# Patient Record
Sex: FEMALE | Race: WHITE | Employment: OTHER | URBAN - METROPOLITAN AREA
[De-identification: names, ages, dates, MRNs, and addresses within clinical notes are randomized per-mention and may not be internally consistent; named-entity substitution may affect disease eponyms.]

---

## 2019-03-25 ENCOUNTER — OFFICE VISIT (OUTPATIENT)
Dept: FAMILY MEDICINE CLINIC | Age: 64
End: 2019-03-25
Payer: COMMERCIAL

## 2019-03-25 VITALS
TEMPERATURE: 98.5 F | WEIGHT: 139 LBS | BODY MASS INDEX: 26.24 KG/M2 | HEART RATE: 88 BPM | DIASTOLIC BLOOD PRESSURE: 84 MMHG | SYSTOLIC BLOOD PRESSURE: 130 MMHG | HEIGHT: 61 IN | OXYGEN SATURATION: 98 %

## 2019-03-25 DIAGNOSIS — S05.01XA ABRASION OF RIGHT CORNEA, INITIAL ENCOUNTER: Primary | ICD-10-CM

## 2019-03-25 PROCEDURE — 99213 OFFICE O/P EST LOW 20 MIN: CPT | Performed by: PHYSICIAN ASSISTANT

## 2019-03-25 RX ORDER — MULTIVIT-MIN/IRON/FOLIC ACID/K 18-600-40
CAPSULE ORAL
Status: ON HOLD | COMMUNITY
End: 2022-02-11 | Stop reason: HOSPADM

## 2019-03-25 RX ORDER — ASCORBIC ACID 500 MG
500 TABLET ORAL DAILY
Status: ON HOLD | COMMUNITY
End: 2022-02-11 | Stop reason: HOSPADM

## 2019-03-25 RX ORDER — LANOLIN ALCOHOL/MO/W.PET/CERES
500 CREAM (GRAM) TOPICAL DAILY
Status: ON HOLD | COMMUNITY
End: 2022-02-11 | Stop reason: HOSPADM

## 2019-03-25 RX ORDER — TOBRAMYCIN 3 MG/ML
SOLUTION/ DROPS OPHTHALMIC
Qty: 5 ML | Refills: 0 | Status: SHIPPED
Start: 2019-03-25 | End: 2022-01-14

## 2019-03-25 SDOH — HEALTH STABILITY: MENTAL HEALTH: HOW MANY STANDARD DRINKS CONTAINING ALCOHOL DO YOU HAVE ON A TYPICAL DAY?: 1 OR 2

## 2019-03-25 SDOH — HEALTH STABILITY: MENTAL HEALTH: HOW OFTEN DO YOU HAVE A DRINK CONTAINING ALCOHOL?: MONTHLY OR LESS

## 2020-06-29 ENCOUNTER — HOSPITAL ENCOUNTER (OUTPATIENT)
Age: 65
Discharge: HOME | End: 2020-07-01
Payer: COMMERCIAL

## 2020-06-29 LAB
ALBUMIN SERPL-MCNC: 4.6 G/DL (ref 3.5–5.2)
ALP BLD-CCNC: 78 U/L (ref 35–104)
ALT SERPL-CCNC: 13 U/L (ref 0–32)
ANION GAP SERPL CALCULATED.3IONS-SCNC: 14 MMOL/L (ref 7–16)
AST SERPL-CCNC: 21 U/L (ref 0–31)
BILIRUB SERPL-MCNC: 0.4 MG/DL (ref 0–1.2)
BUN BLDV-MCNC: 15 MG/DL (ref 8–23)
CALCIUM SERPL-MCNC: 9.6 MG/DL (ref 8.6–10.2)
CHLORIDE BLD-SCNC: 103 MMOL/L (ref 98–107)
CHOLESTEROL, TOTAL: 145 MG/DL (ref 0–199)
CO2: 25 MMOL/L (ref 22–29)
CREAT SERPL-MCNC: 0.8 MG/DL (ref 0.5–1)
GFR AFRICAN AMERICAN: >60
GFR NON-AFRICAN AMERICAN: >60 ML/MIN/1.73
GLUCOSE BLD-MCNC: 94 MG/DL (ref 74–99)
HDLC SERPL-MCNC: 73 MG/DL
LDL CHOLESTEROL CALCULATED: 57 MG/DL (ref 0–99)
POTASSIUM SERPL-SCNC: 4.7 MMOL/L (ref 3.5–5)
SODIUM BLD-SCNC: 142 MMOL/L (ref 132–146)
TOTAL PROTEIN: 7.5 G/DL (ref 6.4–8.3)
TRIGL SERPL-MCNC: 75 MG/DL (ref 0–149)
TSH SERPL DL<=0.05 MIU/L-ACNC: 3.6 UIU/ML (ref 0.27–4.2)
VITAMIN D 25-HYDROXY: 67 NG/ML (ref 30–100)
VLDLC SERPL CALC-MCNC: 15 MG/DL

## 2020-06-29 PROCEDURE — 80061 LIPID PANEL: CPT

## 2020-06-29 PROCEDURE — 82306 VITAMIN D 25 HYDROXY: CPT

## 2020-06-29 PROCEDURE — 80053 COMPREHEN METABOLIC PANEL: CPT

## 2020-06-29 PROCEDURE — 84443 ASSAY THYROID STIM HORMONE: CPT

## 2020-10-28 ENCOUNTER — OFFICE VISIT (OUTPATIENT)
Dept: FAMILY MEDICINE CLINIC | Age: 65
End: 2020-10-28
Payer: COMMERCIAL

## 2020-10-28 ENCOUNTER — HOSPITAL ENCOUNTER (OUTPATIENT)
Age: 65
Discharge: HOME | End: 2020-10-30
Payer: COMMERCIAL

## 2020-10-28 VITALS
BODY MASS INDEX: 25.72 KG/M2 | HEIGHT: 60 IN | WEIGHT: 131 LBS | HEART RATE: 95 BPM | DIASTOLIC BLOOD PRESSURE: 70 MMHG | RESPIRATION RATE: 14 BRPM | OXYGEN SATURATION: 96 % | TEMPERATURE: 96 F | SYSTOLIC BLOOD PRESSURE: 130 MMHG

## 2020-10-28 LAB
APPEARANCE FLUID: ABNORMAL
BILIRUBIN, POC: ABNORMAL
BLOOD URINE, POC: ABNORMAL
CLARITY, POC: ABNORMAL
COLOR, POC: YELLOW
GLUCOSE URINE, POC: ABNORMAL
KETONES, POC: ABNORMAL
LEUKOCYTE EST, POC: ABNORMAL
NITRITE, POC: ABNORMAL
PH, POC: 6
PROTEIN, POC: ABNORMAL
SPECIFIC GRAVITY, POC: 1.01
UROBILINOGEN, POC: 0.2

## 2020-10-28 PROCEDURE — 87088 URINE BACTERIA CULTURE: CPT

## 2020-10-28 PROCEDURE — 87186 SC STD MICRODIL/AGAR DIL: CPT

## 2020-10-28 PROCEDURE — 81002 URINALYSIS NONAUTO W/O SCOPE: CPT | Performed by: PHYSICIAN ASSISTANT

## 2020-10-28 PROCEDURE — 99213 OFFICE O/P EST LOW 20 MIN: CPT | Performed by: PHYSICIAN ASSISTANT

## 2020-10-28 RX ORDER — SULFAMETHOXAZOLE AND TRIMETHOPRIM 800; 160 MG/1; MG/1
1 TABLET ORAL 2 TIMES DAILY
Qty: 14 TABLET | Refills: 0 | Status: SHIPPED | OUTPATIENT
Start: 2020-10-28 | End: 2020-11-04

## 2020-10-28 NOTE — PROGRESS NOTES
Chief Complaint:   Urinary Tract Infection (x 4-5 days, frequency, urgency, cloudy urine, foul odor)      History of Present Illness   Source of history provided by:  patient. Arcadio Forrest is a 76 y.o. old female who has a past medical history of: History reviewed. No pertinent past medical history. Presents to the walk in clinic for evaluation of a suspected UTI X 4-5 days. Reports associated frequency, urgency, foul smelling urine, cloudy urine, and suprapubic pressure. Denies gross hematuria. Denies associated flank pain. Denies any fever, chills, vaginal discharge, vaginal bleeding, possibility of pregnancy, vomiting, diarrhea, or lethargy. No LMP recorded. Patient is postmenopausal.    ROS    Unless otherwise stated in this report or unable to obtain because of the patient's clinical or mental status as evidenced by the medical record, this patients's positive and negative responses for Review of Systems, constitutional, psych, eyes, ENT, cardiovascular, respiratory, gastrointestinal, neurological, genitourinary, musculoskeletal, integument systems and systems related to the presenting problem are either stated in the preceding or were not pertinent or were negative for the symptoms and/or complaints related to the medical problem. Past Surgical history: History reviewed. No pertinent surgical history. Social History:  reports that she has never smoked. She has never used smokeless tobacco. She reports current alcohol use. She reports that she does not use drugs. Family History: family history is not on file. Allergies: Patient has no known allergies. Physical Exam         VS:  /70   Pulse 95   Temp 96 °F (35.6 °C)   Resp 14   Ht 5' (1.524 m)   Wt 131 lb (59.4 kg)   SpO2 96%   BMI 25.58 kg/m²    Oxygen Saturation Interpretation: Normal.    Constitutional:  A&Ox3, development consistent with age, NAD. Lungs:  CTAB without wheezing, rales, or rhonchi.   Heart:  RRR without

## 2020-10-30 LAB
ORGANISM: ABNORMAL
URINE CULTURE, ROUTINE: ABNORMAL

## 2022-01-14 ENCOUNTER — HOSPITAL ENCOUNTER (INPATIENT)
Age: 67
LOS: 28 days | Discharge: ANOTHER HOSPITAL | DRG: 207 | End: 2022-02-11
Attending: EMERGENCY MEDICINE | Admitting: FAMILY MEDICINE
Payer: MEDICARE

## 2022-01-14 ENCOUNTER — APPOINTMENT (OUTPATIENT)
Dept: GENERAL RADIOLOGY | Age: 67
DRG: 207 | End: 2022-01-14
Payer: MEDICARE

## 2022-01-14 DIAGNOSIS — R09.02 HYPOXIA: Primary | ICD-10-CM

## 2022-01-14 DIAGNOSIS — U07.1 COVID-19: ICD-10-CM

## 2022-01-14 PROBLEM — J12.82 PNEUMONIA DUE TO COVID-19 VIRUS: Status: ACTIVE | Noted: 2022-01-14

## 2022-01-14 LAB
ALBUMIN SERPL-MCNC: 3.9 G/DL (ref 3.5–5.2)
ALP BLD-CCNC: 90 U/L (ref 35–104)
ALT SERPL-CCNC: 81 U/L (ref 0–32)
ANION GAP SERPL CALCULATED.3IONS-SCNC: 19 MMOL/L (ref 7–16)
ANISOCYTOSIS: ABNORMAL
APTT: 32.5 SEC (ref 24.5–35.1)
AST SERPL-CCNC: 160 U/L (ref 0–31)
BASOPHILS ABSOLUTE: 0 E9/L (ref 0–0.2)
BASOPHILS RELATIVE PERCENT: 0.1 % (ref 0–2)
BILIRUB SERPL-MCNC: <0.2 MG/DL (ref 0–1.2)
BUN BLDV-MCNC: 38 MG/DL (ref 6–23)
BURR CELLS: ABNORMAL
C-REACTIVE PROTEIN: 12.8 MG/DL (ref 0–0.4)
CALCIUM SERPL-MCNC: 9.2 MG/DL (ref 8.6–10.2)
CHLORIDE BLD-SCNC: 101 MMOL/L (ref 98–107)
CO2: 19 MMOL/L (ref 22–29)
CREAT SERPL-MCNC: 1.9 MG/DL (ref 0.5–1)
D DIMER: 404 NG/ML DDU
EOSINOPHILS ABSOLUTE: 0 E9/L (ref 0.05–0.5)
EOSINOPHILS RELATIVE PERCENT: 0 % (ref 0–6)
FERRITIN: 5098 NG/ML
GFR AFRICAN AMERICAN: 32
GFR NON-AFRICAN AMERICAN: 26 ML/MIN/1.73
GLUCOSE BLD-MCNC: 122 MG/DL (ref 74–99)
HCT VFR BLD CALC: 38.2 % (ref 34–48)
HEMOGLOBIN: 12.5 G/DL (ref 11.5–15.5)
INR BLD: 1
LACTATE DEHYDROGENASE: 640 U/L (ref 135–214)
LACTIC ACID: 1.8 MMOL/L (ref 0.5–2.2)
LYMPHOCYTES ABSOLUTE: 0.29 E9/L (ref 1.5–4)
LYMPHOCYTES RELATIVE PERCENT: 2.6 % (ref 20–42)
MCH RBC QN AUTO: 30.3 PG (ref 26–35)
MCHC RBC AUTO-ENTMCNC: 32.7 % (ref 32–34.5)
MCV RBC AUTO: 92.7 FL (ref 80–99.9)
MONOCYTES ABSOLUTE: 0.19 E9/L (ref 0.1–0.95)
MONOCYTES RELATIVE PERCENT: 1.7 % (ref 2–12)
MYELOCYTE PERCENT: 0.9 % (ref 0–0)
NEUTROPHILS ABSOLUTE: 9.12 E9/L (ref 1.8–7.3)
NEUTROPHILS RELATIVE PERCENT: 94.8 % (ref 43–80)
PDW BLD-RTO: 14.3 FL (ref 11.5–15)
PLATELET # BLD: 432 E9/L (ref 130–450)
PMV BLD AUTO: 11 FL (ref 7–12)
POIKILOCYTES: ABNORMAL
POLYCHROMASIA: ABNORMAL
POTASSIUM REFLEX MAGNESIUM: 4.3 MMOL/L (ref 3.5–5)
PROCALCITONIN: 1.57 NG/ML (ref 0–0.08)
PROTHROMBIN TIME: 11.3 SEC (ref 9.3–12.4)
RBC # BLD: 4.12 E12/L (ref 3.5–5.5)
SARS-COV-2, NAAT: DETECTED
SODIUM BLD-SCNC: 139 MMOL/L (ref 132–146)
TOTAL PROTEIN: 7.5 G/DL (ref 6.4–8.3)
WBC # BLD: 9.5 E9/L (ref 4.5–11.5)

## 2022-01-14 PROCEDURE — 6370000000 HC RX 637 (ALT 250 FOR IP): Performed by: EMERGENCY MEDICINE

## 2022-01-14 PROCEDURE — 85730 THROMBOPLASTIN TIME PARTIAL: CPT

## 2022-01-14 PROCEDURE — 6370000000 HC RX 637 (ALT 250 FOR IP): Performed by: FAMILY MEDICINE

## 2022-01-14 PROCEDURE — 6360000002 HC RX W HCPCS: Performed by: FAMILY MEDICINE

## 2022-01-14 PROCEDURE — 86140 C-REACTIVE PROTEIN: CPT

## 2022-01-14 PROCEDURE — 99283 EMERGENCY DEPT VISIT LOW MDM: CPT

## 2022-01-14 PROCEDURE — XW0DXM6 INTRODUCTION OF BARICITINIB INTO MOUTH AND PHARYNX, EXTERNAL APPROACH, NEW TECHNOLOGY GROUP 6: ICD-10-PCS | Performed by: FAMILY MEDICINE

## 2022-01-14 PROCEDURE — 36415 COLL VENOUS BLD VENIPUNCTURE: CPT

## 2022-01-14 PROCEDURE — 2580000003 HC RX 258: Performed by: EMERGENCY MEDICINE

## 2022-01-14 PROCEDURE — 83615 LACTATE (LD) (LDH) ENZYME: CPT

## 2022-01-14 PROCEDURE — 84145 PROCALCITONIN (PCT): CPT

## 2022-01-14 PROCEDURE — 71045 X-RAY EXAM CHEST 1 VIEW: CPT

## 2022-01-14 PROCEDURE — 1200000000 HC SEMI PRIVATE

## 2022-01-14 PROCEDURE — 83605 ASSAY OF LACTIC ACID: CPT

## 2022-01-14 PROCEDURE — 87635 SARS-COV-2 COVID-19 AMP PRB: CPT

## 2022-01-14 PROCEDURE — 82728 ASSAY OF FERRITIN: CPT

## 2022-01-14 PROCEDURE — 93005 ELECTROCARDIOGRAM TRACING: CPT | Performed by: EMERGENCY MEDICINE

## 2022-01-14 PROCEDURE — 2700000000 HC OXYGEN THERAPY PER DAY

## 2022-01-14 PROCEDURE — 85610 PROTHROMBIN TIME: CPT

## 2022-01-14 PROCEDURE — 2580000003 HC RX 258: Performed by: FAMILY MEDICINE

## 2022-01-14 PROCEDURE — 80053 COMPREHEN METABOLIC PANEL: CPT

## 2022-01-14 PROCEDURE — 85378 FIBRIN DEGRADE SEMIQUANT: CPT

## 2022-01-14 PROCEDURE — 85025 COMPLETE CBC W/AUTO DIFF WBC: CPT

## 2022-01-14 RX ORDER — SODIUM CHLORIDE 9 MG/ML
1000 INJECTION, SOLUTION INTRAVENOUS CONTINUOUS
Status: DISCONTINUED | OUTPATIENT
Start: 2022-01-14 | End: 2022-01-15

## 2022-01-14 RX ORDER — SODIUM CHLORIDE 9 MG/ML
INJECTION, SOLUTION INTRAVENOUS CONTINUOUS
Status: DISCONTINUED | OUTPATIENT
Start: 2022-01-14 | End: 2022-01-15

## 2022-01-14 RX ORDER — ASCORBIC ACID 500 MG
500 TABLET ORAL DAILY
Status: DISCONTINUED | OUTPATIENT
Start: 2022-01-14 | End: 2022-02-09

## 2022-01-14 RX ORDER — ZINC SULFATE 50(220)MG
50 CAPSULE ORAL DAILY
Status: DISCONTINUED | OUTPATIENT
Start: 2022-01-14 | End: 2022-02-09

## 2022-01-14 RX ORDER — SODIUM CHLORIDE 0.9 % (FLUSH) 0.9 %
5-40 SYRINGE (ML) INJECTION EVERY 12 HOURS SCHEDULED
Status: DISCONTINUED | OUTPATIENT
Start: 2022-01-14 | End: 2022-02-11 | Stop reason: HOSPADM

## 2022-01-14 RX ORDER — CEFDINIR 300 MG/1
300 CAPSULE ORAL 2 TIMES DAILY
Status: ON HOLD | COMMUNITY
End: 2022-02-11 | Stop reason: HOSPADM

## 2022-01-14 RX ORDER — SODIUM CHLORIDE 0.9 % (FLUSH) 0.9 %
5-40 SYRINGE (ML) INJECTION PRN
Status: DISCONTINUED | OUTPATIENT
Start: 2022-01-14 | End: 2022-01-19

## 2022-01-14 RX ORDER — CHOLECALCIFEROL (VITAMIN D3) 50 MCG
2000 TABLET ORAL DAILY
Status: DISCONTINUED | OUTPATIENT
Start: 2022-01-14 | End: 2022-02-11 | Stop reason: HOSPADM

## 2022-01-14 RX ORDER — ONDANSETRON 4 MG/1
4 TABLET, ORALLY DISINTEGRATING ORAL EVERY 8 HOURS PRN
Status: DISCONTINUED | OUTPATIENT
Start: 2022-01-14 | End: 2022-01-19

## 2022-01-14 RX ORDER — ONDANSETRON 2 MG/ML
4 INJECTION INTRAMUSCULAR; INTRAVENOUS EVERY 6 HOURS PRN
Status: DISCONTINUED | OUTPATIENT
Start: 2022-01-14 | End: 2022-01-19

## 2022-01-14 RX ORDER — POLYETHYLENE GLYCOL 3350 17 G/17G
17 POWDER, FOR SOLUTION ORAL DAILY PRN
Status: DISCONTINUED | OUTPATIENT
Start: 2022-01-14 | End: 2022-01-19

## 2022-01-14 RX ORDER — DEXAMETHASONE SODIUM PHOSPHATE 10 MG/ML
10 INJECTION INTRAMUSCULAR; INTRAVENOUS ONCE
Status: COMPLETED | OUTPATIENT
Start: 2022-01-14 | End: 2022-01-14

## 2022-01-14 RX ORDER — SODIUM CHLORIDE 9 MG/ML
25 INJECTION, SOLUTION INTRAVENOUS PRN
Status: DISCONTINUED | OUTPATIENT
Start: 2022-01-14 | End: 2022-01-19

## 2022-01-14 RX ORDER — 0.9 % SODIUM CHLORIDE 0.9 %
1000 INTRAVENOUS SOLUTION INTRAVENOUS ONCE
Status: COMPLETED | OUTPATIENT
Start: 2022-01-14 | End: 2022-01-14

## 2022-01-14 RX ORDER — DEXAMETHASONE SODIUM PHOSPHATE 10 MG/ML
6 INJECTION INTRAMUSCULAR; INTRAVENOUS EVERY 24 HOURS
Status: DISCONTINUED | OUTPATIENT
Start: 2022-01-15 | End: 2022-01-15

## 2022-01-14 RX ORDER — ALENDRONATE SODIUM 70 MG/1
70 TABLET ORAL
Status: ON HOLD | COMMUNITY
End: 2022-02-11 | Stop reason: HOSPADM

## 2022-01-14 RX ORDER — ACETAMINOPHEN 650 MG/1
650 SUPPOSITORY RECTAL EVERY 6 HOURS PRN
Status: DISCONTINUED | OUTPATIENT
Start: 2022-01-14 | End: 2022-01-19 | Stop reason: SDUPTHER

## 2022-01-14 RX ORDER — GUAIFENESIN/DEXTROMETHORPHAN 100-10MG/5
5 SYRUP ORAL EVERY 4 HOURS PRN
Status: DISCONTINUED | OUTPATIENT
Start: 2022-01-14 | End: 2022-01-19

## 2022-01-14 RX ORDER — ACETAMINOPHEN 325 MG/1
650 TABLET ORAL ONCE
Status: COMPLETED | OUTPATIENT
Start: 2022-01-14 | End: 2022-01-14

## 2022-01-14 RX ORDER — ALBUTEROL SULFATE 90 UG/1
2 AEROSOL, METERED RESPIRATORY (INHALATION) EVERY 6 HOURS PRN
Status: ON HOLD | COMMUNITY
End: 2022-02-11 | Stop reason: HOSPADM

## 2022-01-14 RX ORDER — ACETAMINOPHEN 325 MG/1
650 TABLET ORAL EVERY 6 HOURS PRN
Status: DISCONTINUED | OUTPATIENT
Start: 2022-01-14 | End: 2022-01-19 | Stop reason: SDUPTHER

## 2022-01-14 RX ADMIN — DEXAMETHASONE SODIUM PHOSPHATE 10 MG: 10 INJECTION INTRAMUSCULAR; INTRAVENOUS at 21:02

## 2022-01-14 RX ADMIN — SODIUM CHLORIDE 1000 ML: 9 INJECTION, SOLUTION INTRAVENOUS at 20:58

## 2022-01-14 RX ADMIN — ENOXAPARIN SODIUM 50 MG: 100 INJECTION SUBCUTANEOUS at 21:00

## 2022-01-14 RX ADMIN — ZINC SULFATE 220 MG (50 MG) CAPSULE 50 MG: CAPSULE at 21:16

## 2022-01-14 RX ADMIN — ACETAMINOPHEN 650 MG: 325 TABLET ORAL at 20:59

## 2022-01-14 RX ADMIN — Medication 2000 UNITS: at 21:16

## 2022-01-14 RX ADMIN — BARICITINIB 1 MG: 2 TABLET, FILM COATED ORAL at 21:15

## 2022-01-14 RX ADMIN — OXYCODONE HYDROCHLORIDE AND ACETAMINOPHEN 500 MG: 500 TABLET ORAL at 21:16

## 2022-01-14 RX ADMIN — SODIUM CHLORIDE: 9 INJECTION, SOLUTION INTRAVENOUS at 22:00

## 2022-01-14 ASSESSMENT — PAIN SCALES - GENERAL: PAINLEVEL_OUTOF10: 0

## 2022-01-14 NOTE — ED PROVIDER NOTES
HPI:  1/14/22,   Time: 5:20 PM SHERI Carlson is a 78 y.o. female presenting to the ED for sob/diarrhea, beginning 1 week ago. The complaint has been persistent, severe in severity, and worsened by nothing. Bib private vehicle, no sick contacts. Pos n/v. No covid vaccine. No cough/congestion/ha. Pos fevers    Review of Systems:   Pertinent positives and negatives are stated within HPI, all other systems reviewed and are negative.          --------------------------------------------- PAST HISTORY ---------------------------------------------  Past Medical History:  has no past medical history on file. Past Surgical History:  has no past surgical history on file. Social History:  reports that she has never smoked. She has never used smokeless tobacco. She reports current alcohol use. She reports that she does not use drugs. Family History: family history is not on file. The patients home medications have been reviewed. Allergies: Patient has no known allergies. ---------------------------------------------------PHYSICAL EXAM--------------------------------------    Constitutional/General: Alert and oriented x3, well appearing, non toxic in NAD  Head: Normocephalic and atraumatic  Eyes: PERRL, EOMI, conjunctive normal, sclera non icteric  Mouth: Oropharynx clear, handling secretions,   Neck: Supple, full ROM,   Respiratory: Lungs clear to auscultation bilaterally, no wheezes, rales, or rhonchi. Jaya Stalker, distressed  Cardiovascular:  Regular rate. Regular rhythm. No murmurs, gallops, or rubs. 2+ distal pulses  Chest: No chest wall tenderness  GI:  Abdomen Soft, Non tender, Non distended. Musculoskeletal: Moves all extremities x 4. Warm and well perfused, no clubbing, cyanosis, or edema. Capillary refill <3 seconds  Integument: skin warm and dry. No rashes.    Lymphatic: no lymphadenopathy noted  Neurologic: GCS 15, no focal deficits,   Psychiatric: Normal Affect    -------------------------------------------------- RESULTS -------------------------------------------------  I have personally reviewed all laboratory and imaging results for this patient. Results are listed below.      LABS:  Results for orders placed or performed during the hospital encounter of 01/14/22   COVID-19, Rapid    Specimen: Nasopharyngeal Swab   Result Value Ref Range    SARS-CoV-2, NAAT DETECTED (A) Not Detected   CBC Auto Differential   Result Value Ref Range    WBC 9.5 4.5 - 11.5 E9/L    RBC 4.12 3.50 - 5.50 E12/L    Hemoglobin 12.5 11.5 - 15.5 g/dL    Hematocrit 38.2 34.0 - 48.0 %    MCV 92.7 80.0 - 99.9 fL    MCH 30.3 26.0 - 35.0 pg    MCHC 32.7 32.0 - 34.5 %    RDW 14.3 11.5 - 15.0 fL    Platelets 304 086 - 278 E9/L    MPV 11.0 7.0 - 12.0 fL    Neutrophils % 94.8 (H) 43.0 - 80.0 %    Lymphocytes % 2.6 (L) 20.0 - 42.0 %    Monocytes % 1.7 (L) 2.0 - 12.0 %    Eosinophils % 0.0 0.0 - 6.0 %    Basophils % 0.1 0.0 - 2.0 %    Neutrophils Absolute 9.12 (H) 1.80 - 7.30 E9/L    Lymphocytes Absolute 0.29 (L) 1.50 - 4.00 E9/L    Monocytes Absolute 0.19 0.10 - 0.95 E9/L    Eosinophils Absolute 0.00 (L) 0.05 - 0.50 E9/L    Basophils Absolute 0.00 0.00 - 0.20 E9/L    Myelocyte Percent 0.9 0 - 0 %    Anisocytosis 2+     Polychromasia 1+     Poikilocytes 1+     Purnima Cells 1+    Comprehensive Metabolic Panel w/ Reflex to MG   Result Value Ref Range    Sodium 139 132 - 146 mmol/L    Potassium reflex Magnesium 4.3 3.5 - 5.0 mmol/L    Chloride 101 98 - 107 mmol/L    CO2 19 (L) 22 - 29 mmol/L    Anion Gap 19 (H) 7 - 16 mmol/L    Glucose 122 (H) 74 - 99 mg/dL    BUN 38 (H) 6 - 23 mg/dL    CREATININE 1.9 (H) 0.5 - 1.0 mg/dL    GFR Non-African American 26 >=60 mL/min/1.73    GFR African American 32     Calcium 9.2 8.6 - 10.2 mg/dL    Total Protein 7.5 6.4 - 8.3 g/dL    Albumin 3.9 3.5 - 5.2 g/dL    Total Bilirubin <0.2 0.0 - 1.2 mg/dL    Alkaline Phosphatase 90 35 - 104 U/L    ALT 81 (H) 0 - 32 U/L     (H) 0 - 31 U/L   Protime-INR   Result Value Ref Range    Protime 11.3 9.3 - 12.4 sec    INR 1.0    APTT   Result Value Ref Range    aPTT 32.5 24.5 - 35.1 sec   D-Dimer, Quantitative   Result Value Ref Range    D-Dimer, Quant 404 ng/mL DDU   Lactic Acid, Plasma   Result Value Ref Range    Lactic Acid 1.8 0.5 - 2.2 mmol/L       RADIOLOGY:  Interpreted by Radiologist.  XR CHEST PORTABLE   Final Result   Bilateral airspace disease likely related to pneumonia. EKG:  This EKG is signed and interpreted by the EP. Time: 1801  Rate: 110  Rhythm: Sinus  Interpretation: non-specific EKG  Comparison: None      ------------------------- NURSING NOTES AND VITALS REVIEWED ---------------------------   The nursing notes within the ED encounter and vital signs as below have been reviewed by myself. /70   Pulse 110   Temp 103.2 °F (39.6 °C) (Oral)   Resp 20   Ht 5' (1.524 m)   Wt 109 lb (49.4 kg)   SpO2 95%   BMI 21.29 kg/m²   Oxygen Saturation Interpretation: Abnormal and Improved after treatment    The patients available past medical records and past encounters were reviewed.         ------------------------------ ED COURSE/MEDICAL DECISION MAKING----------------------  Medications   enoxaparin (LOVENOX) injection 50 mg (has no administration in time range)   dexamethasone (DECADRON) injection 10 mg (has no administration in time range)   sodium chloride flush 0.9 % injection 5-40 mL (has no administration in time range)   sodium chloride flush 0.9 % injection 5-40 mL (has no administration in time range)   0.9 % sodium chloride infusion (has no administration in time range)   enoxaparin (LOVENOX) injection 30 mg (has no administration in time range)   ondansetron (ZOFRAN-ODT) disintegrating tablet 4 mg (has no administration in time range)     Or   ondansetron (ZOFRAN) injection 4 mg (has no administration in time range)   polyethylene glycol (GLYCOLAX) packet 17 g (has no administration in time range) acetaminophen (TYLENOL) tablet 650 mg (has no administration in time range)     Or   acetaminophen (TYLENOL) suppository 650 mg (has no administration in time range)   ascorbic acid (VITAMIN C) tablet 500 mg (has no administration in time range)   zinc sulfate (ZINCATE) capsule 50 mg (has no administration in time range)   0.9 % sodium chloride infusion (has no administration in time range)   guaiFENesin-dextromethorphan (ROBITUSSIN DM) 100-10 MG/5ML syrup 5 mL (has no administration in time range)   dexamethasone (DECADRON) injection 6 mg (has no administration in time range)   Vitamin D (CHOLECALCIFEROL) tablet 2,000 Units (has no administration in time range)   acetaminophen (TYLENOL) tablet 650 mg (has no administration in time range)   0.9 % sodium chloride bolus (has no administration in time range)   0.9 % sodium chloride infusion (has no administration in time range)   baricitinib (OLUMIANT) tablet 1 mg (has no administration in time range)         ED COURSE:       Medical Decision Making:    Covid/hypoxia, o2 to keep sats over 92. Dex given, dimer noted, felisha, can't cta, dose with lovenox and admit for further care. Pt updated      This patient's ED course included: a personal history and physicial examination    This patient has remained hemodynamically stable during their ED course. Re-Evaluations:             Re-evaluation. Patients symptoms show no change    Re-examination  1/14/22   7:20 PM EST          Vital Signs:   Vitals:    01/14/22 1708 01/14/22 1718   BP:  122/70   Pulse: 119 110   Resp: 22 20   Temp: 100.8 °F (38.2 °C) 103.2 °F (39.6 °C)   TempSrc: Oral Oral   SpO2: (!) 72% 95%   Weight:  109 lb (49.4 kg)   Height:  5' (1.524 m)         Consultations:             sound    Critical Care: 35 min        Counseling:    The emergency provider has spoken with the patient and discussed todays results, in addition to providing specific details for the plan of care and counseling regarding the diagnosis and prognosis. Questions are answered at this time and they are agreeable with the plan.       --------------------------------- IMPRESSION AND DISPOSITION ---------------------------------    IMPRESSION  1. Hypoxia    2. COVID-19        DISPOSITION  Disposition: Admit to telemetry  Patient condition is stable    NOTE: This report was transcribed using voice recognition software.  Every effort was made to ensure accuracy; however, inadvertent computerized transcription errors may be present        Brandi Peck MD  01/14/22 1333

## 2022-01-15 LAB
ALBUMIN SERPL-MCNC: 3.1 G/DL (ref 3.5–5.2)
ALP BLD-CCNC: 89 U/L (ref 35–104)
ALT SERPL-CCNC: 147 U/L (ref 0–32)
ANION GAP SERPL CALCULATED.3IONS-SCNC: 18 MMOL/L (ref 7–16)
ANISOCYTOSIS: ABNORMAL
AST SERPL-CCNC: 362 U/L (ref 0–31)
ATYPICAL LYMPHOCYTE RELATIVE PERCENT: 1.8 % (ref 0–4)
BASOPHILS ABSOLUTE: 0 E9/L (ref 0–0.2)
BASOPHILS RELATIVE PERCENT: 0.1 % (ref 0–2)
BILIRUB SERPL-MCNC: <0.2 MG/DL (ref 0–1.2)
BUN BLDV-MCNC: 38 MG/DL (ref 6–23)
BURR CELLS: ABNORMAL
C-REACTIVE PROTEIN: 17.1 MG/DL (ref 0–0.4)
CALCIUM SERPL-MCNC: 8.4 MG/DL (ref 8.6–10.2)
CHLORIDE BLD-SCNC: 106 MMOL/L (ref 98–107)
CO2: 17 MMOL/L (ref 22–29)
CREAT SERPL-MCNC: 1.4 MG/DL (ref 0.5–1)
EOSINOPHILS ABSOLUTE: 0 E9/L (ref 0.05–0.5)
EOSINOPHILS RELATIVE PERCENT: 0 % (ref 0–6)
FIBRINOGEN: >700 MG/DL (ref 225–540)
GFR AFRICAN AMERICAN: 45
GFR NON-AFRICAN AMERICAN: 37 ML/MIN/1.73
GLUCOSE BLD-MCNC: 172 MG/DL (ref 74–99)
HCT VFR BLD CALC: 33.8 % (ref 34–48)
HEMOGLOBIN: 11.1 G/DL (ref 11.5–15.5)
HYPOCHROMIA: ABNORMAL
LYMPHOCYTES ABSOLUTE: 0.58 E9/L (ref 1.5–4)
LYMPHOCYTES RELATIVE PERCENT: 5.2 % (ref 20–42)
MCH RBC QN AUTO: 30.7 PG (ref 26–35)
MCHC RBC AUTO-ENTMCNC: 32.8 % (ref 32–34.5)
MCV RBC AUTO: 93.6 FL (ref 80–99.9)
METER GLUCOSE: 124 MG/DL (ref 74–99)
MONOCYTES ABSOLUTE: 0.42 E9/L (ref 0.1–0.95)
MONOCYTES RELATIVE PERCENT: 5.2 % (ref 2–12)
NEUTROPHILS ABSOLUTE: 7.3 E9/L (ref 1.8–7.3)
NEUTROPHILS RELATIVE PERCENT: 87.8 % (ref 43–80)
OVALOCYTES: ABNORMAL
PDW BLD-RTO: 14.5 FL (ref 11.5–15)
PLATELET # BLD: 384 E9/L (ref 130–450)
PMV BLD AUTO: 11.5 FL (ref 7–12)
POIKILOCYTES: ABNORMAL
POLYCHROMASIA: ABNORMAL
POTASSIUM REFLEX MAGNESIUM: 4.3 MMOL/L (ref 3.5–5)
RBC # BLD: 3.61 E12/L (ref 3.5–5.5)
SCHISTOCYTES: ABNORMAL
SODIUM BLD-SCNC: 141 MMOL/L (ref 132–146)
TOTAL PROTEIN: 6.6 G/DL (ref 6.4–8.3)
WBC # BLD: 8.3 E9/L (ref 4.5–11.5)

## 2022-01-15 PROCEDURE — 6360000002 HC RX W HCPCS: Performed by: NURSE PRACTITIONER

## 2022-01-15 PROCEDURE — 86140 C-REACTIVE PROTEIN: CPT

## 2022-01-15 PROCEDURE — 6370000000 HC RX 637 (ALT 250 FOR IP): Performed by: NURSE PRACTITIONER

## 2022-01-15 PROCEDURE — 87040 BLOOD CULTURE FOR BACTERIA: CPT

## 2022-01-15 PROCEDURE — 6370000000 HC RX 637 (ALT 250 FOR IP): Performed by: FAMILY MEDICINE

## 2022-01-15 PROCEDURE — 85384 FIBRINOGEN ACTIVITY: CPT

## 2022-01-15 PROCEDURE — 82962 GLUCOSE BLOOD TEST: CPT

## 2022-01-15 PROCEDURE — 94664 DEMO&/EVAL PT USE INHALER: CPT

## 2022-01-15 PROCEDURE — 1200000000 HC SEMI PRIVATE

## 2022-01-15 PROCEDURE — 85025 COMPLETE CBC W/AUTO DIFF WBC: CPT

## 2022-01-15 PROCEDURE — 80053 COMPREHEN METABOLIC PANEL: CPT

## 2022-01-15 PROCEDURE — 6360000002 HC RX W HCPCS: Performed by: FAMILY MEDICINE

## 2022-01-15 PROCEDURE — 2580000003 HC RX 258: Performed by: NURSE PRACTITIONER

## 2022-01-15 PROCEDURE — 94660 CPAP INITIATION&MGMT: CPT

## 2022-01-15 PROCEDURE — 94640 AIRWAY INHALATION TREATMENT: CPT

## 2022-01-15 PROCEDURE — 82570 ASSAY OF URINE CREATININE: CPT

## 2022-01-15 PROCEDURE — 2580000003 HC RX 258: Performed by: FAMILY MEDICINE

## 2022-01-15 PROCEDURE — 36415 COLL VENOUS BLD VENIPUNCTURE: CPT

## 2022-01-15 RX ORDER — IPRATROPIUM BROMIDE AND ALBUTEROL SULFATE 2.5; .5 MG/3ML; MG/3ML
1 SOLUTION RESPIRATORY (INHALATION)
Status: DISCONTINUED | OUTPATIENT
Start: 2022-01-15 | End: 2022-02-11 | Stop reason: HOSPADM

## 2022-01-15 RX ORDER — DEXAMETHASONE SODIUM PHOSPHATE 4 MG/ML
6 INJECTION, SOLUTION INTRA-ARTICULAR; INTRALESIONAL; INTRAMUSCULAR; INTRAVENOUS; SOFT TISSUE EVERY 24 HOURS
Status: DISCONTINUED | OUTPATIENT
Start: 2022-01-15 | End: 2022-01-16

## 2022-01-15 RX ADMIN — BARICITINIB 1 MG: 2 TABLET, FILM COATED ORAL at 09:07

## 2022-01-15 RX ADMIN — IPRATROPIUM BROMIDE AND ALBUTEROL SULFATE 1 AMPULE: .5; 2.5 SOLUTION RESPIRATORY (INHALATION) at 15:41

## 2022-01-15 RX ADMIN — IPRATROPIUM BROMIDE AND ALBUTEROL SULFATE 1 AMPULE: .5; 2.5 SOLUTION RESPIRATORY (INHALATION) at 21:04

## 2022-01-15 RX ADMIN — OXYCODONE HYDROCHLORIDE AND ACETAMINOPHEN 500 MG: 500 TABLET ORAL at 09:07

## 2022-01-15 RX ADMIN — Medication 2000 UNITS: at 09:07

## 2022-01-15 RX ADMIN — ZINC SULFATE 220 MG (50 MG) CAPSULE 50 MG: CAPSULE at 09:07

## 2022-01-15 RX ADMIN — WATER 1000 MG: 1 INJECTION INTRAMUSCULAR; INTRAVENOUS; SUBCUTANEOUS at 13:34

## 2022-01-15 RX ADMIN — Medication 10 ML: at 09:00

## 2022-01-15 RX ADMIN — AZITHROMYCIN MONOHYDRATE 500 MG: 500 INJECTION, POWDER, LYOPHILIZED, FOR SOLUTION INTRAVENOUS at 13:35

## 2022-01-15 RX ADMIN — ENOXAPARIN SODIUM 30 MG: 100 INJECTION SUBCUTANEOUS at 09:07

## 2022-01-15 ASSESSMENT — PAIN SCALES - GENERAL
PAINLEVEL_OUTOF10: 0
PAINLEVEL_OUTOF10: 0

## 2022-01-15 NOTE — PROGRESS NOTES
Hospitalist Progress Note      SYNOPSIS:     Ms. Kaleigh Benedict is a 78y.o. year old female who has a PMH of asthma.     She presented to the ER on 22 with complaints of SOB and diarrhea x 1 week. She was not vaccinated against COVID-19. Reportedly her daughter found her curled into the fetal position in respiratory distress at home and called 911. Vitals on arrival were significant for temperature 103.2, heart rate 110, blood pressure 122/70 and oxygen saturation 72% on room air. She was placed on 15 L via nonrebreather mask and her oxygen saturation improved to 95%. Labs were significant for bicarbonate 19, BUN 38, creatinine 1.9, anion gap 19, procalcitonin 1.57, CRP 12.8, , mildly elevated LFTs, D-dimer 404, ferritin 5098 and a positive COVID PCR. Chest x-ray showed bilateral airspace disease. She was given 1 L normal saline bolus as well Lovenox and Decadron prior to being admitted. SUBJECTIVE:    Patient seen and examined. Requiring 15L via nonrebreather, oxygen saturation 90%. Nursing reports desaturations into the 60s when nonrebreather removed for sips of water. Records reviewed. Temp (24hrs), Av.5 °F (38.1 °C), Min:98.3 °F (36.8 °C), Max:103.2 °F (39.6 °C)    DIET: ADULT DIET; Regular  CODE: Full Code    Intake/Output Summary (Last 24 hours) at 1/15/2022 0924  Last data filed at 2022 2133  Gross per 24 hour   Intake 1000 ml   Output --   Net 1000 ml       Review of Systems  All bolded are positive; please see HPI  General:  Fever, chills, diaphoresis, fatigue, malaise, night sweats, weight loss  Psychological:  Anxiety, disorientation, hallucinations. ENT:  Epistaxis, headaches, vertigo, visual changes. Cardiovascular:  Chest pain, irregular heartbeats, palpitations, paroxysmal nocturnal dyspnea. Respiratory:  Shortness of breath, coughing, sputum production, hemoptysis, wheezing, orthopnea.   Gastrointestinal:  Nausea, vomiting, diarrhea, heartburn, constipation, abdominal pain, hematemesis, hematochezia, melena, acholic stools  Genito-Urinary:  Dysuria, urgency, frequency, hematuria  Musculoskeletal:  Joint pain, joint stiffness, joint swelling, muscle pain  Neurology:  Headache, focal neurological deficits, weakness, numbness, paresthesia  Derm:  Rashes, ulcers, excoriations, bruising  Extremities:  Decreased ROM, peripheral edema, mottling      OBJECTIVE:    BP (!) 147/76   Pulse 87   Temp 98.3 °F (36.8 °C) (Oral)   Resp 27   Ht 5' (1.524 m)   Wt 109 lb (49.4 kg)   SpO2 96%   BMI 21.29 kg/m²     General appearance: Alert, in no distress  HEENT: Normal cephalic, atraumatic without obvious deformity. Pupils equal, round, and reactive to light. Neck: Supple, with full range of motion. No jugular venous distention. Trachea midline. Respiratory: Diminished bilaterally on 15L via nonrebreather mask  Cardiovascular: RRR, no murmur noted  Abdomen: Soft, nontender, nondistended, flat  Musculoskeletal: No clubbing, cyanosis, edema of bilateral lower extremities. Brisk capillary refill. Skin: Normal skin color. No rashes or lesions. Neurologic:  Neurovascularly intact without any focal sensory/motor deficits.    Psychiatric: Calm and cooperative    Medications:  REVIEWED DAILY    Infusion Medications    sodium chloride      sodium chloride 75 mL/hr at 01/14/22 2200    sodium chloride       Scheduled Medications    sodium chloride flush  5-40 mL IntraVENous 2 times per day    ascorbic acid  500 mg Oral Daily    zinc sulfate  50 mg Oral Daily    dexamethasone  6 mg IntraVENous Q24H    vitamin D  2,000 Units Oral Daily    baricitinib  1 mg Oral Daily    enoxaparin  30 mg SubCUTAneous Daily     PRN Meds: sodium chloride flush, sodium chloride, ondansetron **OR** ondansetron, polyethylene glycol, acetaminophen **OR** acetaminophen, guaiFENesin-dextromethorphan    Labs:     Recent Labs     01/14/22  1750 01/15/22  0432   WBC 9.5 8.3   HGB 12.5 11.1* HCT 38.2 33.8*    384       Recent Labs     01/14/22  1750 01/15/22  0432    141   K 4.3 4.3    106   CO2 19* 17*   BUN 38* 38*   CREATININE 1.9* 1.4*   CALCIUM 9.2 8.4*       Recent Labs     01/14/22  1750 01/15/22  0432   PROT 7.5 6.6   ALKPHOS 90 89   ALT 81* 147*   * 362*   BILITOT <0.2 <0.2       Recent Labs     01/14/22  1750   INR 1.0       No results for input(s): Alfonzo Weems in the last 72 hours. Chronic labs:    Lab Results   Component Value Date    CHOL 145 06/29/2020    TRIG 75 06/29/2020    HDL 73 06/29/2020    LDLCALC 57 06/29/2020    TSH 3.600 06/29/2020    INR 1.0 01/14/2022       Radiology: REVIEWED DAILY      ASSESSMENT and PLAN:    COVID-19 pneumonia-unvaccinated, symptom onset approximately 1 week prior to presentation. To continue with Decadron for, vitamin C, vitamin D, zinc and baricitinib. Lovenox prophylaxis. Trend inflammatory markers. She is requiring 15L presently and quite tachypneic. CRP trending up. Will consult pulmonary for possible toci. Possible superimposed bacterial pneumonia-procalcitonin 1.57. To obtain blood and sputum cultures, urine antigens. Start empiric azithromycin and ceftriaxone pending cultures. Acute hypoxic respiratory failure-2/2 above, presently requiring 15L via nonrebreather for saturation 90%. Wean O2 as tolerated. SAMUEL stage I-volume responsive prerenal SAMUEL in the setting of COVID-19 and diarrhea. Nephrology has been consulted. Baseline creatinine appears to be 0.7 mg/dL. Presently on normal saline at 75 mL/hr. Low bicarbonate level with elevated anion gap-likely HAGMA 2/2 uremia; continue to monitor bicarbonate levels    Mildly elevated LFTs-in setting of above. Continue to monitor CMP. Normocytic anemia-continue to monitor H&H    Hx asthma- states that she has never seen a pulmonologist and that it is controlled with PRN albuterol.       DISPOSITION: Continued inpatient care    +++++++++++++++++++++++++++++++++++++++++++++++++  MAUREEN Barone CNP Physician - Hospitalist  [address], [place]  +++++++++++++++++++++++++++++++++++++++++++++++++  NOTE: This report was transcribed using voice recognition software. Every effort was made to ensure accuracy; however, inadvertent computerized transcription errors may be present.

## 2022-01-15 NOTE — ED NOTES
PT was incontinent of bladder, this nurse did a bed change. Pt was OOB without non rebreather on O2 72%, PT appeard to be confused.  This nurse re education Pt on the use of O2 and placed Pt back in bed, all needs met at his time     Isaac [facility]  01/14/22 1440

## 2022-01-15 NOTE — CONSULTS
Department of Internal Medicine  Nephrology Nurse Practitioner Consult Note      Reason for Consult: SAMUEL  Requesting Physician:  Arnold Antonio DO    CHIEF COMPLAINT:  SOB and diahhrea    History Obtained From:  patient, electronic medical record    HISTORY OF PRESENT ILLNESS:  Briefly, Ms. Alta Paul is a 78year old female with a past medical history of Asthma who presented to the ER on January 14 th, 2022 with complaints of SOB and diarrhea for one week. She is not vaccinated against COVID. Reportedly her daughter found her curled into a fetal position in respiratory distress at home and EMS was summoned. She was found to be COVID 19 positive. Labs were significant for bicarbonate 19, BUN 38, creatinine 1.9, anion gap 19, procalcitonin 1.57, CRP 12.8, , mildly elevated LFTs, D-dimer 404, ferritin 5098. Chest x-ray showed bilateral airspace disease. Renal is consulted for SAMUEL. Past Medical History:    History reviewed. No pertinent past medical history. Past Surgical History:    History reviewed. No pertinent surgical history.   Current Medications:    Current Facility-Administered Medications: azithromycin (ZITHROMAX) 500 mg in D5W 250ml Vial Mate, 500 mg, IntraVENous, Q24H  cefTRIAXone (ROCEPHIN) 1,000 mg in sterile water 10 mL IV syringe, 1,000 mg, IntraVENous, Q24H  ipratropium-albuterol (DUONEB) nebulizer solution 1 ampule, 1 ampule, Inhalation, Q4H WA  enoxaparin (LOVENOX) injection 30 mg, 30 mg, SubCUTAneous, BID  sodium chloride flush 0.9 % injection 5-40 mL, 5-40 mL, IntraVENous, 2 times per day  sodium chloride flush 0.9 % injection 5-40 mL, 5-40 mL, IntraVENous, PRN  0.9 % sodium chloride infusion, 25 mL, IntraVENous, PRN  ondansetron (ZOFRAN-ODT) disintegrating tablet 4 mg, 4 mg, Oral, Q8H PRN **OR** ondansetron (ZOFRAN) injection 4 mg, 4 mg, IntraVENous, Q6H PRN  polyethylene glycol (GLYCOLAX) packet 17 g, 17 g, Oral, Daily PRN  acetaminophen (TYLENOL) tablet 650 mg, 650 mg, Oral, Q6H PRN **OR** acetaminophen (TYLENOL) suppository 650 mg, 650 mg, Rectal, Q6H PRN  ascorbic acid (VITAMIN C) tablet 500 mg, 500 mg, Oral, Daily  zinc sulfate (ZINCATE) capsule 50 mg, 50 mg, Oral, Daily  0.9 % sodium chloride infusion, , IntraVENous, Continuous  guaiFENesin-dextromethorphan (ROBITUSSIN DM) 100-10 MG/5ML syrup 5 mL, 5 mL, Oral, Q4H PRN  dexamethasone (DECADRON) injection 6 mg, 6 mg, IntraVENous, Q24H  Vitamin D (CHOLECALCIFEROL) tablet 2,000 Units, 2,000 Units, Oral, Daily  baricitinib (OLUMIANT) tablet 1 mg, 1 mg, Oral, Daily  Allergies:  Patient has no known allergies. Social History:    TOBACCO:   reports that she has never smoked. She has never used smokeless tobacco.  ETOH:   reports current alcohol use. DRUGS:   reports no history of drug use. Family History:   History reviewed. No pertinent family history. REVIEW OF SYSTEMS:    General:  Fever, chills, diaphoresis, fatigue, malaise, night sweats, weight loss  Psychological:  Anxiety, disorientation, hallucinations. ENT:  Epistaxis, headaches, vertigo, visual changes. Cardiovascular:  Chest pain, irregular heartbeats, palpitations, paroxysmal nocturnal dyspnea. Respiratory:  Shortness of breath, coughing, sputum production, hemoptysis, wheezing, orthopnea.   Gastrointestinal:  Nausea, vomiting, diarrhea, heartburn, constipation, abdominal pain, hematemesis, hematochezia, melena, acholic stools  Genito-Urinary:  Dysuria, urgency, frequency, hematuria  Musculoskeletal:  Joint pain, joint stiffness, joint swelling, muscle pain  Neurology:  Headache, focal neurological deficits, weakness, numbness, paresthesia  Derm:  Rashes, ulcers, excoriations, bruising  Extremities:  Decreased ROM, peripheral edema, mottling    PHYSICAL EXAM:      Vitals:    VITALS:  /81   Pulse 92   Temp 98.3 °F (36.8 °C) (Oral)   Resp 27   Ht 5' (1.524 m)   Wt 109 lb (49.4 kg)   SpO2 91%   BMI 21.29 kg/m²   24HR INTAKE/OUTPUT:    Intake/Output Summary (Last 24 hours) at 1/15/2022 1618  Last data filed at 1/14/2022 2133  Gross per 24 hour   Intake 1000 ml   Output --   Net 1000 ml     General appearance: Alert, in no distress  HEENT: Normal cephalic, atraumatic without obvious deformity. Pupils equal, round, and reactive to light. Neck: Supple, with full range of motion. No jugular venous distention. Trachea midline. Respiratory: Diminished bilaterally on 15L via nonrebreather mask  Cardiovascular: RRR, no murmur noted  Abdomen: Soft, nontender, nondistended, flat  Musculoskeletal: No clubbing, cyanosis, edema of bilateral lower extremities. Brisk capillary refill. Skin: Normal skin color.  No rashes or lesions. Neurologic:  Neurovascularly intact without any focal sensory/motor deficits. Psychiatric: Calm and cooperative    DATA:    CBC with Differential:    Lab Results   Component Value Date    WBC 8.3 01/15/2022    RBC 3.61 01/15/2022    HGB 11.1 01/15/2022    HCT 33.8 01/15/2022     01/15/2022    MCV 93.6 01/15/2022    MCH 30.7 01/15/2022    MCHC 32.8 01/15/2022    RDW 14.5 01/15/2022    LYMPHOPCT 5.2 01/15/2022    MONOPCT 5.2 01/15/2022    MYELOPCT 0.9 01/14/2022    BASOPCT 0.1 01/15/2022    MONOSABS 0.42 01/15/2022    LYMPHSABS 0.58 01/15/2022    EOSABS 0.00 01/15/2022    BASOSABS 0.00 01/15/2022     CMP:    Lab Results   Component Value Date     01/15/2022    K 4.3 01/15/2022     01/15/2022    CO2 17 01/15/2022    BUN 38 01/15/2022    CREATININE 1.4 01/15/2022    GFRAA 45 01/15/2022    LABGLOM 37 01/15/2022    GLUCOSE 172 01/15/2022    PROT 6.6 01/15/2022    LABALBU 3.1 01/15/2022    CALCIUM 8.4 01/15/2022    BILITOT <0.2 01/15/2022    ALKPHOS 89 01/15/2022     01/15/2022     01/15/2022     Magnesium:  No results found for: MG  Phosphorus:  No results found for: PHOS       Radiology Review:        Chest XR 1/14/22   Bilateral airspace disease likely related to pneumonia.          IMPRESSION/RECOMMENDATIONS:  Briefly, Ms. Margie Valle is a 78year old female with a past medical history of Asthma who presented to the ER on January 14 th, 2022 with complaints of SOB and diarrhea for one week. She is not vaccinated against COVID. Reportedly her daughter found her curled into a fetal position in respiratory distress at home and EMS was summoned. She was found to be COVID 19 positive. Labs were significant for bicarbonate 19, BUN 38, creatinine 1.9, anion gap 19, procalcitonin 1.57, CRP 12.8, , mildly elevated LFTs, D-dimer 404, ferritin 5098. Chest x-ray showed bilateral airspace disease. Renal is consulted for SAMUEL. 1. SAMUEL, likely pre-renal volume responsive SAMUEL secondary to poor oral intake and GI losses (diarrhea). Creatinine 1.9mg/dL on admission. Renal function has improved to 1.4mg/dL with IVF administration. Will continue IVF. 2. HAGMA with low bicarbonate levels, secondary to uremia. To obtain ABG and start bicarbonate drip  3. COVID +, on Decadron, Baricitinib  4. Acute hypoxic respiratory failure, secondary to COVID pneumonia. 5. Normocytic anemia    Plan:    · Change IVF to 0.45%NS + 75 mEq sodium bicarbonate at 150 ml/hr. · Obtain ABG  · Continue to monitor renal function           Thank you very much Dr. Gray Reina DO for allowing us to participate in the care of Ms. Gloria Rodriguez.

## 2022-01-15 NOTE — H&P
Hospitalist History & Physical      PCP: No primary care provider on file. Date of Service: Pt seen/examined on 1/14/2022     Chief Complaint:  had concerns including Shortness of Breath and Concern For COVID-19. History Of Present Illness:    Ms. Law Felder, a 78y.o. year old female  who  has no past medical history on file. Patient presents emergency department with complaints of shortness of breath and diarrhea. This began about a week ago. Patient is not vaccinated against COVID-19. Vital signs reveal the patient to be febrile with a temperature of 103.2 and tachycardic with a rate of 110. He is also hypoxic with SPO2 of 72% on room air. Patient was placed on nonrebreather at 15 L and currently is 95%. Laboratory studies reveal BUN 38, creatinine 1.9, glucose 122, ALT 81, , D-dimer 404. COVID-19 positive. Chest x-ray shows bilateral pulmonary infiltrates. Patient was given Lovenox and dexamethasone and medicine was consulted for admission. History reviewed. No pertinent past medical history. History reviewed. No pertinent surgical history. Prior to Admission medications    Medication Sig Start Date End Date Taking? Authorizing Provider   Lansoprazole (PREVACID PO) Take by mouth    Historical Provider, MD   Alpha-D-Galactosidase (BEANO PO) Take by mouth    Historical Provider, MD   vitamin B-12 (CYANOCOBALAMIN) 1000 MCG tablet Take 500 mcg by mouth daily    Historical Provider, MD   vitamin C (ASCORBIC ACID) 500 MG tablet Take 500 mg by mouth daily    Historical Provider, MD   Cholecalciferol (VITAMIN D) 2000 units CAPS capsule Take by mouth    Historical Provider, MD   RaNITidine HCl (ZANTAC 150 MAXIMUM STRENGTH PO) Take by mouth    Historical Provider, MD   tobramycin (TOBREX) 0.3 % ophthalmic solution 2 drops L eye q4 hrs WA x 5 days 3/25/19   Shanique Erickson PA-C         Allergies:  Patient has no known allergies.     Social History:    TOBACCO:   reports that she has never smoked. She has never used smokeless tobacco.  ETOH:   reports current alcohol use. Family History:    Reviewed in detail and negative for DM, CAD, Cancer, CVA. Positive as follows\"  History reviewed. No pertinent family history. REVIEW OF SYSTEMS:   Pertinent positives as noted in the HPI. All other systems reviewed and negative. PHYSICAL EXAM:  /70   Pulse 110   Temp 103.2 °F (39.6 °C) (Oral)   Resp 20   Ht 5' (1.524 m)   Wt 109 lb (49.4 kg)   SpO2 95%   BMI 21.29 kg/m²   General appearance: No apparent distress, appears stated age and cooperative. HEENT: Normal cephalic, atraumatic without obvious deformity. Pupils equal, round, and reactive to light. Extra ocular muscles intact. Conjunctivae/corneas clear. Neck: Supple, with full range of motion. No jugular venous distention. Trachea midline. Respiratory: Clear to auscultation bilaterally  Cardiovascular: Tachycardic  Abdomen: Soft, nontender, nondistended  Musculoskeletal: No clubbing, cyanosis, edema of bilateral lower extremities. Brisk capillary refill. Skin: Normal skin color. No rashes or lesions. Neurologic:  Neurovascularly intact without any focal sensory/motor deficits. Cranial nerves: II-XII intact, grossly non-focal.  Psychiatric: Alert and oriented, thought content appropriate, normal insight    Reviewed EKG and CXR personally      CBC:   Recent Labs     01/14/22  1750   WBC 9.5   RBC 4.12   HGB 12.5   HCT 38.2   MCV 92.7   RDW 14.3        BMP:   Recent Labs     01/14/22  1750      K 4.3      CO2 19*   BUN 38*   CREATININE 1.9*     LFT:  Recent Labs     01/14/22  1750   PROT 7.5   ALKPHOS 90   ALT 81*   *   BILITOT <0.2     CE:  No results for input(s): Valerie Breeze in the last 72 hours. PT/INR:   Recent Labs     01/14/22  1750   INR 1.0   APTT 32.5     BNP: No results for input(s): BNP in the last 72 hours.   ESR: No results found for: SEDRATE  CRP: No results found for: CRP  D Dimer:   Lab Results   Component Value Date    DDIMER 404 01/14/2022      Folate and B12: No results found for: HYPAICUR28, No results found for: FOLATE  Lactic Acid:   Lab Results   Component Value Date    LACTA 1.8 01/14/2022     Thyroid Studies:   Lab Results   Component Value Date    TSH 3.600 06/29/2020       Oupatient labs:  Lab Results   Component Value Date    CHOL 145 06/29/2020    TRIG 75 06/29/2020    HDL 73 06/29/2020    LDLCALC 57 06/29/2020    TSH 3.600 06/29/2020    INR 1.0 01/14/2022       Urinalysis:    Lab Results   Component Value Date    BLOODU Trace-Intact 10/28/2020    SPECGRAV 1.015 10/28/2020    GLUCOSEU neg 10/28/2020       Imaging:  XR CHEST PORTABLE    Result Date: 1/14/2022  EXAMINATION: ONE XRAY VIEW OF THE CHEST 1/14/2022 4:37 pm COMPARISON: None. HISTORY: ORDERING SYSTEM PROVIDED HISTORY: hypoxia TECHNOLOGIST PROVIDED HISTORY: Reason for exam:->hypoxia What reading provider will be dictating this exam?->CRC FINDINGS: There is extensive bilateral airspace disease bilaterally. The findings are most compatible with pneumonia. Heart size is borderline. Pulmonary vascularity is not clearly congested. Bilateral airspace disease likely related to pneumonia. ASSESSMENT:  -COVID-19 pneumonia  -Acute hypoxic respiratory failure  -Acute renal failure      PLAN:  -Admit to medicine  -Consult nephrology  -Pharmacy to dose baricitinib  -Dexamethasone 6 mg IV daily  -Vitamin C, vitamin D, zinc  -Lovenox 30 mg twice daily  -Monitor serial inflammatory markers  -Normal saline at 75 mils per hour  -Monitor renal function avoid nephrotoxic medications        Diet: No diet orders on file  Code Status: No Order  Surrogate decision maker confirmed with patient:   Extended Emergency Contact Information  Primary Emergency Contact: Jenny Mendosa  Mobile Phone: [phone]  Relation: Domestic Partner   needed?  No    DVT Prophylaxis: []Lovenox []Heparin []PCD [] [address]  []Encouraged ambulation  Disposition: []Med/Surg [] Intermediate [] ICU/CCU  Admit status: [] Observation [] Inpatient     +++++++++++++++++++++++++++++++++++++++++++++++++  DO KIESHA Bailey/ [place] [address]  +++++++++++++++++++++++++++++++++++++++++++++++++  NOTE: This report was transcribed using voice recognition software. Every effort was made to ensure accuracy; however, inadvertent computerized transcription errors may be present.

## 2022-01-16 ENCOUNTER — APPOINTMENT (OUTPATIENT)
Dept: GENERAL RADIOLOGY | Age: 67
DRG: 207 | End: 2022-01-16
Payer: MEDICARE

## 2022-01-16 LAB
ALBUMIN SERPL-MCNC: 3.3 G/DL (ref 3.5–5.2)
ALP BLD-CCNC: 87 U/L (ref 35–104)
ALT SERPL-CCNC: 111 U/L (ref 0–32)
ANION GAP SERPL CALCULATED.3IONS-SCNC: 15 MMOL/L (ref 7–16)
ANION GAP SERPL CALCULATED.3IONS-SCNC: 19 MMOL/L (ref 7–16)
AST SERPL-CCNC: 210 U/L (ref 0–31)
BASOPHILS ABSOLUTE: 0.01 E9/L (ref 0–0.2)
BASOPHILS RELATIVE PERCENT: 0.1 % (ref 0–2)
BILIRUB SERPL-MCNC: 0.2 MG/DL (ref 0–1.2)
BUN BLDV-MCNC: 31 MG/DL (ref 6–23)
BUN BLDV-MCNC: 34 MG/DL (ref 6–23)
C-REACTIVE PROTEIN: 9 MG/DL (ref 0–0.4)
CALCIUM SERPL-MCNC: 8.4 MG/DL (ref 8.6–10.2)
CALCIUM SERPL-MCNC: 8.5 MG/DL (ref 8.6–10.2)
CHLORIDE BLD-SCNC: 110 MMOL/L (ref 98–107)
CHLORIDE BLD-SCNC: 110 MMOL/L (ref 98–107)
CHLORIDE URINE RANDOM: 31 MMOL/L
CO2: 19 MMOL/L (ref 22–29)
CO2: 24 MMOL/L (ref 22–29)
CREAT SERPL-MCNC: 0.9 MG/DL (ref 0.5–1)
CREAT SERPL-MCNC: 1 MG/DL (ref 0.5–1)
CREATININE URINE: 73 MG/DL (ref 29–226)
EKG ATRIAL RATE: 113 BPM
EKG P AXIS: 52 DEGREES
EKG P-R INTERVAL: 124 MS
EKG Q-T INTERVAL: 316 MS
EKG QRS DURATION: 76 MS
EKG QTC CALCULATION (BAZETT): 433 MS
EKG R AXIS: 50 DEGREES
EKG T AXIS: 32 DEGREES
EKG VENTRICULAR RATE: 113 BPM
EOSINOPHILS ABSOLUTE: 0 E9/L (ref 0.05–0.5)
EOSINOPHILS RELATIVE PERCENT: 0 % (ref 0–6)
GFR AFRICAN AMERICAN: >60
GFR AFRICAN AMERICAN: >60
GFR NON-AFRICAN AMERICAN: 55 ML/MIN/1.73
GFR NON-AFRICAN AMERICAN: >60 ML/MIN/1.73
GLUCOSE BLD-MCNC: 148 MG/DL (ref 74–99)
GLUCOSE BLD-MCNC: 148 MG/DL (ref 74–99)
HCT VFR BLD CALC: 33.1 % (ref 34–48)
HEMOGLOBIN: 10.8 G/DL (ref 11.5–15.5)
IMMATURE GRANULOCYTES #: 0.1 E9/L
IMMATURE GRANULOCYTES %: 0.9 % (ref 0–5)
L. PNEUMOPHILA SEROGP 1 UR AG: NORMAL
LYMPHOCYTES ABSOLUTE: 0.89 E9/L (ref 1.5–4)
LYMPHOCYTES RELATIVE PERCENT: 8.3 % (ref 20–42)
MCH RBC QN AUTO: 30.8 PG (ref 26–35)
MCHC RBC AUTO-ENTMCNC: 32.6 % (ref 32–34.5)
MCV RBC AUTO: 94.3 FL (ref 80–99.9)
MONOCYTES ABSOLUTE: 0.72 E9/L (ref 0.1–0.95)
MONOCYTES RELATIVE PERCENT: 6.8 % (ref 2–12)
NEUTROPHILS ABSOLUTE: 8.94 E9/L (ref 1.8–7.3)
NEUTROPHILS RELATIVE PERCENT: 83.9 % (ref 43–80)
PDW BLD-RTO: 14.6 FL (ref 11.5–15)
PLATELET # BLD: 512 E9/L (ref 130–450)
PMV BLD AUTO: 11.2 FL (ref 7–12)
POTASSIUM REFLEX MAGNESIUM: 3.6 MMOL/L (ref 3.5–5)
POTASSIUM SERPL-SCNC: 3.4 MMOL/L (ref 3.5–5)
POTASSIUM, UR: 36.6 MMOL/L
RBC # BLD: 3.51 E12/L (ref 3.5–5.5)
SODIUM BLD-SCNC: 148 MMOL/L (ref 132–146)
SODIUM BLD-SCNC: 149 MMOL/L (ref 132–146)
SODIUM URINE: 36 MMOL/L
STREP PNEUMONIAE ANTIGEN, URINE: NORMAL
TOTAL PROTEIN: 6.6 G/DL (ref 6.4–8.3)
WBC # BLD: 10.7 E9/L (ref 4.5–11.5)

## 2022-01-16 PROCEDURE — 6360000002 HC RX W HCPCS: Performed by: NURSE PRACTITIONER

## 2022-01-16 PROCEDURE — 86140 C-REACTIVE PROTEIN: CPT

## 2022-01-16 PROCEDURE — 85025 COMPLETE CBC W/AUTO DIFF WBC: CPT

## 2022-01-16 PROCEDURE — 36415 COLL VENOUS BLD VENIPUNCTURE: CPT

## 2022-01-16 PROCEDURE — 94660 CPAP INITIATION&MGMT: CPT

## 2022-01-16 PROCEDURE — 2580000003 HC RX 258: Performed by: FAMILY MEDICINE

## 2022-01-16 PROCEDURE — 6370000000 HC RX 637 (ALT 250 FOR IP): Performed by: NURSE PRACTITIONER

## 2022-01-16 PROCEDURE — 6360000002 HC RX W HCPCS: Performed by: FAMILY MEDICINE

## 2022-01-16 PROCEDURE — 84300 ASSAY OF URINE SODIUM: CPT

## 2022-01-16 PROCEDURE — 6360000002 HC RX W HCPCS

## 2022-01-16 PROCEDURE — 2500000003 HC RX 250 WO HCPCS: Performed by: NURSE PRACTITIONER

## 2022-01-16 PROCEDURE — 94640 AIRWAY INHALATION TREATMENT: CPT

## 2022-01-16 PROCEDURE — 84133 ASSAY OF URINE POTASSIUM: CPT

## 2022-01-16 PROCEDURE — 80048 BASIC METABOLIC PNL TOTAL CA: CPT

## 2022-01-16 PROCEDURE — 71045 X-RAY EXAM CHEST 1 VIEW: CPT

## 2022-01-16 PROCEDURE — 1200000000 HC SEMI PRIVATE

## 2022-01-16 PROCEDURE — 6370000000 HC RX 637 (ALT 250 FOR IP)

## 2022-01-16 PROCEDURE — 2580000003 HC RX 258: Performed by: NURSE PRACTITIONER

## 2022-01-16 PROCEDURE — 6370000000 HC RX 637 (ALT 250 FOR IP): Performed by: FAMILY MEDICINE

## 2022-01-16 PROCEDURE — 87449 NOS EACH ORGANISM AG IA: CPT

## 2022-01-16 PROCEDURE — 82436 ASSAY OF URINE CHLORIDE: CPT

## 2022-01-16 PROCEDURE — 2700000000 HC OXYGEN THERAPY PER DAY

## 2022-01-16 PROCEDURE — 80053 COMPREHEN METABOLIC PANEL: CPT

## 2022-01-16 RX ORDER — PANTOPRAZOLE SODIUM 40 MG/1
40 TABLET, DELAYED RELEASE ORAL
Status: DISCONTINUED | OUTPATIENT
Start: 2022-01-17 | End: 2022-01-19

## 2022-01-16 RX ORDER — DEXAMETHASONE SODIUM PHOSPHATE 10 MG/ML
10 INJECTION, SOLUTION INTRAMUSCULAR; INTRAVENOUS EVERY 12 HOURS
Status: DISCONTINUED | OUTPATIENT
Start: 2022-01-16 | End: 2022-01-19

## 2022-01-16 RX ORDER — BUDESONIDE AND FORMOTEROL FUMARATE DIHYDRATE 160; 4.5 UG/1; UG/1
2 AEROSOL RESPIRATORY (INHALATION) 2 TIMES DAILY
Status: DISCONTINUED | OUTPATIENT
Start: 2022-01-16 | End: 2022-01-20

## 2022-01-16 RX ORDER — DEXAMETHASONE SODIUM PHOSPHATE 10 MG/ML
10 INJECTION, SOLUTION INTRAMUSCULAR; INTRAVENOUS EVERY 24 HOURS
Status: DISCONTINUED | OUTPATIENT
Start: 2022-01-21 | End: 2022-01-19

## 2022-01-16 RX ADMIN — IPRATROPIUM BROMIDE AND ALBUTEROL SULFATE 1 AMPULE: .5; 2.5 SOLUTION RESPIRATORY (INHALATION) at 13:23

## 2022-01-16 RX ADMIN — ONDANSETRON 4 MG: 2 INJECTION INTRAMUSCULAR; INTRAVENOUS at 03:44

## 2022-01-16 RX ADMIN — SODIUM BICARBONATE: 84 INJECTION, SOLUTION INTRAVENOUS at 00:15

## 2022-01-16 RX ADMIN — DEXAMETHASONE SODIUM PHOSPHATE 6 MG: 4 INJECTION, SOLUTION INTRAMUSCULAR; INTRAVENOUS at 00:18

## 2022-01-16 RX ADMIN — IPRATROPIUM BROMIDE AND ALBUTEROL SULFATE 1 AMPULE: .5; 2.5 SOLUTION RESPIRATORY (INHALATION) at 15:37

## 2022-01-16 RX ADMIN — BUDESONIDE AND FORMOTEROL FUMARATE DIHYDRATE 2 PUFF: 160; 4.5 AEROSOL RESPIRATORY (INHALATION) at 20:22

## 2022-01-16 RX ADMIN — IPRATROPIUM BROMIDE AND ALBUTEROL SULFATE 1 AMPULE: .5; 2.5 SOLUTION RESPIRATORY (INHALATION) at 08:03

## 2022-01-16 RX ADMIN — ENOXAPARIN SODIUM 30 MG: 100 INJECTION SUBCUTANEOUS at 20:21

## 2022-01-16 RX ADMIN — Medication 10 ML: at 20:18

## 2022-01-16 RX ADMIN — AZITHROMYCIN MONOHYDRATE 500 MG: 500 INJECTION, POWDER, LYOPHILIZED, FOR SOLUTION INTRAVENOUS at 11:45

## 2022-01-16 RX ADMIN — OXYCODONE HYDROCHLORIDE AND ACETAMINOPHEN 500 MG: 500 TABLET ORAL at 08:30

## 2022-01-16 RX ADMIN — IPRATROPIUM BROMIDE AND ALBUTEROL SULFATE 1 AMPULE: .5; 2.5 SOLUTION RESPIRATORY (INHALATION) at 19:53

## 2022-01-16 RX ADMIN — SODIUM BICARBONATE: 84 INJECTION, SOLUTION INTRAVENOUS at 14:24

## 2022-01-16 RX ADMIN — ENOXAPARIN SODIUM 30 MG: 100 INJECTION SUBCUTANEOUS at 08:29

## 2022-01-16 RX ADMIN — ZINC SULFATE 220 MG (50 MG) CAPSULE 50 MG: CAPSULE at 08:29

## 2022-01-16 RX ADMIN — Medication 10 ML: at 00:15

## 2022-01-16 RX ADMIN — BARICITINIB 1 MG: 2 TABLET, FILM COATED ORAL at 08:30

## 2022-01-16 RX ADMIN — BUDESONIDE AND FORMOTEROL FUMARATE DIHYDRATE 2 PUFF: 160; 4.5 AEROSOL RESPIRATORY (INHALATION) at 12:05

## 2022-01-16 RX ADMIN — Medication 10 ML: at 08:30

## 2022-01-16 RX ADMIN — DEXAMETHASONE SODIUM PHOSPHATE 10 MG: 10 INJECTION INTRAMUSCULAR; INTRAVENOUS at 11:48

## 2022-01-16 RX ADMIN — Medication 2000 UNITS: at 08:29

## 2022-01-16 RX ADMIN — WATER 1000 MG: 1 INJECTION INTRAMUSCULAR; INTRAVENOUS; SUBCUTANEOUS at 10:30

## 2022-01-16 RX ADMIN — SODIUM BICARBONATE: 84 INJECTION, SOLUTION INTRAVENOUS at 11:45

## 2022-01-16 RX ADMIN — ENOXAPARIN SODIUM 30 MG: 100 INJECTION SUBCUTANEOUS at 00:21

## 2022-01-16 RX ADMIN — ACETAMINOPHEN 650 MG: 325 TABLET ORAL at 05:02

## 2022-01-16 RX ADMIN — DEXAMETHASONE SODIUM PHOSPHATE 10 MG: 10 INJECTION INTRAMUSCULAR; INTRAVENOUS at 20:19

## 2022-01-16 ASSESSMENT — PAIN SCALES - GENERAL
PAINLEVEL_OUTOF10: 5
PAINLEVEL_OUTOF10: 0

## 2022-01-16 ASSESSMENT — PAIN DESCRIPTION - PAIN TYPE: TYPE: ACUTE PAIN

## 2022-01-16 ASSESSMENT — PAIN DESCRIPTION - LOCATION: LOCATION: HEAD

## 2022-01-16 ASSESSMENT — PAIN DESCRIPTION - DESCRIPTORS: DESCRIPTORS: HEADACHE

## 2022-01-16 NOTE — PROGRESS NOTES
Nutrition Assessment     Type and Reason for Visit: Initial,Positive Nutrition Screen    Nutrition Recommendations/Plan: Continue current diet, Start Ensure BID     Nutrition Assessment:  Pt adm w/ hypoxia 2/2 +COVID19. Noted SAMUEL. Subjective wt loss/poor PO intake PTA. Will add ONS and monitor    Malnutrition Assessment:  Malnutrition Status: Insufficient data (d/t covid iso)    Nutrition Related Findings: pt alert, abd WDL, no noted edema, +I/Os      Current Nutrition Therapies:    ADULT DIET; Regular    Anthropometric Measures:  · Height: 5' (152.4 cm)  · Current Body Wt: 109 lb (49.4 kg) (bed scale 1/14)   · BMI: 21.3    Nutrition Diagnosis:   No nutrition diagnosis at this time     Nutrition Interventions:   Food and/or Nutrient Delivery:  Continue Current Diet,Start Oral Nutrition Supplement (Ensure BID)  Nutrition Education/Counseling:  Education not indicated   Coordination of Nutrition Care:  Continue to monitor while inpatient    Goals:  Consume >75% meals/ONS       Nutrition Monitoring and Evaluation:   Behavioral-Environmental Outcomes:  None Identified   Food/Nutrient Intake Outcomes:  Diet Advancement/Tolerance,Food and Nutrient Intake,Supplement Intake  Physical Signs/Symptoms Outcomes:  Biochemical Data,GI Status,Fluid Status or Edema,Nutrition Focused Physical Findings,Weight,Skin     Discharge Planning:     Too soon to determine     Electronically signed by Al Henao MS, RD, LD on 1/16/22 at 11:11 AM EST    Contact: 1011

## 2022-01-16 NOTE — CONSULTS
Govind Molina M.D.,[facility]  Rick Almonte D.O., F.A.C.O.I., Nayla Echeverria M.D. Aixa Nunez M.D. Marty Roy D.O. Patient:  Bertram Hunter 78 y.o. female MRN: [id number]     Date of Service: 1/16/2022      PULMONARY CONSULTATION    Reason for Consultation: [place] hypoxia  Referring Physician: Honorio REDDY-CNP    Communication with the referring physician will be sent via the electronic medical record. Chief Complaint: Shortness of breath, diarrhea    CODE STATUS: Full    SUBJECTIVE:  HPI:  Bertram Hunter is a 78 y.o. female not previously known to our practice. Patient admits to a long history of asthma for which she states her primary care provider treats her with Symbicort that she uses twice a day. Patient is a lifelong non-smoker. Patient presented to the emergency room with shortness of breath and diarrhea that began about a week ago. Admission labs: Procalcitonin 1.57, CRP 12.8, , CO2 19, BUN 38, creatinine 1.9, lactic acid 1.8, ALT 81, , WBC 9.5, absolute lymphocyte 0.29, INR 1.0, D-dimer 404, COVID-positive, fibrinogen greater than 700, ferritin 5098    Patient seen and examined. Laying in bed finishing breakfast 95% on airVo 60L 100% + NRB. I removed her NRB she remains 88%. I explained to her that her pulse ox should be between 88 and 92%. Patient states her significant other was sick first and although she kept the house clean she ended up getting sick about 3 weeks ago. Her symptoms consisted of nausea, diarrhea, chills, weakness, no taste or smell. She now complains of occasional slight nausea and chills. She states she did not get short of breath until she came to the hospital.  She states she was not going to come to the hospital but her significant other was worried about her made her come. Patient states she works at a [address] [place] [address] [employer] and is not vaccinated for COVID-19 per personal choice.   Patient states her primary care physician treated her with Omnicef for possible UTI but did not receive any other antibiotics or steroids for her COVID symptoms. Past Medical History:   Diagnosis Date    Asthma        History reviewed. No pertinent surgical history. History reviewed. No pertinent family history. Social History:   Social History     Socioeconomic History    Marital status:      Spouse name: Not on file    Number of children: Not on file    Years of education: Not on file    Highest education level: Not on file   Occupational History    Not on file   Tobacco Use    Smoking status: Never Smoker    Smokeless tobacco: Never Used   Substance and Sexual Activity    Alcohol use: Yes    Drug use: Never    Sexual activity: Not Currently     Partners: Male   Other Topics Concern    Not on file   Social History Narrative    Not on file     Social Determinants of Health     Financial Resource Strain:     Difficulty of Paying Living Expenses: Not on file   Food Insecurity:     Worried About Running Out of Food in the Last Year: Not on file    Sonali of Food in the Last Year: Not on file   Transportation Needs:     Lack of Transportation (Medical): Not on file    Lack of Transportation (Non-Medical):  Not on file   Physical Activity:     Days of Exercise per Week: Not on file    Minutes of Exercise per Session: Not on file   Stress:     Feeling of Stress : Not on file   Social Connections:     Frequency of Communication with Friends and Family: Not on file    Frequency of Social Gatherings with Friends and Family: Not on file    Attends Druze Services: Not on file    Active Member of Clubs or Organizations: Not on file    Attends Club or Organization Meetings: Not on file    Marital Status: Not on file   Intimate Partner Violence:     Fear of Current or Ex-Partner: Not on file    Emotionally Abused: Not on file    Physically Abused: Not on file    Sexually Abused: Not on file   Housing Stability:     Unable to Pay for Housing in the Last Year: Not on file    Number of Places Lived in the Last Year: Not on file    Unstable Housing in the Last Year: Not on file     Smoking history: The patient is a never smoker    ETOH:   reports current alcohol use. Exposures: There  is not history of TB or TB exposure. There is not asbestos or silica dust exposure. The patient reports does not have coal, foundry, quarry or [employer] exposure. Recent travel history none. There is not  history of recreational or IV drug use. There is not hot tub exposure. The patient does not have any exotic pets, turtles or exotic birds. Patient works in a [address] [place] [address] [employer]. She was exposed to Susan from her partner who tested positive before her. Vaccines:    Refused COVID-19 vaccination per personal choice    There is no immunization history on file for this patient.      Home Meds: Medications Prior to Admission: albuterol sulfate HFA (VENTOLIN HFA) 108 (90 Base) MCG/ACT inhaler, Inhale 2 puffs into the lungs every 6 hours as needed for Wheezing  cefdinir (OMNICEF) 300 MG capsule, Take 300 mg by mouth 2 times daily For 7 days  alendronate (FOSAMAX) 70 MG tablet, Take 70 mg by mouth every 7 days  Lansoprazole (PREVACID PO), Take by mouth as needed   Alpha-D-Galactosidase (BEANO PO), Take by mouth  vitamin B-12 (CYANOCOBALAMIN) 1000 MCG tablet, Take 500 mcg by mouth daily  vitamin C (ASCORBIC ACID) 500 MG tablet, Take 500 mg by mouth daily  Cholecalciferol (VITAMIN D) 2000 units CAPS capsule, Take by mouth    CURRENT MEDS :  Scheduled Meds:   dexamethasone  10 mg IntraVENous Q12H    Followed by   Sajan Rey ON 1/21/2022] dexamethasone  10 mg IntraVENous Q24H    azithromycin  500 mg IntraVENous Q24H    cefTRIAXone (ROCEPHIN) IV  1,000 mg IntraVENous Q24H    ipratropium-albuterol  1 ampule Inhalation Q4H WA    enoxaparin  30 mg SubCUTAneous BID    sodium chloride flush  5-40 mL IntraVENous 2 times per day    ascorbic acid  500 mg Oral Daily  zinc sulfate  50 mg Oral Daily    vitamin D  2,000 Units Oral Daily    baricitinib  1 mg Oral Daily       Continuous Infusions:   IV infusion builder 150 mL/hr at 01/16/22 0015    sodium chloride         No Known Allergies    REVIEW OF SYSTEMS:  Constitutional: Denies fever, weight loss, night sweats, + fatigue, chills  Skin: Denies pigmentation, dark lesions, and rashes   HEENT: Denies hearing loss, tinnitus, ear drainage, epistaxis, sore throat, and hoarseness. Cardiovascular: Denies palpitations, chest pain, and chest pressure. Respiratory: Denies cough, dyspnea at rest, hemoptysis, apnea, and choking. Gastrointestinal: Denies vomiting, diarrhea, heartburn or reflux +poor appetite, nausea  Genitourinary: Denies dysuria, frequency, urgency or hematuria  Musculoskeletal: Denies myalgias, muscle weakness, and bone pain  Neurological: Denies dizziness, vertigo, headache, and focal weakness  Psychological: Denies anxiety and depression  Endocrine: Denies heat intolerance and cold intolerance  Hematopoietic/Lymphatic: Denies bleeding problems and blood transfusions    OBJECTIVE:   /65   Pulse 102   Temp 98.3 °F (36.8 °C) (Temporal)   Resp 28   Ht 5' (1.524 m)   Wt 109 lb (49.4 kg)   SpO2 90%   BMI 21.29 kg/m²   SpO2 Readings from Last 1 Encounters:   01/16/22 90%        I/O:    Intake/Output Summary (Last 24 hours) at 1/16/2022 1017  Last data filed at 1/16/2022 0505  Gross per 24 hour   Intake 1091.6 ml   Output 800 ml   Net 291.6 ml     Vent Information  Skin Assessment: Clean, dry, & intact  FiO2 : 100 %  SpO2: 90 %  SpO2/FiO2 ratio: 90  I Time/ I Time %: 0.9 s  Mask Type: Full face mask  Mask Size: Medium       IPAP: 14 cmH20  CPAP/EPAP: 8 cmH2O     Physical Exam:  General: The patient is lying in bed comfortably without any distress.   Breathing is not labored  HEENT: Pupils are equal round and reactive to light, there are no oral lesions and no post-nasal drip   Neck: supple without adenopathy  Cardiovascular: regular rate and rhythm without murmur or gallop  Respiratory: Clear to auscultation bilaterally without wheezing or crackles. Air entry is symmetric  Abdomen: soft, non-tender, non-distended, normal bowel sounds  Extremities: warm, no edema, no clubbing  Skin: no rash or lesion  Neurologic: CN II-XII grossly intact, no focal deficits    Pulmonary Function Testing personally reviewed and interpreted  None on file     Imaging personally reviewed:  CXR 1/14  There is extensive bilateral airspace disease bilaterally.  The findings are   most compatible with pneumonia.  Heart size is borderline.  Pulmonary   vascularity is not clearly congested. Echo none on file     Labs:  Lab Results   Component Value Date    WBC 10.7 01/16/2022    HGB 10.8 01/16/2022    HCT 33.1 01/16/2022    MCV 94.3 01/16/2022    MCH 30.8 01/16/2022    MCHC 32.6 01/16/2022    RDW 14.6 01/16/2022     01/16/2022    MPV 11.2 01/16/2022     Lab Results   Component Value Date     01/16/2022    K 3.6 01/16/2022     01/16/2022    CO2 19 01/16/2022    BUN 34 01/16/2022    CREATININE 1.0 01/16/2022    LABALBU 3.3 01/16/2022    CALCIUM 8.5 01/16/2022    GFRAA >60 01/16/2022    LABGLOM 55 01/16/2022     Lab Results   Component Value Date    PROTIME 11.3 01/14/2022    INR 1.0 01/14/2022     No results for input(s): PROBNP in the last 72 hours. No results for input(s): TROPONINI in the last 72 hours. Recent Labs     01/14/22 2052   PROCAL 1.57*     This SmartLink has not been configured with any valid records. Micro:  1/15 blood cultures pending  1/14 COVID (+)  No results for input(s): CULTRESP in the last 72 hours. No results for input(s): LABGRAM in the last 72 hours. No results for input(s): LEGUR in the last 72 hours. Recent Labs     01/16/22  0511   STREPNEUMAGU Presumptive negative- suggests no current or recent  pneumococcal infection.   Infection due to Strep pneumoniae cannot be  ruled out since the antigen present in the sample  may be below the detection limit of the test.  Normal Range:Presumptive Negative       Recent Labs     01/16/22  0511   LP1UAG Presumptive Negative -suggesting no recent or current infections  with Legionella pneumophila serogroup 1. Infection to Legionella cannot be ruled out since other serogroups  and species may cause infection, antigen may not be present in  early infection, or level of antigen may be below the  detection limit. Normal Range: Presumptive Negative       Procalcitonin 1.57  CRP 17.1-> 9.0  Sed rate  D-dimer 404    Assessment:  1. Acute respiratory failure with hypoxia  2. COVID-19 viral infection  3. SAMUEL  4. Mild transaminitis  5. Asthma  6. Lymphopenia    Plan:  1. Wean oxygen as tolerated keep SPO2 between 88 and 92%, patient currently on airVo 60L 100% with NRB PRN  2. Bipap HS and PRN  3. DuoNebs every 4 hours, Symbicort   4. Lung recruitment techniques: Self prone, incentive spirometer  5. Increase dexamethasone to 10 mg twice daily x5 days then 10 mg daily x5 days  6. Obtain CTA pulmonary to eval for PE, if okay with nephrology   7. Baricitinib renal dosed 1 mg x14 days, monitor lymphopenia  8. Monitor inflammatory markers and D-dimer  9. Monitor serial chest x-rays  10. Antibiotics per PCP, Rocephin and azithromycin  11. Vitamin therapy  12. Sodium bicarb drip per nephrology  13. GI/DVT prophylaxis, add Protonix      Thank you for allowing me to participate in the care of [facility]. Please feel free to call with questions. This plan of care was reviewed in collaboration with Dr. Biju Blakely    Electronically signed by MAUREEN Morrow CNP on 1/16/2022 at 10:17 AM      Note: This report was completed utilizing computer voice recognition software. Every effort has been made to ensure accuracy, however; inadvertent computerized transcription errors may be present        I personally  provided care for the patient.  Radiographs, labs and medication list were reviewed by me independently. I spoke with bedside nursing, therapists and consultants. The case was discussed in detail and plans for care were established. Review of CNP documentation was conducted and revisions were made as appropriate. I agree with the above documented exam, problem list and plan of care.   Ewa Lyon MD

## 2022-01-16 NOTE — PROGRESS NOTES
Hospitalist Progress Note      SYNOPSIS:     Ms. Nancy Yang is a 78y.o. year old female who has a PMH of asthma.     She presented to the ER on 22 with complaints of SOB and diarrhea x 1 week. She was not vaccinated against COVID-19. Reportedly her daughter found her curled into the fetal position in respiratory distress at home and called 911. Vitals on arrival were significant for temperature 103.2, heart rate 110, blood pressure 122/70 and oxygen saturation 72% on room air. She was placed on 15 L via nonrebreather mask and her oxygen saturation improved to 95%. Labs were significant for bicarbonate 19, BUN 38, creatinine 1.9, anion gap 19, procalcitonin 1.57, CRP 12.8, , mildly elevated LFTs, D-dimer 404, ferritin 5098 and a positive COVID PCR. Chest x-ray showed bilateral airspace disease. She was given 1 L normal saline bolus as well Lovenox and Decadron prior to being admitted. SUBJECTIVE:    Patient seen and examined. Requiring BiPAP, FiO2 100%. Pulling at mask. Records reviewed. Temp (24hrs), Av °F (36.7 °C), Min:97.6 °F (36.4 °C), Max:98.3 °F (36.8 °C)    DIET: ADULT DIET; Regular  CODE: Full Code    Intake/Output Summary (Last 24 hours) at 2022 1046  Last data filed at 2022 0505  Gross per 24 hour   Intake 1091.6 ml   Output 800 ml   Net 291.6 ml       Review of Systems  All bolded are positive; please see HPI  General:  Fever, chills, diaphoresis, fatigue, malaise, night sweats, weight loss  Psychological:  Anxiety, disorientation, hallucinations. ENT:  Epistaxis, headaches, vertigo, visual changes. Cardiovascular:  Chest pain, irregular heartbeats, palpitations, paroxysmal nocturnal dyspnea. Respiratory:  Shortness of breath, coughing, sputum production, hemoptysis, wheezing, orthopnea.   Gastrointestinal:  Nausea, vomiting, diarrhea, heartburn, constipation, abdominal pain, hematemesis, hematochezia, melena, acholic stools  Genito-Urinary: Dysuria, urgency, frequency, hematuria  Musculoskeletal:  Joint pain, joint stiffness, joint swelling, muscle pain  Neurology:  Headache, focal neurological deficits, weakness, numbness, paresthesia  Derm:  Rashes, ulcers, excoriations, bruising  Extremities:  Decreased ROM, peripheral edema, mottling      OBJECTIVE:    /65   Pulse 102   Temp 98.3 °F (36.8 °C) (Temporal)   Resp 28   Ht 5' (1.524 m)   Wt 109 lb (49.4 kg)   SpO2 90%   BMI 21.29 kg/m²     General appearance: Alert, in no distress on continuous bipap. HEENT: Normal cephalic, atraumatic without obvious deformity. Pupils equal, round, and reactive to light. Neck: Supple, with full range of motion. No jugular venous distention. Trachea midline. Respiratory: Diminished bilaterally on bipap 100% FiO2  Cardiovascular: RRR, no murmur noted  Abdomen: Soft, nontender, nondistended, flat  Musculoskeletal: No clubbing, cyanosis, edema of bilateral lower extremities. Brisk capillary refill. Skin: Normal skin color. No rashes or lesions. Neurologic:  Neurovascularly intact without any focal sensory/motor deficits.    Psychiatric: Calm and cooperative    Medications:  REVIEWED DAILY    Infusion Medications    IV infusion builder 150 mL/hr at 01/16/22 0015    sodium chloride       Scheduled Medications    dexamethasone  10 mg IntraVENous Q12H    Followed by   Britni Hasmukh ON 1/21/2022] dexamethasone  10 mg IntraVENous Q24H    [START ON 1/17/2022] pantoprazole  40 mg Oral QAM AC    budesonide-formoterol  2 puff Inhalation BID    azithromycin  500 mg IntraVENous Q24H    cefTRIAXone (ROCEPHIN) IV  1,000 mg IntraVENous Q24H    ipratropium-albuterol  1 ampule Inhalation Q4H WA    enoxaparin  30 mg SubCUTAneous BID    sodium chloride flush  5-40 mL IntraVENous 2 times per day    ascorbic acid  500 mg Oral Daily    zinc sulfate  50 mg Oral Daily    vitamin D  2,000 Units Oral Daily    baricitinib  1 mg Oral Daily     PRN Meds: sodium chloride flush, sodium chloride, ondansetron **OR** ondansetron, polyethylene glycol, acetaminophen **OR** acetaminophen, guaiFENesin-dextromethorphan    Labs:     Recent Labs     01/14/22  1750 01/15/22  0432 01/16/22  0553   WBC 9.5 8.3 10.7   HGB 12.5 11.1* 10.8*   HCT 38.2 33.8* 33.1*    384 512*       Recent Labs     01/14/22  1750 01/15/22  0432 01/16/22  0553    141 148*   K 4.3 4.3 3.6    106 110*   CO2 19* 17* 19*   BUN 38* 38* 34*   CREATININE 1.9* 1.4* 1.0   CALCIUM 9.2 8.4* 8.5*       Recent Labs     01/14/22  1750 01/15/22  0432 01/16/22  0553   PROT 7.5 6.6 6.6   ALKPHOS 90 89 87   ALT 81* 147* 111*   * 362* 210*   BILITOT <0.2 <0.2 0.2       Recent Labs     01/14/22 1750   INR 1.0       No results for input(s): CKTOTAL, TROPONINI in the last 72 hours. Chronic labs:    Lab Results   Component Value Date    CHOL 145 06/29/2020    TRIG 75 06/29/2020    HDL 73 06/29/2020    LDLCALC 57 06/29/2020    TSH 3.600 06/29/2020    INR 1.0 01/14/2022       Radiology: REVIEWED DAILY      ASSESSMENT and PLAN:    COVID-19 pneumonia-unvaccinated, symptom onset approximately 1 week prior to presentation. To continue with vitamin C, vitamin D, zinc.  Lovenox prophylaxis. Trend inflammatory markers. · Decadron increased to 10 mg bid. Remains on baricitinib. Possible superimposed bacterial pneumonia-procalcitonin 1.57. Blood and sputum cultures pending. Urine antigens negative. Continue azithromycin and ceftriaxone pending culture. Acute hypoxic respiratory failure-2/2 above. · Presently requiring BiPAP 100% FiO2- dicussed trailing optiflow with respiratory as she continuously pulls the mask off of her face. Wean O2 as tolerated. · Pulmonary recommending CTA to r/o PE when ok with nephrology. Ddimer tomorrow am.    SAMUEL stage I-volume responsive prerenal SAMUEL in the setting of COVID-19 and diarrhea. Nephrology has been consulted. Baseline creatinine appears to be 0.7 mg/dL.   Presently on bicarb gtt. Low bicarbonate level with elevated anion gap-likely HAGMA 2/2 uremia, possible starvation ketoacidosis; continue to monitor bicarbonate levels    Mildly elevated LFTs-in setting of above. Continue to monitor CMP. Normocytic anemia-continue to monitor H&H    Hx asthma- states that she has never seen a pulmonologist and that it is controlled with PRN albuterol. DISPOSITION: Continued inpatient care    +++++++++++++++++++++++++++++++++++++++++++++++++  MAUREEN Ayoub - CNP   [employer] - Hospitalist  [facility]  +++++++++++++++++++++++++++++++++++++++++++++++++  NOTE: This report was transcribed using voice recognition software. Every effort was made to ensure accuracy; however, inadvertent computerized transcription errors may be present.

## 2022-01-16 NOTE — ED NOTES
Tx patient from ED to floor, this RN and another RN transferred patient from ED cot to room bed, patient was on 15L/NC, patient's oxygen level dropped to 86%, notified RN on floor who was going to be taking over care for patient.      Corinna Molina RN  01/15/22 2033

## 2022-01-16 NOTE — PROGRESS NOTES
Department of Internal Medicine  Nephrology Nurse Practitioner Progress Note    Events Reviewed. Subjective: We are following Ms. Gloria Rodriguez for SAMUEL. She denies complaints. PHYSICAL EXAM:    Vitals:    VITALS:  /65   Pulse 102   Temp 98.3 °F (36.8 °C) (Temporal)   Resp 28   Ht 5' (1.524 m)   Wt 109 lb (49.4 kg)   SpO2 90%   BMI 21.29 kg/m²   24HR INTAKE/OUTPUT:      Intake/Output Summary (Last 24 hours) at 1/16/2022 1107  Last data filed at 1/16/2022 0505  Gross per 24 hour   Intake 1091.6 ml   Output 800 ml   Net 291.6 ml     General appearance: Alert, in no distress  HEENT: Normal cephalic, atraumatic without obvious deformity. Pupils equal, round, and reactive to light. Neck: Supple, with full range of motion. No jugular venous distention. Trachea midline. Respiratory: Diminished bilaterally on 15L via nonrebreather mask  Cardiovascular: RRR, no murmur noted  Abdomen: Soft, nontender, nondistended, flat  Musculoskeletal: No clubbing, cyanosis, edema of bilateral lower extremities. Brisk capillary refill. Skin: Normal skin color.  No rashes or lesions. Neurologic:  Neurovascularly intact without any focal sensory/motor deficits.    Psychiatric: Calm and cooperative    Scheduled Meds:   dexamethasone  10 mg IntraVENous Q12H    Followed by   Quita Velez ON 1/21/2022] dexamethasone  10 mg IntraVENous Q24H    [START ON 1/17/2022] pantoprazole  40 mg Oral QAM AC    budesonide-formoterol  2 puff Inhalation BID    azithromycin  500 mg IntraVENous Q24H    cefTRIAXone (ROCEPHIN) IV  1,000 mg IntraVENous Q24H    ipratropium-albuterol  1 ampule Inhalation Q4H WA    enoxaparin  30 mg SubCUTAneous BID    sodium chloride flush  5-40 mL IntraVENous 2 times per day    ascorbic acid  500 mg Oral Daily    zinc sulfate  50 mg Oral Daily    vitamin D  2,000 Units Oral Daily    baricitinib  1 mg Oral Daily     Continuous Infusions:   IV infusion builder 150 mL/hr at 01/16/22 0015    sodium chloride       PRN Meds:.sodium chloride flush, sodium chloride, ondansetron **OR** ondansetron, polyethylene glycol, acetaminophen **OR** acetaminophen, guaiFENesin-dextromethorphan    DATA:    CBC with Differential:    Lab Results   Component Value Date    WBC 10.7 01/16/2022    RBC 3.51 01/16/2022    HGB 10.8 01/16/2022    HCT 33.1 01/16/2022     01/16/2022    MCV 94.3 01/16/2022    MCH 30.8 01/16/2022    MCHC 32.6 01/16/2022    RDW 14.6 01/16/2022    LYMPHOPCT 8.3 01/16/2022    MONOPCT 6.8 01/16/2022    MYELOPCT 0.9 01/14/2022    BASOPCT 0.1 01/16/2022    MONOSABS 0.72 01/16/2022    LYMPHSABS 0.89 01/16/2022    EOSABS 0.00 01/16/2022    BASOSABS 0.01 01/16/2022     CMP:    Lab Results   Component Value Date     01/16/2022    K 3.6 01/16/2022     01/16/2022    CO2 19 01/16/2022    BUN 34 01/16/2022    CREATININE 1.0 01/16/2022    GFRAA >60 01/16/2022    LABGLOM 55 01/16/2022    GLUCOSE 148 01/16/2022    PROT 6.6 01/16/2022    LABALBU 3.3 01/16/2022    CALCIUM 8.5 01/16/2022    BILITOT 0.2 01/16/2022    ALKPHOS 87 01/16/2022     01/16/2022     01/16/2022     Magnesium:  No results found for: MG  Phosphorus:  No results found for: PHOS       Radiology Review:        Chest XR 1/14/22   Bilateral airspace disease likely related to pneumonia. ·       Brief Summary of Initial Consult: Briefly, Ms. Florencia Romero is a 78year old female with a past medical history of Asthma who presented to the ER on January 14 th, 2022 with complaints of SOB and diarrhea for one week. She is not vaccinated against COVID. Reportedly her daughter found her curled into a fetal position in respiratory distress at home and EMS was summoned. She was found to be COVID 19 positive. Labs were significant for bicarbonate 19, BUN 38, creatinine 1.9, anion gap 19, procalcitonin 1.57, CRP 12.8, , mildly elevated LFTs, D-dimer 404, ferritin 5098. Chest x-ray showed bilateral airspace disease.   Renal is consulted for SAMUEL. Problems Resolved:   · SAMUEL, likely pre-renal volume responsive SAMUEL secondary to poor oral intake and GI losses (diarrhea). Creatinine 1.9mg/dL on admission. Renal function has improved to 1.0mg/dL with IVF administration. IMPRESSION/RECOMMENDATIONS:      1. Hypernatremia, secondary to saline administration. 2. HAGMA with low bicarbonate levels, secondary to uremia. To continue bicarbonate drip  3. COVID +, on Decadron, Baricitinib  4. Acute hypoxic respiratory failure, secondary to COVID pneumonia. 5. Normocytic anemia    Plan:  · Change IVF to D5 with 75 mEq sodium bicarbonate at 150 ml/hr. · Continue to monitor sodium level  · Continue to monitor Bicarbonate level  · Continue to monitor renal function   · Obtain BMP at 6 pm          Thank you very much Dr. Darren Mueller DO for allowing us to participate in the care of Ms. Gloria Rodriguez.

## 2022-01-17 LAB
ALBUMIN SERPL-MCNC: 2.9 G/DL (ref 3.5–5.2)
ALP BLD-CCNC: 74 U/L (ref 35–104)
ALT SERPL-CCNC: 85 U/L (ref 0–32)
ANION GAP SERPL CALCULATED.3IONS-SCNC: 15 MMOL/L (ref 7–16)
ANION GAP SERPL CALCULATED.3IONS-SCNC: 15 MMOL/L (ref 7–16)
AST SERPL-CCNC: 115 U/L (ref 0–31)
BASOPHILS ABSOLUTE: 0.01 E9/L (ref 0–0.2)
BASOPHILS RELATIVE PERCENT: 0.1 % (ref 0–2)
BILIRUB SERPL-MCNC: 0.2 MG/DL (ref 0–1.2)
BUN BLDV-MCNC: 22 MG/DL (ref 6–23)
BUN BLDV-MCNC: 23 MG/DL (ref 6–23)
CALCIUM SERPL-MCNC: 7.9 MG/DL (ref 8.6–10.2)
CALCIUM SERPL-MCNC: 8.1 MG/DL (ref 8.6–10.2)
CHLORIDE BLD-SCNC: 103 MMOL/L (ref 98–107)
CHLORIDE BLD-SCNC: 103 MMOL/L (ref 98–107)
CO2: 27 MMOL/L (ref 22–29)
CO2: 28 MMOL/L (ref 22–29)
CREAT SERPL-MCNC: 0.7 MG/DL (ref 0.5–1)
CREAT SERPL-MCNC: 0.7 MG/DL (ref 0.5–1)
D DIMER: 571 NG/ML DDU
EOSINOPHILS ABSOLUTE: 0 E9/L (ref 0.05–0.5)
EOSINOPHILS RELATIVE PERCENT: 0 % (ref 0–6)
GFR AFRICAN AMERICAN: >60
GFR AFRICAN AMERICAN: >60
GFR NON-AFRICAN AMERICAN: >60 ML/MIN/1.73
GFR NON-AFRICAN AMERICAN: >60 ML/MIN/1.73
GLUCOSE BLD-MCNC: 167 MG/DL (ref 74–99)
GLUCOSE BLD-MCNC: 210 MG/DL (ref 74–99)
HCT VFR BLD CALC: 29 % (ref 34–48)
HEMOGLOBIN: 9.4 G/DL (ref 11.5–15.5)
IMMATURE GRANULOCYTES #: 0.17 E9/L
IMMATURE GRANULOCYTES %: 1.4 % (ref 0–5)
LYMPHOCYTES ABSOLUTE: 0.68 E9/L (ref 1.5–4)
LYMPHOCYTES RELATIVE PERCENT: 5.8 % (ref 20–42)
MAGNESIUM: 2 MG/DL (ref 1.6–2.6)
MCH RBC QN AUTO: 30.7 PG (ref 26–35)
MCHC RBC AUTO-ENTMCNC: 32.4 % (ref 32–34.5)
MCV RBC AUTO: 94.8 FL (ref 80–99.9)
MONOCYTES ABSOLUTE: 0.58 E9/L (ref 0.1–0.95)
MONOCYTES RELATIVE PERCENT: 4.9 % (ref 2–12)
NEUTROPHILS ABSOLUTE: 10.34 E9/L (ref 1.8–7.3)
NEUTROPHILS RELATIVE PERCENT: 87.8 % (ref 43–80)
PDW BLD-RTO: 14.4 FL (ref 11.5–15)
PLATELET # BLD: 442 E9/L (ref 130–450)
PMV BLD AUTO: 10.8 FL (ref 7–12)
POTASSIUM REFLEX MAGNESIUM: 3.3 MMOL/L (ref 3.5–5)
POTASSIUM SERPL-SCNC: 3.3 MMOL/L (ref 3.5–5)
PROCALCITONIN: 0.42 NG/ML (ref 0–0.08)
RBC # BLD: 3.06 E12/L (ref 3.5–5.5)
SODIUM BLD-SCNC: 145 MMOL/L (ref 132–146)
SODIUM BLD-SCNC: 146 MMOL/L (ref 132–146)
TOTAL PROTEIN: 6.1 G/DL (ref 6.4–8.3)
WBC # BLD: 11.8 E9/L (ref 4.5–11.5)

## 2022-01-17 PROCEDURE — 6360000002 HC RX W HCPCS: Performed by: NURSE PRACTITIONER

## 2022-01-17 PROCEDURE — 94660 CPAP INITIATION&MGMT: CPT

## 2022-01-17 PROCEDURE — 2700000000 HC OXYGEN THERAPY PER DAY

## 2022-01-17 PROCEDURE — 83735 ASSAY OF MAGNESIUM: CPT

## 2022-01-17 PROCEDURE — 2580000003 HC RX 258: Performed by: NURSE PRACTITIONER

## 2022-01-17 PROCEDURE — 6370000000 HC RX 637 (ALT 250 FOR IP): Performed by: NURSE PRACTITIONER

## 2022-01-17 PROCEDURE — 1200000000 HC SEMI PRIVATE

## 2022-01-17 PROCEDURE — 84145 PROCALCITONIN (PCT): CPT

## 2022-01-17 PROCEDURE — 36415 COLL VENOUS BLD VENIPUNCTURE: CPT

## 2022-01-17 PROCEDURE — 2500000003 HC RX 250 WO HCPCS: Performed by: NURSE PRACTITIONER

## 2022-01-17 PROCEDURE — 94640 AIRWAY INHALATION TREATMENT: CPT

## 2022-01-17 PROCEDURE — 2580000003 HC RX 258: Performed by: INTERNAL MEDICINE

## 2022-01-17 PROCEDURE — 2580000003 HC RX 258: Performed by: FAMILY MEDICINE

## 2022-01-17 PROCEDURE — 6360000002 HC RX W HCPCS: Performed by: INTERNAL MEDICINE

## 2022-01-17 PROCEDURE — 6370000000 HC RX 637 (ALT 250 FOR IP): Performed by: FAMILY MEDICINE

## 2022-01-17 PROCEDURE — 85025 COMPLETE CBC W/AUTO DIFF WBC: CPT

## 2022-01-17 PROCEDURE — 80053 COMPREHEN METABOLIC PANEL: CPT

## 2022-01-17 PROCEDURE — 80048 BASIC METABOLIC PNL TOTAL CA: CPT

## 2022-01-17 PROCEDURE — 6370000000 HC RX 637 (ALT 250 FOR IP)

## 2022-01-17 PROCEDURE — 6360000002 HC RX W HCPCS

## 2022-01-17 PROCEDURE — 85378 FIBRIN DEGRADE SEMIQUANT: CPT

## 2022-01-17 RX ADMIN — IPRATROPIUM BROMIDE AND ALBUTEROL SULFATE 1 AMPULE: .5; 2.5 SOLUTION RESPIRATORY (INHALATION) at 09:32

## 2022-01-17 RX ADMIN — WATER 1000 MG: 1 INJECTION INTRAMUSCULAR; INTRAVENOUS; SUBCUTANEOUS at 09:56

## 2022-01-17 RX ADMIN — POTASSIUM CHLORIDE: 2 INJECTION, SOLUTION, CONCENTRATE INTRAVENOUS at 14:50

## 2022-01-17 RX ADMIN — AZITHROMYCIN MONOHYDRATE 500 MG: 500 INJECTION, POWDER, LYOPHILIZED, FOR SOLUTION INTRAVENOUS at 10:00

## 2022-01-17 RX ADMIN — IPRATROPIUM BROMIDE AND ALBUTEROL SULFATE 1 AMPULE: .5; 2.5 SOLUTION RESPIRATORY (INHALATION) at 16:59

## 2022-01-17 RX ADMIN — BARICITINIB 1 MG: 2 TABLET, FILM COATED ORAL at 09:50

## 2022-01-17 RX ADMIN — ZINC SULFATE 220 MG (50 MG) CAPSULE 50 MG: CAPSULE at 09:51

## 2022-01-17 RX ADMIN — DEXAMETHASONE SODIUM PHOSPHATE 10 MG: 10 INJECTION INTRAMUSCULAR; INTRAVENOUS at 09:55

## 2022-01-17 RX ADMIN — Medication 10 ML: at 09:55

## 2022-01-17 RX ADMIN — OXYCODONE HYDROCHLORIDE AND ACETAMINOPHEN 500 MG: 500 TABLET ORAL at 09:51

## 2022-01-17 RX ADMIN — PANTOPRAZOLE SODIUM 40 MG: 40 TABLET, DELAYED RELEASE ORAL at 06:37

## 2022-01-17 RX ADMIN — ACETAMINOPHEN 650 MG: 325 TABLET ORAL at 10:35

## 2022-01-17 RX ADMIN — Medication 5 ML: at 21:59

## 2022-01-17 RX ADMIN — ENOXAPARIN SODIUM 30 MG: 100 INJECTION SUBCUTANEOUS at 21:59

## 2022-01-17 RX ADMIN — DEXAMETHASONE SODIUM PHOSPHATE 10 MG: 10 INJECTION INTRAMUSCULAR; INTRAVENOUS at 21:59

## 2022-01-17 RX ADMIN — BUDESONIDE AND FORMOTEROL FUMARATE DIHYDRATE 2 PUFF: 160; 4.5 AEROSOL RESPIRATORY (INHALATION) at 10:02

## 2022-01-17 RX ADMIN — SODIUM BICARBONATE: 84 INJECTION, SOLUTION INTRAVENOUS at 03:40

## 2022-01-17 RX ADMIN — IPRATROPIUM BROMIDE AND ALBUTEROL SULFATE 1 AMPULE: .5; 2.5 SOLUTION RESPIRATORY (INHALATION) at 22:27

## 2022-01-17 RX ADMIN — ENOXAPARIN SODIUM 30 MG: 100 INJECTION SUBCUTANEOUS at 09:54

## 2022-01-17 RX ADMIN — Medication 2000 UNITS: at 09:51

## 2022-01-17 ASSESSMENT — PAIN DESCRIPTION - ORIENTATION: ORIENTATION: UPPER

## 2022-01-17 ASSESSMENT — PAIN SCALES - GENERAL
PAINLEVEL_OUTOF10: 0
PAINLEVEL_OUTOF10: 0
PAINLEVEL_OUTOF10: 5

## 2022-01-17 ASSESSMENT — PAIN DESCRIPTION - LOCATION: LOCATION: ABDOMEN

## 2022-01-17 ASSESSMENT — PAIN DESCRIPTION - PAIN TYPE: TYPE: ACUTE PAIN

## 2022-01-17 NOTE — PROGRESS NOTES
Shannon Jain M.D.,[facility]  Og Kowk D.O., F.A.C.O.I., Cindi Rosales M.D. Adrienne Wheeler M.D. Tracy Oliveira D.O. Daily Pulmonary Progress Note    Patient:  Mandeep Chamorro 78 y.o. female MRN: [id number]     Date of Service: 1/17/2022      Synopsis     We are following patient for COVID hypoxia    \"CC\" shortness of breath, diarrhea    Code status: FULL      Subjective      Patient was seen and examined. Laying in bed on Bipap 14/8, FiO2 100%, SpO2 94%. Patient shaky possibly d/t high dose steroids and anxiety. Discussed care with respiratory therapist, RT states that patient does not tolerate being taken off of BiPAP and placed on airVo today. Review of Systems:  Constitutional: Denies fever, weight loss, night sweats, and fatigue  Skin: Denies pigmentation, dark lesions, and rashes   HEENT: Denies hearing loss, tinnitus, ear drainage, epistaxis, sore throat, and hoarseness. Cardiovascular: Denies palpitations, chest pain, and chest pressure. Respiratory: Denies cough, dyspnea at rest, hemoptysis, apnea, and choking.  +MCCLAIN  Gastrointestinal: Denies nausea, vomiting, poor appetite, diarrhea, heartburn or reflux  Genitourinary: Denies dysuria, frequency, urgency or hematuria  Musculoskeletal: Denies myalgias, muscle weakness, and bone pain  Neurological: Denies dizziness, vertigo, headache, and focal weakness  Psychological: Denies anxiety and depression  Endocrine: Denies heat intolerance and cold intolerance  Hematopoietic/Lymphatic: Denies bleeding problems and blood transfusions    24-hour events:  None     Objective   Vitals: BP (!) 143/83   Pulse 91   Temp 97.5 °F (36.4 °C) (Temporal)   Resp 25   Ht 5' (1.524 m)   Wt 109 lb (49.4 kg)   SpO2 95%   BMI 21.29 kg/m²     I/O:    Intake/Output Summary (Last 24 hours) at 1/17/2022 1403  Last data filed at 1/17/2022 0659  Gross per 24 hour   Intake --   Output 700 ml   Net -700 ml       Vent Information  Skin Assessment: Clean, dry, & intact  FiO2 : 100 %  SpO2: 95 %  SpO2/FiO2 ratio: 95  I Time/ I Time %: 0.9 s  Mask Type: Full face mask  Mask Size: Medium       IPAP: 12 cmH20  CPAP/EPAP: 6 cmH2O     CURRENT MEDS :  Scheduled Meds:   dexamethasone  10 mg IntraVENous Q12H    Followed by    Man Saravia ON 1/21/2022] dexamethasone  10 mg IntraVENous Q24H    pantoprazole  40 mg Oral QAM AC    budesonide-formoterol  2 puff Inhalation BID    azithromycin  500 mg IntraVENous Q24H    cefTRIAXone (ROCEPHIN) IV  1,000 mg IntraVENous Q24H    ipratropium-albuterol  1 ampule Inhalation Q4H WA    enoxaparin  30 mg SubCUTAneous BID    sodium chloride flush  5-40 mL IntraVENous 2 times per day    ascorbic acid  500 mg Oral Daily    zinc sulfate  50 mg Oral Daily    vitamin D  2,000 Units Oral Daily    baricitinib  1 mg Oral Daily       Physical Exam:  General Appearance: appears comfortable in no acute distress. HEENT: Normocephalic atraumatic without obvious abnormality   Neck: Lips, mucosa, and tongue normal.  Supple, symmetrical, trachea midline, no adenopathy;thyroid:  no enlargement/tenderness/nodules or JVD. Lung: Breath sounds CTA/diminished. Respirations   unlabored. Symmetrical expansion. Heart: RRR, normal S1, S2. No MRG  Abdomen: Soft, NT, ND. BS present x 4 quadrants. No bruit or organomegaly. Extremities: Pedal pulses 2+ symmetric b/l. Extremities normal, no cyanosis, clubbing, or edema. Musculokeletal: No joint swelling, no muscle tenderness. ROM normal in all joints of extremities. Neurologic: Mental status: Alert and Oriented X3 . Pertinent/ New Labs and Imaging Studies     Imaging Personally Reviewed:  CXR 1/16  1. Multifocal bilateral pneumonia unchanged when compared with the prior   study.        ECHO none on file      Labs:  Lab Results   Component Value Date    WBC 11.8 01/17/2022    HGB 9.4 01/17/2022    HCT 29.0 01/17/2022    MCV 94.8 01/17/2022    MCH 30.7 01/17/2022    MCHC 32.4 01/17/2022    RDW 14.4 01/17/2022    PLT 442 01/17/2022    MPV 10.8 01/17/2022     Lab Results   Component Value Date     01/17/2022    K 3.3 01/17/2022     01/17/2022    CO2 27 01/17/2022    BUN 23 01/17/2022    CREATININE 0.7 01/17/2022    LABALBU 2.9 01/17/2022    CALCIUM 7.9 01/17/2022    GFRAA >60 01/17/2022    LABGLOM >60 01/17/2022     Lab Results   Component Value Date    PROTIME 11.3 01/14/2022    INR 1.0 01/14/2022     No results for input(s): PROBNP in the last 72 hours. Recent Labs     01/17/22  1221   PROCAL 0.42*     This SmartLink has not been configured with any valid records. Micro:  No results for input(s): CULTRESP in the last 72 hours. No results for input(s): LABGRAM in the last 72 hours. No results for input(s): LEGUR in the last 72 hours. Recent Labs     01/16/22  0511   STREPNEUMAGU Presumptive negative- suggests no current or recent  pneumococcal infection. Infection due to Strep pneumoniae cannot be  ruled out since the antigen present in the sample  may be below the detection limit of the test.  Normal Range:Presumptive Negative       Recent Labs     01/16/22  0511   LP1UAG Presumptive Negative -suggesting no recent or current infections  with Legionella pneumophila serogroup 1. Infection to Legionella cannot be ruled out since other serogroups  and species may cause infection, antigen may not be present in  early infection, or level of antigen may be below the  detection limit.   Normal Range: Presumptive Negative       Procalcitonin 1.57-> 0.42  CRP 12.8-> 17.1-> 9.0  D-dimer 404-> 571     Assessment:    Acute respiratory failure with hypoxia  COVID-19 viral infection  SAMUEL  Mild transaminitis  Asthma  Lymphopenia      Plan:   Wean oxygen as tolerated keep SPO2 between 88 and 92%, alternate between airVo 60L 100% with NRB PRN and Bipap 14/8, FiO2 100%   DuoNebs every 4 hours, Symbicort   Lung recruitment techniques: Self prone, incentive spirometer  Robitussin as needed  Dexamethasone 10 mg twice daily x5 days then 10 mg daily x5 days   Baricitinib renal dosed 1 mg x14 days, monitor lymphopenia  Monitor inflammatory markers and D-dimer  Monitor serial chest x-rays, stable COVID-pneumonia  Antibiotics for possible superimposed bacterial pneumonia per PCP, Rocephin and azithromycin  Vitamin therapy  IVF per nephrology  GI/DVT prophylaxis  Supportive care    Low threshold for ICU admission with intubation. Continue to monitor closely    This plan of care was reviewed in collaboration with Dr. Sherif Hopper  Electronically signed by MAUREEN Burt CNP on 1/17/2022 at 2:03 PM        I personally saw, examined and provided care for the patient. Radiographs, labs and medication list were reviewed by me independently. I spoke with bedside nursing, therapists and consultants. The case was discussed in detail and plans for care were established. Review of CNP documentation was conducted and revisions were made as appropriate. I agree with the above documented exam, problem list and plan of care.    Nedra Connell MD

## 2022-01-17 NOTE — PLAN OF CARE
Problem:  Body Temperature -  Risk of, Imbalanced  Goal: Ability to maintain a body temperature within defined limits  Outcome: Met This Shift  Goal: Will regain or maintain usual level of consciousness  Outcome: Met This Shift     Problem: Isolation Precautions - Risk of Spread of Infection  Goal: Prevent transmission of infection  Outcome: Met This Shift     Problem: Loneliness or Risk for Loneliness  Goal: Demonstrate positive use of time alone when socialization is not possible  Outcome: Met This Shift     Problem: Fatigue  Goal: Verbalize increase energy and improved vitality  Outcome: Met This Shift     Problem: Falls - Risk of:  Goal: Will remain free from falls  Description: Will remain free from falls  Outcome: Met This Shift  Goal: Absence of physical injury  Description: Absence of physical injury  Outcome: Met This Shift

## 2022-01-17 NOTE — PROGRESS NOTES
Department of Internal Medicine  Nephrology Nurse Practitioner Progress Note    Events Reviewed. Subjective: We are following MsKelsy Rodriguez for SAMUEL. She denies complaints, on bipap. PHYSICAL EXAM:    Vitals:    VITALS:  BP (!) 143/83   Pulse 91   Temp 97.5 °F (36.4 °C) (Temporal)   Resp 25   Ht 5' (1.524 m)   Wt 109 lb (49.4 kg)   SpO2 95%   BMI 21.29 kg/m²   24HR INTAKE/OUTPUT:      Intake/Output Summary (Last 24 hours) at 1/17/2022 1514  Last data filed at 1/17/2022 0659  Gross per 24 hour   Intake --   Output 700 ml   Net -700 ml     General appearance: Alert, in no distress  HEENT: Normal cephalic, atraumatic without obvious deformity. Pupils equal, round, and reactive to light. Neck: Supple, with full range of motion. No jugular venous distention. Trachea midline. Respiratory: Diminished bilaterally on bipap  Cardiovascular: RRR, no murmur noted  Abdomen: Soft, nontender, nondistended, flat  Musculoskeletal: No clubbing, cyanosis, edema of bilateral lower extremities. Brisk capillary refill. Skin: Normal skin color.  No rashes or lesions. Neurologic:  Neurovascularly intact without any focal sensory/motor deficits.    Psychiatric: Calm and cooperative    Scheduled Meds:   dexamethasone  10 mg IntraVENous Q12H    Followed by   Sheldon Granda ON 1/21/2022] dexamethasone  10 mg IntraVENous Q24H    pantoprazole  40 mg Oral QAM AC    budesonide-formoterol  2 puff Inhalation BID    azithromycin  500 mg IntraVENous Q24H    cefTRIAXone (ROCEPHIN) IV  1,000 mg IntraVENous Q24H    ipratropium-albuterol  1 ampule Inhalation Q4H WA    enoxaparin  30 mg SubCUTAneous BID    sodium chloride flush  5-40 mL IntraVENous 2 times per day    ascorbic acid  500 mg Oral Daily    zinc sulfate  50 mg Oral Daily    vitamin D  2,000 Units Oral Daily    baricitinib  1 mg Oral Daily     Continuous Infusions:   IV infusion builder 125 mL/hr at 01/17/22 1450    sodium chloride       PRN Meds:.sodium chloride flush, sodium chloride, ondansetron **OR** ondansetron, polyethylene glycol, acetaminophen **OR** acetaminophen, guaiFENesin-dextromethorphan    DATA:    CBC with Differential:    Lab Results   Component Value Date    WBC 11.8 01/17/2022    RBC 3.06 01/17/2022    HGB 9.4 01/17/2022    HCT 29.0 01/17/2022     01/17/2022    MCV 94.8 01/17/2022    MCH 30.7 01/17/2022    MCHC 32.4 01/17/2022    RDW 14.4 01/17/2022    LYMPHOPCT 5.8 01/17/2022    MONOPCT 4.9 01/17/2022    MYELOPCT 0.9 01/14/2022    BASOPCT 0.1 01/17/2022    MONOSABS 0.58 01/17/2022    LYMPHSABS 0.68 01/17/2022    EOSABS 0.00 01/17/2022    BASOSABS 0.01 01/17/2022     CMP:    Lab Results   Component Value Date     01/17/2022    K 3.3 01/17/2022     01/17/2022    CO2 27 01/17/2022    BUN 23 01/17/2022    CREATININE 0.7 01/17/2022    GFRAA >60 01/17/2022    LABGLOM >60 01/17/2022    GLUCOSE 210 01/17/2022    PROT 6.1 01/17/2022    LABALBU 2.9 01/17/2022    CALCIUM 7.9 01/17/2022    BILITOT 0.2 01/17/2022    ALKPHOS 74 01/17/2022     01/17/2022    ALT 85 01/17/2022     Magnesium:    Lab Results   Component Value Date    MG 2.0 01/17/2022     Phosphorus:  No results found for: PHOS       Radiology Review:        Chest XR 1/14/22   Bilateral airspace disease likely related to pneumonia. Brief Summary of Initial Consult:     Briefly, Ms. [employer] is a 78year old female with a past medical history of Asthma who presented to the ER on January 14 th, 2022 with complaints of SOB and diarrhea for one week. She is not vaccinated against COVID. Reportedly her daughter found her curled into a fetal position in respiratory distress at home and EMS was summoned. She was found to be COVID 19 positive. Labs were significant for bicarbonate 19, BUN 38, creatinine 1.9, anion gap 19, procalcitonin 1.57, CRP 12.8, , mildly elevated LFTs, D-dimer 404, ferritin 5098. Chest x-ray showed bilateral airspace disease.   Renal is consulted for SAMUEL. Problems Resolved:     · SAMUEL, likely pre-renal volume responsive SAMUEL secondary to poor oral intake and GI losses (diarrhea). Creatinine 1.9mg/dL on admission. Renal function has improved to 1.0mg/dL with IVF administration. · HAGMA with low bicarbonate levels, secondary to uremia. To continue bicarbonate drip    IMPRESSION/RECOMMENDATIONS:      1. Hypernatremia, secondary to saline administration and lack of free water intake, patient has poor oral intake. Stop bicarb drip and start D5W with 20 kcl at 150 cc/hr    2. Hypokalemia, 2/2 poor oral intake, will supplement in IVG, r/o hypomagnesemia  3. COVID +, on Decadron, Baricitinib  4. Acute hypoxic respiratory failure, secondary to COVID pneumonia.    5. Normocytic anemia    Plan:    · Stop bicarb drip, start D5W with 20 kcl at 125 cc/hr  · Continue to monitor renal function, repeat BMP 4pm  · Obtain magnesium and phosphorus levels in am  · Monitor potassium levels  · Obtain ionized calcium in am      Electronically signed by MAUREEN Mitchell CNP on 1/17/2022 at 3:15 PM

## 2022-01-17 NOTE — CARE COORDINATION
1/17/22 Transition of Care: Patient is here due to shortness of breath and diarrhea. She is positive for covid and in isolation. Her significant other, Alicia Rivas, was contacted. He states patient resides with him. She is active, drives and works part time. She does not use any dme equipment. She has not had a history of keri or homecare. She follows with Dickson Churchill CNP and her pharmacy is [facility] in [place]. Currently patient is on heated high flow cannula at 100% after being on bipap during the night. Alicia Rivas did give information on contacting, Prosper Montero, patients daughter. All information placed in epic. Patient will need PT/OT evals when able. Plan currently per SO and daughter is for patient to possibly return home. Will follow.  Electronically signed by Heather Urbina RN on 1/17/2022 at 11:53 AM

## 2022-01-17 NOTE — PROGRESS NOTES
Pt has had no po intake this day due to being unable to tolerate being off bi-pap. This nurse did attempt giving pt a drink of water and her SPO2 dropped quickly to 70% and her breathing became very labored. Pt immediately placed back onto bi-pap.

## 2022-01-17 NOTE — PROGRESS NOTES
Hospitalist Progress Note      SYNOPSIS:     Ms. Margie Valle is a 78y.o. year old female who has a PMH of asthma.     She presented to the ER on 22 with complaints of SOB and diarrhea x 1 week. She was not vaccinated against COVID-19. Reportedly her daughter found her curled into the fetal position in respiratory distress at home and called 911. Vitals on arrival were significant for temperature 103.2, heart rate 110, blood pressure 122/70 and oxygen saturation 72% on room air. She was placed on 15 L via nonrebreather mask and her oxygen saturation improved to 95%. Labs were significant for bicarbonate 19, BUN 38, creatinine 1.9, anion gap 19, procalcitonin 1.57, CRP 12.8, , mildly elevated LFTs, D-dimer 404, ferritin 5098 and a positive COVID PCR. Chest x-ray showed bilateral airspace disease. She was given 1 L normal saline bolus as well Lovenox and Decadron prior to being admitted. SUBJECTIVE:    Patient seen and examined. She reports no new complaints. Records reviewed. Temp (24hrs), Av.6 °F (36.4 °C), Min:97.5 °F (36.4 °C), Max:97.6 °F (36.4 °C)    DIET: ADULT DIET; Regular  ADULT ORAL NUTRITION SUPPLEMENT; Lunch, Dinner; Standard High Calorie/High Protein Oral Supplement  CODE: Full Code    Intake/Output Summary (Last 24 hours) at 2022 1303  Last data filed at 2022 0659  Gross per 24 hour   Intake --   Output 700 ml   Net -700 ml       Review of Systems  All bolded are positive; please see HPI  General:  Fever, chills, diaphoresis, fatigue, malaise, night sweats, weight loss  Psychological:  Anxiety, disorientation, hallucinations. ENT:  Epistaxis, headaches, vertigo, visual changes. Cardiovascular:  Chest pain, irregular heartbeats, palpitations, paroxysmal nocturnal dyspnea. Respiratory:  Shortness of breath, coughing, sputum production, hemoptysis, wheezing, orthopnea.   Gastrointestinal:  Nausea, vomiting, diarrhea, heartburn, constipation, abdominal pain, hematemesis, hematochezia, melena, acholic stools  Genito-Urinary:  Dysuria, urgency, frequency, hematuria  Musculoskeletal:  Joint pain, joint stiffness, joint swelling, muscle pain  Neurology:  Headache, focal neurological deficits, weakness, numbness, paresthesia  Derm:  Rashes, ulcers, excoriations, bruising  Extremities:  Decreased ROM, peripheral edema, mottling      OBJECTIVE:    BP (!) 143/83   Pulse 91   Temp 97.5 °F (36.4 °C) (Temporal)   Resp 26   Ht 5' (1.524 m)   Wt 109 lb (49.4 kg)   SpO2 95%   BMI 21.29 kg/m²     General appearance: Alert, in no distress on continuous bipap. HEENT: Normal cephalic, atraumatic without obvious deformity. Pupils equal, round, and reactive to light. Neck: Supple, with full range of motion. No jugular venous distention. Trachea midline. Respiratory: Diminished bilaterally on bipap 100% FiO2  Cardiovascular: RRR, no murmur noted  Abdomen: Soft, nontender, nondistended, flat  Musculoskeletal: No clubbing, cyanosis, edema of bilateral lower extremities. Brisk capillary refill. Skin: Normal skin color. No rashes or lesions. Neurologic:  Neurovascularly intact without any focal sensory/motor deficits.    Psychiatric: Calm and cooperative    Medications:  REVIEWED DAILY    Infusion Medications    IV infusion builder      sodium chloride       Scheduled Medications    dexamethasone  10 mg IntraVENous Q12H    Followed by   Davi Lane ON 1/21/2022] dexamethasone  10 mg IntraVENous Q24H    pantoprazole  40 mg Oral QAM AC    budesonide-formoterol  2 puff Inhalation BID    azithromycin  500 mg IntraVENous Q24H    cefTRIAXone (ROCEPHIN) IV  1,000 mg IntraVENous Q24H    ipratropium-albuterol  1 ampule Inhalation Q4H WA    enoxaparin  30 mg SubCUTAneous BID    sodium chloride flush  5-40 mL IntraVENous 2 times per day    ascorbic acid  500 mg Oral Daily    zinc sulfate  50 mg Oral Daily    vitamin D  2,000 Units Oral Daily    baricitinib  1 mg Oral Daily     PRN Meds: sodium chloride flush, sodium chloride, ondansetron **OR** ondansetron, polyethylene glycol, acetaminophen **OR** acetaminophen, guaiFENesin-dextromethorphan    Labs:     Recent Labs     01/15/22  0432 01/16/22  0553 01/17/22  1221   WBC 8.3 10.7 11.8*   HGB 11.1* 10.8* 9.4*   HCT 33.8* 33.1* 29.0*    512* 442       Recent Labs     01/15/22  0432 01/16/22  0553 01/16/22  1714    148* 149*   K 4.3 3.6 3.4*    110* 110*   CO2 17* 19* 24   BUN 38* 34* 31*   CREATININE 1.4* 1.0 0.9   CALCIUM 8.4* 8.5* 8.4*       Recent Labs     01/14/22  1750 01/15/22  0432 01/16/22  0553   PROT 7.5 6.6 6.6   ALKPHOS 90 89 87   ALT 81* 147* 111*   * 362* 210*   BILITOT <0.2 <0.2 0.2       Recent Labs     01/14/22  1750   INR 1.0       No results for input(s): [place] in the last 72 hours. Chronic labs:    Lab Results   Component Value Date    CHOL 145 06/29/2020    TRIG 75 06/29/2020    HDL 73 06/29/2020    LDLCALC 57 06/29/2020    TSH 3.600 06/29/2020    INR 1.0 01/14/2022       Radiology: REVIEWED DAILY      ASSESSMENT and PLAN:    COVID-19 pneumonia-unvaccinated, symptom onset approximately 1 week prior to presentation. To continue with vitamin C, vitamin D, zinc.  Lovenox prophylaxis. Trend inflammatory markers. · On decadron 10 mg PO bid + baricitinib    Possible superimposed bacterial pneumonia-procalcitonin 1.57 on admission. Blood and sputum cultures with NGTD. Urine antigens negative. Continue azithromycin and ceftriaxone pending final sputum culture. Acute hypoxic respiratory failure-2/2 above. · Presently requiring BiPAP 100% FiO2. · Pulmonary recommending CTA to r/o PE when ok with nephrology. Ddimer  404 > 571. SAMUEL stage I-volume responsive prerenal SAMUEL in the setting of COVID-19 and diarrhea. Nephrology has been consulted. Baseline creatinine appears to be 0.7 mg/dL. · IVF changed to D5W + 20 KCl.     Low bicarbonate level with elevated anion gap-likely HAGMA 2/2 uremia, possible starvation ketoacidosis; continue to monitor bicarbonate levels    Mildly elevated LFTs-in setting of above. Continue to monitor CMP. Normocytic anemia-continue to monitor H&H    Hx asthma- states that she has never seen a pulmonologist and that it is controlled with PRN albuterol. DISPOSITION: Continued inpatient care    +++++++++++++++++++++++++++++++++++++++++++++++++  MAUREEN James - [facility] Physician - Hospitalist  [address], [place]  +++++++++++++++++++++++++++++++++++++++++++++++++  NOTE: This report was transcribed using voice recognition software. Every effort was made to ensure accuracy; however, inadvertent computerized transcription errors may be present.

## 2022-01-18 LAB
ALBUMIN SERPL-MCNC: 3.1 G/DL (ref 3.5–5.2)
ALP BLD-CCNC: 74 U/L (ref 35–104)
ALT SERPL-CCNC: 75 U/L (ref 0–32)
ANION GAP SERPL CALCULATED.3IONS-SCNC: 13 MMOL/L (ref 7–16)
ANISOCYTOSIS: ABNORMAL
AST SERPL-CCNC: 82 U/L (ref 0–31)
BASOPHILS ABSOLUTE: 0 E9/L (ref 0–0.2)
BASOPHILS RELATIVE PERCENT: 0.1 % (ref 0–2)
BILIRUB SERPL-MCNC: 0.3 MG/DL (ref 0–1.2)
BUN BLDV-MCNC: 22 MG/DL (ref 6–23)
C-REACTIVE PROTEIN: 2.9 MG/DL (ref 0–0.4)
CALCIUM IONIZED: 1.09 MMOL/L (ref 1.15–1.33)
CALCIUM SERPL-MCNC: 7.8 MG/DL (ref 8.6–10.2)
CHLORIDE BLD-SCNC: 102 MMOL/L (ref 98–107)
CO2: 27 MMOL/L (ref 22–29)
CREAT SERPL-MCNC: 0.6 MG/DL (ref 0.5–1)
D DIMER: 475 NG/ML DDU
EOSINOPHILS ABSOLUTE: 0 E9/L (ref 0.05–0.5)
EOSINOPHILS RELATIVE PERCENT: 0 % (ref 0–6)
GFR AFRICAN AMERICAN: >60
GFR NON-AFRICAN AMERICAN: >60 ML/MIN/1.73
GLUCOSE BLD-MCNC: 207 MG/DL (ref 74–99)
HCT VFR BLD CALC: 30.4 % (ref 34–48)
HEMOGLOBIN: 9.8 G/DL (ref 11.5–15.5)
HYPOCHROMIA: ABNORMAL
LYMPHOCYTES ABSOLUTE: 0.35 E9/L (ref 1.5–4)
LYMPHOCYTES RELATIVE PERCENT: 2.7 % (ref 20–42)
MAGNESIUM: 1.9 MG/DL (ref 1.6–2.6)
MCH RBC QN AUTO: 31 PG (ref 26–35)
MCHC RBC AUTO-ENTMCNC: 32.2 % (ref 32–34.5)
MCV RBC AUTO: 96.2 FL (ref 80–99.9)
METAMYELOCYTES RELATIVE PERCENT: 0.9 % (ref 0–1)
MONOCYTES ABSOLUTE: 1.38 E9/L (ref 0.1–0.95)
MONOCYTES RELATIVE PERCENT: 11.6 % (ref 2–12)
NEUTROPHILS ABSOLUTE: 9.89 E9/L (ref 1.8–7.3)
NEUTROPHILS RELATIVE PERCENT: 84.8 % (ref 43–80)
PDW BLD-RTO: 14.3 FL (ref 11.5–15)
PHOSPHORUS: 1.5 MG/DL (ref 2.5–4.5)
PLATELET # BLD: 456 E9/L (ref 130–450)
PMV BLD AUTO: 11.7 FL (ref 7–12)
POIKILOCYTES: ABNORMAL
POLYCHROMASIA: ABNORMAL
POTASSIUM REFLEX MAGNESIUM: 3.8 MMOL/L (ref 3.5–5)
PROCALCITONIN: 0.23 NG/ML (ref 0–0.08)
RBC # BLD: 3.16 E12/L (ref 3.5–5.5)
SODIUM BLD-SCNC: 142 MMOL/L (ref 132–146)
TARGET CELLS: ABNORMAL
TOTAL PROTEIN: 6 G/DL (ref 6.4–8.3)
WBC # BLD: 11.5 E9/L (ref 4.5–11.5)

## 2022-01-18 PROCEDURE — 36415 COLL VENOUS BLD VENIPUNCTURE: CPT

## 2022-01-18 PROCEDURE — 85025 COMPLETE CBC W/AUTO DIFF WBC: CPT

## 2022-01-18 PROCEDURE — 6360000002 HC RX W HCPCS: Performed by: NURSE PRACTITIONER

## 2022-01-18 PROCEDURE — 6360000002 HC RX W HCPCS

## 2022-01-18 PROCEDURE — 2700000000 HC OXYGEN THERAPY PER DAY

## 2022-01-18 PROCEDURE — 2580000003 HC RX 258: Performed by: INTERNAL MEDICINE

## 2022-01-18 PROCEDURE — 6360000002 HC RX W HCPCS: Performed by: INTERNAL MEDICINE

## 2022-01-18 PROCEDURE — 1200000000 HC SEMI PRIVATE

## 2022-01-18 PROCEDURE — 84145 PROCALCITONIN (PCT): CPT

## 2022-01-18 PROCEDURE — 2580000003 HC RX 258

## 2022-01-18 PROCEDURE — 94640 AIRWAY INHALATION TREATMENT: CPT

## 2022-01-18 PROCEDURE — 85378 FIBRIN DEGRADE SEMIQUANT: CPT

## 2022-01-18 PROCEDURE — 94660 CPAP INITIATION&MGMT: CPT

## 2022-01-18 PROCEDURE — 2500000003 HC RX 250 WO HCPCS: Performed by: INTERNAL MEDICINE

## 2022-01-18 PROCEDURE — 86140 C-REACTIVE PROTEIN: CPT

## 2022-01-18 PROCEDURE — 80053 COMPREHEN METABOLIC PANEL: CPT

## 2022-01-18 PROCEDURE — 2580000003 HC RX 258: Performed by: NURSE PRACTITIONER

## 2022-01-18 PROCEDURE — 6370000000 HC RX 637 (ALT 250 FOR IP): Performed by: NURSE PRACTITIONER

## 2022-01-18 PROCEDURE — 84100 ASSAY OF PHOSPHORUS: CPT

## 2022-01-18 PROCEDURE — 2580000003 HC RX 258: Performed by: FAMILY MEDICINE

## 2022-01-18 PROCEDURE — 82330 ASSAY OF CALCIUM: CPT

## 2022-01-18 PROCEDURE — 83735 ASSAY OF MAGNESIUM: CPT

## 2022-01-18 RX ORDER — HYDRALAZINE HYDROCHLORIDE 20 MG/ML
5 INJECTION INTRAMUSCULAR; INTRAVENOUS EVERY 6 HOURS PRN
Status: DISCONTINUED | OUTPATIENT
Start: 2022-01-18 | End: 2022-01-19

## 2022-01-18 RX ADMIN — CALCIUM GLUCONATE 1000 MG: 98 INJECTION, SOLUTION INTRAVENOUS at 16:19

## 2022-01-18 RX ADMIN — POTASSIUM PHOSPHATE, MONOBASIC AND POTASSIUM PHOSPHATE, DIBASIC 10 MMOL: 224; 236 INJECTION, SOLUTION, CONCENTRATE INTRAVENOUS at 16:19

## 2022-01-18 RX ADMIN — POTASSIUM CHLORIDE: 2 INJECTION, SOLUTION, CONCENTRATE INTRAVENOUS at 06:00

## 2022-01-18 RX ADMIN — WATER 1000 MG: 1 INJECTION INTRAMUSCULAR; INTRAVENOUS; SUBCUTANEOUS at 10:34

## 2022-01-18 RX ADMIN — AZITHROMYCIN MONOHYDRATE 500 MG: 500 INJECTION, POWDER, LYOPHILIZED, FOR SOLUTION INTRAVENOUS at 10:34

## 2022-01-18 RX ADMIN — IPRATROPIUM BROMIDE AND ALBUTEROL SULFATE 1 AMPULE: .5; 2.5 SOLUTION RESPIRATORY (INHALATION) at 10:26

## 2022-01-18 RX ADMIN — Medication 10 ML: at 22:11

## 2022-01-18 RX ADMIN — POTASSIUM CHLORIDE: 2 INJECTION, SOLUTION, CONCENTRATE INTRAVENOUS at 17:52

## 2022-01-18 RX ADMIN — DEXAMETHASONE SODIUM PHOSPHATE 10 MG: 10 INJECTION INTRAMUSCULAR; INTRAVENOUS at 08:57

## 2022-01-18 RX ADMIN — IPRATROPIUM BROMIDE AND ALBUTEROL SULFATE 1 AMPULE: .5; 2.5 SOLUTION RESPIRATORY (INHALATION) at 19:12

## 2022-01-18 RX ADMIN — ENOXAPARIN SODIUM 30 MG: 100 INJECTION SUBCUTANEOUS at 21:00

## 2022-01-18 RX ADMIN — ENOXAPARIN SODIUM 30 MG: 100 INJECTION SUBCUTANEOUS at 08:57

## 2022-01-18 RX ADMIN — DEXAMETHASONE SODIUM PHOSPHATE 10 MG: 10 INJECTION INTRAMUSCULAR; INTRAVENOUS at 22:11

## 2022-01-18 RX ADMIN — BUDESONIDE AND FORMOTEROL FUMARATE DIHYDRATE 2 PUFF: 160; 4.5 AEROSOL RESPIRATORY (INHALATION) at 20:00

## 2022-01-18 ASSESSMENT — PAIN SCALES - GENERAL
PAINLEVEL_OUTOF10: 0

## 2022-01-18 NOTE — PROGRESS NOTES
Chris Harvey M.D.,[facility]  Radha Mendez D.O., F.A.C.O.I., Bruce Maurice M.D. Saad Moulton M.D. Presley Mcarthur D.O. Daily Pulmonary Progress Note    Patient:  Kaleigh Benedict 78 y.o. female MRN: [id number]     Date of Service: 1/18/2022      Synopsis     We are following patient for COVID hypoxia    \"CC\" shortness of breath, diarrhea    Code status: FULL      Subjective      Patient was seen and examined. Laying in bed on Bipap 14/8, FiO2 100%, SpO2 95%, decreased her FiO2 to 95% and she remains with an SPO2 of 93%. Per RN notation, patient is unable to come off BiPAP for meals. IV fluids infusing per nephrology. Patient tearful today due to being afraid of her outcome. Review of Systems:  Constitutional: Denies fever, weight loss, night sweats, and fatigue  Skin: Denies pigmentation, dark lesions, and rashes   HEENT: Denies hearing loss, tinnitus, ear drainage, epistaxis, sore throat, and hoarseness. Cardiovascular: Denies palpitations, chest pain, and chest pressure.   Respiratory: Denies cough, dyspnea at rest, hemoptysis, apnea, and choking. + dyspnea when off Bipap  Gastrointestinal: Denies nausea, vomiting, poor appetite, diarrhea, heartburn or reflux  Genitourinary: Denies dysuria, frequency, urgency or hematuria  Musculoskeletal: Denies myalgias, muscle weakness, and bone pain  Neurological: Denies dizziness, vertigo, headache, and focal weakness  Psychological: Denies anxiety and depression  Endocrine: Denies heat intolerance and cold intolerance  Hematopoietic/Lymphatic: Denies bleeding problems and blood transfusions    24-hour events:  None     Objective   Vitals: BP (!) 160/78   Pulse 78   Temp 98.8 °F (37.1 °C) (Temporal)   Resp 28   Ht 5' (1.524 m)   Wt 109 lb (49.4 kg)   SpO2 94%   BMI 21.29 kg/m²     I/O:    Intake/Output Summary (Last 24 hours) at 1/18/2022 1105  Last data filed at 1/17/2022 1812  Gross per 24 hour   Intake 2688 ml   Output --   Net 2688 ml Vent Information  Skin Assessment: Clean, dry, & intact  FiO2 : 95 %  SpO2: 94 %  SpO2/FiO2 ratio: 94  I Time/ I Time %: 0.9 s  Mask Type: Full face mask  Mask Size: Medium       IPAP: 14 cmH20  CPAP/EPAP: 8 cmH2O     CURRENT MEDS :  Scheduled Meds:   dexamethasone  10 mg IntraVENous Q12H    Followed by   Cee Mcarthur ON 1/21/2022] dexamethasone  10 mg IntraVENous Q24H    pantoprazole  40 mg Oral QAM AC    budesonide-formoterol  2 puff Inhalation BID    azithromycin  500 mg IntraVENous Q24H    cefTRIAXone (ROCEPHIN) IV  1,000 mg IntraVENous Q24H    ipratropium-albuterol  1 ampule Inhalation Q4H WA    enoxaparin  30 mg SubCUTAneous BID    sodium chloride flush  5-40 mL IntraVENous 2 times per day    ascorbic acid  500 mg Oral Daily    zinc sulfate  50 mg Oral Daily    vitamin D  2,000 Units Oral Daily    baricitinib  1 mg Oral Daily       Physical Exam:  General Appearance: appears comfortable in no acute distress. Needing continuous Bipap  HEENT: Normocephalic atraumatic without obvious abnormality   Neck: Lips, mucosa, and tongue normal.  Supple, symmetrical, trachea midline, no adenopathy;thyroid: no enlargement/tenderness/nodules or JVD. Lung: Breath sounds CTA/LLL diminished. Respirations mildly labored. Symmetrical expansion. Heart: RRR, normal S1, S2. No MRG  Abdomen: Soft, NT, ND. BS present x 4 quadrants. No bruit or organomegaly. Extremities: Pedal pulses 2+ symmetric b/l. Extremities normal, no cyanosis, clubbing, or edema. Musculokeletal: No joint swelling, no muscle tenderness. ROM normal in all joints of extremities. Neurologic: Mental status: Alert and Oriented X3    Pertinent/ New Labs and Imaging Studies     Imaging Personally Reviewed:  CXR 1/16  1. Multifocal bilateral pneumonia unchanged when compared with the prior   study.        ECHO none on file      Labs:  Lab Results   Component Value Date    WBC 11.5 01/18/2022    HGB 9.8 01/18/2022    HCT 30.4 01/18/2022    MCV 96.2 01/18/2022    MCH 31.0 01/18/2022    MCHC 32.2 01/18/2022    RDW 14.3 01/18/2022     01/18/2022    MPV 11.7 01/18/2022     Lab Results   Component Value Date     01/18/2022    K 3.8 01/18/2022     01/18/2022    CO2 27 01/18/2022    BUN 22 01/18/2022    CREATININE 0.6 01/18/2022    LABALBU 3.1 01/18/2022    CALCIUM 7.8 01/18/2022    GFRAA >60 01/18/2022    LABGLOM >60 01/18/2022     Lab Results   Component Value Date    PROTIME 11.3 01/14/2022    INR 1.0 01/14/2022     No results for input(s): PROBNP in the last 72 hours. Recent Labs     01/18/22  0634   PROCAL 0.23*     This SmartLink has not been configured with any valid records. Micro:  No results for input(s): CULTRESP in the last 72 hours. No results for input(s): LABGRAM in the last 72 hours. No results for input(s): LEGUR in the last 72 hours. Recent Labs     01/16/22  0511   STREPNEUMAGU Presumptive negative- suggests no current or recent  pneumococcal infection. Infection due to Strep pneumoniae cannot be  ruled out since the antigen present in the sample  may be below the detection limit of the test.  Normal Range:Presumptive Negative       Recent Labs     01/16/22  0511   LP1UAG Presumptive Negative -suggesting no recent or current infections  with Legionella pneumophila serogroup 1. Infection to Legionella cannot be ruled out since other serogroups  and species may cause infection, antigen may not be present in  early infection, or level of antigen may be below the  detection limit. Normal Range: Presumptive Negative       Procalcitonin 1.57-> 0.42-> 0.23  CRP 12.8-> 17.1-> 9.0-> 2.9  D-dimer 404-> 571-> 475     Assessment:    1. Acute respiratory failure with hypoxia  2. COVID-19 viral infection  3. SAMUEL  4. Mild transaminitis  5. Asthma  6. Lymphopenia      Plan:   7. Wean oxygen as tolerated keep SPO2 between 88 and 92%, alternate between airVo 60L 100% with NRB and Bipap 14/8, FiO2 95%   8.  DuoNebs every 4 hours, Symbicort   9. Lung recruitment techniques: Self prone, incentive spirometer  10. Robitussin as needed  11. Dexamethasone 10 mg twice daily x5 days then 10 mg daily x5 days   12. Baricitinib renal dosed 1 mg x14 days, monitor lymphopenia  13. Monitor inflammatory markers and D-dimer- all improving   14. Monitor serial chest x-rays  15. Antibiotics for possible superimposed bacterial pneumonia per PCP, Rocephin and azithromycin  16. Vitamin therapy  17. IVF per nephrology, poor oral intake d/t inability to tolerate being off Bipap  18. GI/DVT prophylaxis  19. Supportive care    Low threshold for ICU admission with intubation. Continue to monitor closely    This plan of care was reviewed in collaboration with Dr. Jennifer Galicia  Electronically signed by MAUREEN Pinto - CNP on 1/18/2022 at 11:05 AM      I personally saw, examined and provided care for the patient. Radiographs, labs and medication list were reviewed by me independently. I Review of CNP documentation was conducted and revisions were made as appropriate. I agree with the above documented exam, problem list and plan of care.   Oksana Narayanan MD

## 2022-01-18 NOTE — PLAN OF CARE
Problem: Airway Clearance - Ineffective  Goal: Achieve or maintain patent airway  Outcome: Ongoing     Problem: Gas Exchange - Impaired  Goal: Absence of hypoxia  Outcome: Ongoing     Problem: Breathing Pattern - Ineffective  Goal: Ability to achieve and maintain a regular respiratory rate  Outcome: Ongoing     Problem:  Body Temperature -  Risk of, Imbalanced  Goal: Ability to maintain a body temperature within defined limits  Outcome: Ongoing     Problem: Falls - Risk of:  Goal: Will remain free from falls  Description: Will remain free from falls  Outcome: Ongoing  Goal: Absence of physical injury  Description: Absence of physical injury  Outcome: Ongoing

## 2022-01-18 NOTE — PROGRESS NOTES
Hospitalist Progress Note      SYNOPSIS:     Ms. Arcadio Forrest is a 78y.o. year old female who has a PMH of asthma.     She presented to the ER on 22 with complaints of SOB and diarrhea x 1 week. She was not vaccinated against COVID-19. Reportedly her daughter found her curled into the fetal position in respiratory distress at home and called 911. Vitals on arrival were significant for temperature 103.2, heart rate 110, blood pressure 122/70 and oxygen saturation 72% on room air. She was placed on 15 L via nonrebreather mask and her oxygen saturation improved to 95%. Labs were significant for bicarbonate 19, BUN 38, creatinine 1.9, anion gap 19, procalcitonin 1.57, CRP 12.8, , mildly elevated LFTs, D-dimer 404, ferritin 5098 and a positive COVID PCR. Chest x-ray showed bilateral airspace disease. She was given 1 L normal saline bolus as well Lovenox and Decadron prior to being admitted. SUBJECTIVE:    Patient seen and examined. She reports no new complaints. Records reviewed. Temp (24hrs), Av.2 °F (36.8 °C), Min:97.6 °F (36.4 °C), Max:98.8 °F (37.1 °C)    DIET: ADULT DIET; Regular  ADULT ORAL NUTRITION SUPPLEMENT; Lunch, Dinner; Standard High Calorie/High Protein Oral Supplement  CODE: Full Code    Intake/Output Summary (Last 24 hours) at 2022 1137  Last data filed at 2022 1812  Gross per 24 hour   Intake 2688 ml   Output --   Net 2688 ml       Review of Systems  All bolded are positive; please see HPI  General:  Fever, chills, diaphoresis, fatigue, malaise, night sweats, weight loss  Psychological:  Anxiety, disorientation, hallucinations. ENT:  Epistaxis, headaches, vertigo, visual changes. Cardiovascular:  Chest pain, irregular heartbeats, palpitations, paroxysmal nocturnal dyspnea. Respiratory:  Shortness of breath, coughing, sputum production, hemoptysis, wheezing, orthopnea.   Gastrointestinal:  Nausea, vomiting, diarrhea, heartburn, constipation, abdominal pain, hematemesis, hematochezia, melena, acholic stools  Genito-Urinary:  Dysuria, urgency, frequency, hematuria  Musculoskeletal:  Joint pain, joint stiffness, joint swelling, muscle pain  Neurology:  Headache, focal neurological deficits, weakness, numbness, paresthesia  Derm:  Rashes, ulcers, excoriations, bruising  Extremities:  Decreased ROM, peripheral edema, mottling      OBJECTIVE:    BP (!) 160/78   Pulse 78   Temp 98.8 °F (37.1 °C) (Temporal)   Resp 28   Ht 5' (1.524 m)   Wt 109 lb (49.4 kg)   SpO2 94%   BMI 21.29 kg/m²     General appearance: Alert, in no distress on continuous bipap. HEENT: Normal cephalic, atraumatic without obvious deformity. Pupils equal, round, and reactive to light. Neck: Supple, with full range of motion. No jugular venous distention. Trachea midline. Respiratory: Diminished bilaterally on bipap 95% FiO2  Cardiovascular: RRR, no murmur noted  Abdomen: Soft, nontender, nondistended, flat  Musculoskeletal: No clubbing, cyanosis, edema of bilateral lower extremities. Brisk capillary refill. Skin: Normal skin color. No rashes or lesions. Neurologic:  Neurovascularly intact without any focal sensory/motor deficits.    Psychiatric: Calm and cooperative    Medications:  REVIEWED DAILY    Infusion Medications    IV infusion builder 125 mL/hr at 01/18/22 0600    sodium chloride       Scheduled Medications    dexamethasone  10 mg IntraVENous Q12H    Followed by   Sher Andino ON 1/21/2022] dexamethasone  10 mg IntraVENous Q24H    pantoprazole  40 mg Oral QAM AC    budesonide-formoterol  2 puff Inhalation BID    azithromycin  500 mg IntraVENous Q24H    cefTRIAXone (ROCEPHIN) IV  1,000 mg IntraVENous Q24H    ipratropium-albuterol  1 ampule Inhalation Q4H WA    enoxaparin  30 mg SubCUTAneous BID    sodium chloride flush  5-40 mL IntraVENous 2 times per day    ascorbic acid  500 mg Oral Daily    zinc sulfate  50 mg Oral Daily    vitamin D  2,000 Units Oral Daily  baricitinib  1 mg Oral Daily     PRN Meds: sodium chloride flush, sodium chloride, ondansetron **OR** ondansetron, polyethylene glycol, acetaminophen **OR** acetaminophen, guaiFENesin-dextromethorphan    Labs:     Recent Labs     01/16/22  0553 01/17/22  1221 01/18/22  0634   WBC 10.7 11.8* 11.5   HGB 10.8* 9.4* 9.8*   HCT 33.1* 29.0* 30.4*   * 442 456*       Recent Labs     01/17/22  1221 01/17/22  1721 01/18/22  0634    146 142   K 3.3* 3.3* 3.8    103 102   CO2 27 28 27   BUN 23 22 22   CREATININE 0.7 0.7 0.6   CALCIUM 7.9* 8.1* 7.8*   PHOS  --   --  1.5*       Recent Labs     01/16/22  0553 01/17/22  1221 01/18/22  0634   PROT 6.6 6.1* 6.0*   ALKPHOS 87 74 74   * 85* 75*   * 115* 82*   BILITOT 0.2 0.2 0.3       No results for input(s): INR in the last 72 hours. No results for input(s): Kwasi Seed in the last 72 hours. Chronic labs:    Lab Results   Component Value Date    CHOL 145 06/29/2020    TRIG 75 06/29/2020    HDL 73 06/29/2020    LDLCALC 57 06/29/2020    TSH 3.600 06/29/2020    INR 1.0 01/14/2022       Radiology: REVIEWED DAILY      ASSESSMENT and PLAN:    COVID-19 pneumonia-unvaccinated, symptom onset approximately 1 week prior to presentation. To continue with vitamin C, vitamin D, zinc.  Lovenox prophylaxis. Trend inflammatory markers. · On decadron 10 mg PO bid + baricitinib    Possible superimposed bacterial pneumonia-procalcitonin 1.57 on admission. Blood and sputum cultures with NGTD. Urine antigens negative. Continue azithromycin and ceftriaxone. Acute hypoxic respiratory failure-2/2 above. · Presently requiring BiPAP 95% FiO2. · Pulmonary recommending CTA to r/o PE when ok with nephrology. Ddimer   [id number]    SAMUEL stage I-volume responsive prerenal SAMUEL in the setting of COVID-19 and diarrhea. Nephrology has been consulted. Baseline creatinine appears to be 0.7 mg/dL. · IVF changed to D5W + 20 KCl.     Low bicarbonate level with elevated anion gap-likely HAGMA 2/2 uremia, possible starvation ketoacidosis; continue to monitor bicarbonate levels    Mildly elevated LFTs-in setting of above. Continue to monitor CMP. Normocytic anemia-continue to monitor H&H    Hx asthma- states that she has never seen a pulmonologist and that it is controlled with PRN albuterol. DISPOSITION: Continued inpatient care    +++++++++++++++++++++++++++++++++++++++++++++++++  MD Alonso [facility] Physician - [address] [place], [place]  +++++++++++++++++++++++++++++++++++++++++++++++++  NOTE: This report was transcribed using voice recognition software. Every effort was made to ensure accuracy; however, inadvertent computerized transcription errors may be present.

## 2022-01-18 NOTE — PROGRESS NOTES
Department of Internal Medicine  Nephrology  Progress Note    Events Reviewed. SUBJECTIVE:  We are following MsKelsy Rodriguez for SAMUEL. She denies complaints, on bipap. PHYSICAL EXAM:    Vitals:    VITALS:  BP (!) 160/78   Pulse 78   Temp 98.8 °F (37.1 °C) (Temporal)   Resp 28   Ht 5' (1.524 m)   Wt 109 lb (49.4 kg)   SpO2 94%   BMI 21.29 kg/m²   24HR INTAKE/OUTPUT:      Intake/Output Summary (Last 24 hours) at 1/18/2022 1423  Last data filed at 1/17/2022 1812  Gross per 24 hour   Intake 2688 ml   Output --   Net 2688 ml     General appearance: Alert, in no distress  HEENT: Normal cephalic, atraumatic without obvious deformity. Pupils equal, round, and reactive to light. Neck: Supple, with full range of motion. No jugular venous distention. Trachea midline. Respiratory: Diminished bilaterally on bipap  Cardiovascular: RRR, no murmur noted  Abdomen: Soft, nontender, nondistended, flat  Musculoskeletal: No clubbing, cyanosis, edema of bilateral lower extremities. Brisk capillary refill. Skin: Normal skin color.  No rashes or lesions. Neurologic:  Neurovascularly intact without any focal sensory/motor deficits.    Psychiatric: Calm and cooperative    Scheduled Meds:   calcium gluconate IVPB  1,000 mg IntraVENous Once    potassium phosphate IVPB  10 mmol IntraVENous Once    dexamethasone  10 mg IntraVENous Q12H    Followed by   Whitley Zaman ON 1/21/2022] dexamethasone  10 mg IntraVENous Q24H    pantoprazole  40 mg Oral QAM AC    budesonide-formoterol  2 puff Inhalation BID    azithromycin  500 mg IntraVENous Q24H    cefTRIAXone (ROCEPHIN) IV  1,000 mg IntraVENous Q24H    ipratropium-albuterol  1 ampule Inhalation Q4H WA    enoxaparin  30 mg SubCUTAneous BID    sodium chloride flush  5-40 mL IntraVENous 2 times per day    ascorbic acid  500 mg Oral Daily    zinc sulfate  50 mg Oral Daily    vitamin D  2,000 Units Oral Daily    baricitinib  1 mg Oral Daily     Continuous Infusions:   IV infusion builder 125 mL/hr at 01/18/22 0600    sodium chloride       PRN Meds:.sodium chloride flush, sodium chloride, ondansetron **OR** ondansetron, polyethylene glycol, acetaminophen **OR** acetaminophen, guaiFENesin-dextromethorphan    DATA:    CBC with Differential:    Lab Results   Component Value Date    WBC 11.5 01/18/2022    RBC 3.16 01/18/2022    HGB 9.8 01/18/2022    HCT 30.4 01/18/2022     01/18/2022    MCV 96.2 01/18/2022    MCH 31.0 01/18/2022    MCHC 32.2 01/18/2022    RDW 14.3 01/18/2022    METASPCT 0.9 01/18/2022    LYMPHOPCT 2.7 01/18/2022    MONOPCT 11.6 01/18/2022    MYELOPCT 0.9 01/14/2022    BASOPCT 0.1 01/18/2022    MONOSABS 1.38 01/18/2022    LYMPHSABS 0.35 01/18/2022    EOSABS 0.00 01/18/2022    BASOSABS 0.00 01/18/2022     CMP:    Lab Results   Component Value Date     01/18/2022    K 3.8 01/18/2022     01/18/2022    CO2 27 01/18/2022    BUN 22 01/18/2022    CREATININE 0.6 01/18/2022    GFRAA >60 01/18/2022    LABGLOM >60 01/18/2022    GLUCOSE 207 01/18/2022    PROT 6.0 01/18/2022    LABALBU 3.1 01/18/2022    CALCIUM 7.8 01/18/2022    BILITOT 0.3 01/18/2022    ALKPHOS 74 01/18/2022    AST 82 01/18/2022    ALT 75 01/18/2022     Magnesium:    Lab Results   Component Value Date    MG 1.9 01/18/2022     Phosphorus:    Lab Results   Component Value Date    PHOS 1.5 01/18/2022          Radiology Review:        Chest XR 1/14/22   Bilateral airspace disease likely related to pneumonia. BRIEF SUMMARY OF INITIAL CONSULT:     Briefly, Ms. Prashant Mcleod is a 78year old female with a past medical history of Asthma who presented to the ER on January 14 th, 2022 with complaints of SOB and diarrhea for one week. She is not vaccinated against COVID. Reportedly her daughter found her curled into a fetal position in respiratory distress at home and EMS was summoned. She was found to be COVID 19 positive.  Labs were significant for bicarbonate 19, BUN 38, creatinine 1.9, anion gap 19, procalcitonin 1.57, CRP 12.8, , mildly elevated LFTs, D-dimer 404, ferritin 5098. Chest x-ray showed bilateral airspace disease. Renal is consulted for SAMUEL. Problems Resolved:     · SAMUEL, likely pre-renal volume responsive SAMUEL secondary to poor oral intake and GI losses (diarrhea). Creatinine 1.9mg/dL on admission. Renal function has improved to 1.0mg/dL with IVF administration. · HAGMA with low bicarbonate levels, secondary to uremia. To continue bicarbonate drip    IMPRESSION/RECOMMENDATIONS:      1. Hypernatremia, with water deficit, dehydration due to poor intake. Sodium levels quite improved. 2. Hypokalemia, 2/2 poor oral intake, potassium levels improved. 3. Hypophosphatemia, 2/2 poor intake, rule out vitamin D deficiency  4. Hypocalcemia, ionized calcium 1.09, rule out vitamin D deficiency  ----------------------------------------  5. COVID +, on Decadron, Baricitinib  6. Acute hypoxic respiratory failure, secondary to COVID pneumonia.    7. Normocytic anemia, hemoglobin stable    Plan:    · Continue IV fluids, D5W with 20 kcl, decrease to 75 cc/hour  · Replace phosphorus  · Replace calcium  · Obtain vitamin D 25 level  · Continue to monitor renal function, repeat BMP 4pm  · Continue to monitor potassium levels  · Continue to monitor ionized calcium      Electronically signed by Alfonzo Ulrich MD on 1/18/2022 at 2:23 PM

## 2022-01-18 NOTE — CARE COORDINATION
1/18/22 Update CM note: Patient is in isolation and on continuous bipap/FiO2 95% with inability to wean due to instant drop in oxygen sat. She is on iv decadron, iv rocephin, aerosols, iv zithromax. PT/OT pending. Renal is following due to kidney function. Lab values sent to renal via [facility]. Discharge plan unclear at this point.  Electronically signed by Asuncion Harkins RN CM on 1/18/2022 at 1:49 PM

## 2022-01-18 NOTE — PROGRESS NOTES
Pt unable to tolerate being off of bipap. Once removed from bipap, patient quickly desats. Unable to give AM PO Meds. Pt was placed back on to bipap.

## 2022-01-19 ENCOUNTER — APPOINTMENT (OUTPATIENT)
Dept: GENERAL RADIOLOGY | Age: 67
DRG: 207 | End: 2022-01-19
Payer: MEDICARE

## 2022-01-19 ENCOUNTER — ANESTHESIA (OUTPATIENT)
Dept: ICU | Age: 67
DRG: 207 | End: 2022-01-19
Payer: MEDICARE

## 2022-01-19 ENCOUNTER — ANESTHESIA EVENT (OUTPATIENT)
Dept: ICU | Age: 67
DRG: 207 | End: 2022-01-19
Payer: MEDICARE

## 2022-01-19 LAB
AADO2: 419.5 MMHG
AADO2: 441.7 MMHG
AADO2: 541.9 MMHG
AADO2: 550.1 MMHG
AADO2: 595.6 MMHG
ANION GAP SERPL CALCULATED.3IONS-SCNC: 14 MMOL/L (ref 7–16)
B.E.: 1.3 MMOL/L (ref -3–3)
B.E.: 1.5 MMOL/L (ref -3–3)
B.E.: 1.9 MMOL/L (ref -3–3)
B.E.: 2.5 MMOL/L (ref -3–3)
B.E.: 2.9 MMOL/L (ref -3–3)
B.E.: 4 MMOL/L (ref -3–3)
BUN BLDV-MCNC: 21 MG/DL (ref 6–23)
CALCIUM IONIZED: 1.11 MMOL/L (ref 1.15–1.33)
CALCIUM SERPL-MCNC: 7.8 MG/DL (ref 8.6–10.2)
CHLORIDE BLD-SCNC: 97 MMOL/L (ref 98–107)
CO2: 26 MMOL/L (ref 22–29)
COHB: 0.1 % (ref 0–1.5)
COHB: 0.2 % (ref 0–1.5)
COHB: 0.2 % (ref 0–1.5)
COHB: 0.4 % (ref 0–1.5)
COHB: 0.6 % (ref 0–1.5)
COMMENT: ABNORMAL
CREAT SERPL-MCNC: 0.5 MG/DL (ref 0.5–1)
CRITICAL: ABNORMAL
D DIMER: 508 NG/ML DDU
DATE ANALYZED: ABNORMAL
DATE OF COLLECTION: ABNORMAL
DELIVERY SYSTEMS: ABNORMAL
DEVICE: ABNORMAL
FERRITIN: 2910 NG/ML
FIO2 ARTERIAL: 100
FIO2: 100 %
FIO2: 85 %
FIO2: 85 %
GFR AFRICAN AMERICAN: >60
GFR NON-AFRICAN AMERICAN: >60 ML/MIN/1.73
GLUCOSE BLD-MCNC: 241 MG/DL (ref 74–99)
HBA1C MFR BLD: 6 % (ref 4–5.6)
HCO3 ARTERIAL: 31 MMOL/L (ref 22–26)
HCO3: 25.8 MMOL/L (ref 22–26)
HCO3: 25.8 MMOL/L (ref 22–26)
HCO3: 27.6 MMOL/L (ref 22–26)
HCO3: 28.3 MMOL/L (ref 22–26)
HCO3: 30.3 MMOL/L (ref 22–26)
HCT VFR BLD CALC: 29.1 % (ref 34–48)
HCT VFR BLD CALC: 29.5 % (ref 34–48)
HEMOGLOBIN: 9.3 G/DL (ref 11.5–15.5)
HHB: 13.1 % (ref 0–5)
HHB: 2.4 % (ref 0–5)
HHB: 2.9 % (ref 0–5)
HHB: 3.3 % (ref 0–5)
HHB: 4.9 % (ref 0–5)
IMMATURE RETIC FRACT: 18.7 % (ref 3–15.9)
IRON SATURATION: 35 % (ref 15–50)
IRON: 74 MCG/DL (ref 37–145)
LAB: ABNORMAL
LACTIC ACID: 4 MMOL/L (ref 0.5–2.2)
Lab: ABNORMAL
MAGNESIUM: 1.8 MG/DL (ref 1.6–2.6)
MCH RBC QN AUTO: 30.6 PG (ref 26–35)
MCHC RBC AUTO-ENTMCNC: 32 % (ref 32–34.5)
MCV RBC AUTO: 95.7 FL (ref 80–99.9)
METER GLUCOSE: 121 MG/DL (ref 74–99)
METER GLUCOSE: 123 MG/DL (ref 74–99)
METER GLUCOSE: 125 MG/DL (ref 74–99)
METER GLUCOSE: 199 MG/DL (ref 74–99)
METER GLUCOSE: 209 MG/DL (ref 74–99)
METER GLUCOSE: 216 MG/DL (ref 74–99)
METHB: 0.1 % (ref 0–1.5)
METHB: 0.3 % (ref 0–1.5)
METHB: 0.3 % (ref 0–1.5)
MODE: ABNORMAL
MODE: AC
O2 CONTENT: 13.3 ML/DL
O2 CONTENT: 13.4 ML/DL
O2 CONTENT: 13.5 ML/DL
O2 CONTENT: 14.1 ML/DL
O2 CONTENT: 14.2 ML/DL
O2 SATURATION: 86.8 % (ref 92–98.5)
O2 SATURATION: 95.1 % (ref 92–98.5)
O2 SATURATION: 96.7 % (ref 92–98.5)
O2 SATURATION: 97.1 % (ref 92–98.5)
O2 SATURATION: 97.3 % (ref 92–98.5)
O2 SATURATION: 97.6 % (ref 92–98.5)
O2HB: 86 % (ref 94–97)
O2HB: 94.8 % (ref 94–97)
O2HB: 96.2 % (ref 94–97)
O2HB: 96.9 % (ref 94–97)
O2HB: 97.1 % (ref 94–97)
OPERATOR ID: 1721
OPERATOR ID: 1868
OPERATOR ID: 359
OPERATOR ID: ABNORMAL
PATIENT TEMP: 37 C
PCO2 ARTERIAL: 65 MMHG (ref 35–45)
PCO2: 39.3 MMHG (ref 35–45)
PCO2: 40.1 MMHG (ref 35–45)
PCO2: 47.9 MMHG (ref 35–45)
PCO2: 48.8 MMHG (ref 35–45)
PCO2: 54.7 MMHG (ref 35–45)
PDW BLD-RTO: 13.9 FL (ref 11.5–15)
PEEP/CPAP: 8 CMH2O
PFO2: 0.53 MMHG/%
PFO2: 0.89 MMHG/%
PFO2: 1.06 MMHG/%
PFO2: 1.1 MMHG/%
PFO2: 1.28 MMHG/%
PH BLOOD GAS: 7.29 (ref 7.35–7.45)
PH BLOOD GAS: 7.36 (ref 7.35–7.45)
PH BLOOD GAS: 7.37 (ref 7.35–7.45)
PH BLOOD GAS: 7.39 (ref 7.35–7.45)
PH BLOOD GAS: 7.43 (ref 7.35–7.45)
PH BLOOD GAS: 7.43 (ref 7.35–7.45)
PHOSPHORUS: 3.3 MG/DL (ref 2.5–4.5)
PLATELET # BLD: 475 E9/L (ref 130–450)
PMV BLD AUTO: 12 FL (ref 7–12)
PO2 ARTERIAL: 108.4 MMHG (ref 60–80)
PO2: 106 MMHG (ref 75–100)
PO2: 108.6 MMHG (ref 75–100)
PO2: 53.1 MMHG (ref 75–100)
PO2: 89.1 MMHG (ref 75–100)
PO2: 93.4 MMHG (ref 75–100)
POSITIVE END EXP PRESS: 8 CMH2O
POTASSIUM SERPL-SCNC: 3.53 MMOL/L (ref 3.5–5)
POTASSIUM SERPL-SCNC: 4.1 MMOL/L (ref 3.5–5)
RBC # BLD: 3.04 E12/L (ref 3.5–5.5)
RESPIRATORY RATE: 18 B/MIN
RETIC HGB EQUIVALENT: 31.1 PG (ref 28.2–36.6)
RETICULOCYTE ABSOLUTE COUNT: 0.02 E12/L
RETICULOCYTE COUNT PCT: 0.8 % (ref 0.4–1.9)
RI(T): 11.22
RI(T): 3.86
RI(T): 4.73
RI(T): 5.11
RI(T): 6.17
RR MECHANICAL: 18 B/MIN
RR MECHANICAL: 18 B/MIN
RR MECHANICAL: 26 B/MIN
RR MECHANICAL: 26 B/MIN
SODIUM BLD-SCNC: 137 MMOL/L (ref 132–146)
SOURCE, BLOOD GAS: ABNORMAL
THB: 10 G/DL (ref 11.5–16.5)
THB: 10.2 G/DL (ref 11.5–16.5)
THB: 10.3 G/DL (ref 11.5–16.5)
THB: 11.1 G/DL (ref 11.5–16.5)
THB: 9.7 G/DL (ref 11.5–16.5)
TIDAL VOLUME: 300 ML
TIME ANALYZED: 1333
TIME ANALYZED: 154
TIME ANALYZED: 2036
TIME ANALYZED: 348
TIME ANALYZED: 651
TOTAL IRON BINDING CAPACITY: 211 MCG/DL (ref 250–450)
VITAMIN D 25-HYDROXY: 39 NG/ML (ref 30–100)
VT MECHANICAL: 300 ML
WBC # BLD: 20.2 E9/L (ref 4.5–11.5)

## 2022-01-19 PROCEDURE — 6360000002 HC RX W HCPCS: Performed by: INTERNAL MEDICINE

## 2022-01-19 PROCEDURE — 2500000003 HC RX 250 WO HCPCS

## 2022-01-19 PROCEDURE — 6360000002 HC RX W HCPCS

## 2022-01-19 PROCEDURE — 87070 CULTURE OTHR SPECIMN AEROBIC: CPT

## 2022-01-19 PROCEDURE — 36556 INSERT NON-TUNNEL CV CATH: CPT | Performed by: INTERNAL MEDICINE

## 2022-01-19 PROCEDURE — 85045 AUTOMATED RETICULOCYTE COUNT: CPT

## 2022-01-19 PROCEDURE — 82330 ASSAY OF CALCIUM: CPT

## 2022-01-19 PROCEDURE — 99223 1ST HOSP IP/OBS HIGH 75: CPT | Performed by: INTERNAL MEDICINE

## 2022-01-19 PROCEDURE — 87449 NOS EACH ORGANISM AG IA: CPT

## 2022-01-19 PROCEDURE — 2000000000 HC ICU R&B

## 2022-01-19 PROCEDURE — 82306 VITAMIN D 25 HYDROXY: CPT

## 2022-01-19 PROCEDURE — 87206 SMEAR FLUORESCENT/ACID STAI: CPT

## 2022-01-19 PROCEDURE — 82962 GLUCOSE BLOOD TEST: CPT

## 2022-01-19 PROCEDURE — 82805 BLOOD GASES W/O2 SATURATION: CPT

## 2022-01-19 PROCEDURE — 87106 FUNGI IDENTIFICATION YEAST: CPT

## 2022-01-19 PROCEDURE — 31500 INSERT EMERGENCY AIRWAY: CPT

## 2022-01-19 PROCEDURE — 2580000003 HC RX 258

## 2022-01-19 PROCEDURE — 84100 ASSAY OF PHOSPHORUS: CPT

## 2022-01-19 PROCEDURE — 83550 IRON BINDING TEST: CPT

## 2022-01-19 PROCEDURE — 6360000002 HC RX W HCPCS: Performed by: FAMILY MEDICINE

## 2022-01-19 PROCEDURE — 85027 COMPLETE CBC AUTOMATED: CPT

## 2022-01-19 PROCEDURE — 87088 URINE BACTERIA CULTURE: CPT

## 2022-01-19 PROCEDURE — 2500000003 HC RX 250 WO HCPCS: Performed by: NURSE ANESTHETIST, CERTIFIED REGISTERED

## 2022-01-19 PROCEDURE — 2580000003 HC RX 258: Performed by: INTERNAL MEDICINE

## 2022-01-19 PROCEDURE — 36556 INSERT NON-TUNNEL CV CATH: CPT

## 2022-01-19 PROCEDURE — 94640 AIRWAY INHALATION TREATMENT: CPT

## 2022-01-19 PROCEDURE — 6370000000 HC RX 637 (ALT 250 FOR IP): Performed by: FAMILY MEDICINE

## 2022-01-19 PROCEDURE — 74018 RADEX ABDOMEN 1 VIEW: CPT

## 2022-01-19 PROCEDURE — 82803 BLOOD GASES ANY COMBINATION: CPT

## 2022-01-19 PROCEDURE — 83540 ASSAY OF IRON: CPT

## 2022-01-19 PROCEDURE — 80048 BASIC METABOLIC PNL TOTAL CA: CPT

## 2022-01-19 PROCEDURE — 6370000000 HC RX 637 (ALT 250 FOR IP)

## 2022-01-19 PROCEDURE — 71045 X-RAY EXAM CHEST 1 VIEW: CPT

## 2022-01-19 PROCEDURE — C9113 INJ PANTOPRAZOLE SODIUM, VIA: HCPCS

## 2022-01-19 PROCEDURE — 36600 WITHDRAWAL OF ARTERIAL BLOOD: CPT

## 2022-01-19 PROCEDURE — 83735 ASSAY OF MAGNESIUM: CPT

## 2022-01-19 PROCEDURE — 02HV33Z INSERTION OF INFUSION DEVICE INTO SUPERIOR VENA CAVA, PERCUTANEOUS APPROACH: ICD-10-PCS | Performed by: INTERNAL MEDICINE

## 2022-01-19 PROCEDURE — 83036 HEMOGLOBIN GLYCOSYLATED A1C: CPT

## 2022-01-19 PROCEDURE — 82728 ASSAY OF FERRITIN: CPT

## 2022-01-19 PROCEDURE — 6370000000 HC RX 637 (ALT 250 FOR IP): Performed by: NURSE PRACTITIONER

## 2022-01-19 PROCEDURE — 6370000000 HC RX 637 (ALT 250 FOR IP): Performed by: INTERNAL MEDICINE

## 2022-01-19 PROCEDURE — 2500000003 HC RX 250 WO HCPCS: Performed by: INTERNAL MEDICINE

## 2022-01-19 PROCEDURE — 85378 FIBRIN DEGRADE SEMIQUANT: CPT

## 2022-01-19 PROCEDURE — 94002 VENT MGMT INPAT INIT DAY: CPT

## 2022-01-19 PROCEDURE — 36620 INSERTION CATHETER ARTERY: CPT

## 2022-01-19 PROCEDURE — 31500 INSERT EMERGENCY AIRWAY: CPT | Performed by: NURSE ANESTHETIST, CERTIFIED REGISTERED

## 2022-01-19 PROCEDURE — 87081 CULTURE SCREEN ONLY: CPT

## 2022-01-19 PROCEDURE — 6360000002 HC RX W HCPCS: Performed by: NURSE ANESTHETIST, CERTIFIED REGISTERED

## 2022-01-19 PROCEDURE — 83605 ASSAY OF LACTIC ACID: CPT

## 2022-01-19 PROCEDURE — 84132 ASSAY OF SERUM POTASSIUM: CPT

## 2022-01-19 PROCEDURE — 76937 US GUIDE VASCULAR ACCESS: CPT | Performed by: INTERNAL MEDICINE

## 2022-01-19 RX ORDER — SUCCINYLCHOLINE CHLORIDE 20 MG/ML
INJECTION INTRAMUSCULAR; INTRAVENOUS PRN
Status: DISCONTINUED | OUTPATIENT
Start: 2022-01-19 | End: 2022-01-20

## 2022-01-19 RX ORDER — DEXAMETHASONE SODIUM PHOSPHATE 10 MG/ML
10 INJECTION INTRAMUSCULAR; INTRAVENOUS EVERY 24 HOURS
Status: DISCONTINUED | OUTPATIENT
Start: 2022-01-19 | End: 2022-01-23

## 2022-01-19 RX ORDER — SODIUM CHLORIDE 0.9 % (FLUSH) 0.9 %
5-40 SYRINGE (ML) INJECTION EVERY 12 HOURS SCHEDULED
Status: DISCONTINUED | OUTPATIENT
Start: 2022-01-19 | End: 2022-01-19

## 2022-01-19 RX ORDER — ACETAMINOPHEN 325 MG/1
650 TABLET ORAL EVERY 6 HOURS PRN
Status: DISCONTINUED | OUTPATIENT
Start: 2022-01-19 | End: 2022-02-11 | Stop reason: HOSPADM

## 2022-01-19 RX ORDER — MIDAZOLAM HYDROCHLORIDE 1 MG/ML
INJECTION INTRAMUSCULAR; INTRAVENOUS
Status: COMPLETED
Start: 2022-01-19 | End: 2022-01-19

## 2022-01-19 RX ORDER — SODIUM CHLORIDE 9 MG/ML
25 INJECTION, SOLUTION INTRAVENOUS PRN
Status: DISCONTINUED | OUTPATIENT
Start: 2022-01-19 | End: 2022-02-11 | Stop reason: HOSPADM

## 2022-01-19 RX ORDER — ONDANSETRON 2 MG/ML
4 INJECTION INTRAMUSCULAR; INTRAVENOUS EVERY 6 HOURS PRN
Status: DISCONTINUED | OUTPATIENT
Start: 2022-01-19 | End: 2022-01-19

## 2022-01-19 RX ORDER — ACETAMINOPHEN 650 MG/1
650 SUPPOSITORY RECTAL EVERY 6 HOURS PRN
Status: DISCONTINUED | OUTPATIENT
Start: 2022-01-19 | End: 2022-02-11 | Stop reason: HOSPADM

## 2022-01-19 RX ORDER — MIDAZOLAM HYDROCHLORIDE 2 MG/2ML
4 INJECTION, SOLUTION INTRAMUSCULAR; INTRAVENOUS ONCE
Status: COMPLETED | OUTPATIENT
Start: 2022-01-19 | End: 2022-01-19

## 2022-01-19 RX ORDER — SODIUM CHLORIDE 9 MG/ML
10 INJECTION INTRAVENOUS DAILY
Status: DISCONTINUED | OUTPATIENT
Start: 2022-01-19 | End: 2022-01-26

## 2022-01-19 RX ORDER — NICOTINE POLACRILEX 4 MG
15 LOZENGE BUCCAL PRN
Status: DISCONTINUED | OUTPATIENT
Start: 2022-01-19 | End: 2022-02-11 | Stop reason: HOSPADM

## 2022-01-19 RX ORDER — SUCCINYLCHOLINE CHLORIDE 20 MG/ML
INJECTION INTRAMUSCULAR; INTRAVENOUS
Status: COMPLETED
Start: 2022-01-19 | End: 2022-01-19

## 2022-01-19 RX ORDER — POLYETHYLENE GLYCOL 3350 17 G/17G
17 POWDER, FOR SOLUTION ORAL DAILY
Status: DISCONTINUED | OUTPATIENT
Start: 2022-01-19 | End: 2022-01-31

## 2022-01-19 RX ORDER — DEXTROSE MONOHYDRATE 50 MG/ML
100 INJECTION, SOLUTION INTRAVENOUS PRN
Status: DISCONTINUED | OUTPATIENT
Start: 2022-01-19 | End: 2022-02-11 | Stop reason: HOSPADM

## 2022-01-19 RX ORDER — MINERAL OIL AND WHITE PETROLATUM 150; 830 MG/G; MG/G
OINTMENT OPHTHALMIC EVERY 4 HOURS
Status: DISCONTINUED | OUTPATIENT
Start: 2022-01-19 | End: 2022-02-10

## 2022-01-19 RX ORDER — SODIUM CHLORIDE 0.9 % (FLUSH) 0.9 %
5-40 SYRINGE (ML) INJECTION PRN
Status: DISCONTINUED | OUTPATIENT
Start: 2022-01-19 | End: 2022-02-11 | Stop reason: HOSPADM

## 2022-01-19 RX ORDER — ETOMIDATE 2 MG/ML
INJECTION INTRAVENOUS PRN
Status: DISCONTINUED | OUTPATIENT
Start: 2022-01-19 | End: 2022-01-20

## 2022-01-19 RX ORDER — LORAZEPAM 2 MG/ML
1 INJECTION INTRAMUSCULAR ONCE
Status: COMPLETED | OUTPATIENT
Start: 2022-01-19 | End: 2022-01-19

## 2022-01-19 RX ORDER — ONDANSETRON 4 MG/1
4 TABLET, ORALLY DISINTEGRATING ORAL EVERY 8 HOURS PRN
Status: DISCONTINUED | OUTPATIENT
Start: 2022-01-19 | End: 2022-01-19

## 2022-01-19 RX ORDER — CHLORHEXIDINE GLUCONATE 0.12 MG/ML
15 RINSE ORAL 2 TIMES DAILY
Status: DISCONTINUED | OUTPATIENT
Start: 2022-01-19 | End: 2022-02-11 | Stop reason: HOSPADM

## 2022-01-19 RX ORDER — PANTOPRAZOLE SODIUM 40 MG/10ML
40 INJECTION, POWDER, LYOPHILIZED, FOR SOLUTION INTRAVENOUS DAILY
Status: DISCONTINUED | OUTPATIENT
Start: 2022-01-19 | End: 2022-01-26

## 2022-01-19 RX ORDER — POLYETHYLENE GLYCOL 3350 17 G/17G
17 POWDER, FOR SOLUTION ORAL DAILY PRN
Status: DISCONTINUED | OUTPATIENT
Start: 2022-01-19 | End: 2022-01-19

## 2022-01-19 RX ORDER — DEXTROSE MONOHYDRATE 25 G/50ML
12.5 INJECTION, SOLUTION INTRAVENOUS PRN
Status: DISCONTINUED | OUTPATIENT
Start: 2022-01-19 | End: 2022-02-11 | Stop reason: HOSPADM

## 2022-01-19 RX ORDER — SODIUM CHLORIDE, SODIUM LACTATE, POTASSIUM CHLORIDE, CALCIUM CHLORIDE 600; 310; 30; 20 MG/100ML; MG/100ML; MG/100ML; MG/100ML
INJECTION, SOLUTION INTRAVENOUS CONTINUOUS
Status: DISCONTINUED | OUTPATIENT
Start: 2022-01-19 | End: 2022-01-20

## 2022-01-19 RX ORDER — ETOMIDATE 2 MG/ML
INJECTION INTRAVENOUS
Status: COMPLETED
Start: 2022-01-19 | End: 2022-01-19

## 2022-01-19 RX ADMIN — ENOXAPARIN SODIUM 40 MG: 100 INJECTION SUBCUTANEOUS at 08:33

## 2022-01-19 RX ADMIN — CISATRACURIUM BESYLATE 2 MCG/KG/MIN: 200 INJECTION INTRAVENOUS at 20:42

## 2022-01-19 RX ADMIN — Medication 25 MCG/HR: at 02:13

## 2022-01-19 RX ADMIN — MIDAZOLAM HYDROCHLORIDE 4 MG: 2 INJECTION, SOLUTION INTRAMUSCULAR; INTRAVENOUS at 02:25

## 2022-01-19 RX ADMIN — MIDAZOLAM 4 MG: 1 INJECTION INTRAMUSCULAR; INTRAVENOUS at 02:25

## 2022-01-19 RX ADMIN — CHLORHEXIDINE GLUCONATE 0.12% ORAL RINSE 15 ML: 1.2 LIQUID ORAL at 20:17

## 2022-01-19 RX ADMIN — Medication 2000 UNITS: at 08:41

## 2022-01-19 RX ADMIN — ETOMIDATE 10 MG: 2 INJECTION, SOLUTION INTRAVENOUS at 02:07

## 2022-01-19 RX ADMIN — POTASSIUM CHLORIDE: 2 INJECTION, SOLUTION, CONCENTRATE INTRAVENOUS at 02:51

## 2022-01-19 RX ADMIN — INSULIN LISPRO 6 UNITS: 100 INJECTION, SOLUTION INTRAVENOUS; SUBCUTANEOUS at 11:12

## 2022-01-19 RX ADMIN — DEXAMETHASONE SODIUM PHOSPHATE 10 MG: 10 INJECTION INTRAMUSCULAR; INTRAVENOUS at 11:12

## 2022-01-19 RX ADMIN — PANTOPRAZOLE SODIUM 40 MG: 40 INJECTION, POWDER, FOR SOLUTION INTRAVENOUS at 11:13

## 2022-01-19 RX ADMIN — ENOXAPARIN SODIUM 70 MG: 100 INJECTION SUBCUTANEOUS at 20:53

## 2022-01-19 RX ADMIN — LORAZEPAM 1 MG: 2 INJECTION INTRAMUSCULAR; INTRAVENOUS at 00:26

## 2022-01-19 RX ADMIN — MINERAL OIL AND WHITE PETROLATUM: 150; 830 OINTMENT OPHTHALMIC at 12:00

## 2022-01-19 RX ADMIN — VANCOMYCIN HYDROCHLORIDE 1500 MG: 500 INJECTION, POWDER, LYOPHILIZED, FOR SOLUTION INTRAVENOUS at 11:51

## 2022-01-19 RX ADMIN — MIDAZOLAM 1 MG/HR: 5 INJECTION INTRAMUSCULAR; INTRAVENOUS at 02:49

## 2022-01-19 RX ADMIN — IPRATROPIUM BROMIDE AND ALBUTEROL SULFATE 1 AMPULE: .5; 2.5 SOLUTION RESPIRATORY (INHALATION) at 10:55

## 2022-01-19 RX ADMIN — OXYCODONE HYDROCHLORIDE AND ACETAMINOPHEN 500 MG: 500 TABLET ORAL at 08:33

## 2022-01-19 RX ADMIN — POLYETHYLENE GLYCOL 3350 17 G: 17 POWDER, FOR SOLUTION ORAL at 12:27

## 2022-01-19 RX ADMIN — Medication 200 MCG/HR: at 16:34

## 2022-01-19 RX ADMIN — ZINC SULFATE 220 MG (50 MG) CAPSULE 50 MG: CAPSULE at 08:40

## 2022-01-19 RX ADMIN — SUCCINYLCHOLINE CHLORIDE 100 MG: 20 INJECTION, SOLUTION INTRAMUSCULAR; INTRAVENOUS at 02:07

## 2022-01-19 RX ADMIN — CALCIUM GLUCONATE 1000 MG: 98 INJECTION, SOLUTION INTRAVENOUS at 10:19

## 2022-01-19 RX ADMIN — SODIUM CHLORIDE, POTASSIUM CHLORIDE, SODIUM LACTATE AND CALCIUM CHLORIDE 100 ML: 600; 310; 30; 20 INJECTION, SOLUTION INTRAVENOUS at 11:50

## 2022-01-19 RX ADMIN — CISATRACURIUM BESYLATE 2 MCG/KG/MIN: 200 INJECTION INTRAVENOUS at 09:37

## 2022-01-19 RX ADMIN — CHLORHEXIDINE GLUCONATE 0.12% ORAL RINSE 15 ML: 1.2 LIQUID ORAL at 08:33

## 2022-01-19 RX ADMIN — POTASSIUM PHOSPHATE, MONOBASIC AND POTASSIUM PHOSPHATE, DIBASIC 20 MMOL: 224; 236 INJECTION, SOLUTION, CONCENTRATE INTRAVENOUS at 03:45

## 2022-01-19 RX ADMIN — MINERAL OIL AND WHITE PETROLATUM: 150; 830 OINTMENT OPHTHALMIC at 16:37

## 2022-01-19 RX ADMIN — PIPERACILLIN AND TAZOBACTAM 3375 MG: 3; .375 INJECTION, POWDER, LYOPHILIZED, FOR SOLUTION INTRAVENOUS at 11:52

## 2022-01-19 RX ADMIN — IPRATROPIUM BROMIDE AND ALBUTEROL SULFATE 1 AMPULE: .5; 2.5 SOLUTION RESPIRATORY (INHALATION) at 21:43

## 2022-01-19 RX ADMIN — PIPERACILLIN AND TAZOBACTAM 3375 MG: 3; .375 INJECTION, POWDER, LYOPHILIZED, FOR SOLUTION INTRAVENOUS at 20:19

## 2022-01-19 RX ADMIN — BARICITINIB 1 MG: 2 TABLET, FILM COATED ORAL at 08:32

## 2022-01-19 RX ADMIN — MINERAL OIL AND WHITE PETROLATUM: 150; 830 OINTMENT OPHTHALMIC at 20:18

## 2022-01-19 RX ADMIN — IPRATROPIUM BROMIDE AND ALBUTEROL SULFATE 1 AMPULE: .5; 2.5 SOLUTION RESPIRATORY (INHALATION) at 16:48

## 2022-01-19 RX ADMIN — MIDAZOLAM 5 MG/HR: 5 INJECTION INTRAMUSCULAR; INTRAVENOUS at 20:15

## 2022-01-19 ASSESSMENT — PULMONARY FUNCTION TESTS
PIF_VALUE: 32
PIF_VALUE: 32
PIF_VALUE: 27
PIF_VALUE: 33
PIF_VALUE: 20
PIF_VALUE: 32
PIF_VALUE: 32
PIF_VALUE: 29
PIF_VALUE: 32
PIF_VALUE: 27
PIF_VALUE: 16
PIF_VALUE: 33
PIF_VALUE: 32
PIF_VALUE: 25
PIF_VALUE: 28
PIF_VALUE: 32
PIF_VALUE: 33
PIF_VALUE: 31
PIF_VALUE: 24
PIF_VALUE: 28
PIF_VALUE: 26
PIF_VALUE: 29
PIF_VALUE: 14
PIF_VALUE: 28

## 2022-01-19 ASSESSMENT — PAIN SCALES - GENERAL
PAINLEVEL_OUTOF10: 0

## 2022-01-19 NOTE — PROGRESS NOTES
Mechanical Vent Consult    Pt now intubated s/p RRT transferred to MICU. Nutrition assessment completed 1/16, will follow up per policy  Please consult if nutrition support initiated for appropriate recs.     Electronically signed by Galo Musa RD, LD on 1/19/2022 at 11:04 AM

## 2022-01-19 NOTE — PROGRESS NOTES
Pt was placed on bipap, pt has a sev anxiety, breathing 55/min.  I had to take her off and place her back on Airvo 100%    Nurse was notified about her anxiety and  call was placed to a Dr. Per nurse     I will be back to check back with pt and try to place her back on

## 2022-01-19 NOTE — PLAN OF CARE
Problem: Airway Clearance - Ineffective  Goal: Achieve or maintain patent airway  Outcome: Met This Shift     Problem: Gas Exchange - Impaired  Goal: Absence of hypoxia  Outcome: Met This Shift     Problem: Risk for Fluid Volume Deficit  Goal: Maintain normal heart rhythm  Outcome: Met This Shift     Problem: Risk for Fluid Volume Deficit  Goal: Maintain normal serum potassium, sodium, calcium, phosphorus, and pH  Outcome: Ongoing     Problem: Falls - Risk of:  Goal: Will remain free from falls  Outcome: Met This Shift     Problem: Falls - Risk of:  Goal: Absence of physical injury  Outcome: Met This Shift     Problem: Skin Integrity:  Goal: Will show no infection signs and symptoms  Outcome: Met This Shift     Problem: Skin Integrity:  Goal: Absence of new skin breakdown  Outcome: Met This Shift     Problem: Non-Violent Restraints  Goal: Removal from restraints as soon as assessed to be safe  Outcome: Not Met This Shift     Problem: Non-Violent Restraints  Goal: No harm/injury to patient while restraints in use  Outcome: Met This Shift     Problem: Non-Violent Restraints  Goal: Patient's dignity will be maintained  Outcome: Met This Shift

## 2022-01-19 NOTE — PLAN OF CARE
Problem: Airway Clearance - Ineffective  Goal: Achieve or maintain patent airway  1/19/2022 0450 by Marie Mart RN  Outcome: Met This Shift     Problem: Gas Exchange - Impaired  Goal: Absence of hypoxia  1/19/2022 0450 by Marie Mart RN  Outcome: Met This Shift     Problem: Risk for Fluid Volume Deficit  Goal: Maintain normal heart rhythm  1/19/2022 0450 by Marie Mart RN  Outcome: Met This Shift     Problem: Falls - Risk of:  Goal: Will remain free from falls  Description: Will remain free from falls  1/19/2022 0450 by Marie Mart RN  Outcome: Met This Shift  Goal: Absence of physical injury  Description: Absence of physical injury  1/19/2022 0450 by Marie Mart RN  Outcome: Met This Shift     Problem: Skin Integrity:  Goal: Will show no infection signs and symptoms  Description: Will show no infection signs and symptoms  1/19/2022 0450 by Marie Mart RN  Outcome: Met This Shift  Goal: Absence of new skin breakdown  Description: Absence of new skin breakdown  1/19/2022 0450 by Marie Mart RN  Outcome: Met This Shift

## 2022-01-19 NOTE — CONSULTS
Medical Intensive Care Unit     Naye [address]  Resident Consult Note    Date and time: 1/19/2022 2:46 AM  Patient's name:  Ronnell Iraheta Record Number: [id number]  Patient's account/billing number: [de-identified]  Patient's YOB: 1955  Age: 78 y.o. Date of Admission: 1/14/2022  5:11 PM  Length of stay during current admission: 5    Primary Care Physician: No primary care provider on file. ICU Attending Physician: Dr. Oral Hernandez Status: Full Code    Reason for ICU admission: Acute Respiratory Decompensation     History of Present Illness:   Sally Bradshaw is a 70-year-old female with a past medical history of asthma. Patient presented to the hospital with worsening shortness of breath, on January 14, tested positive for COVID at that time. Patient was admitted to the hospital for evaluation due to concerns of respiratory status. Patient initially started on baricitinib per primary. Patient was also found to have an SAMUEL at this time and was treated by nephrology and has since resolved. On the floors patient was initially on nasal cannula and was satting well however she began to further decompensate requiring the need for BiPAP, BiPAP and stabilizer with her satting in the 90s. Pulmonology was also consulted and she was started on high-dose steroids Decadron 10 mg twice daily for 5 days followed by 10 mg daily. However on 1/19/2022 patient began to have further decompensation on BiPAP and could not maintain oxygen saturation above 84%. At this point it was determined that patient would be a candidate for intubation and was transferred to the unit where she was intubated upon arrival. Work up was sent for bacterial component of respiratory status.       CURRENT VENTILATION STATUS:   [x] Ventilator  [] BIPAP  [] Nasal Cannula [] Room Air      SECRETIONS   Amount:  [] Small [] Moderate  [] Large [x] None    Color:     [] White [] Colored  [] Bloody    SEDATION:  DALJIT Score:  [] Propofol gtt  [x] Versed gtt  [x] Fentanyl gtt  [] No Sedation    PARALYZED:  [] No    [x] Yes    VASOPRESSORS:  [x] No    [] Yes    If yes -   [] Levophed       [] Dopamine     [] Vasopressin       [] Dobutamine  [] Phenylephrine         [] Epinephrine    CENTRAL LINES:     [x] No   [] Yes   (Date of Insertion:   )           If yes -     [] Right IJ     [] Left IJ [] Right Femoral [] Left Femoral                   [] Right Subclavian [] Left Subclavian     TRIANA'S CATHETER:   [] No   [x] Yes  (Date of Insertion:   )     URINE OUTPUT:            [] Good   [] Low              [] Anuric    Review of Systems   Reason unable to perform ROS: Unable to obtain due to intubation status of patient. OBJECTIVE:     VITAL SIGNS:  BP (!) 162/144   Pulse 115   Temp 96.6 °F (35.9 °C) (Temporal)   Resp (!) 42   Ht 5' (1.524 m)   Wt 109 lb (49.4 kg)   SpO2 96%   BMI 21.29 kg/m²   Tmax over 24 hours:  Temp (24hrs), Av.7 °F (36.5 °C), Min:96.6 °F (35.9 °C), Max:98.8 °F (37.1 °C)      Patient Vitals for the past 6 hrs:   BP Temp Temp src Pulse Resp SpO2   22 0215 (!) 162/144 -- -- 115 -- 96 %   22 0207 135/72 -- -- 98 (!) 42 (!) 81 %   22 0102 -- -- -- -- (!) 41 --   22 0011 -- -- -- -- (!) 31 --   22 2200 (!) 147/72 96.6 °F (35.9 °C) Temporal 79 22 94 %       No intake or output data in the 24 hours ending 22 0246  Wt Readings from Last 2 Encounters:   22 109 lb (49.4 kg)   10/28/20 131 lb (59.4 kg)     Body mass index is 21.29 kg/m². Physical Exam  Constitutional:       Appearance: Normal appearance. Interventions: She is sedated and intubated. HENT:      Head: Normocephalic and atraumatic. Eyes:      Pupils: Pupils are equal, round, and reactive to light. Cardiovascular:      Rate and Rhythm: Normal rate and regular rhythm. Pulses: Normal pulses. Heart sounds: Normal heart sounds. Pulmonary:      Effort: She is intubated.       Breath sounds: Rhonchi present. Abdominal:      General: Abdomen is flat. Bowel sounds are normal. There is no distension. Palpations: Abdomen is soft. Musculoskeletal:         General: Normal range of motion. Cervical back: Normal range of motion and neck supple. Skin:     General: Skin is warm and dry. Capillary Refill: Capillary refill takes less than 2 seconds. Neurological:      General: No focal deficit present. Mental Status: She is alert and oriented to person, place, and time.           Any additional physical findings:    MEDICATIONS:  Scheduled Meds:   chlorhexidine  15 mL Mouth/Throat BID    sodium chloride flush  5-40 mL IntraVENous 2 times per day    enoxaparin  40 mg SubCUTAneous Daily    potassium phosphate IVPB  20 mmol IntraVENous Once    dexamethasone  10 mg IntraVENous Q12H    Followed by   Tinnie Free ON 1/21/2022] dexamethasone  10 mg IntraVENous Q24H    pantoprazole  40 mg Oral QAM AC    budesonide-formoterol  2 puff Inhalation BID    azithromycin  500 mg IntraVENous Q24H    cefTRIAXone (ROCEPHIN) IV  1,000 mg IntraVENous Q24H    ipratropium-albuterol  1 ampule Inhalation Q4H WA    sodium chloride flush  5-40 mL IntraVENous 2 times per day    ascorbic acid  500 mg Oral Daily    zinc sulfate  50 mg Oral Daily    vitamin D  2,000 Units Oral Daily    baricitinib  1 mg Oral Daily     Continuous Infusions:   fentaNYL 5 mcg/ml in 0.9%  ml infusion 25 mcg/hr (01/19/22 0213)    midazolam      cisatracurium (NIMBEX) infusion      sodium chloride      IV infusion builder 75 mL/hr at 01/18/22 1752    sodium chloride       PRN Meds:   sodium chloride flush, 5-40 mL, PRN  sodium chloride, 25 mL, PRN  ondansetron, 4 mg, Q8H PRN   Or  ondansetron, 4 mg, Q6H PRN  polyethylene glycol, 17 g, Daily PRN  acetaminophen, 650 mg, Q6H PRN   Or  acetaminophen, 650 mg, Q6H PRN  sodium chloride flush, 5-40 mL, PRN  sodium chloride, 25 mL, PRN  acetaminophen, 650 mg, Q6H PRN Or  acetaminophen, 650 mg, Q6H PRN        VENT SETTINGS (Comprehensive) (if applicable):  Vent Information  $Ventilation: $Initial Day  Skin Assessment: Clean, dry, & intact  Vent Type: 980  Vent Mode: AC/VC  Vt Ordered: 300 mL  Rate Set: 18 bmp  Peak Flow: 55 L/min  Pressure Support: 0 cmH20  FiO2 : 100 %  SpO2: 96 %  SpO2/FiO2 ratio: 81  Sensitivity: 3  PEEP/CPAP: 8  I Time/ I Time %: 0 s  Humidification Source: Heated wire  Humidification Temp: 37  Mask Type: Full face mask  Mask Size: Medium  Additional Respiratory  Assessments  Pulse: 115  Resp: (!) 42  SpO2: 96 %  Humidification Source: Heated wire  Humidification Temp: 37  Subglottic Suction Done?: Yes  Airway Size: 7.5    ABGs:   Recent Labs     01/19/22 0154   PH 7.435   PCO2 39.3   PO2 53.1*   HCO3 25.8   BE 1.5   O2SAT 86.8*       Laboratory findings:  Complete Blood Count:   Recent Labs     01/16/22  0553 01/17/22  1221 01/18/22  0634   WBC 10.7 11.8* 11.5   HGB 10.8* 9.4* 9.8*   HCT 33.1* 29.0* 30.4*   * 442 456*        Last 3 Blood Glucose:   Recent Labs     01/17/22  1221 01/17/22  1721 01/18/22  0634   GLUCOSE 210* 167* 207*        PT/INR:    Lab Results   Component Value Date    PROTIME 11.3 01/14/2022    INR 1.0 01/14/2022     PTT:    Lab Results   Component Value Date    APTT 32.5 01/14/2022       Comprehensive Metabolic Profile:   Recent Labs     01/16/22  1714 01/17/22  1221 01/17/22  1221 01/17/22  1721 01/18/22  0634 01/19/22  0154   NA  --  145  --  146 142  --    K   < > 3.3*  --  3.3* 3.8 3.53   CL  --  103  --  103 102  --    CO2  --  27  --  28 27  --    BUN  --  23 --  22 22  --    CREATININE  --  0.7  --  0.7 0.6  --    GLUCOSE  --  210*  --  167* 207*  --    CALCIUM  --  7.9*  --  8.1* 7.8*  --    PROT  --  6.1*   < >  --  6.0*  --    LABALBU  --  2.9*   < >  --  3.1*  --    BILITOT  --  0.2   < >  --  0.3  --    ALKPHOS  --  74   < >  --  74  --    AST  --  115*   < >  --  82*  --    ALT  --  85*   < >  --  75*  --     < > = values in this interval not displayed. Magnesium:   Lab Results   Component Value Date    MG 1.9 01/18/2022     Phosphorus:   Lab Results   Component Value Date    PHOS 1.5 01/18/2022     Ionized Calcium:   Lab Results   Component Value Date    CAION 1.09 01/18/2022      Troponin: No results for input(s): TROPONINI in the last 72 hours. Radiology/Imaging:   XR CHEST PORTABLE   Final Result   1. Multifocal bilateral pneumonia unchanged when compared with the prior   study. XR CHEST PORTABLE   Final Result   Bilateral airspace disease likely related to pneumonia.          XR CHEST PORTABLE    (Results Pending)   XR CHEST PORTABLE    (Results Pending)   XR ABDOMEN FOR NG/OG/NE TUBE PLACEMENT    (Results Pending)   XR CHEST PORTABLE    (Results Pending)         ASSESSMENT  And PLAN:        Pulmonary  Acute Hypoxic Respiratory 2/2 COVID 19 PNA   -Patient was diagnosed with COVID on 1/14/22  -Admitted for Observation  -Was on BiPAP with further decompensation  -RRT was called for worsening hypoxia  -Transferred to ICU for intubation   -Pulmonology following  -Did not receive remdesivir   -CRP of 2.9  -Pro-maría elena of 0.23  -Will trend Ferratin, CRP, D-Dimer  -Lactic Acid sent   -Follow ABG, prone and adjust vent based on ABG  -Patient started on Baricitinib on 1/14/22 per primary, will stop  -Continue on Decadron 10 mg BID for 5 followed by 10 mg daily for 5 days that was started on the 16th  -Some concern for superimposed bacterial infection, cultures have been sent and patient was started on Rocephin and azithromycin, will send fungal work up as well  -Follow cultures   -Stop Rocephin and azithromycin, start on Vanc and Zosyn  -In light of decompensation will obtain CTA of the chest to rule out PE or pneumo, also send troponin's and ekg to rule out cardiac causes   -Continue breathing treatments with symbicort and douneb     Hx Asthma  -Continue breathing treatments  -On mechanical ventilation Infectious Disease  COVID 19 Infection   -See above  -Trend inflammatory markers   -Continue Vit Bundle, C, D and zinc     Endocrine  Hyperglycemia 2/2 Steroids?  -Sugars are now elevated in the 200s  -Recently started on high dose steroids  -A1C sent to evaluate for underlying diabetes  -Will start on low dose sliding scale  -Continue to monitor Glucose     Genitourinary/Renal  Stage 1 SAMUEL (resolved) was likely 2/2 poor oral intake   -Cr on admission was 1.9  -Baseline around 0.8  -Nephrology was consulted   -Was started on IV fluids and bicarb  -Cr now improved to 0.6, appears to be back on baseline   -Will follow nephrology recommendations     Hematology/Oncology  Normocytic Anemia most likely 2/2 Blood loss due to lab draws  -Hgb has progressively declined over course of admission  -Hgb of 9.8 and is slightly up  -Also may have component of dilutional effect with the fluid resuscitation that she has received  -Iron studies sent   -Will continue to monitor at this time        WEAN PER PROTOCOL:  [x] No   [] Yes  [] N/A    ICU PROPHYLAXIS:  Stress ulcer:  [x] PPI Agent  [] [id number] [] Sucralfate  [] Other:  VTE:   [x] Enoxaparin  [] Unfract. Heparin Subcut  [] EPC Cuffs    NUTRITION:  [x] NPO [] Tube Feeding (Specify: ) [] TPN  [] PO (Diet: Diet NPO)    INSULIN DRIP:   [x] No   [] Yes    CONSULTATION NEEDED:   [x] No   [] Yes    FAMILY UPDATED:    [x] No   [] Yes    TRANSFER OUT OF ICU:   [x] No   [] Yes    Anu De Los Santos MD, PGY-1  Attending Physician: Dr. Beto Dunn   1/19/2022, 2:46 AM    [facility]  Department of Pulmonary, Critical Care and Sleep Medicine  [address] [facility]  Department of Internal Medicine      During multidisciplinary team rounds Sally Bradshaw is a 78 y.o. female was seen, examined and discussed.  This is confirmation that I have personally seen and examined the patient and that the key elements of the encounter were performed by me (> 85 % time). The medications & laboratory data was discussed and adjusted where necessary. The radiographic images were reviewed or with radiologist or consultant if felt dis-concordant with the exam or history. The above findings were corroborated, plans confirmed and changes made if needed. Family is updated at the bedside as available. Key issues of the case were discussed among consultants. Critical Care time is documented if appropriate.       Angel Torrez DO, FACP, FCCP, [facility],

## 2022-01-19 NOTE — FLOWSHEET NOTE
MD Juana Peraza made aware that patient unstable to transfer to CT for CTA. MD stated it's okay to postpone at this time. MD also made aware that patient has been hypotensive. RN repositioned cuff, NIBP MAP <65. RN decreased sedation per BIS orders. BIS currently <40.

## 2022-01-19 NOTE — PROCEDURES
Central Line Insertion     Procedure: right internal jugular vein triple lumen catheter placement. Indications: vascular access    Consent: The family members were counseled regarding the procedure, its indications, risks, potential complications and alternatives, and any questions were answered. Consent was obtained to proceed. Number of sticks: 1    Number of Kits used: 1    Procedure: Time Out: Immediately prior to the procedure a \"timeout\" was called to verify the correct patient and procedure. The patient was place in the trendelenburg position and the skin over the right internal jugular vein was prepped with betadine and draped in a sterile fashion. Local anesthesia was not used due to sedation of patient . With ultrasound guidance a large bore needle was used to identify the vein, dark non pulsatile blood returned. The guide wire was then inserted through the needle with minimal resistance. 2 mm nick was made in the skin beside the guidewire. Then a dilator was inserted and removed. A triple lumen catheter was then inserted into the vessel over the guide wire using the Seldinger technique to the 15 cm thomas. All ports showed good, free flowing blood return and were flushed with saline solution. The catheter was then securely fastened to the skin with sutures and with an adhesive dressing and covered with a bio patch and sterile dressing. A post procedure X-ray was ordered and is still pending at this time. Complications: None   The patient tolerated the procedure well. Estimated blood loss: 5 ml. Brittny Carrington MD PGY-1  1/19/2022  12:24 PM    [place]  Department of Pulmonary, Critical Care and Sleep Medicine  [address] [facility]  Department of Internal Medicine      During multidisciplinary team rounds Sade Matt is a 78 y.o. female was seen, examined and discussed.  This is confirmation that I have personally seen and examined the

## 2022-01-19 NOTE — PROGRESS NOTES
RRT called at [address]. Unable to tolerate bipap. Oxygen 84 % on optiflow. Transported to MICU bed 18 for intubation.

## 2022-01-19 NOTE — PROGRESS NOTES
jugular venous distention. Trachea midline. Respiratory: Diminished bilaterally on bipap 95% FiO2  Cardiovascular: RRR, no murmur noted  Abdomen: Soft,, nondistended, flat  Musculoskeletal: No clubbing, cyanosis, edema of bilateral lower extremities. Brisk capillary refill. Skin: Normal skin color. No rashes or lesions.   Neurologic:  intubated, sedated, paralyzed    Medications:  REVIEWED DAILY    Infusion Medications    fentaNYL 5 mcg/ml in 0.9%  ml infusion 200 mcg/hr (01/19/22 1634)    midazolam 5 mg/hr (01/19/22 0940)    cisatracurium (NIMBEX) infusion 2 mcg/kg/min (01/19/22 4512)    sodium chloride      dextrose      lactated ringers 100 mL (01/19/22 1150)    IV infusion builder 75 mL/hr at 01/19/22 0251     Scheduled Medications    chlorhexidine  15 mL Mouth/Throat BID    pantoprazole  40 mg IntraVENous Daily    And    sodium chloride (PF)  10 mL IntraVENous Daily    insulin lispro  0-12 Units SubCUTAneous Q4H    artificial tears   Both Eyes Q4H    polyethylene glycol  17 g Oral Daily    dexamethasone  10 mg IntraVENous Q24H    piperacillin-tazobactam  3,375 mg IntraVENous Q8H    enoxaparin  1 mg/kg SubCUTAneous BID    budesonide-formoterol  2 puff Inhalation BID    ipratropium-albuterol  1 ampule Inhalation Q4H WA    sodium chloride flush  5-40 mL IntraVENous 2 times per day    ascorbic acid  500 mg Oral Daily    zinc sulfate  50 mg Oral Daily    vitamin D  2,000 Units Oral Daily     PRN Meds: sodium chloride flush, sodium chloride, acetaminophen **OR** acetaminophen, glucose, dextrose, glucagon (rDNA), dextrose    Labs:     Recent Labs     01/17/22  1221 01/18/22  0634 01/19/22  0436   WBC 11.8* 11.5 20.2*   HGB 9.4* 9.8* 9.3*   HCT 29.0* 30.4* 29.5*  29.1*    456* 475*       Recent Labs     01/17/22  1221 01/17/22  1721 01/18/22  0634 01/19/22  0154 01/19/22  0436   NA  --  146 142  --  137   K   < > 3.3* 3.8 3.53 4.1   CL  --  103 102  --  97*   CO2  --  28 27  -- 26   BUN  --  22 22  --  21   CREATININE  --  0.7 0.6  --  0.5   CALCIUM  --  8.1* 7.8*  --  7.8*   PHOS  --   --  1.5*  --  3.3    < > = values in this interval not displayed. Recent Labs     01/17/22  1221 01/18/22  0634   PROT 6.1* 6.0*   ALKPHOS 74 74   ALT 85* 75*   * 82*   BILITOT 0.2 0.3       No results for input(s): INR in the last 72 hours. No results for input(s): Homero Collier in the last 72 hours. Chronic labs:    Lab Results   Component Value Date    CHOL 145 06/29/2020    TRIG 75 06/29/2020    HDL 73 06/29/2020    LDLCALC 57 06/29/2020    TSH 3.600 06/29/2020    INR 1.0 01/14/2022    LABA1C 6.0 (H) 01/19/2022       Radiology: REVIEWED DAILY      ASSESSMENT and PLAN:    COVID-19 pneumonia-unvaccinated, symptom onset approximately 1 week prior to presentation. To continue with vitamin C, vitamin D, zinc.  Lovenox prophylaxis. Trend inflammatory markers. · On decadron 10 mg PO bid + baricitinib    Possible superimposed bacterial pneumonia-procalcitonin 1.57-->0. 23. Blood and sputum cultures with NGTD. Urine antigens negative. Continue azithromycin and ceftriaxone. Acute hypoxic respiratory failure-2/2 above. · Presently requiring BiPAP 95% FiO2. · Pulmonary recommending CTA to r/o PE when ok with nephrology. Ddimer   330>442>142    SAMUEL stage I-volume responsive prerenal SAMUEL in the setting of COVID-19 and diarrhea. Nephrology has been consulted. Baseline creatinine appears to be 0.7 mg/dL. · IVF [email or web address]    Low bicarbonate level with elevated anion gap-likely HAGMA 2/2 uremia, possible starvation ketoacidosis; continue to monitor bicarbonate levels    Mildly elevated LFTs-in setting of above. Continue to monitor CMP. Normocytic anemia-continue to monitor H&H    Hx asthma- states that she has never seen a pulmonologist and that it is controlled with PRN albuterol.       DISPOSITION: Continued inpatient care    +++++++++++++++++++++++++++++++++++++++++++++++++  Rosa Maria Vargas MD   [address]  +++++++++++++++++++++++++++++++++++++++++++++++++  NOTE: This report was transcribed using voice recognition software. Every effort was made to ensure accuracy; however, inadvertent computerized transcription errors may be present.

## 2022-01-19 NOTE — PROGRESS NOTES
Long conversation with daughter, Aristeo Casillas earlier. Updated on condition,treatment paln and insertion of lines and rational as to need. Direct phone number to unit provided.

## 2022-01-19 NOTE — SIGNIFICANT EVENT
Rapid Response Team Note  Date of event: 1/19/2022   Time of event: 1.45  Gloria Rodriguez 78y.o. year old female   YOB: 1955   Admit date:  1/14/2022   Location: [address] [place] [address]-A   Witnessed? : [x]Yes  [] No  Monitored? : [x]Yes  [] No  Code status: [x] Full  [] DNR-CCA  []DNR-CC  ______________________________________________________________________  Reason for RRT:    [] RR < 8     [x] RR > 28   [x] SpO2 <90%   [] HR < 40 bpm   [] HR > 130 bpm  [] SBP < 90 mmHg    [] SpO2 <90%   [] LOC   [] Seizures    [] Significant Bleeding Event    [] Other: accessory  Muscle use    Subjective:   CTSP regarding the above event, RRT was called for resp distress, she was breathing in 30-40, saturating only 84%, she was on Bipap, not tolerating it well, very anxious, she got ativan, ABG was drawn before RRT wasc alled, showed hypoxemia, with p/f ratio of 0.55, pt was transferred to MICU for intubation and ventilation, .     Objective:   Vital signs: Temp: /BP:  149/82 /RR: 33 /HR: 97  Initial Condition:  Conscious   [x] Yes  [] No     Breathing [x] Yes  [] No     Pulse  [x] Yes  [] No    Airway:   [x] Open/ Clear     Intervention: [] None  [] Pooled secretions     [] Suctioned  [] Stridor      [] Intubation    Lungs:   [] Symmetrical chest rise/ CTABL Intervention: [] None  [x] Use of accessory muscles    [x] NIV (CPAP/BiPAP)  [] Cyanosis      [] Nasal Oxygen/Mask  [] Wheezing       [] ABG             [] CXR  [] Other:     Circulation:   Rhythm:  [x] Sinus [] Other:  Intervention: [] None            [] IV Access  [] Peripheral              [] Central            [] EKG            [] Cardioversion            [] Defibrillation     Capillary Refill:  [] > 2 seconds [] < 2 seconds    Neurologic:   [] NIHSS      [] Pupillary Response:    Response to pain:   [] Yes  [] No  Follow commands:  [x] Yes  [] No  Facial asymmetry:  [] Yes  [x] No  Motor strength:  [] Equal  [] Focal deficit:   Diagnostic Test:  Blood Sugar:  216 mg/dL  EKG:      ABG:      Lab Results   Component Value Date    PH 7.435 01/19/2022    PCO2 39.3 01/19/2022    PO2 53.1 01/19/2022    HCO3 25.8 01/19/2022    O2SAT 86.8 01/19/2022     Medication(s) Given:  []  Narcan (Naloxone)   []  Romazicon (Flumazenil)    []  Breathing Treatment    []  Steroids (Prednisone, Solu-Medrol)  []  Adenosine  [] Cardiac Medicines:    Infusion(s):   [] Normal Saline    Amount:  cc/hr      [] Lactate Ringers      [] Other:     Imaging:   [] CXR:   [] Normal         [] Pneumothorax         [] Pulmonary Edema  [] Infiltrate          [] CT Head  [] Normal          [] ICB          [] SAH          [] Other:     Laboratory Tests:         Teams Assessment and Plan:  Acute resp failure with hypoxia, 2/2 COVID PNA  COVID PNA  Asthma  Lymphopenia  S/p barcitinib, abx, dexamethasone    ?   Disposition:  [] No transfer   [] Transfer to monitor floor  [x] Transfer to: [x] MICU [] NICU [] CCU [] SICU    Patients family updated: [] Yes  [x] No   Discussed with:  [x] Critical Care Intensivist: Dr. Lory Macias      [] Primary Care Provider: Dr. Gerardo Al      [] Other: ?    Marcos Frank MD PGY-3  1/19/2022 1:59 AM  Attending Physician: Dr Gerardo Al

## 2022-01-19 NOTE — ANESTHESIA PROCEDURE NOTES
Airway  Date/Time: 1/19/2022 2:07 AM  Urgency: emergent    Airway not difficult    General Information and Staff    Patient location during procedure: ICU  Resident/CRNA: MAUREEN Gu CRNA  Performed: resident/CRNA     Consent for Airway (if performed for an anesthetic, see related documentation for consents)  Patient identity confirmed: per hospital policy  Consent: The procedure was performed in an emergent situation. Verbal consent not obtained. Written consent not obtained. Risks and benefits: risks, benefits and alternatives were not discussed      Code status verified:yes  Indications and Patient Condition  Indications for airway management: respiratory failure (COVID)  Spontaneous ventilation: present  Sedation level: deep  Preoxygenated: yes  Patient position: sniffing  MILS not maintained throughout  Mask difficulty assessment: not attempted    Final Airway Details  Final airway type: endotracheal airway      Successful airway: ETT  Cuffed: yes   Successful intubation technique: video laryngoscopy  Facilitating devices/methods: intubating stylet  Endotracheal tube insertion site: oral  Blade type: CMAC D Blade.   Blade size: #4  ETT size (mm): 8.0  Cormack-Lehane Classification: grade I - full view of glottis  Placement verified by: chest auscultation and capnometry   Measured from: lips  ETT to teeth (cm): 21  Number of attempts at approach: 1  Number of other approaches attempted: 0    no

## 2022-01-19 NOTE — PROGRESS NOTES
Physical Therapy    PT consult to evaluate/treat received and appreciated. Pt chart reviewed and evaluation attempted. Pt is currently on paralytic. Not able to participate in PT, will sign off for now. Please re-consult as needed. Thank you.         Ade Quinonez, PT, DPT   [id number]

## 2022-01-19 NOTE — PROGRESS NOTES
Department of Internal Medicine  Nephrology  Progress Note    Events Reviewed. Patient RRT yesterday was intubated and transferred to MICU    SUBJECTIVE:  We are following Ms. Gloria Rodriguez for SAMUEL. Patient is intubated. PHYSICAL EXAM:    Vitals:    VITALS:  BP (!) 86/49   Pulse 63   Temp 95.6 °F (35.3 °C)   Resp 26   Ht 5' (1.524 m)   Wt 146 lb 13.2 oz (66.6 kg)   SpO2 97%   BMI 28.68 kg/m²   24HR INTAKE/OUTPUT:      Intake/Output Summary (Last 24 hours) at 1/19/2022 1218  Last data filed at 1/19/2022 0600  Gross per 24 hour   Intake 600 ml   Output 1030 ml   Net -430 ml     General appearance: Patient is intubated, sedated  HEENT: Normal cephalic, atraumatic without obvious deformity. Pupils equal, round, and reactive to light. Endotracheal tube in place  Neck: Supple, with full range of motion. No jugular venous distention. Trachea midline. Respiratory: Diminished bilaterally on bipap  Cardiovascular: RRR, no murmur noted  Abdomen: Soft, nontender, nondistended, flat  Musculoskeletal: No clubbing, cyanosis, edema of bilateral lower extremities. Brisk capillary refill. Skin: Normal skin color.  No rashes or lesions.   Neurologic: Patient sedated  Other: No edema      Scheduled Meds:   chlorhexidine  15 mL Mouth/Throat BID    pantoprazole  40 mg IntraVENous Daily    And    sodium chloride (PF)  10 mL IntraVENous Daily    insulin lispro  0-12 Units SubCUTAneous Q4H    artificial tears   Both Eyes Q4H    polyethylene glycol  17 g Oral Daily    dexamethasone  10 mg IntraVENous Q24H    piperacillin-tazobactam  3,375 mg IntraVENous Q8H    vancomycin  1,500 mg IntraVENous Once    enoxaparin  1 mg/kg SubCUTAneous BID    budesonide-formoterol  2 puff Inhalation BID    ipratropium-albuterol  1 ampule Inhalation Q4H WA    sodium chloride flush  5-40 mL IntraVENous 2 times per day    ascorbic acid  500 mg Oral Daily    zinc sulfate  50 mg Oral Daily    vitamin D  2,000 Units Oral Daily Continuous Infusions:   fentaNYL 5 mcg/ml in 0.9%  ml infusion 200 mcg/hr (01/19/22 0839)    midazolam 5 mg/hr (01/19/22 0940)    cisatracurium (NIMBEX) infusion 2 mcg/kg/min (01/19/22 0937)    sodium chloride      dextrose      lactated ringers 100 mL (01/19/22 1150)    IV infusion builder 75 mL/hr at 01/19/22 0251     PRN Meds:.sodium chloride flush, sodium chloride, acetaminophen **OR** acetaminophen, glucose, dextrose, glucagon (rDNA), dextrose    DATA:    CBC with Differential:    Lab Results   Component Value Date    WBC 20.2 01/19/2022    RBC 3.04 01/19/2022    HGB 9.3 01/19/2022    HCT 29.1 01/19/2022    HCT 29.5 01/19/2022     01/19/2022    MCV 95.7 01/19/2022    MCH 30.6 01/19/2022    MCHC 32.0 01/19/2022    RDW 13.9 01/19/2022    METASPCT 0.9 01/18/2022    LYMPHOPCT 2.7 01/18/2022    MONOPCT 11.6 01/18/2022    MYELOPCT 0.9 01/14/2022    BASOPCT 0.1 01/18/2022    MONOSABS 1.38 01/18/2022    LYMPHSABS 0.35 01/18/2022    EOSABS 0.00 01/18/2022    BASOSABS 0.00 01/18/2022     CMP:    Lab Results   Component Value Date     01/19/2022    K 4.1 01/19/2022    K 3.8 01/18/2022    CL 97 01/19/2022    CO2 26 01/19/2022    BUN 21 01/19/2022    CREATININE 0.5 01/19/2022    GFRAA >60 01/19/2022    LABGLOM >60 01/19/2022    GLUCOSE 241 01/19/2022    PROT 6.0 01/18/2022    LABALBU 3.1 01/18/2022    CALCIUM 7.8 01/19/2022    BILITOT 0.3 01/18/2022    ALKPHOS 74 01/18/2022    AST 82 01/18/2022    ALT 75 01/18/2022     Magnesium:    Lab Results   Component Value Date    MG 1.8 01/19/2022     Phosphorus:    Lab Results   Component Value Date    PHOS 3.3 01/19/2022          Radiology Review:        Chest XR 1/14/22   Bilateral airspace disease likely related to pneumonia. BRIEF SUMMARY OF INITIAL CONSULT:     Briefly, Ms. Geeta De Souza is a 78year old female with a past medical history of Asthma who presented to the ER on January 14 th, 2022 with complaints of SOB and diarrhea for one week. She is not vaccinated against COVID. Reportedly her daughter found her curled into a fetal position in respiratory distress at home and EMS was summoned. She was found to be COVID 19 positive. Labs were significant for bicarbonate 19, BUN 38, creatinine 1.9, anion gap 19, procalcitonin 1.57, CRP 12.8, , mildly elevated LFTs, D-dimer 404, ferritin 5098. Chest x-ray showed bilateral airspace disease. Renal is consulted for SAMUEL. Problems Resolved:     · SAMUEL, likely pre-renal volume responsive SAMUEL secondary to poor oral intake and GI losses (diarrhea). Creatinine 1.9mg/dL on admission. Renal function has improved to 1.0mg/dL with IVF administration. · HAGMA with low bicarbonate levels, secondary to uremia. To continue bicarbonate drip  · Hypokalemia, 2/2 poor oral intake, potassium levels improved. IMPRESSION/RECOMMENDATIONS:      1. Hypernatremia, with water deficit, dehydration due to poor intake. Sodium levels quite improved. Resolved. 2. Hypophosphatemia, 2/2 poor intake   3. Hypocalcemia, vitamin D 25 level 39, ionized calcium 1.09, to replace  4. Respiratory acidosis, pH: 7.371, PCO2: 48.8  ----------------------------------------  5. COVID +, on dexamethasone, Baricitinib  6. Acute hypoxic respiratory failure, secondary to COVID pneumonia. Status post intubation  7. Normocytic anemia, hemoglobin stable  8.  Nutrition, n.p.o.    Plan:    · Continue IV fluids, D5W with 20 kcl, decrease to 75 cc/hour, discontinue when dizziness started  · Replace calcium  · Continue to monitor renal function  · Continue to monitor ionized calcium      Electronically signed by Jose Guadalupe Lopez MD on 1/19/2022 at 12:18 PM

## 2022-01-19 NOTE — PROGRESS NOTES
IntraVENous Daily    And    sodium chloride (PF)  10 mL IntraVENous Daily    insulin lispro  0-12 Units SubCUTAneous Q4H    artificial tears   Both Eyes Q4H    polyethylene glycol  17 g Oral Daily    dexamethasone  10 mg IntraVENous Q24H    piperacillin-tazobactam  3,375 mg IntraVENous Q8H    vancomycin  1,500 mg IntraVENous Once    enoxaparin  1 mg/kg SubCUTAneous BID    budesonide-formoterol  2 puff Inhalation BID    ipratropium-albuterol  1 ampule Inhalation Q4H WA    sodium chloride flush  5-40 mL IntraVENous 2 times per day    ascorbic acid  500 mg Oral Daily    zinc sulfate  50 mg Oral Daily    vitamin D  2,000 Units Oral Daily     Due to the current efforts to prevent transmission of COVID-19 and also the need to preserve PPE for other caregivers, a face-to-face encounter with the patient was not performed. That being said, all relevant records and diagnostic tests were reviewed, including laboratory results and imaging. Please reference any relevant documentation elsewhere. Care will be coordinated with the primary service. Pertinent/ New Labs and Imaging Studies     Imaging Personally Reviewed:        ECHO none on file      Labs:  Lab Results   Component Value Date    WBC 20.2 01/19/2022    HGB 9.3 01/19/2022    HCT 29.1 01/19/2022    HCT 29.5 01/19/2022    MCV 95.7 01/19/2022    MCH 30.6 01/19/2022    MCHC 32.0 01/19/2022    RDW 13.9 01/19/2022     01/19/2022    MPV 12.0 01/19/2022     Lab Results   Component Value Date     01/19/2022    K 4.1 01/19/2022    K 3.8 01/18/2022    CL 97 01/19/2022    CO2 26 01/19/2022    BUN 21 01/19/2022    CREATININE 0.5 01/19/2022    LABALBU 3.1 01/18/2022    CALCIUM 7.8 01/19/2022    GFRAA >60 01/19/2022    LABGLOM >60 01/19/2022     Lab Results   Component Value Date    PROTIME 11.3 01/14/2022    INR 1.0 01/14/2022     No results for input(s): PROBNP in the last 72 hours.   Recent Labs     01/18/22  0634   PROCAL 0.23*     This SmartLink has not been configured with any valid records. Micro:  No results for input(s): CULTRESP in the last 72 hours. No results for input(s): LABGRAM in the last 72 hours. No results for input(s): LEGUR in the last 72 hours. No results for input(s): STREPNEUMAGU in the last 72 hours. No results for input(s): LP1UAG in the last 72 hours. Procalcitonin 1.57-> 0.42-> 0.23  CRP 12.8-> 17.1-> 9.0-> 2.9  D-dimer 404-> 571-> 475     Assessment:    1. Acute respiratory failure with hypoxia  2. COVID-19 viral infection severe  3. Possible superimposed bacterial pnemonia  4. SAMUEL  5. Mild transaminitis  6. Asthma  7. Lymphopenia      Plan:   8. Continue low tidal volume lung protective stratergy ventilation, wean FiO2 for saturations above 94% , can consider increasing PEEP, permissive hypercapnia  9. Agree with zosyn and vancomycin, follow cultures  10. Follow serial lactic acid  11. DuoNebs every 4 hours, can switch to Pulmicort and brovana  12. Dexamethasone 10 mg twice daily x5 days then 10 mg daily x5 days   13. Baricitinib renal dosed 1 mg x14 days, monitor lymphopenia  14. Monitor inflammatory markers and D-dimer- all improving   15. Monitor serial chest x-rays  16. Vitamin therapy  17. IGI/DVT prophylaxis  18.  T/C starting TF    Electronically signed by Enriqueta Norris MD on 1/19/2022 at 1:22 PM

## 2022-01-19 NOTE — PROGRESS NOTES
RN stated the patient is too unstable for their exam and will try again tomorrow 1/20.  Call CT at #[phone] when the patient is ready for their exam.

## 2022-01-20 ENCOUNTER — APPOINTMENT (OUTPATIENT)
Dept: GENERAL RADIOLOGY | Age: 67
DRG: 207 | End: 2022-01-20
Payer: MEDICARE

## 2022-01-20 LAB
AADO2: 333.6 MMHG
ANION GAP SERPL CALCULATED.3IONS-SCNC: 10 MMOL/L (ref 7–16)
B.E.: 2.5 MMOL/L (ref -3–3)
BLOOD CULTURE, ROUTINE: NORMAL
BUN BLDV-MCNC: 16 MG/DL (ref 6–23)
C-REACTIVE PROTEIN: 3 MG/DL (ref 0–0.4)
CALCIUM IONIZED: 1.15 MMOL/L (ref 1.15–1.33)
CALCIUM SERPL-MCNC: 7.7 MG/DL (ref 8.6–10.2)
CHLORIDE BLD-SCNC: 102 MMOL/L (ref 98–107)
CO2: 26 MMOL/L (ref 22–29)
COHB: 0.5 % (ref 0–1.5)
CREAT SERPL-MCNC: 0.5 MG/DL (ref 0.5–1)
CRITICAL: ABNORMAL
CULTURE, BLOOD 2: NORMAL
D DIMER: 864 NG/ML DDU
DATE ANALYZED: ABNORMAL
DATE OF COLLECTION: ABNORMAL
FIO2: 65 %
GFR AFRICAN AMERICAN: >60
GFR NON-AFRICAN AMERICAN: >60 ML/MIN/1.73
GLUCOSE BLD-MCNC: 99 MG/DL (ref 74–99)
HCO3: 27.4 MMOL/L (ref 22–26)
HCT VFR BLD CALC: 28.4 % (ref 34–48)
HEMOGLOBIN: 9 G/DL (ref 11.5–15.5)
HHB: 7.5 % (ref 0–5)
LAB: ABNORMAL
LACTIC ACID: 1.4 MMOL/L (ref 0.5–2.2)
LACTIC ACID: 2.5 MMOL/L (ref 0.5–2.2)
Lab: ABNORMAL
MAGNESIUM: 1.6 MG/DL (ref 1.6–2.6)
MCH RBC QN AUTO: 30.3 PG (ref 26–35)
MCHC RBC AUTO-ENTMCNC: 31.7 % (ref 32–34.5)
MCV RBC AUTO: 95.6 FL (ref 80–99.9)
METER GLUCOSE: 107 MG/DL (ref 74–99)
METER GLUCOSE: 180 MG/DL (ref 74–99)
METER GLUCOSE: 184 MG/DL (ref 74–99)
METER GLUCOSE: 189 MG/DL (ref 74–99)
METER GLUCOSE: 207 MG/DL (ref 74–99)
METER GLUCOSE: 90 MG/DL (ref 74–99)
METHB: 0 % (ref 0–1.5)
MODE: AC
MRSA CULTURE ONLY: NORMAL
O2 CONTENT: 12.7 ML/DL
O2 SATURATION: 92.5 % (ref 92–98.5)
O2HB: 92 % (ref 94–97)
OPERATOR ID: ABNORMAL
PATIENT TEMP: 37 C
PCO2: 44.4 MMHG (ref 35–45)
PDW BLD-RTO: 13.7 FL (ref 11.5–15)
PEEP/CPAP: 8 CMH2O
PFO2: 1 MMHG/%
PH BLOOD GAS: 7.41 (ref 7.35–7.45)
PHOSPHORUS: 1.8 MG/DL (ref 2.5–4.5)
PLATELET # BLD: 397 E9/L (ref 130–450)
PMV BLD AUTO: 11.6 FL (ref 7–12)
PO2: 65.3 MMHG (ref 75–100)
POTASSIUM SERPL-SCNC: 4 MMOL/L (ref 3.5–5)
PROCALCITONIN: 0.11 NG/ML (ref 0–0.08)
RBC # BLD: 2.97 E12/L (ref 3.5–5.5)
RI(T): 5.11
RR MECHANICAL: 26 B/MIN
SODIUM BLD-SCNC: 138 MMOL/L (ref 132–146)
SOURCE, BLOOD GAS: ABNORMAL
THB: 9.8 G/DL (ref 11.5–16.5)
TIME ANALYZED: 515
VT MECHANICAL: 300 ML
WBC # BLD: 17.8 E9/L (ref 4.5–11.5)

## 2022-01-20 PROCEDURE — 99233 SBSQ HOSP IP/OBS HIGH 50: CPT | Performed by: INTERNAL MEDICINE

## 2022-01-20 PROCEDURE — 82330 ASSAY OF CALCIUM: CPT

## 2022-01-20 PROCEDURE — 6360000002 HC RX W HCPCS: Performed by: INTERNAL MEDICINE

## 2022-01-20 PROCEDURE — 82962 GLUCOSE BLOOD TEST: CPT

## 2022-01-20 PROCEDURE — 2580000003 HC RX 258: Performed by: INTERNAL MEDICINE

## 2022-01-20 PROCEDURE — 6370000000 HC RX 637 (ALT 250 FOR IP)

## 2022-01-20 PROCEDURE — 71045 X-RAY EXAM CHEST 1 VIEW: CPT

## 2022-01-20 PROCEDURE — 2580000003 HC RX 258

## 2022-01-20 PROCEDURE — 6370000000 HC RX 637 (ALT 250 FOR IP): Performed by: INTERNAL MEDICINE

## 2022-01-20 PROCEDURE — 84100 ASSAY OF PHOSPHORUS: CPT

## 2022-01-20 PROCEDURE — 82805 BLOOD GASES W/O2 SATURATION: CPT

## 2022-01-20 PROCEDURE — 2500000003 HC RX 250 WO HCPCS: Performed by: INTERNAL MEDICINE

## 2022-01-20 PROCEDURE — 2000000000 HC ICU R&B

## 2022-01-20 PROCEDURE — 6370000000 HC RX 637 (ALT 250 FOR IP): Performed by: NURSE PRACTITIONER

## 2022-01-20 PROCEDURE — 85378 FIBRIN DEGRADE SEMIQUANT: CPT

## 2022-01-20 PROCEDURE — 83735 ASSAY OF MAGNESIUM: CPT

## 2022-01-20 PROCEDURE — 6360000002 HC RX W HCPCS

## 2022-01-20 PROCEDURE — C9113 INJ PANTOPRAZOLE SODIUM, VIA: HCPCS

## 2022-01-20 PROCEDURE — 94640 AIRWAY INHALATION TREATMENT: CPT

## 2022-01-20 PROCEDURE — 85027 COMPLETE CBC AUTOMATED: CPT

## 2022-01-20 PROCEDURE — 6370000000 HC RX 637 (ALT 250 FOR IP): Performed by: FAMILY MEDICINE

## 2022-01-20 PROCEDURE — 84145 PROCALCITONIN (PCT): CPT

## 2022-01-20 PROCEDURE — 86140 C-REACTIVE PROTEIN: CPT

## 2022-01-20 PROCEDURE — 83605 ASSAY OF LACTIC ACID: CPT

## 2022-01-20 PROCEDURE — 6360000002 HC RX W HCPCS: Performed by: STUDENT IN AN ORGANIZED HEALTH CARE EDUCATION/TRAINING PROGRAM

## 2022-01-20 PROCEDURE — 94003 VENT MGMT INPAT SUBQ DAY: CPT

## 2022-01-20 PROCEDURE — 2580000003 HC RX 258: Performed by: FAMILY MEDICINE

## 2022-01-20 PROCEDURE — 80048 BASIC METABOLIC PNL TOTAL CA: CPT

## 2022-01-20 PROCEDURE — 36415 COLL VENOUS BLD VENIPUNCTURE: CPT

## 2022-01-20 PROCEDURE — 2500000003 HC RX 250 WO HCPCS

## 2022-01-20 PROCEDURE — 37799 UNLISTED PX VASCULAR SURGERY: CPT

## 2022-01-20 RX ORDER — MAGNESIUM SULFATE IN WATER 40 MG/ML
2000 INJECTION, SOLUTION INTRAVENOUS ONCE
Status: COMPLETED | OUTPATIENT
Start: 2022-01-20 | End: 2022-01-20

## 2022-01-20 RX ADMIN — ENOXAPARIN SODIUM 70 MG: 100 INJECTION SUBCUTANEOUS at 08:26

## 2022-01-20 RX ADMIN — PANTOPRAZOLE SODIUM 40 MG: 40 INJECTION, POWDER, FOR SOLUTION INTRAVENOUS at 08:27

## 2022-01-20 RX ADMIN — MINERAL OIL AND WHITE PETROLATUM: 150; 830 OINTMENT OPHTHALMIC at 03:30

## 2022-01-20 RX ADMIN — IPRATROPIUM BROMIDE AND ALBUTEROL SULFATE 1 AMPULE: .5; 2.5 SOLUTION RESPIRATORY (INHALATION) at 13:03

## 2022-01-20 RX ADMIN — Medication 2000 UNITS: at 08:29

## 2022-01-20 RX ADMIN — OXYCODONE HYDROCHLORIDE AND ACETAMINOPHEN 500 MG: 500 TABLET ORAL at 08:28

## 2022-01-20 RX ADMIN — DEXAMETHASONE SODIUM PHOSPHATE 10 MG: 10 INJECTION INTRAMUSCULAR; INTRAVENOUS at 10:54

## 2022-01-20 RX ADMIN — POLYETHYLENE GLYCOL 3350 17 G: 17 POWDER, FOR SOLUTION ORAL at 08:28

## 2022-01-20 RX ADMIN — Medication 150 MCG/HR: at 18:34

## 2022-01-20 RX ADMIN — IPRATROPIUM BROMIDE AND ALBUTEROL SULFATE 1 AMPULE: .5; 2.5 SOLUTION RESPIRATORY (INHALATION) at 08:38

## 2022-01-20 RX ADMIN — CISATRACURIUM BESYLATE 2 MCG/KG/MIN: 200 INJECTION INTRAVENOUS at 09:17

## 2022-01-20 RX ADMIN — SODIUM CHLORIDE, POTASSIUM CHLORIDE, SODIUM LACTATE AND CALCIUM CHLORIDE: 600; 310; 30; 20 INJECTION, SOLUTION INTRAVENOUS at 00:03

## 2022-01-20 RX ADMIN — PIPERACILLIN AND TAZOBACTAM 3375 MG: 3; .375 INJECTION, POWDER, LYOPHILIZED, FOR SOLUTION INTRAVENOUS at 18:34

## 2022-01-20 RX ADMIN — ENOXAPARIN SODIUM 70 MG: 100 INJECTION SUBCUTANEOUS at 20:32

## 2022-01-20 RX ADMIN — MINERAL OIL AND WHITE PETROLATUM: 150; 830 OINTMENT OPHTHALMIC at 08:26

## 2022-01-20 RX ADMIN — Medication 10 ML: at 20:32

## 2022-01-20 RX ADMIN — SODIUM CHLORIDE, PRESERVATIVE FREE 10 ML: 5 INJECTION INTRAVENOUS at 08:27

## 2022-01-20 RX ADMIN — INSULIN LISPRO 4 UNITS: 100 INJECTION, SOLUTION INTRAVENOUS; SUBCUTANEOUS at 15:09

## 2022-01-20 RX ADMIN — IPRATROPIUM BROMIDE AND ALBUTEROL SULFATE 1 AMPULE: .5; 2.5 SOLUTION RESPIRATORY (INHALATION) at 22:03

## 2022-01-20 RX ADMIN — INSULIN LISPRO 2 UNITS: 100 INJECTION, SOLUTION INTRAVENOUS; SUBCUTANEOUS at 23:05

## 2022-01-20 RX ADMIN — CHLORHEXIDINE GLUCONATE 0.12% ORAL RINSE 15 ML: 1.2 LIQUID ORAL at 20:32

## 2022-01-20 RX ADMIN — Medication 150 MCG/HR: at 09:17

## 2022-01-20 RX ADMIN — IPRATROPIUM BROMIDE AND ALBUTEROL SULFATE 1 AMPULE: .5; 2.5 SOLUTION RESPIRATORY (INHALATION) at 16:53

## 2022-01-20 RX ADMIN — MINERAL OIL AND WHITE PETROLATUM: 150; 830 OINTMENT OPHTHALMIC at 18:34

## 2022-01-20 RX ADMIN — MINERAL OIL AND WHITE PETROLATUM: 150; 830 OINTMENT OPHTHALMIC at 10:54

## 2022-01-20 RX ADMIN — MIDAZOLAM 5 MG/HR: 5 INJECTION INTRAMUSCULAR; INTRAVENOUS at 09:18

## 2022-01-20 RX ADMIN — Medication 10 ML: at 08:28

## 2022-01-20 RX ADMIN — ZINC SULFATE 220 MG (50 MG) CAPSULE 50 MG: CAPSULE at 08:28

## 2022-01-20 RX ADMIN — PIPERACILLIN AND TAZOBACTAM 3375 MG: 3; .375 INJECTION, POWDER, LYOPHILIZED, FOR SOLUTION INTRAVENOUS at 03:20

## 2022-01-20 RX ADMIN — INSULIN LISPRO 2 UNITS: 100 INJECTION, SOLUTION INTRAVENOUS; SUBCUTANEOUS at 18:36

## 2022-01-20 RX ADMIN — MAGNESIUM SULFATE HEPTAHYDRATE 2000 MG: 40 INJECTION, SOLUTION INTRAVENOUS at 09:18

## 2022-01-20 RX ADMIN — SODIUM PHOSPHATE, MONOBASIC, MONOHYDRATE 20 MMOL: 276; 142 INJECTION, SOLUTION INTRAVENOUS at 08:27

## 2022-01-20 RX ADMIN — PIPERACILLIN AND TAZOBACTAM 3375 MG: 3; .375 INJECTION, POWDER, LYOPHILIZED, FOR SOLUTION INTRAVENOUS at 11:37

## 2022-01-20 RX ADMIN — CHLORHEXIDINE GLUCONATE 0.12% ORAL RINSE 15 ML: 1.2 LIQUID ORAL at 08:28

## 2022-01-20 RX ADMIN — Medication 150 MCG/HR: at 01:22

## 2022-01-20 RX ADMIN — MINERAL OIL AND WHITE PETROLATUM: 150; 830 OINTMENT OPHTHALMIC at 22:52

## 2022-01-20 RX ADMIN — MINERAL OIL AND WHITE PETROLATUM: 150; 830 OINTMENT OPHTHALMIC at 15:11

## 2022-01-20 ASSESSMENT — PULMONARY FUNCTION TESTS
PIF_VALUE: 24
PIF_VALUE: 28
PIF_VALUE: 25
PIF_VALUE: 26
PIF_VALUE: 25
PIF_VALUE: 26
PIF_VALUE: 24
PIF_VALUE: 26
PIF_VALUE: 27
PIF_VALUE: 24
PIF_VALUE: 28
PIF_VALUE: 28
PIF_VALUE: 25
PIF_VALUE: 26
PIF_VALUE: 23
PIF_VALUE: 25
PIF_VALUE: 25
PIF_VALUE: 28
PIF_VALUE: 27
PIF_VALUE: 26
PIF_VALUE: 28
PIF_VALUE: 28
PIF_VALUE: 29
PIF_VALUE: 27
PIF_VALUE: 28

## 2022-01-20 ASSESSMENT — PAIN SCALES - GENERAL
PAINLEVEL_OUTOF10: 0
PAINLEVEL_OUTOF10: 0

## 2022-01-20 NOTE — PROGRESS NOTES
Department of Internal Medicine  Nephrology  Progress Note    Events Reviewed. SUBJECTIVE:  We are following Ms. Gloria Rodriguez for SAMUEL. Patient is intubated. PHYSICAL EXAM:    Vitals:    VITALS:  BP (!) 98/54   Pulse 71   Temp 97.1 °F (36.2 °C) (Axillary)   Resp 26   Ht 5' (1.524 m)   Wt 146 lb 13.2 oz (66.6 kg)   SpO2 93%   BMI 28.68 kg/m²   24HR INTAKE/OUTPUT:      Intake/Output Summary (Last 24 hours) at 1/20/2022 0936  Last data filed at 1/20/2022 0800  Gross per 24 hour   Intake 4225 ml   Output 1216 ml   Net 3009 ml     General appearance: Patient is intubated, sedated  HEENT: Normal cephalic, atraumatic without obvious deformity. Pupils equal, round, and reactive to light. Endotracheal tube in place  Neck: Supple, with full range of motion. No jugular venous distention. Trachea midline. Respiratory: Diminished bilaterally on bipap  Cardiovascular: RRR, no murmur noted  Abdomen: Soft, nontender, nondistended, flat  Musculoskeletal: No clubbing, cyanosis, edema of bilateral lower extremities. Brisk capillary refill. Skin: Normal skin color.  No rashes or lesions.   Neurologic: Patient sedated  Other: No edema      Scheduled Meds:   sodium phosphate IVPB  20 mmol IntraVENous Once    magnesium sulfate  2,000 mg IntraVENous Once    chlorhexidine  15 mL Mouth/Throat BID    pantoprazole  40 mg IntraVENous Daily    And    sodium chloride (PF)  10 mL IntraVENous Daily    insulin lispro  0-12 Units SubCUTAneous Q4H    artificial tears   Both Eyes Q4H    polyethylene glycol  17 g Oral Daily    dexamethasone  10 mg IntraVENous Q24H    piperacillin-tazobactam  3,375 mg IntraVENous Q8H    enoxaparin  1 mg/kg SubCUTAneous BID    budesonide-formoterol  2 puff Inhalation BID    ipratropium-albuterol  1 ampule Inhalation Q4H WA    sodium chloride flush  5-40 mL IntraVENous 2 times per day    ascorbic acid  500 mg Oral Daily    zinc sulfate  50 mg Oral Daily    vitamin D  2,000 Units Oral Daily     Continuous Infusions:   fentaNYL 5 mcg/ml in 0.9%  ml infusion 150 mcg/hr (01/20/22 0917)    midazolam 5 mg/hr (01/20/22 0918)    cisatracurium (NIMBEX) infusion 2 mcg/kg/min (01/20/22 0917)    sodium chloride      dextrose      lactated ringers 100 mL/hr at 01/20/22 0003    IV infusion builder 75 mL/hr at 01/19/22 0251     PRN Meds:.sodium chloride flush, sodium chloride, acetaminophen **OR** acetaminophen, glucose, dextrose, glucagon (rDNA), dextrose    DATA:    CBC with Differential:    Lab Results   Component Value Date    WBC 17.8 01/20/2022    RBC 2.97 01/20/2022    HGB 9.0 01/20/2022    HCT 28.4 01/20/2022     01/20/2022    MCV 95.6 01/20/2022    MCH 30.3 01/20/2022    MCHC 31.7 01/20/2022    RDW 13.7 01/20/2022    METASPCT 0.9 01/18/2022    LYMPHOPCT 2.7 01/18/2022    MONOPCT 11.6 01/18/2022    MYELOPCT 0.9 01/14/2022    BASOPCT 0.1 01/18/2022    MONOSABS 1.38 01/18/2022    LYMPHSABS 0.35 01/18/2022    EOSABS 0.00 01/18/2022    BASOSABS 0.00 01/18/2022     CMP:    Lab Results   Component Value Date     01/20/2022    K 4.0 01/20/2022    K 3.8 01/18/2022     01/20/2022    CO2 26 01/20/2022    BUN 16 01/20/2022    CREATININE 0.5 01/20/2022    GFRAA >60 01/20/2022    LABGLOM >60 01/20/2022    GLUCOSE 99 01/20/2022    PROT 6.0 01/18/2022    LABALBU 3.1 01/18/2022    CALCIUM 7.7 01/20/2022    BILITOT 0.3 01/18/2022    ALKPHOS 74 01/18/2022    AST 82 01/18/2022    ALT 75 01/18/2022     Magnesium:    Lab Results   Component Value Date    MG 1.6 01/20/2022     Phosphorus:    Lab Results   Component Value Date    PHOS 1.8 01/20/2022          Radiology Review:        Chest XR 1/14/22   Bilateral airspace disease likely related to pneumonia. BRIEF SUMMARY OF INITIAL CONSULT:     Briefly, Ms. Jackelin Abdalla is a 78year old female with a past medical history of Asthma who presented to the ER on January 14 th, 2022 with complaints of SOB and diarrhea for one week.  She is not vaccinated against COVID. Reportedly her daughter found her curled into a fetal position in respiratory distress at home and EMS was summoned. She was found to be COVID 19 positive. Labs were significant for bicarbonate 19, BUN 38, creatinine 1.9, anion gap 19, procalcitonin 1.57, CRP 12.8, , mildly elevated LFTs, D-dimer 404, ferritin 5098. Chest x-ray showed bilateral airspace disease. Renal is consulted for SAMUEL. Problems Resolved:     · SAMUEL, likely pre-renal volume responsive SAMUEL secondary to poor oral intake and GI losses (diarrhea). Creatinine 1.9mg/dL on admission. Renal function has improved to 1.0mg/dL with IVF administration. · HAGMA with low bicarbonate levels, secondary to uremia. To continue bicarbonate drip  · Hypokalemia, 2/2 poor oral intake, potassium levels improved. · xypernatremia, with water deficit, dehydration due to poor intake. Sodium levels quite improved. Resolved. IMPRESSION/RECOMMENDATIONS:      1. Hypophosphatemia, 2/2 poor intake, to replace  2. Hypocalcemia, vitamin D 25 level 39, ionized calcium improved  3. Normal pH  ----------------------------------------  4. COVID +, on dexamethasone, Baricitinib  5. Acute hypoxic respiratory failure, secondary to COVID pneumonia. Status post intubation  6. Normocytic anemia, hemoglobin stable  7.  Nutrition, n.p.o.    Plan:    · Discontinue LR  · Continue to monitor renal function      Electronically signed by Isabel Xiao MD on 1/20/2022 at 9:36 AM

## 2022-01-20 NOTE — PLAN OF CARE
Problem: Airway Clearance - Ineffective  Goal: Achieve or maintain patent airway  1/20/2022 1317 by Kyung Avila RN  Outcome: Met This Shift     Problem: Breathing Pattern - Ineffective  Goal: Ability to achieve and maintain a regular respiratory rate  Outcome: Met This Shift     Problem:  Body Temperature -  Risk of, Imbalanced  Goal: Ability to maintain a body temperature within defined limits  Outcome: Met This Shift     Problem: Risk for Fluid Volume Deficit  Goal: Maintain normal heart rhythm  Outcome: Met This Shift     Problem: Patient Education: Go to Patient Education Activity  Goal: Patient/Family Education  Outcome: Met This Shift     Problem: Falls - Risk of:  Goal: Will remain free from falls  1/20/2022 1317 by Kyung Avila RN  Outcome: Met This Shift     Problem: Falls - Risk of:  Goal: Absence of physical injury  1/20/2022 1317 by Kyung Avila RN  Outcome: Met This Shift     Problem: Skin Integrity:  Goal: Will show no infection signs and symptoms  1/20/2022 1317 by Kyung Avila RN  Outcome: Met This Shift     Problem: Skin Integrity:  Goal: Absence of new skin breakdown  1/20/2022 1317 by Kyung Avila RN  Outcome: Met This Shift

## 2022-01-20 NOTE — PROGRESS NOTES
Janessa Pablo M.D.,[facility] ADINA Grimm, F.A.C.O.I., Jeannette Durán M.D. Darryl Trevino M.D. Isidro Roche D.O. Daily Pulmonary Progress Note    Patient:  Margie Valle 78 y.o. female MRN: [id number]     Date of Service: 2022      Synopsis     We are following patient for COVID hypoxia    \"CC\" shortness of breath, diarrhea    Code status: FULL      Subjective      Patient seen through window. Currently prone/intubated/sedated/paralyzed on full vent support. Nimbex/Fentanyl/Versed drips infusing   ACVC 24/300/8/65%.    AB.4 0/44/65/20 7/92.5% on FiO2 of 65%  P/F 1.00  Pplat 23  Compliance 21    Review of Systems:  unable to obtain    24-hour events:  None     Objective   Vitals: BP (!) 98/54   Pulse 96   Temp 96.6 °F (35.9 °C) (Axillary)   Resp 24   Ht 5' (1.524 m)   Wt 146 lb 13.2 oz (66.6 kg)   SpO2 98%   BMI 28.68 kg/m²     I/O:    Intake/Output Summary (Last 24 hours) at 2022 1246  Last data filed at 2022 1200  Gross per 24 hour   Intake 4285 ml   Output 1411 ml   Net 2874 ml       Vent Information  $Ventilation: $Subsequent Day  Skin Assessment: Clean, dry, & intact  Equipment ID: [id number]  Vent Type: 980  Vent Mode: AC/VC  Vt Ordered: 300 mL  Rate Set: 24 bmp  Peak Flow: 60 L/min  Pressure Support: 0 cmH20  FiO2 : 65 %  SpO2: 98 %  SpO2/FiO2 ratio: 150.77  Sensitivity: 3  PEEP/CPAP: 8  I Time/ I Time %: 0 s  Humidification Source: Heated wire  Humidification Temp: 37  Humidification Temp Measured: 37  Circuit Condensation: Drained  Mask Type: Full face mask  Mask Size: Medium       IPAP: 14 cmH20  CPAP/EPAP: 8 cmH2O     CURRENT MEDS :  Scheduled Meds:   chlorhexidine  15 mL Mouth/Throat BID    pantoprazole  40 mg IntraVENous Daily    And    sodium chloride (PF)  10 mL IntraVENous Daily    insulin lispro  0-12 Units SubCUTAneous Q4H    artificial tears   Both Eyes Q4H    polyethylene glycol  17 g Oral Daily    dexamethasone  10 mg IntraVENous Q24H    piperacillin-tazobactam  3,375 mg IntraVENous Q8H    enoxaparin  1 mg/kg SubCUTAneous BID    ipratropium-albuterol  1 ampule Inhalation Q4H WA    sodium chloride flush  5-40 mL IntraVENous 2 times per day    ascorbic acid  500 mg Oral Daily    zinc sulfate  50 mg Oral Daily    vitamin D  2,000 Units Oral Daily     Due to the current efforts to prevent transmission of COVID-19 and also the need to preserve PPE for other caregivers, a face-to-face encounter with the patient was not performed. That being said, all relevant records and diagnostic tests were reviewed, including laboratory results and imaging. Please reference any relevant documentation elsewhere. Care will be coordinated with the primary service. Pertinent/ New Labs and Imaging Studies     Imaging Personally Reviewed:  CXR 1/20  Slightly worsened bilateral pulmonary infiltrates with increasing opacity in   the bilateral bases when compared to previous.  Stable lines and tubes. Labs:  Lab Results   Component Value Date    WBC 17.8 01/20/2022    HGB 9.0 01/20/2022    HCT 28.4 01/20/2022    MCV 95.6 01/20/2022    MCH 30.3 01/20/2022    MCHC 31.7 01/20/2022    RDW 13.7 01/20/2022     01/20/2022    MPV 11.6 01/20/2022     Lab Results   Component Value Date     01/20/2022    K 4.0 01/20/2022    K 3.8 01/18/2022     01/20/2022    CO2 26 01/20/2022    BUN 16 01/20/2022    CREATININE 0.5 01/20/2022    LABALBU 3.1 01/18/2022    CALCIUM 7.7 01/20/2022    GFRAA >60 01/20/2022    LABGLOM >60 01/20/2022     Lab Results   Component Value Date    PROTIME 11.3 01/14/2022    INR 1.0 01/14/2022     No results for input(s): PROBNP in the last 72 hours. Recent Labs     01/18/22  0634   PROCAL 0.23*     This SmartLink has not been configured with any valid records.        Micro:  1/19 urine culture positive for Candida  1/19 respiratory culture negative    Procalcitonin 1.57-> 0.42-> 0.23  CRP 12.8-> 17.1-> 9.0-> 2.9-> 3.0  D-dimer 404-> 571-> 475-> 508-> 864     Assessment:    1. Acute respiratory failure with hypoxia  2. COVID-19 viral infection severe  3. Possible superimposed bacterial pnemonia  4. SAMUEL  5. Mild transaminitis  6. Asthma  7. Lymphopenia      Plan:   8. Continue low tidal volume lung protective stratergy ventilation, wean FiO2 as tolerated  9. Sedation and paralytic per MICU team, currently on Nimbex, fentanyl, Versed  10. Agree with zosyn and vancomycin x1, follow cultures  11. Follow serial lactic acid, previous 4.0  12. DuoNebs every 4 hours, can switch to Pulmicort and brovana  13. Dexamethasone 10 mg daily x5 days   14. Baricitinib renal dosed stopped 1/19 per PCP  15. Monitor inflammatory markers- stable, repeat procal pending  16. Dimer elevated to 864 today, unable to do CTA pulmonary d/t respiratory status    17. Monitor serial chest x-rays- worsening infiltrates today  18. Vitamin therapy  19. GI/DVT prophylaxis, full dose Lovenox twice daily    This plan of care was reviewed in collaboration with Dr. Fabiola Gilliam  Electronically signed by MAUREEN Marrufo CNP on 1/20/2022 at 12:46 PM      I personally cared for the patient. Labs, medications, radiographs reviewed. I agree with history exam and plans detailed in NP note.   Alessandra Goodwin MD

## 2022-01-20 NOTE — PROGRESS NOTES
Patient now prone     01/20/22 1023   Vent Information   Vent Type 980   Vent Mode AC/VC   Vt Ordered 300 mL   Rate Set 24 bmp   Peak Flow 60 L/min   Pressure Support 0 cmH20   FiO2  65 %   SpO2 97 %   SpO2/FiO2 ratio 149.23   Sensitivity 3   PEEP/CPAP 8   I Time/ I Time % 0 s   Humidification Source Heated wire   Humidification Temp 37   Vent Patient Data   High Peep/I Pressure 0   Peak Inspiratory Pressure 26 cmH2O   Mean Airway Pressure 12 cmH20   Rate Measured 24 br/min   Vt Exhaled 313 mL   Minute Volume 7.51 Liters   I:E Ratio 1:3.60   Spontaneous Breathing Trial (SBT) RT Doc   Pulse 106   Additional Respiratory  Assessments   Resp 24   Alarm Settings   High Pressure Alarm 55 cmH2O

## 2022-01-20 NOTE — PROGRESS NOTES
Hospitalist Progress Note      SYNOPSIS:     Ms. Scarlett Salazar is a 78y.o. year old female who has a PMH of asthma.     She presented to the ER on 22 with complaints of SOB and diarrhea x 1 week. She was not vaccinated against COVID-19. Reportedly her daughter found her curled into the fetal position in respiratory distress at home and called 911. Vitals on arrival were significant for temperature 103.2, heart rate 110, blood pressure 122/70 and oxygen saturation 72% on room air. She was placed on 15 L via nonrebreather mask and her oxygen saturation improved to 95%. Labs were significant for bicarbonate 19, BUN 38, creatinine 1.9, anion gap 19, procalcitonin 1.57, CRP 12.8, , mildly elevated LFTs, D-dimer 404, ferritin 5098 and a positive COVID PCR. Chest x-ray showed bilateral airspace disease. She was given 1 L normal saline bolus as well Lovenox and Decadron prior to being admitted. SUBJECTIVE:    Patient seen and examined. Intubated,sedated- 65 fiO2, PEEP 8  Records reviewed. Temp (24hrs), Av.7 °F (35.9 °C), Min:95.6 °F (35.3 °C), Max:97.1 °F (36.2 °C)    DIET: Diet NPO  CODE: Full Code    Intake/Output Summary (Last 24 hours) at 2022 1244  Last data filed at 2022 1200  Gross per 24 hour   Intake 4285 ml   Output 1411 ml   Net 2874 ml       Review of Systems  Unable to obtain- intubated , sedated      OBJECTIVE:    BP (!) 98/54   Pulse 96   Temp 96.6 °F (35.9 °C) (Axillary)   Resp 24   Ht 5' (1.524 m)   Wt 146 lb 13.2 oz (66.6 kg)   SpO2 98%   BMI 28.68 kg/m²     General appearance: intubated, sedated  HEENT: Normal cephalic, atraumatic without obvious deformity. RS- 65fiO2, PEEP 8  CVS- Tachycardic  Skin: Normal skin color. No rashes or lesions.   Neurologic:  intubated, sedated, paralyzed    Medications:  REVIEWED DAILY    Infusion Medications    fentaNYL 5 mcg/ml in 0.9%  ml infusion 150 mcg/hr (22 0917)    midazolam 5 mg/hr (22 4009)    cisatracurium (NIMBEX) infusion 2 mcg/kg/min (01/20/22 0917)    sodium chloride      dextrose       Scheduled Medications    chlorhexidine  15 mL Mouth/Throat BID    pantoprazole  40 mg IntraVENous Daily    And    sodium chloride (PF)  10 mL IntraVENous Daily    insulin lispro  0-12 Units SubCUTAneous Q4H    artificial tears   Both Eyes Q4H    polyethylene glycol  17 g Oral Daily    dexamethasone  10 mg IntraVENous Q24H    piperacillin-tazobactam  3,375 mg IntraVENous Q8H    enoxaparin  1 mg/kg SubCUTAneous BID    ipratropium-albuterol  1 ampule Inhalation Q4H WA    sodium chloride flush  5-40 mL IntraVENous 2 times per day    ascorbic acid  500 mg Oral Daily    zinc sulfate  50 mg Oral Daily    vitamin D  2,000 Units Oral Daily     PRN Meds: sodium chloride flush, sodium chloride, acetaminophen **OR** acetaminophen, glucose, dextrose, glucagon (rDNA), dextrose    Labs:     Recent Labs     01/18/22  0634 01/19/22 0436 01/20/22 0432   WBC 11.5 20.2* 17.8*   HGB 9.8* 9.3* 9.0*   HCT 30.4* 29.5*  29.1* 28.4*   * 475* 397       Recent Labs     01/17/22  1721 01/18/22  0634 01/19/22  0154 01/19/22 0436 01/20/22 0432   NA  --  142  --  137 138   K   < > 3.8 3.53 4.1 4.0   CL  --  102  --  97* 102   CO2  --  27  --  26 26   BUN  --  22  --  21 16   CREATININE  --  0.6  --  0.5 0.5   CALCIUM  --  7.8*  --  7.8* 7.7*   PHOS  --  1.5*  --  3.3 1.8*    < > = values in this interval not displayed. Recent Labs     01/18/22 0634   PROT 6.0*   ALKPHOS 74   ALT 75*   AST 82*   BILITOT 0.3       No results for input(s): INR in the last 72 hours. No results for input(s): Margette Dec in the last 72 hours.     Chronic labs:    Lab Results   Component Value Date    CHOL 145 06/29/2020    TRIG 75 06/29/2020    HDL 73 06/29/2020    LDLCALC 57 06/29/2020    TSH 3.600 06/29/2020    INR 1.0 01/14/2022    LABA1C 6.0 (H) 01/19/2022       Radiology: REVIEWED DAILY      ASSESSMENT and PLAN:    COVID-19 pneumonia-unvaccinated, symptom onset approximately 1 week prior to presentation. To continue with vitamin C, vitamin D, zinc.  Lovenox prophylaxis. Trend inflammatory markers. · On decadron 10 mg PO bid + baricitinib    Possible superimposed bacterial pneumonia-procalcitonin 1.57-->0. 23. Blood and sputum cultures with NGTD. Urine antigens negative. Continue azithromycin and ceftriaxone. Acute hypoxic respiratory failure-2/2 above. 65fiO2, PEEP 8  · Pulmonary recommending CTA to r/o PE when ok with nephrology. Ddimer   282>150>281    SAMUEL stage I-volume responsive prerenal SAMUEL in the setting of COVID-19 and diarrhea. Nephrology has been consulted. Baseline creatinine appears to be 0.7 mg/dL. · IVF [email or web address]    Low bicarbonate level with elevated anion gap-likely HAGMA 2/2 uremia, possible starvation ketoacidosis; continue to monitor bicarbonate levels    Mildly elevated LFTs-in setting of above. Continue to monitor CMP. Normocytic anemia-continue to monitor H&H    Hx asthma- states that she has never seen a pulmonologist and that it is controlled with PRN albuterol. DISPOSITION: Continued inpatient care    +++++++++++++++++++++++++++++++++++++++++++++++++  Yocasta Cedillo MD   [facility] Physician - [address] [place] [address]  +++++++++++++++++++++++++++++++++++++++++++++++++  NOTE: This report was transcribed using voice recognition software. Every effort was made to ensure accuracy; however, inadvertent computerized transcription errors may be present.

## 2022-01-20 NOTE — FLOWSHEET NOTE
Before Proning patient 3 sets of earrings (2 pair small silver hoops, 1 pair silver ball)  removed & placed securely in container with name sticker on it.

## 2022-01-20 NOTE — PROCEDURES
Arterial line placement    Procedure: Right Radial arterial line placement. Indications: Continuous monitoring of blood pressure in a patient with hypotension +/- shock, on Levophed. Anesthesia: Local infiltration of 1% lidocaine. Consent:  Unable to be obtained due to patient's condition. Technique: Time Out: Immediately prior to the procedure a \"timeout\" was called to verify the correct patient and procedure. Procedure was done using strict aseptic technique. Guilherme's test was performed and was normal. Right Radial site was cleaned with chloraprep and draped. Right Radial was identified, then Lidocaine 1% was infiltrated locally. Arterial line was inserted, a good blood flow was obtained, after which guidewire was inserted all the way with no resistance. Then the canula was inserted and needle with guidewire was withdrawn. Pulsatile bright red blood flow was observed. The canula was connected to BP monitoring apparatus and a good quality waveform was noted. Then the canula was secured with 2 stay sutures of 2-0 silk after Lidocaine infiltration, following which dressing was applied. Number of sticks: 1. Number of Kits used: 1. Complications: No immediate complication. Estimated blood loss: About 1 ml. Comment: Patient tolerated the procedure well. Spencer Acosta MD PGY-1  1/19/2022 7:55 PM        [place]  Department of Pulmonary, Critical Care and Sleep Medicine  [address] [facility]  Department of Internal Medicine  Attending Procedural Note Addendum Statement    This procedure was supervised. The critical elements of this procedure was monitored and, if needed, I was available for the needs of the procedure.      Anita Ho DO, FCCP, Chele Dick

## 2022-01-20 NOTE — PLAN OF CARE
Problem: Airway Clearance - Ineffective  Goal: Achieve or maintain patent airway  1/20/2022 1857 by Vilma Perez RN  Outcome: Met This Shift  1/20/2022 1317 by Abdirahman Mcconnell RN  Outcome: Met This Shift  1/20/2022 0525 by Harriet Lee RN  Outcome: Met This Shift     Problem: Gas Exchange - Impaired  Goal: Absence of hypoxia  Outcome: Met This Shift  Goal: Promote optimal lung function  Outcome: Met This Shift     Problem: Breathing Pattern - Ineffective  Goal: Ability to achieve and maintain a regular respiratory rate  1/20/2022 1857 by Vilma Perez RN  Outcome: Met This Shift  1/20/2022 1317 by Abdirahman Mcconnell RN  Outcome: Met This Shift     Problem:  Body Temperature -  Risk of, Imbalanced  Goal: Ability to maintain a body temperature within defined limits  1/20/2022 1857 by Vilma Perez RN  Outcome: Met This Shift  1/20/2022 1317 by Kristine Cantu RN  Outcome: Met This Shift  Goal: Will regain or maintain usual level of consciousness  Outcome: Met This Shift  Goal: Complications related to the disease process, condition or treatment will be avoided or minimized  Outcome: Met This Shift     Problem: Isolation Precautions - Risk of Spread of Infection  Goal: Prevent transmission of infection  Outcome: Met This Shift     Problem: Nutrition Deficits  Goal: Optimize nutritional status  Outcome: Met This Shift     Problem: Risk for Fluid Volume Deficit  Goal: Maintain normal heart rhythm  1/20/2022 1857 by Vilma Perez RN  Outcome: Met This Shift  1/20/2022 1317 by Kristine Cantu RN  Outcome: Met This Shift  Goal: Maintain absence of muscle cramping  Outcome: Met This Shift  Goal: Maintain normal serum potassium, sodium, calcium, phosphorus, and pH  Outcome: Met This Shift     Problem: Loneliness or Risk for Loneliness  Goal: Demonstrate positive use of time alone when socialization is not possible  Outcome: Met This Shift     Problem: Fatigue  Goal: Verbalize increase energy and improved vitality  Outcome: Met This Shift     Problem: Patient Education: Go to Patient Education Activity  Goal: Patient/Family Education  1/20/2022 1857 by Abbey Moore RN  Outcome: Met This Shift  1/20/2022 1317 by Aiyana Fonseca RN  Outcome: Met This Shift     Problem: Falls - Risk of:  Goal: Will remain free from falls  Description: Will remain free from falls  1/20/2022 1857 by Abbey Moore RN  Outcome: Met This Shift  1/20/2022 1317 by Aiyana Fonseca RN  Outcome: Met This Shift  1/20/2022 0525 by Sharona Dunne RN  Outcome: Met This Shift  Goal: Absence of physical injury  Description: Absence of physical injury  1/20/2022 1857 by Abbey Moore RN  Outcome: Met This Shift  1/20/2022 1317 by Aiyana Fonseca RN  Outcome: Met This Shift  1/20/2022 0525 by Sharona Dunne RN  Outcome: Met This Shift     Problem: Skin Integrity:  Goal: Will show no infection signs and symptoms  Description: Will show no infection signs and symptoms  1/20/2022 1857 by Abbey Moore RN  Outcome: Met This Shift  1/20/2022 1317 by Aiyana Fonseca RN  Outcome: Met This Shift  1/20/2022 0525 by Sharona Dunne RN  Outcome: Met This Shift  Goal: Absence of new skin breakdown  Description: Absence of new skin breakdown  1/20/2022 1857 by Abbey Moore RN  Outcome: Met This Shift  1/20/2022 1317 by Aiyana Fonseca RN  Outcome: Met This Shift  1/20/2022 0525 by Sharona Dunne RN  Outcome: Met This Shift

## 2022-01-20 NOTE — PLAN OF CARE
Problem: Airway Clearance - Ineffective  Goal: Achieve or maintain patent airway  Outcome: Met This Shift     Problem: Falls - Risk of:  Goal: Will remain free from falls  Outcome: Met This Shift     Problem: Falls - Risk of:  Goal: Absence of physical injury  Outcome: Met This Shift     Problem: Skin Integrity:  Goal: Will show no infection signs and symptoms  Outcome: Met This Shift     Problem: Skin Integrity:  Goal: Absence of new skin breakdown  Outcome: Met This Shift

## 2022-01-20 NOTE — CARE COORDINATION
1/20: Update CM Note: Pt was transferred to MICU for respiratory distress. Pt is intubated, sedated, prone & on paralytics/Spo2 95% & Fio2 55%. Covid +1/14. Pt is on Iv Zoysn & Iv Decadron. PT/OT pending eval once pt medically stable. CTA needed. Needs unclear. [facility] following. SW/CM will continue to follow for d/c planning.  Electronically signed by Elizabeth Hannon RN on 1/20/2022 at 3:19 PM

## 2022-01-21 ENCOUNTER — APPOINTMENT (OUTPATIENT)
Dept: GENERAL RADIOLOGY | Age: 67
DRG: 207 | End: 2022-01-21
Payer: MEDICARE

## 2022-01-21 LAB
(1,3)-BETA-D-GLUCAN (FUNGITELL) INTERPRETATION: NEGATIVE
(1,3)-BETA-D-GLUCAN (FUNGITELL): 41 PG/ML
AADO2: 200.5 MMHG
AADO2: 216.9 MMHG
AADO2: 356.7 MMHG
AADO2: 400.7 MMHG
ANION GAP SERPL CALCULATED.3IONS-SCNC: 10 MMOL/L (ref 7–16)
B.E.: 4.7 MMOL/L (ref -3–3)
B.E.: 5.2 MMOL/L (ref -3–3)
B.E.: 5.2 MMOL/L (ref -3–3)
B.E.: 6.9 MMOL/L (ref -3–3)
BUN BLDV-MCNC: 10 MG/DL (ref 6–23)
CALCIUM IONIZED: 1.09 MMOL/L (ref 1.15–1.33)
CALCIUM SERPL-MCNC: 7.5 MG/DL (ref 8.6–10.2)
CHLORIDE BLD-SCNC: 100 MMOL/L (ref 98–107)
CO2: 28 MMOL/L (ref 22–29)
COHB: 0.1 % (ref 0–1.5)
COHB: 0.3 % (ref 0–1.5)
COHB: 0.6 % (ref 0–1.5)
COHB: 0.7 % (ref 0–1.5)
CREAT SERPL-MCNC: 0.5 MG/DL (ref 0.5–1)
CRITICAL: ABNORMAL
DATE ANALYZED: ABNORMAL
DATE OF COLLECTION: ABNORMAL
FERRITIN: 1811 NG/ML
FIO2: 50 %
FIO2: 50 %
FIO2: 80 %
FIO2: 80 %
GFR AFRICAN AMERICAN: >60
GFR NON-AFRICAN AMERICAN: >60 ML/MIN/1.73
GLUCOSE BLD-MCNC: 117 MG/DL (ref 74–99)
HCO3: 29.7 MMOL/L (ref 22–26)
HCO3: 29.8 MMOL/L (ref 22–26)
HCO3: 30.6 MMOL/L (ref 22–26)
HCO3: 31.8 MMOL/L (ref 22–26)
HCT VFR BLD CALC: 28.1 % (ref 34–48)
HEMOGLOBIN: 8.8 G/DL (ref 11.5–15.5)
HHB: 1.3 % (ref 0–5)
HHB: 2.7 % (ref 0–5)
HHB: 2.7 % (ref 0–5)
HHB: 4.6 % (ref 0–5)
LAB: ABNORMAL
LACTIC ACID: 1.4 MMOL/L (ref 0.5–2.2)
LACTIC ACID: 1.5 MMOL/L (ref 0.5–2.2)
LACTIC ACID: 1.6 MMOL/L (ref 0.5–2.2)
LACTIC ACID: 1.8 MMOL/L (ref 0.5–2.2)
Lab: ABNORMAL
MAGNESIUM: 1.8 MG/DL (ref 1.6–2.6)
MCH RBC QN AUTO: 30.4 PG (ref 26–35)
MCHC RBC AUTO-ENTMCNC: 31.3 % (ref 32–34.5)
MCV RBC AUTO: 97.2 FL (ref 80–99.9)
METER GLUCOSE: 130 MG/DL (ref 74–99)
METER GLUCOSE: 137 MG/DL (ref 74–99)
METER GLUCOSE: 145 MG/DL (ref 74–99)
METER GLUCOSE: 171 MG/DL (ref 74–99)
METER GLUCOSE: 195 MG/DL (ref 74–99)
METER GLUCOSE: 203 MG/DL (ref 74–99)
METER GLUCOSE: 99 MG/DL (ref 74–99)
METHB: 0.2 % (ref 0–1.5)
METHB: 0.3 % (ref 0–1.5)
MODE: AC
O2 CONTENT: 13 ML/DL
O2 CONTENT: 13.1 ML/DL
O2 CONTENT: 14 ML/DL
O2 CONTENT: 14.4 ML/DL
O2 SATURATION: 95.4 % (ref 92–98.5)
O2 SATURATION: 97.3 % (ref 92–98.5)
O2 SATURATION: 97.3 % (ref 92–98.5)
O2 SATURATION: 98.7 % (ref 92–98.5)
O2HB: 94.5 % (ref 94–97)
O2HB: 96.3 % (ref 94–97)
O2HB: 96.7 % (ref 94–97)
O2HB: 98.4 % (ref 94–97)
OPERATOR ID: 1921
OPERATOR ID: ABNORMAL
ORGANISM: ABNORMAL
PATIENT TEMP: 37 C
PCO2: 44.3 MMHG (ref 35–45)
PCO2: 46.8 MMHG (ref 35–45)
PCO2: 47.4 MMHG (ref 35–45)
PCO2: 49 MMHG (ref 35–45)
PDW BLD-RTO: 13.5 FL (ref 11.5–15)
PEEP/CPAP: 8 CMH2O
PFO2: 1.23 MMHG/%
PFO2: 1.49 MMHG/%
PFO2: 1.8 MMHG/%
PFO2: 1.87 MMHG/%
PH BLOOD GAS: 7.41 (ref 7.35–7.45)
PH BLOOD GAS: 7.42 (ref 7.35–7.45)
PH BLOOD GAS: 7.45 (ref 7.35–7.45)
PH BLOOD GAS: 7.45 (ref 7.35–7.45)
PHOSPHORUS: 1.7 MG/DL (ref 2.5–4.5)
PLATELET # BLD: 417 E9/L (ref 130–450)
PMV BLD AUTO: 12.2 FL (ref 7–12)
PO2: 143.9 MMHG (ref 75–100)
PO2: 74.5 MMHG (ref 75–100)
PO2: 93.7 MMHG (ref 75–100)
PO2: 98.3 MMHG (ref 75–100)
POTASSIUM SERPL-SCNC: 3.9 MMOL/L (ref 3.5–5)
POTASSIUM SERPL-SCNC: 3.98 MMOL/L (ref 3.5–5)
PROCALCITONIN: 0.26 NG/ML (ref 0–0.08)
RBC # BLD: 2.89 E12/L (ref 3.5–5.5)
RI(T): 2.14
RI(T): 2.48
RI(T): 2.91
RI(T): 4.08
RR MECHANICAL: 24 B/MIN
SODIUM BLD-SCNC: 138 MMOL/L (ref 132–146)
SOURCE, BLOOD GAS: ABNORMAL
THB: 10.5 G/DL (ref 11.5–16.5)
THB: 9.6 G/DL (ref 11.5–16.5)
THB: 9.7 G/DL (ref 11.5–16.5)
THB: 9.9 G/DL (ref 11.5–16.5)
TIME ANALYZED: 1758
TIME ANALYZED: 22
TIME ANALYZED: 2343
TIME ANALYZED: 504
URINE CULTURE, ROUTINE: ABNORMAL
VT MECHANICAL: 300 ML
WBC # BLD: 18.2 E9/L (ref 4.5–11.5)

## 2022-01-21 PROCEDURE — 6360000002 HC RX W HCPCS

## 2022-01-21 PROCEDURE — 6370000000 HC RX 637 (ALT 250 FOR IP): Performed by: INTERNAL MEDICINE

## 2022-01-21 PROCEDURE — 6360000002 HC RX W HCPCS: Performed by: STUDENT IN AN ORGANIZED HEALTH CARE EDUCATION/TRAINING PROGRAM

## 2022-01-21 PROCEDURE — 2580000003 HC RX 258: Performed by: FAMILY MEDICINE

## 2022-01-21 PROCEDURE — 6360000002 HC RX W HCPCS: Performed by: INTERNAL MEDICINE

## 2022-01-21 PROCEDURE — C9113 INJ PANTOPRAZOLE SODIUM, VIA: HCPCS

## 2022-01-21 PROCEDURE — 6370000000 HC RX 637 (ALT 250 FOR IP)

## 2022-01-21 PROCEDURE — 2580000003 HC RX 258

## 2022-01-21 PROCEDURE — 99233 SBSQ HOSP IP/OBS HIGH 50: CPT | Performed by: INTERNAL MEDICINE

## 2022-01-21 PROCEDURE — 84145 PROCALCITONIN (PCT): CPT

## 2022-01-21 PROCEDURE — 84132 ASSAY OF SERUM POTASSIUM: CPT

## 2022-01-21 PROCEDURE — 85027 COMPLETE CBC AUTOMATED: CPT

## 2022-01-21 PROCEDURE — 82330 ASSAY OF CALCIUM: CPT

## 2022-01-21 PROCEDURE — 82728 ASSAY OF FERRITIN: CPT

## 2022-01-21 PROCEDURE — 2580000003 HC RX 258: Performed by: INTERNAL MEDICINE

## 2022-01-21 PROCEDURE — 82805 BLOOD GASES W/O2 SATURATION: CPT

## 2022-01-21 PROCEDURE — 94640 AIRWAY INHALATION TREATMENT: CPT

## 2022-01-21 PROCEDURE — 83735 ASSAY OF MAGNESIUM: CPT

## 2022-01-21 PROCEDURE — 2580000003 HC RX 258: Performed by: STUDENT IN AN ORGANIZED HEALTH CARE EDUCATION/TRAINING PROGRAM

## 2022-01-21 PROCEDURE — 80048 BASIC METABOLIC PNL TOTAL CA: CPT

## 2022-01-21 PROCEDURE — 2500000003 HC RX 250 WO HCPCS

## 2022-01-21 PROCEDURE — 82962 GLUCOSE BLOOD TEST: CPT

## 2022-01-21 PROCEDURE — 2500000003 HC RX 250 WO HCPCS: Performed by: STUDENT IN AN ORGANIZED HEALTH CARE EDUCATION/TRAINING PROGRAM

## 2022-01-21 PROCEDURE — 83605 ASSAY OF LACTIC ACID: CPT

## 2022-01-21 PROCEDURE — 84100 ASSAY OF PHOSPHORUS: CPT

## 2022-01-21 PROCEDURE — 2500000003 HC RX 250 WO HCPCS: Performed by: INTERNAL MEDICINE

## 2022-01-21 PROCEDURE — 2000000000 HC ICU R&B

## 2022-01-21 PROCEDURE — 71045 X-RAY EXAM CHEST 1 VIEW: CPT

## 2022-01-21 PROCEDURE — 94003 VENT MGMT INPAT SUBQ DAY: CPT

## 2022-01-21 PROCEDURE — 6370000000 HC RX 637 (ALT 250 FOR IP): Performed by: NURSE PRACTITIONER

## 2022-01-21 PROCEDURE — 6370000000 HC RX 637 (ALT 250 FOR IP): Performed by: FAMILY MEDICINE

## 2022-01-21 RX ORDER — FUROSEMIDE 10 MG/ML
20 INJECTION INTRAMUSCULAR; INTRAVENOUS ONCE
Status: COMPLETED | OUTPATIENT
Start: 2022-01-21 | End: 2022-01-21

## 2022-01-21 RX ORDER — MAGNESIUM SULFATE IN WATER 40 MG/ML
2000 INJECTION, SOLUTION INTRAVENOUS ONCE
Status: COMPLETED | OUTPATIENT
Start: 2022-01-21 | End: 2022-01-21

## 2022-01-21 RX ADMIN — Medication 10 ML: at 09:01

## 2022-01-21 RX ADMIN — Medication 2000 UNITS: at 09:01

## 2022-01-21 RX ADMIN — FUROSEMIDE 20 MG: 20 INJECTION, SOLUTION INTRAMUSCULAR; INTRAVENOUS at 12:41

## 2022-01-21 RX ADMIN — DEXAMETHASONE SODIUM PHOSPHATE 10 MG: 10 INJECTION INTRAMUSCULAR; INTRAVENOUS at 10:44

## 2022-01-21 RX ADMIN — IPRATROPIUM BROMIDE AND ALBUTEROL SULFATE 1 AMPULE: .5; 2.5 SOLUTION RESPIRATORY (INHALATION) at 21:15

## 2022-01-21 RX ADMIN — IPRATROPIUM BROMIDE AND ALBUTEROL SULFATE 1 AMPULE: .5; 2.5 SOLUTION RESPIRATORY (INHALATION) at 12:09

## 2022-01-21 RX ADMIN — Medication 125 MCG/HR: at 05:10

## 2022-01-21 RX ADMIN — CISATRACURIUM BESYLATE 2 MCG/KG/MIN: 200 INJECTION INTRAVENOUS at 13:05

## 2022-01-21 RX ADMIN — IPRATROPIUM BROMIDE AND ALBUTEROL SULFATE 1 AMPULE: .5; 2.5 SOLUTION RESPIRATORY (INHALATION) at 16:28

## 2022-01-21 RX ADMIN — PANTOPRAZOLE SODIUM 40 MG: 40 INJECTION, POWDER, FOR SOLUTION INTRAVENOUS at 09:00

## 2022-01-21 RX ADMIN — MAGNESIUM SULFATE HEPTAHYDRATE 2000 MG: 40 INJECTION, SOLUTION INTRAVENOUS at 10:44

## 2022-01-21 RX ADMIN — MIDAZOLAM 5 MG/HR: 5 INJECTION INTRAMUSCULAR; INTRAVENOUS at 03:44

## 2022-01-21 RX ADMIN — PIPERACILLIN AND TAZOBACTAM 3375 MG: 3; .375 INJECTION, POWDER, LYOPHILIZED, FOR SOLUTION INTRAVENOUS at 19:29

## 2022-01-21 RX ADMIN — Medication 5 MCG/MIN: at 16:09

## 2022-01-21 RX ADMIN — ENOXAPARIN SODIUM 70 MG: 100 INJECTION SUBCUTANEOUS at 09:00

## 2022-01-21 RX ADMIN — PIPERACILLIN AND TAZOBACTAM 3375 MG: 3; .375 INJECTION, POWDER, LYOPHILIZED, FOR SOLUTION INTRAVENOUS at 03:06

## 2022-01-21 RX ADMIN — MINERAL OIL AND WHITE PETROLATUM: 150; 830 OINTMENT OPHTHALMIC at 09:01

## 2022-01-21 RX ADMIN — MINERAL OIL AND WHITE PETROLATUM: 150; 830 OINTMENT OPHTHALMIC at 03:48

## 2022-01-21 RX ADMIN — MINERAL OIL AND WHITE PETROLATUM: 150; 830 OINTMENT OPHTHALMIC at 06:51

## 2022-01-21 RX ADMIN — INSULIN LISPRO 2 UNITS: 100 INJECTION, SOLUTION INTRAVENOUS; SUBCUTANEOUS at 03:15

## 2022-01-21 RX ADMIN — MINERAL OIL AND WHITE PETROLATUM: 150; 830 OINTMENT OPHTHALMIC at 19:30

## 2022-01-21 RX ADMIN — CHLORHEXIDINE GLUCONATE 0.12% ORAL RINSE 15 ML: 1.2 LIQUID ORAL at 09:01

## 2022-01-21 RX ADMIN — OXYCODONE HYDROCHLORIDE AND ACETAMINOPHEN 500 MG: 500 TABLET ORAL at 09:01

## 2022-01-21 RX ADMIN — CHLORHEXIDINE GLUCONATE 0.12% ORAL RINSE 15 ML: 1.2 LIQUID ORAL at 20:41

## 2022-01-21 RX ADMIN — Medication 10 ML: at 20:41

## 2022-01-21 RX ADMIN — CALCIUM GLUCONATE 2000 MG: 98 INJECTION, SOLUTION INTRAVENOUS at 09:00

## 2022-01-21 RX ADMIN — INSULIN LISPRO 4 UNITS: 100 INJECTION, SOLUTION INTRAVENOUS; SUBCUTANEOUS at 19:46

## 2022-01-21 RX ADMIN — SODIUM CHLORIDE, PRESERVATIVE FREE 10 ML: 5 INJECTION INTRAVENOUS at 09:00

## 2022-01-21 RX ADMIN — MINERAL OIL AND WHITE PETROLATUM: 150; 830 OINTMENT OPHTHALMIC at 14:24

## 2022-01-21 RX ADMIN — PIPERACILLIN AND TAZOBACTAM 3375 MG: 3; .375 INJECTION, POWDER, LYOPHILIZED, FOR SOLUTION INTRAVENOUS at 11:21

## 2022-01-21 RX ADMIN — ENOXAPARIN SODIUM 70 MG: 100 INJECTION SUBCUTANEOUS at 20:41

## 2022-01-21 RX ADMIN — FUROSEMIDE 20 MG: 10 INJECTION, SOLUTION INTRAMUSCULAR; INTRAVENOUS at 16:35

## 2022-01-21 RX ADMIN — POTASSIUM PHOSPHATE, MONOBASIC AND POTASSIUM PHOSPHATE, DIBASIC 20 MMOL: 224; 236 INJECTION, SOLUTION, CONCENTRATE INTRAVENOUS at 09:00

## 2022-01-21 RX ADMIN — FLUCONAZOLE IN SODIUM CHLORIDE 200 MG: 2 INJECTION, SOLUTION INTRAVENOUS at 13:01

## 2022-01-21 RX ADMIN — INSULIN LISPRO 2 UNITS: 100 INJECTION, SOLUTION INTRAVENOUS; SUBCUTANEOUS at 16:20

## 2022-01-21 RX ADMIN — IPRATROPIUM BROMIDE AND ALBUTEROL SULFATE 1 AMPULE: .5; 2.5 SOLUTION RESPIRATORY (INHALATION) at 07:27

## 2022-01-21 RX ADMIN — Medication 100 MCG/HR: at 16:34

## 2022-01-21 RX ADMIN — ZINC SULFATE 220 MG (50 MG) CAPSULE 50 MG: CAPSULE at 09:01

## 2022-01-21 RX ADMIN — POLYETHYLENE GLYCOL 3350 17 G: 17 POWDER, FOR SOLUTION ORAL at 09:01

## 2022-01-21 RX ADMIN — INSULIN LISPRO 2 UNITS: 100 INJECTION, SOLUTION INTRAVENOUS; SUBCUTANEOUS at 23:51

## 2022-01-21 ASSESSMENT — PULMONARY FUNCTION TESTS
PIF_VALUE: 24
PIF_VALUE: 23
PIF_VALUE: 29
PIF_VALUE: 24
PIF_VALUE: 28
PIF_VALUE: 26
PIF_VALUE: 24
PIF_VALUE: 26
PIF_VALUE: 27
PIF_VALUE: 27
PIF_VALUE: 26
PIF_VALUE: 27
PIF_VALUE: 25
PIF_VALUE: 23
PIF_VALUE: 23
PIF_VALUE: 25
PIF_VALUE: 26
PIF_VALUE: 25
PIF_VALUE: 17
PIF_VALUE: 27
PIF_VALUE: 18
PIF_VALUE: 26
PIF_VALUE: 25
PIF_VALUE: 27
PIF_VALUE: 21
PIF_VALUE: 27
PIF_VALUE: 26
PIF_VALUE: 28
PIF_VALUE: 25
PIF_VALUE: 25
PIF_VALUE: 28
PIF_VALUE: 22
PIF_VALUE: 25

## 2022-01-21 ASSESSMENT — PAIN SCALES - GENERAL
PAINLEVEL_OUTOF10: 0
PAINLEVEL_OUTOF10: 0

## 2022-01-21 NOTE — FLOWSHEET NOTE
This note also relates to the following rows which could not be included:  ABP (Arterial line BP) - Cannot attach notes to unvalidated device data  ABP mean (Arterial line mean) - Cannot attach notes to unvalidated device data

## 2022-01-21 NOTE — PROGRESS NOTES
Hospitalist Progress Note      SYNOPSIS:     Ms. Tommy Phillips is a 78y.o. year old female who has a PMH of asthma.     She presented to the ER on 22 with complaints of SOB and diarrhea x 1 week. She was not vaccinated against COVID-19. Reportedly her daughter found her curled into the fetal position in respiratory distress at home and called 911. Vitals on arrival were significant for temperature 103.2, heart rate 110, blood pressure 122/70 and oxygen saturation 72% on room air. She was placed on 15 L via nonrebreather mask and her oxygen saturation improved to 95%. Labs were significant for bicarbonate 19, BUN 38, creatinine 1.9, anion gap 19, procalcitonin 1.57, CRP 12.8, , mildly elevated LFTs, D-dimer 404, ferritin 5098 and a positive COVID PCR. Chest x-ray showed bilateral airspace disease. She was given 1 L normal saline bolus as well Lovenox and Decadron prior to being admitted. SUBJECTIVE:    Patient seen in the ICU, Currently Intubated,sedated, paralyzed- 55 fiO2, PEEP 8  Records reviewed. Temp (24hrs), Av.1 °F (35.6 °C), Min:95.3 °F (35.2 °C), Max:96.8 °F (36 °C)    DIET: Diet NPO  CODE: Full Code    Intake/Output Summary (Last 24 hours) at 2022 1107  Last data filed at 2022 0800  Gross per 24 hour   Intake 3027.86 ml   Output 2070 ml   Net 957.86 ml       Review of Systems  Unable to obtain- intubated , sedated      OBJECTIVE:    BP (!) 137/51   Pulse 83   Temp 95.5 °F (35.3 °C) (Axillary)   Resp 24   Ht 5' (1.524 m)   Wt 143 lb 1 oz (64.9 kg)   SpO2 93%   BMI 27.94 kg/m²     General appearance: intubated, sedated  HEENT: Normal cephalic, atraumatic without obvious deformity. RS-intubated, 55fiO2, PEEP 8  CVS- Tachycardic  Skin: Normal skin color. No rashes or lesions.   Neurologic:  intubated, sedated, paralyzed    Medications:  REVIEWED DAILY    Infusion Medications    fentaNYL 5 mcg/ml in 0.9%  ml infusion 100 mcg/hr (22 0937)   Elio midazolam 4 mg/hr (01/21/22 2209)    cisatracurium (NIMBEX) infusion 1.5 mcg/kg/min (01/20/22 8282)    sodium chloride      dextrose       Scheduled Medications    potassium phosphate IVPB  20 mmol IntraVENous Once    magnesium sulfate  2,000 mg IntraVENous Once    chlorhexidine  15 mL Mouth/Throat BID    pantoprazole  40 mg IntraVENous Daily    And    sodium chloride (PF)  10 mL IntraVENous Daily    insulin lispro  0-12 Units SubCUTAneous Q4H    artificial tears   Both Eyes Q4H    polyethylene glycol  17 g Oral Daily    dexamethasone  10 mg IntraVENous Q24H    piperacillin-tazobactam  3,375 mg IntraVENous Q8H    enoxaparin  1 mg/kg SubCUTAneous BID    ipratropium-albuterol  1 ampule Inhalation Q4H WA    sodium chloride flush  5-40 mL IntraVENous 2 times per day    ascorbic acid  500 mg Oral Daily    zinc sulfate  50 mg Oral Daily    vitamin D  2,000 Units Oral Daily     PRN Meds: sodium chloride flush, sodium chloride, acetaminophen **OR** acetaminophen, glucose, dextrose, glucagon (rDNA), dextrose    Labs:     Recent Labs     01/19/22 0436 01/20/22 0432 01/21/22  0420   WBC 20.2* 17.8* 18.2*   HGB 9.3* 9.0* 8.8*   HCT 29.5*  29.1* 28.4* 28.1*   * 397 417       Recent Labs     01/19/22 0436 01/20/22  0432 01/21/22  0420    138 138   K 4.1 4.0 3.9   CL 97* 102 100   CO2 26 26 28   BUN 21 16 10   CREATININE 0.5 0.5 0.5   CALCIUM 7.8* 7.7* 7.5*   PHOS 3.3 1.8* 1.7*       No results for input(s): PROT, ALB, ALKPHOS, ALT, AST, BILITOT, AMYLASE, LIPASE in the last 72 hours. No results for input(s): INR in the last 72 hours. No results for input(s): Vanderbilt Pace in the last 72 hours.     Chronic labs:    Lab Results   Component Value Date    CHOL 145 06/29/2020    TRIG 75 06/29/2020    HDL 73 06/29/2020    LDLCALC 57 06/29/2020    TSH 3.600 06/29/2020    INR 1.0 01/14/2022    LABA1C 6.0 (H) 01/19/2022       Radiology: REVIEWED DAILY      ASSESSMENT and PLAN:    [id number] pneumonia-unvaccinated, symptom onset approximately 1 week prior to presentation. To continue with vitamin C, vitamin D, zinc.  Lovenox prophylaxis. Trend inflammatory markers. · On decadron 10 mg PO bid + baricitinib    Acute hypoxic respiratory failure-2/2 above. Intubated -65fiO2, PEEP 8    Possible superimposed bacterial pneumonia-procalcitonin 1.57-->0. 23. Blood and sputum cultures with NGTD. Urine antigens negative. Switched to Zosyn from azithromycin and ceftriaxone. One dose of Vanc given as well on 1/20/22    Normocytic anemia-continue to monitor H&H- 12.5-->11.1-->10.8--->9.0--->8.8, fobt ordered. Possibly consider surgical evaluation    Hx asthma- states that she has never seen a pulmonologist and that it is controlled with PRN albuterol. DISPOSITION: Continued inpatient care    +++++++++++++++++++++++++++++++++++++++++++++++++  Driss Valero MD   [facility] Physician - [address] [place] [address]  +++++++++++++++++++++++++++++++++++++++++++++++++  NOTE: This report was transcribed using voice recognition software. Every effort was made to ensure accuracy; however, inadvertent computerized transcription errors may be present.

## 2022-01-21 NOTE — ACP (ADVANCE CARE PLANNING)
Advance Care Planning   Healthcare Decision Maker:    Primary Decision Maker: Isaias Poe - [phone]    Secondary Decision Maker: Sherrill Lowery - Domestic Partner - [phone]    Click here to complete Healthcare Decision Makers including selection of the Healthcare Decision Maker Relationship (ie \"Primary\").

## 2022-01-21 NOTE — PROGRESS NOTES
Department of Internal Medicine  Nephrology  Progress Note    Events Reviewed. SUBJECTIVE:  We are following Ms. Gloria Rodriguez for SAMUEL. Patient is intubated. PHYSICAL EXAM:    Vitals:    VITALS:  BP (!) 137/51   Pulse 83   Temp 95.5 °F (35.3 °C) (Axillary)   Resp 24   Ht 5' (1.524 m)   Wt 143 lb 1 oz (64.9 kg)   SpO2 93%   BMI 27.94 kg/m²   24HR INTAKE/OUTPUT:      Intake/Output Summary (Last 24 hours) at 1/21/2022 1035  Last data filed at 1/21/2022 0800  Gross per 24 hour   Intake 3027.86 ml   Output 2120 ml   Net 907.86 ml     General appearance: Patient is intubated, sedated  HEENT: Normal cephalic, atraumatic without obvious deformity. Pupils equal, round, and reactive to light. Endotracheal tube in place  Neck: Supple, with full range of motion. No jugular venous distention. Trachea midline. Respiratory: Diminished bilaterally on bipap  Cardiovascular: RRR, no murmur noted  Abdomen: Soft, nontender, nondistended, flat  Musculoskeletal: No clubbing, cyanosis, edema of bilateral lower extremities. Brisk capillary refill. Skin: Normal skin color.  No rashes or lesions.   Neurologic: Patient sedated  Other: No edema      Scheduled Meds:   potassium phosphate IVPB  20 mmol IntraVENous Once    magnesium sulfate  2,000 mg IntraVENous Once    chlorhexidine  15 mL Mouth/Throat BID    pantoprazole  40 mg IntraVENous Daily    And    sodium chloride (PF)  10 mL IntraVENous Daily    insulin lispro  0-12 Units SubCUTAneous Q4H    artificial tears   Both Eyes Q4H    polyethylene glycol  17 g Oral Daily    dexamethasone  10 mg IntraVENous Q24H    piperacillin-tazobactam  3,375 mg IntraVENous Q8H    enoxaparin  1 mg/kg SubCUTAneous BID    ipratropium-albuterol  1 ampule Inhalation Q4H WA    sodium chloride flush  5-40 mL IntraVENous 2 times per day    ascorbic acid  500 mg Oral Daily    zinc sulfate  50 mg Oral Daily    vitamin D  2,000 Units Oral Daily     Continuous Infusions:   fentaNYL 5 summoned. She was found to be COVID 19 positive. Labs were significant for bicarbonate 19, BUN 38, creatinine 1.9, anion gap 19, procalcitonin 1.57, CRP 12.8, , mildly elevated LFTs, D-dimer 404, ferritin 5098. Chest x-ray showed bilateral airspace disease. Renal is consulted for SAMUEL. Problems Resolved:     · SAMUEL, likely pre-renal volume responsive SAMUEL secondary to poor oral intake and GI losses (diarrhea). Creatinine 1.9mg/dL on admission. Renal function has improved to 1.0mg/dL with IVF administration. · HAGMA with low bicarbonate levels, secondary to uremia. To continue bicarbonate drip  · Hypokalemia, 2/2 poor oral intake, potassium levels improved. · xypernatremia, with water deficit, dehydration due to poor intake. Sodium levels quite improved. Resolved. IMPRESSION/RECOMMENDATIONS:      1. Hypophosphatemia, 2/2 poor intake, to replace  2. Hypocalcemia, vitamin D 25 level 39, to replace   3. Normal pH  ----------------------------------------  4. COVID +, on dexamethasone, Baricitinib  5. Acute hypoxic respiratory failure, secondary to COVID pneumonia. Status post intubation  6. Normocytic anemia, hemoglobin stable  7.  Nutrition, n.p.o.    Plan:    · Replace phosphorus   · Replace calcium       Electronically signed by Felicia Cates MD on 1/21/2022 at 10:35 AM

## 2022-01-21 NOTE — PROGRESS NOTES
[address] [facility]   Department of Internal Medicine   Internal Medicine Residency  MICU Progress Note    Patient:  Luis Eduardo Doran 78 y.o. female   MRN: [id number]       Date of Service: 2022    Allergy: Patient has no known allergies. Subjective     Overnight, patient's P/F was 1.1. Repeat ABG this AM showed P/F 1.00 and patient was proned. Will reasses in 16 hours. Patient was seen and examined this morning at bedside sedated, intubated and supine. Objective     TEMPERATURE:  Current - Temp: 96.8 °F (36 °C); Max - Temp  Av.9 °F (36.1 °C)  Min: 96.6 °F (35.9 °C)  Max: 97.1 °F (36.2 °C)  RESPIRATIONS RANGE: Resp  Av.8  Min: 21  Max: 27  PULSE RANGE: Pulse  Av.9  Min: 57  Max: 116  BLOOD PRESSURE RANGE:  Systolic (82IJI), MBC:864 , Min:132 , XMT:765   ; Diastolic (30BCQ), VKY:04, Min:49, Max:52    PULSE OXIMETRY RANGE: SpO2  Av.4 %  Min: 88 %  Max: 98 %    I & O - 24hr:    Intake/Output Summary (Last 24 hours) at 2022 2301  Last data filed at 2022 2145  Gross per 24 hour   Intake 2646 ml   Output 1805 ml   Net 841 ml     I/O last 3 completed shifts: In: 5616 [I.V.:4521; NG/GT:60; IV Piggyback:1035]  Out: 9941 [Urine:2261] I/O this shift:  In: -   Out: 365 [Urine:365]   Weight change:     Physical Exam:  General Appearance:    Sedated, intubated, supine   HEENT:    NC/AT, mucous membranes are moist   Neck:   Supple, no jugular venous distention. Resp:     Course breath sounds b/l, No wheezes, no use of accessory muscles   Heart:    RRR, S1 and S2 normal, no murmur, rub or gallop.     Abdomen:     Soft, non-distended with normal bowel sounds   Extremities:   Atraumatic, no cyanosis or edema   Pulses:  Radial and pedal pulses are intact bilaterally   Neurologic:   Sedated        Medications     Continuous Infusions:   fentaNYL 5 mcg/ml in 0.9%  ml infusion 125 mcg/hr (22 1791)    midazolam 5 mg/hr (22 7131)    cisatracurium (NIMBEX) infusion 1.5 mcg/kg/min (01/20/22 6018)    sodium chloride      dextrose       Scheduled Meds:   chlorhexidine  15 mL Mouth/Throat BID    pantoprazole  40 mg IntraVENous Daily    And    sodium chloride (PF)  10 mL IntraVENous Daily    insulin lispro  0-12 Units SubCUTAneous Q4H    artificial tears   Both Eyes Q4H    polyethylene glycol  17 g Oral Daily    dexamethasone  10 mg IntraVENous Q24H    piperacillin-tazobactam  3,375 mg IntraVENous Q8H    enoxaparin  1 mg/kg SubCUTAneous BID    ipratropium-albuterol  1 ampule Inhalation Q4H WA    sodium chloride flush  5-40 mL IntraVENous 2 times per day    ascorbic acid  500 mg Oral Daily    zinc sulfate  50 mg Oral Daily    vitamin D  2,000 Units Oral Daily     PRN Meds: sodium chloride flush, sodium chloride, acetaminophen **OR** acetaminophen, glucose, dextrose, glucagon (rDNA), dextrose  Nutrition:   NG/OG tube TF type: Pulmocare/Nephro/Glucerna/Jevity        At rate: n/a    Labs and Imaging Studies     CBC:   Recent Labs     01/18/22  0634 01/19/22  0436 01/20/22 0432   WBC 11.5 20.2* 17.8*   HGB 9.8* 9.3* 9.0*   HCT 30.4* 29.5*  29.1* 28.4*   MCV 96.2 95.7 95.6   * 475* 397       BMP:    Recent Labs     01/18/22  0634 01/19/22  0154 01/19/22 0436 01/20/22 0432     --  137 138   K 3.8 3.53 4.1 4.0     --  97* 102   CO2 27  --  26 26   BUN 22  --  21 16   CREATININE 0.6  --  0.5 0.5   GLUCOSE 207*  --  241* 99       LIVER PROFILE:   Recent Labs     01/18/22 0634   AST 82*   ALT 75*   BILITOT 0.3   ALKPHOS 74       PT/INR:   No results for input(s): PROTIME, INR in the last 72 hours. APTT:   No results for input(s): APTT in the last 72 hours.     Fasting Lipid Panel:    Lab Results   Component Value Date    CHOL 145 06/29/2020    TRIG 75 06/29/2020    HDL 73 06/29/2020       Cardiac Enzymes:    No results found for: CKTOTAL, CKMB, CKMBINDEX, TROPONINI    Notable Cultures:      Blood cultures   Blood Culture, Routine   Date Value Ref Range Status   01/15/2022 24 Hours no growth  Preliminary     Respiratory cultures No results found for: RESPCULTURE No results found for: LABGRAM  Urine   Urine Culture, Routine   Date Value Ref Range Status   01/19/2022 >100,000 CFU/ml  Identification to follow    Preliminary     Legionella No results found for: LABLEGI  C Diff PCR No results found for: CDIFPCR  Wound culture/abscess: No results for input(s): WNDABS in the last 72 hours. Tip culture:No results for input(s): CXCATHTIP in the last 72 hours. Oxygen:     Vent Information  $Ventilation: $Subsequent Day  Skin Assessment: Clean, dry, & intact  Equipment ID: [id number]  Vent Type: 980  Vent Mode: AC/VC  Vt Ordered: 300 mL  Rate Set: 24 bmp  Peak Flow: 50 L/min  Pressure Support: 0 cmH20  FiO2 : 50 %  SpO2: 95 %  SpO2/FiO2 ratio: 190  Sensitivity: 3  PEEP/CPAP: 8  I Time/ I Time %: 0 s  Humidification Source: Heated wire  Humidification Temp: 37  Humidification Temp Measured: 37  Circuit Condensation: Drained  Mask Type: Full face mask  Mask Size: Medium  Additional Respiratory  Assessments  Pulse: 74  Resp: 24  SpO2: 95 %  Humidification Source: Heated wire  Humidification Temp: 37  Circuit Condensation: Drained  Oral Care: Lip moisturizer applied,Mouth swabbed,Mouth suctioned,Mouth moisturizer  Subglottic Suction Done?: No  Airway Size: 8       Urethral Catheter Double-lumen 16 fr-Output (mL): 140 mL  [REMOVED] External Urinary Catheter-Output (mL): 200 mL    Imaging Studies:  XR CHEST PORTABLE   Final Result   Slightly worsened bilateral pulmonary infiltrates with increasing opacity in   the bilateral bases when compared to previous. Stable lines and tubes. XR CHEST PORTABLE   Final Result   1. Good position right IJ central venous line. Negative for pneumothorax. 2.  Bilateral widespread pulmonary infiltrates with interval mild worsening   on the left and compatible with bilateral pneumonia.          XR CHEST PORTABLE   Final Result Endotracheal tube tip is 3.3 cm above the alberta. Diffuse pulmonary infiltrates are unchanged. XR ABDOMEN FOR NG/OG/NE TUBE PLACEMENT   Final Result   Nasogastric tube terminates in the stomach. XR CHEST PORTABLE   Final Result   1. Multifocal bilateral pneumonia unchanged when compared with the prior   study. XR CHEST PORTABLE   Final Result   Bilateral airspace disease likely related to pneumonia.          XR CHEST PORTABLE    (Results Pending)   CTA PULMONARY W CONTRAST    (Results Pending)   XR CHEST PORTABLE    (Results Pending)        Resident's Assessment and Plan     Assessment and Plan:    Pulmonary  Acute Hypoxic Respiratory 2/2 COVID 19 PNA   COVID + on 1/14/22  Did not receive remdesivir, received barticinib on 1/14/22  and was discontinued    CRP of 2.9 --> 3.0, Pro-maría elena of 0.23, Lactic Acid 4.0   WBC 17. 8 decreased  I/O net +5.8 L, UO 1.7 L  ABG 7.409/44/65/27 P/F 1.00----proned  CXR: worsened b/l opacities  Pending respi culture, urine culture, MRSA screen  Continue vanc day 2, zosyn day 2  Continue on Decadron 10 mg BID for 5 followed by 10 mg daily for 5 days that was started on the 16th  Pending CTA of the chest to rule out PE or pneumo, also sent troponins and ekg to rule out cardiac causes   Continue breathing treatments with symbicort and douneb      Hx Asthma  Continue breathing treatments  On mechanical ventilation       Infectious Disease  COVID 19 Infection   -See above  -Trend inflammatory markers   -Continue Vit Bundle, C, D and zinc      Endocrine  Hyperglycemia likely 2/2 Steroids, prediabetic  -209 overnight  Recently started on high dose steroids  A1C 6.0  Continue LDSS  Continue to monitor Glucose      Genitourinary/Renal  Stage 1 SAMUEL (resolved) was likely 2/2 poor oral intake   Cr on admission was 1.9 (Baseline around 0.8)  Current Cr 0.5  Nephrology following     Hematology/Oncology  Normocytic Anemia most likely 2/2 mixed inflammatory reaction and hemolytic anemia  Hg 12.5 on admission  Current Hg 9.0  Iron studies follow inflammatory reaction  Retic studies support hemolytic component  Follow CBC      # Peptic ulcer prophylaxis: protonix  # DVT Prophylaxis: lovenox therapeutic dose  # Disposition: Cont current care     Kika Shannon MD, PGY-1    Attending Physician: Dr. Mckenzie De Leon  Department of Pulmonary, Critical Care and Sleep Medicine  [address] [facility]  Department of Internal Medicine      During multidisciplinary team rounds Alma Narayanan is a 78 y.o. female was seen, examined and discussed. This is confirmation that I have personally seen and examined the patient and that the key elements of the encounter were performed by me (> 85 % time). The medications & laboratory data was discussed and adjusted where necessary. The radiographic images were reviewed or with radiologist or consultant if felt dis-concordant with the exam or history. The above findings were corroborated, plans confirmed and changes made if needed. Family is updated at the bedside as available. Key issues of the case were discussed among consultants. Critical Care time is documented if appropriate.       Pankaj De La Rosa DO, FACP, FCCP, [facility],

## 2022-01-21 NOTE — PROGRESS NOTES
Wound care: Patient supine at this time. Post prone assessment- no pressure injuries to heels or face that I can visualize. Lift pad on bed, will order heelmedix boots. Place prone positioner under head. Will follow.  Luis Enrique Funes RN

## 2022-01-21 NOTE — PLAN OF CARE
Problem: Patient Education: Go to Patient Education Activity  Goal: Patient/Family Education  Outcome: Met This Shift     Problem: Falls - Risk of:  Goal: Will remain free from falls  Outcome: Met This Shift     Problem: Skin Integrity:  Goal: Will show no infection signs and symptoms  Outcome: Met This Shift     Problem: Skin Integrity:  Goal: Absence of new skin breakdown  Outcome: Met This Shift

## 2022-01-22 ENCOUNTER — APPOINTMENT (OUTPATIENT)
Dept: GENERAL RADIOLOGY | Age: 67
DRG: 207 | End: 2022-01-22
Payer: MEDICARE

## 2022-01-22 LAB
AADO2: 202 MMHG
AADO2: 209.4 MMHG
AADO2: 249.5 MMHG
AADO2: 292.5 MMHG
ALBUMIN SERPL-MCNC: 2.5 G/DL (ref 3.5–5.2)
ALP BLD-CCNC: 63 U/L (ref 35–104)
ALT SERPL-CCNC: 23 U/L (ref 0–32)
ANION GAP SERPL CALCULATED.3IONS-SCNC: 7 MMOL/L (ref 7–16)
ANION GAP SERPL CALCULATED.3IONS-SCNC: 8 MMOL/L (ref 7–16)
AST SERPL-CCNC: 15 U/L (ref 0–31)
B.E.: 5.4 MMOL/L (ref -3–3)
B.E.: 6 MMOL/L (ref -3–3)
B.E.: 6.2 MMOL/L (ref -3–3)
B.E.: 8.7 MMOL/L (ref -3–3)
BILIRUB SERPL-MCNC: 0.2 MG/DL (ref 0–1.2)
BILIRUBIN DIRECT: <0.2 MG/DL (ref 0–0.3)
BILIRUBIN, INDIRECT: ABNORMAL MG/DL (ref 0–1)
BUN BLDV-MCNC: 17 MG/DL (ref 6–23)
BUN BLDV-MCNC: 21 MG/DL (ref 6–23)
C-REACTIVE PROTEIN: 8.7 MG/DL (ref 0–0.4)
CALCIUM IONIZED: 1.17 MMOL/L (ref 1.15–1.33)
CALCIUM SERPL-MCNC: 7.8 MG/DL (ref 8.6–10.2)
CALCIUM SERPL-MCNC: 7.9 MG/DL (ref 8.6–10.2)
CHLORIDE BLD-SCNC: 102 MMOL/L (ref 98–107)
CHLORIDE BLD-SCNC: 104 MMOL/L (ref 98–107)
CO2: 30 MMOL/L (ref 22–29)
CO2: 32 MMOL/L (ref 22–29)
COHB: 0 % (ref 0–1.5)
COHB: 0.3 % (ref 0–1.5)
COHB: 0.6 % (ref 0–1.5)
COHB: 0.7 % (ref 0–1.5)
CREAT SERPL-MCNC: 0.6 MG/DL (ref 0.5–1)
CREAT SERPL-MCNC: 0.6 MG/DL (ref 0.5–1)
CRITICAL: ABNORMAL
CULTURE, RESPIRATORY: NORMAL
DATE ANALYZED: ABNORMAL
DATE OF COLLECTION: ABNORMAL
FIO2: 50 %
FIO2: 50 %
FIO2: 60 %
FIO2: 70 %
GFR AFRICAN AMERICAN: >60
GFR AFRICAN AMERICAN: >60
GFR NON-AFRICAN AMERICAN: >60 ML/MIN/1.73
GFR NON-AFRICAN AMERICAN: >60 ML/MIN/1.73
GLUCOSE BLD-MCNC: 151 MG/DL (ref 74–99)
GLUCOSE BLD-MCNC: 161 MG/DL (ref 74–99)
HCO3: 29.8 MMOL/L (ref 22–26)
HCO3: 31.4 MMOL/L (ref 22–26)
HCO3: 31.9 MMOL/L (ref 22–26)
HCO3: 33.7 MMOL/L (ref 22–26)
HCT VFR BLD CALC: 26 % (ref 34–48)
HEMOGLOBIN: 8.1 G/DL (ref 11.5–15.5)
HHB: 1.2 % (ref 0–5)
HHB: 2.3 % (ref 0–5)
HHB: 4.1 % (ref 0–5)
HHB: 4.4 % (ref 0–5)
LAB: ABNORMAL
MAGNESIUM: 2.2 MG/DL (ref 1.6–2.6)
MCH RBC QN AUTO: 30.1 PG (ref 26–35)
MCHC RBC AUTO-ENTMCNC: 31.2 % (ref 32–34.5)
MCV RBC AUTO: 96.7 FL (ref 80–99.9)
METER GLUCOSE: 141 MG/DL (ref 74–99)
METER GLUCOSE: 141 MG/DL (ref 74–99)
METER GLUCOSE: 155 MG/DL (ref 74–99)
METER GLUCOSE: 163 MG/DL (ref 74–99)
METER GLUCOSE: 177 MG/DL (ref 74–99)
METHB: 0.1 % (ref 0–1.5)
METHB: 0.1 % (ref 0–1.5)
METHB: 0.3 % (ref 0–1.5)
METHB: 0.4 % (ref 0–1.5)
MODE: AC
O2 CONTENT: 12.5 ML/DL
O2 CONTENT: 12.7 ML/DL
O2 CONTENT: 12.8 ML/DL
O2 CONTENT: 13 ML/DL
O2 SATURATION: 95.6 % (ref 92–98.5)
O2 SATURATION: 95.9 % (ref 92–98.5)
O2 SATURATION: 97.7 % (ref 92–98.5)
O2 SATURATION: 98.8 % (ref 92–98.5)
O2HB: 94.9 % (ref 94–97)
O2HB: 95.2 % (ref 94–97)
O2HB: 97.6 % (ref 94–97)
O2HB: 97.8 % (ref 94–97)
OPERATOR ID: 1921
OPERATOR ID: 7490
PATIENT TEMP: 37 C
PCO2: 43.1 MMHG (ref 35–45)
PCO2: 48.6 MMHG (ref 35–45)
PCO2: 49.3 MMHG (ref 35–45)
PCO2: 53.9 MMHG (ref 35–45)
PDW BLD-RTO: 13.9 FL (ref 11.5–15)
PEEP/CPAP: 8 CMH2O
PFO2: 1.58 MMHG/%
PFO2: 1.63 MMHG/%
PFO2: 1.83 MMHG/%
PFO2: 2.04 MMHG/%
PH BLOOD GAS: 7.39 (ref 7.35–7.45)
PH BLOOD GAS: 7.43 (ref 7.35–7.45)
PH BLOOD GAS: 7.45 (ref 7.35–7.45)
PH BLOOD GAS: 7.46 (ref 7.35–7.45)
PHOSPHORUS: 2.2 MG/DL (ref 2.5–4.5)
PLATELET # BLD: 434 E9/L (ref 130–450)
PMV BLD AUTO: 12.3 FL (ref 7–12)
PO2: 109.8 MMHG (ref 75–100)
PO2: 142.8 MMHG (ref 75–100)
PO2: 79.1 MMHG (ref 75–100)
PO2: 81.4 MMHG (ref 75–100)
POTASSIUM SERPL-SCNC: 3.9 MMOL/L (ref 3.5–5)
POTASSIUM SERPL-SCNC: 4.3 MMOL/L (ref 3.5–5)
PROCALCITONIN: 0.22 NG/ML (ref 0–0.08)
RBC # BLD: 2.69 E12/L (ref 3.5–5.5)
RI(T): 2.05
RI(T): 2.27
RI(T): 2.48
RI(T): 2.65
RR MECHANICAL: 20 B/MIN
RR MECHANICAL: 24 B/MIN
SMEAR, RESPIRATORY: NORMAL
SODIUM BLD-SCNC: 141 MMOL/L (ref 132–146)
SODIUM BLD-SCNC: 142 MMOL/L (ref 132–146)
SOURCE, BLOOD GAS: ABNORMAL
THB: 9 G/DL (ref 11.5–16.5)
THB: 9.2 G/DL (ref 11.5–16.5)
THB: 9.3 G/DL (ref 11.5–16.5)
THB: 9.6 G/DL (ref 11.5–16.5)
TIME ANALYZED: 1135
TIME ANALYZED: 2041
TIME ANALYZED: 437
TIME ANALYZED: 651
TOTAL PROTEIN: 5.2 G/DL (ref 6.4–8.3)
VT MECHANICAL: 300 ML
WBC # BLD: 14.3 E9/L (ref 4.5–11.5)

## 2022-01-22 PROCEDURE — C9113 INJ PANTOPRAZOLE SODIUM, VIA: HCPCS

## 2022-01-22 PROCEDURE — 83735 ASSAY OF MAGNESIUM: CPT

## 2022-01-22 PROCEDURE — 84145 PROCALCITONIN (PCT): CPT

## 2022-01-22 PROCEDURE — 82330 ASSAY OF CALCIUM: CPT

## 2022-01-22 PROCEDURE — 2500000003 HC RX 250 WO HCPCS: Performed by: INTERNAL MEDICINE

## 2022-01-22 PROCEDURE — 36415 COLL VENOUS BLD VENIPUNCTURE: CPT

## 2022-01-22 PROCEDURE — 6360000002 HC RX W HCPCS

## 2022-01-22 PROCEDURE — 2580000003 HC RX 258: Performed by: INTERNAL MEDICINE

## 2022-01-22 PROCEDURE — 2580000003 HC RX 258: Performed by: STUDENT IN AN ORGANIZED HEALTH CARE EDUCATION/TRAINING PROGRAM

## 2022-01-22 PROCEDURE — 2500000003 HC RX 250 WO HCPCS

## 2022-01-22 PROCEDURE — 6360000002 HC RX W HCPCS: Performed by: STUDENT IN AN ORGANIZED HEALTH CARE EDUCATION/TRAINING PROGRAM

## 2022-01-22 PROCEDURE — 6370000000 HC RX 637 (ALT 250 FOR IP): Performed by: INTERNAL MEDICINE

## 2022-01-22 PROCEDURE — 94640 AIRWAY INHALATION TREATMENT: CPT

## 2022-01-22 PROCEDURE — 80048 BASIC METABOLIC PNL TOTAL CA: CPT

## 2022-01-22 PROCEDURE — 94003 VENT MGMT INPAT SUBQ DAY: CPT

## 2022-01-22 PROCEDURE — 99233 SBSQ HOSP IP/OBS HIGH 50: CPT | Performed by: INTERNAL MEDICINE

## 2022-01-22 PROCEDURE — 82962 GLUCOSE BLOOD TEST: CPT

## 2022-01-22 PROCEDURE — 2580000003 HC RX 258

## 2022-01-22 PROCEDURE — 85027 COMPLETE CBC AUTOMATED: CPT

## 2022-01-22 PROCEDURE — 6360000002 HC RX W HCPCS: Performed by: INTERNAL MEDICINE

## 2022-01-22 PROCEDURE — 2580000003 HC RX 258: Performed by: FAMILY MEDICINE

## 2022-01-22 PROCEDURE — 82805 BLOOD GASES W/O2 SATURATION: CPT

## 2022-01-22 PROCEDURE — 84100 ASSAY OF PHOSPHORUS: CPT

## 2022-01-22 PROCEDURE — 2000000000 HC ICU R&B

## 2022-01-22 PROCEDURE — 6370000000 HC RX 637 (ALT 250 FOR IP): Performed by: NURSE PRACTITIONER

## 2022-01-22 PROCEDURE — 80076 HEPATIC FUNCTION PANEL: CPT

## 2022-01-22 PROCEDURE — 6370000000 HC RX 637 (ALT 250 FOR IP)

## 2022-01-22 PROCEDURE — 6370000000 HC RX 637 (ALT 250 FOR IP): Performed by: FAMILY MEDICINE

## 2022-01-22 PROCEDURE — 2500000003 HC RX 250 WO HCPCS: Performed by: STUDENT IN AN ORGANIZED HEALTH CARE EDUCATION/TRAINING PROGRAM

## 2022-01-22 PROCEDURE — 86140 C-REACTIVE PROTEIN: CPT

## 2022-01-22 PROCEDURE — 71045 X-RAY EXAM CHEST 1 VIEW: CPT

## 2022-01-22 RX ORDER — FUROSEMIDE 10 MG/ML
40 INJECTION INTRAMUSCULAR; INTRAVENOUS ONCE
Status: COMPLETED | OUTPATIENT
Start: 2022-01-22 | End: 2022-01-22

## 2022-01-22 RX ADMIN — MINERAL OIL AND WHITE PETROLATUM: 150; 830 OINTMENT OPHTHALMIC at 03:52

## 2022-01-22 RX ADMIN — MINERAL OIL AND WHITE PETROLATUM: 150; 830 OINTMENT OPHTHALMIC at 14:35

## 2022-01-22 RX ADMIN — Medication 10 ML: at 20:56

## 2022-01-22 RX ADMIN — MINERAL OIL AND WHITE PETROLATUM: 150; 830 OINTMENT OPHTHALMIC at 20:15

## 2022-01-22 RX ADMIN — PIPERACILLIN AND TAZOBACTAM 3375 MG: 3; .375 INJECTION, POWDER, LYOPHILIZED, FOR SOLUTION INTRAVENOUS at 20:15

## 2022-01-22 RX ADMIN — Medication 100 MCG/HR: at 20:13

## 2022-01-22 RX ADMIN — IPRATROPIUM BROMIDE AND ALBUTEROL SULFATE 1 AMPULE: .5; 2.5 SOLUTION RESPIRATORY (INHALATION) at 09:42

## 2022-01-22 RX ADMIN — ENOXAPARIN SODIUM 70 MG: 100 INJECTION SUBCUTANEOUS at 20:57

## 2022-01-22 RX ADMIN — Medication 10 ML: at 08:22

## 2022-01-22 RX ADMIN — POLYETHYLENE GLYCOL 3350 17 G: 17 POWDER, FOR SOLUTION ORAL at 08:21

## 2022-01-22 RX ADMIN — FUROSEMIDE 40 MG: 10 INJECTION, SOLUTION INTRAMUSCULAR; INTRAVENOUS at 09:26

## 2022-01-22 RX ADMIN — CHLORHEXIDINE GLUCONATE 0.12% ORAL RINSE 15 ML: 1.2 LIQUID ORAL at 20:57

## 2022-01-22 RX ADMIN — MINERAL OIL AND WHITE PETROLATUM: 150; 830 OINTMENT OPHTHALMIC at 01:18

## 2022-01-22 RX ADMIN — IPRATROPIUM BROMIDE AND ALBUTEROL SULFATE 1 AMPULE: .5; 2.5 SOLUTION RESPIRATORY (INHALATION) at 12:59

## 2022-01-22 RX ADMIN — PIPERACILLIN AND TAZOBACTAM 3375 MG: 3; .375 INJECTION, POWDER, LYOPHILIZED, FOR SOLUTION INTRAVENOUS at 10:59

## 2022-01-22 RX ADMIN — INSULIN LISPRO 2 UNITS: 100 INJECTION, SOLUTION INTRAVENOUS; SUBCUTANEOUS at 10:59

## 2022-01-22 RX ADMIN — ZINC SULFATE 220 MG (50 MG) CAPSULE 50 MG: CAPSULE at 08:22

## 2022-01-22 RX ADMIN — CISATRACURIUM BESYLATE 4 MCG/KG/MIN: 200 INJECTION INTRAVENOUS at 12:31

## 2022-01-22 RX ADMIN — INSULIN LISPRO 2 UNITS: 100 INJECTION, SOLUTION INTRAVENOUS; SUBCUTANEOUS at 14:34

## 2022-01-22 RX ADMIN — PIPERACILLIN AND TAZOBACTAM 3375 MG: 3; .375 INJECTION, POWDER, LYOPHILIZED, FOR SOLUTION INTRAVENOUS at 03:52

## 2022-01-22 RX ADMIN — POTASSIUM PHOSPHATE, MONOBASIC AND POTASSIUM PHOSPHATE, DIBASIC 15 MMOL: 224; 236 INJECTION, SOLUTION, CONCENTRATE INTRAVENOUS at 09:25

## 2022-01-22 RX ADMIN — PANTOPRAZOLE SODIUM 40 MG: 40 INJECTION, POWDER, FOR SOLUTION INTRAVENOUS at 08:21

## 2022-01-22 RX ADMIN — MIDAZOLAM 3 MG/HR: 5 INJECTION INTRAMUSCULAR; INTRAVENOUS at 20:22

## 2022-01-22 RX ADMIN — CHLORHEXIDINE GLUCONATE 0.12% ORAL RINSE 15 ML: 1.2 LIQUID ORAL at 08:21

## 2022-01-22 RX ADMIN — MINERAL OIL AND WHITE PETROLATUM: 150; 830 OINTMENT OPHTHALMIC at 10:59

## 2022-01-22 RX ADMIN — INSULIN LISPRO 2 UNITS: 100 INJECTION, SOLUTION INTRAVENOUS; SUBCUTANEOUS at 06:35

## 2022-01-22 RX ADMIN — MIDAZOLAM 3 MG/HR: 5 INJECTION INTRAMUSCULAR; INTRAVENOUS at 02:49

## 2022-01-22 RX ADMIN — IPRATROPIUM BROMIDE AND ALBUTEROL SULFATE 1 AMPULE: .5; 2.5 SOLUTION RESPIRATORY (INHALATION) at 16:38

## 2022-01-22 RX ADMIN — IPRATROPIUM BROMIDE AND ALBUTEROL SULFATE 1 AMPULE: .5; 2.5 SOLUTION RESPIRATORY (INHALATION) at 21:29

## 2022-01-22 RX ADMIN — ENOXAPARIN SODIUM 70 MG: 100 INJECTION SUBCUTANEOUS at 08:21

## 2022-01-22 RX ADMIN — INSULIN LISPRO 2 UNITS: 100 INJECTION, SOLUTION INTRAVENOUS; SUBCUTANEOUS at 04:05

## 2022-01-22 RX ADMIN — Medication 2000 UNITS: at 08:22

## 2022-01-22 RX ADMIN — Medication 100 MCG/HR: at 06:35

## 2022-01-22 RX ADMIN — OXYCODONE HYDROCHLORIDE AND ACETAMINOPHEN 500 MG: 500 TABLET ORAL at 08:21

## 2022-01-22 RX ADMIN — MINERAL OIL AND WHITE PETROLATUM: 150; 830 OINTMENT OPHTHALMIC at 06:30

## 2022-01-22 RX ADMIN — Medication: at 23:59

## 2022-01-22 RX ADMIN — DEXAMETHASONE SODIUM PHOSPHATE 10 MG: 10 INJECTION INTRAMUSCULAR; INTRAVENOUS at 10:59

## 2022-01-22 RX ADMIN — SODIUM CHLORIDE, PRESERVATIVE FREE 10 ML: 5 INJECTION INTRAVENOUS at 08:21

## 2022-01-22 RX ADMIN — INSULIN LISPRO 2 UNITS: 100 INJECTION, SOLUTION INTRAVENOUS; SUBCUTANEOUS at 20:41

## 2022-01-22 RX ADMIN — MINERAL OIL AND WHITE PETROLATUM: 150; 830 OINTMENT OPHTHALMIC at 23:17

## 2022-01-22 ASSESSMENT — PULMONARY FUNCTION TESTS
PIF_VALUE: 28
PIF_VALUE: 25
PIF_VALUE: 24
PIF_VALUE: 28
PIF_VALUE: 27
PIF_VALUE: 26
PIF_VALUE: 27
PIF_VALUE: 25
PIF_VALUE: 27
PIF_VALUE: 28
PIF_VALUE: 24
PIF_VALUE: 25
PIF_VALUE: 27
PIF_VALUE: 25
PIF_VALUE: 24

## 2022-01-22 NOTE — PLAN OF CARE
Problem: Gas Exchange - Impaired  Goal: Absence of hypoxia  Outcome: Ongoing  Goal: Promote optimal lung function  Outcome: Ongoing     Problem: Breathing Pattern - Ineffective  Goal: Ability to achieve and maintain a regular respiratory rate  Outcome: Ongoing     Problem:  Body Temperature -  Risk of, Imbalanced  Goal: Ability to maintain a body temperature within defined limits  Outcome: Met This Shift  Goal: Will regain or maintain usual level of consciousness  Outcome: Ongoing  Goal: Complications related to the disease process, condition or treatment will be avoided or minimized  Outcome: Met This Shift     Problem: Isolation Precautions - Risk of Spread of Infection  Goal: Prevent transmission of infection  Outcome: Met This Shift     Problem: Nutrition Deficits  Goal: Optimize nutritional status  Outcome: Not Met This Shift     Problem: Risk for Fluid Volume Deficit  Goal: Maintain normal heart rhythm  Outcome: Met This Shift  Goal: Maintain absence of muscle cramping  Outcome: Met This Shift     Problem: Loneliness or Risk for Loneliness  Goal: Demonstrate positive use of time alone when socialization is not possible  Outcome: Ongoing     Problem: Fatigue  Goal: Verbalize increase energy and improved vitality  Outcome: Ongoing     Problem: Patient Education: Go to Patient Education Activity  Goal: Patient/Family Education  1/21/2022 2059 by Maddie Lopez RN  Outcome: Not Met This Shift  1/21/2022 1524 by Sarah Ordoñez RN  Outcome: Met This Shift     Problem: Falls - Risk of:  Goal: Will remain free from falls  Description: Will remain free from falls  1/21/2022 2059 by Maddie Lopez RN  Outcome: Met This Shift  1/21/2022 1524 by Sarah Ordoñez RN  Outcome: Met This Shift  Goal: Absence of physical injury  Description: Absence of physical injury  1/21/2022 2059 by Maddie Lopez RN  Outcome: Met This Shift  1/21/2022 1524 by Sarah Ordoñez RN  Outcome: Met This Shift Problem: Skin Integrity:  Goal: Will show no infection signs and symptoms  Description: Will show no infection signs and symptoms  1/21/2022 2059 by Salbador Ho RN  Outcome: Met This Shift  1/21/2022 1524 by Justina Urban RN  Outcome: Met This Shift  Goal: Absence of new skin breakdown  Description: Absence of new skin breakdown  1/21/2022 2059 by Salbador Ho RN  Outcome: Met This Shift  1/21/2022 1524 by Justina Urban RN  Outcome: Met This Shift

## 2022-01-22 NOTE — PROGRESS NOTES
Hospitalist Progress Note      SYNOPSIS:     Ms. Monique Mcmahan is a 78y.o. year old female who has a PMH of asthma.     She presented to the ER on 22 with complaints of SOB and diarrhea x 1 week. She was not vaccinated against COVID-19. Reportedly her daughter found her curled into the fetal position in respiratory distress at home and called 911. Vitals on arrival were significant for temperature 103.2, heart rate 110, blood pressure 122/70 and oxygen saturation 72% on room air. She was placed on 15 L via nonrebreather mask and her oxygen saturation improved to 95%. Labs were significant for bicarbonate 19, BUN 38, creatinine 1.9, anion gap 19, procalcitonin 1.57, CRP 12.8, , mildly elevated LFTs, D-dimer 404, ferritin 5098 and a positive COVID PCR. Chest x-ray showed bilateral airspace disease. She was given 1 L normal saline bolus as well Lovenox and Decadron prior to being admitted. SUBJECTIVE:    Patient seen in the ICU, Currently Intubated,sedated, paralyzed- 50 fiO2, PEEP 8  Records reviewed. Temp (24hrs), Av.2 °F (36.2 °C), Min:95.4 °F (35.2 °C), Max:99 °F (37.2 °C)    DIET: Diet NPO  ADULT TUBE FEEDING; Nasogastric; Standard with Fiber; Continuous; 10; No; 30; Q 3 hours  CODE: Full Code    Intake/Output Summary (Last 24 hours) at 2022 1330  Last data filed at 2022 1231  Gross per 24 hour   Intake 1641.5 ml   Output 1930 ml   Net -288.5 ml       Review of Systems  Unable to obtain- intubated , sedated      OBJECTIVE:    BP (!) 137/51   Pulse 80   Temp 97.3 °F (36.3 °C)   Resp 20   Ht 5' (1.524 m)   Wt 143 lb 2 oz (64.9 kg)   SpO2 95%   BMI 27.95 kg/m²     General appearance: intubated, sedated, proned  HEENT: Normal cephalic, atraumatic without obvious deformity. RS-intubated, 50fiO2, PEEP 8  CVS- Tachycardic  Skin: Normal skin color. No rashes or lesions.   Neurologic:  intubated, sedated, paralyzed    Medications:  REVIEWED DAILY    Infusion Medications    norepinephrine Stopped (01/22/22 0254)    fentaNYL 5 mcg/ml in 0.9%  ml infusion 100 mcg/hr (01/22/22 0635)    cisatracurium (NIMBEX) infusion 4 mcg/kg/min (01/22/22 1231)    sodium chloride      dextrose       Scheduled Medications    chlorhexidine  15 mL Mouth/Throat BID    pantoprazole  40 mg IntraVENous Daily    And    sodium chloride (PF)  10 mL IntraVENous Daily    insulin lispro  0-12 Units SubCUTAneous Q4H    artificial tears   Both Eyes Q4H    polyethylene glycol  17 g Oral Daily    dexamethasone  10 mg IntraVENous Q24H    piperacillin-tazobactam  3,375 mg IntraVENous Q8H    enoxaparin  1 mg/kg SubCUTAneous BID    ipratropium-albuterol  1 ampule Inhalation Q4H WA    sodium chloride flush  5-40 mL IntraVENous 2 times per day    ascorbic acid  500 mg Oral Daily    zinc sulfate  50 mg Oral Daily    vitamin D  2,000 Units Oral Daily     PRN Meds: sodium chloride flush, sodium chloride, acetaminophen **OR** acetaminophen, glucose, dextrose, glucagon (rDNA), dextrose    Labs:     Recent Labs     01/20/22  0432 01/21/22  0420 01/22/22  0401   WBC 17.8* 18.2* 14.3*   HGB 9.0* 8.8* 8.1*   HCT 28.4* 28.1* 26.0*    417 434       Recent Labs     01/20/22  0432 01/20/22  0432 01/21/22  0420 01/21/22  2343 01/22/22  0401     --  138  --  142   K 4.0   < > 3.9 3.98 3.9     --  100  --  104   CO2 26  --  28  --  30*   BUN 16  --  10  --  17   CREATININE 0.5  --  0.5  --  0.6   CALCIUM 7.7*  --  7.5*  --  7.8*   PHOS 1.8*  --  1.7*  --  2.2*    < > = values in this interval not displayed. Recent Labs     01/22/22  0401   PROT 5.2*   ALKPHOS 63   ALT 23   AST 15   BILITOT 0.2       No results for input(s): INR in the last 72 hours. No results for input(s): Tia Garcia in the last 72 hours.     Chronic labs:    Lab Results   Component Value Date    CHOL 145 06/29/2020    TRIG 75 06/29/2020    HDL 73 06/29/2020    LDLCALC 57 06/29/2020    TSH 3.600 06/29/2020    INR 1.0 01/14/2022    LABA1C 6.0 (H) 01/19/2022       Radiology: REVIEWED DAILY      ASSESSMENT and PLAN:    COVID-19 pneumonia-unvaccinated, symptom onset approximately 1 week prior to presentation. To continue with vitamin C, vitamin D, zinc.  Lovenox prophylaxis. Trend inflammatory markers. · On decadron 10 mg PO bid s/p baricitinib 1/14-1/19    Acute hypoxic respiratory failure-2/2 above. Intubated -50fiO2, PEEP 8    Possible superimposed bacterial pneumonia-procalcitonin 1.57-->0.2--->0. 22. Blood and sputum cultures with NGTD. Urine antigens negative. Switched to Zosyn from azithromycin and ceftriaxone. One dose of Vanc given as well on 1/20/22    Normocytic anemia-continue to monitor H&H- 12.5-->11.1-->10.8--->9.0--->8.8, fobt ordered. Possibly consider surgical evaluation    Hx asthma- states that she has never seen a pulmonologist and that it is controlled with PRN albuterol. DISPOSITION: Continued inpatient care    +++++++++++++++++++++++++++++++++++++++++++++++++  Sandra Freeman MD   [facility] [place] Physician - [address], [place]  +++++++++++++++++++++++++++++++++++++++++++++++++  NOTE: This report was transcribed using voice recognition software. Every effort was made to ensure accuracy; however, inadvertent computerized transcription errors may be present.

## 2022-01-22 NOTE — PROGRESS NOTES
Shanice rWight M.D.,[facility]  Steve Vale D.O., F.A.C.O.I., Dina Hines M.D. Flaco Galvan M.D. Elmer Mancia D.O. Daily Pulmonary Progress Note    Patient:  Yazna Cruz 78 y.o. female MRN: [id number]     Date of Service: 1/22/2022      Synopsis     We are following patient for COVID hypoxia    \"CC\" shortness of breath, diarrhea    Code status: FULL      Subjective      Patient seen through window. Currently prone/intubated/sedated/paralyzed on full vent support. Nimbex/Fentanyl/Versed drips infusing   ACVC 24/300/8/50%.    P/F :163  Pplat 23  Compliance 21    Review of Systems:  unable to obtain    24-hour events:  None     Objective   Vitals: BP (!) 137/51   Pulse 91   Temp 97.3 °F (36.3 °C)   Resp 20   Ht 5' (1.524 m)   Wt 143 lb 2 oz (64.9 kg)   SpO2 95%   BMI 27.95 kg/m²     I/O:    Intake/Output Summary (Last 24 hours) at 1/22/2022 1524  Last data filed at 1/22/2022 1428  Gross per 24 hour   Intake 1895.5 ml   Output 2340 ml   Net -444.5 ml       Vent Information  $Ventilation: $Subsequent Day  Skin Assessment: Clean, dry, & intact  Equipment ID: [id number]  Equipment Changed: (S) Humidification  Vent Type: 980  Vent Mode: AC/VC  Vt Ordered: 300 mL  Rate Set: 20 bmp  Peak Flow: 70 L/min  Pressure Support: 0 cmH20  FiO2 : 50 %  SpO2: 95 %  SpO2/FiO2 ratio: 190  Sensitivity: 3  PEEP/CPAP: 8  I Time/ I Time %: 0 s  Humidification Source: Heated wire  Humidification Temp: 37  Humidification Temp Measured: 36.8  Circuit Condensation: Drained  Mask Type: Full face mask  Mask Size: Medium       IPAP: 14 cmH20  CPAP/EPAP: 8 cmH2O     CURRENT MEDS :  Scheduled Meds:   chlorhexidine  15 mL Mouth/Throat BID    pantoprazole  40 mg IntraVENous Daily    And    sodium chloride (PF)  10 mL IntraVENous Daily    insulin lispro  0-12 Units SubCUTAneous Q4H    artificial tears   Both Eyes Q4H    polyethylene glycol  17 g Oral Daily    dexamethasone  10 mg IntraVENous Q24H    piperacillin-tazobactam  3,375 mg IntraVENous Q8H    enoxaparin  1 mg/kg SubCUTAneous BID    ipratropium-albuterol  1 ampule Inhalation Q4H WA    sodium chloride flush  5-40 mL IntraVENous 2 times per day    ascorbic acid  500 mg Oral Daily    zinc sulfate  50 mg Oral Daily    vitamin D  2,000 Units Oral Daily     Due to the current efforts to prevent transmission of COVID-19 and also the need to preserve PPE for other caregivers, a face-to-face encounter with the patient was not performed. That being said, all relevant records and diagnostic tests were reviewed, including laboratory results and imaging. Please reference any relevant documentation elsewhere. Care will be coordinated with the primary service. Pertinent/ New Labs and Imaging Studies     Imaging Personally Reviewed:  CXR 1/20  Slightly worsened bilateral pulmonary infiltrates with increasing opacity in   the bilateral bases when compared to previous.  Stable lines and tubes. Labs:  Lab Results   Component Value Date    WBC 14.3 01/22/2022    HGB 8.1 01/22/2022    HCT 26.0 01/22/2022    MCV 96.7 01/22/2022    MCH 30.1 01/22/2022    MCHC 31.2 01/22/2022    RDW 13.9 01/22/2022     01/22/2022    MPV 12.3 01/22/2022     Lab Results   Component Value Date     01/22/2022    K 3.9 01/22/2022    K 3.8 01/18/2022     01/22/2022    CO2 30 01/22/2022    BUN 17 01/22/2022    CREATININE 0.6 01/22/2022    LABALBU 2.5 01/22/2022    CALCIUM 7.8 01/22/2022    GFRAA >60 01/22/2022    LABGLOM >60 01/22/2022     Lab Results   Component Value Date    PROTIME 11.3 01/14/2022    INR 1.0 01/14/2022     No results for input(s): PROBNP in the last 72 hours. Recent Labs     01/22/22  0401   PROCAL 0.22*     This SmartLink has not been configured with any valid records.        Micro:  1/19 urine culture positive for Candida  1/19 respiratory culture negative    Procalcitonin 1.57-> 0.42-> 0.23  CRP 12.8-> 17.1-> 9.0-> 2.9-> 3.0  D-dimer 404-> 571-> 475-> 508-> 864     Assessment:    1. Acute respiratory failure with hypoxia  2. COVID-19 viral infection severe  3. Possible superimposed bacterial pnemonia  4. SAMUEL  5. Mild transaminitis  6. Asthma  7. Lymphopenia      Plan:   8. Continue low tidal volume lung protective stratergy ventilation, wean FiO2 as tolerated  9. Sedation and paralytic per MICU team, currently on Nimbex, fentanyl, Versed  10. Agree with zosyn and vancomycin x1, follow cultures  11. Follow serial lactic acid, previous 4.0  12. DuoNebs every 4 hours, can switch to Pulmicort and brovana  13. Dexamethasone 10 mg daily x5 days   14. Baricitinib renal dosed stopped 1/19 per PCP  15. Monitor inflammatory markers- stable, repeat procal pending  16. Dimer elevated to 864 today, unable to do CTA pulmonary d/t respiratory status    17. Monitor serial chest x-rays- worsening infiltrates today  18. Vitamin therapy  19.  GI/DVT prophylaxis, full dose Lovenox twice daily      Electronically signed by Rukhsana Kat MD on 1/22/2022 at 3:24 PM abnormal

## 2022-01-22 NOTE — PROGRESS NOTES
[address] [facility]   Department of Internal Medicine   Internal Medicine Residency  MICU Progress Note    Patient:  Kb Ramos 78 y.o. female   MRN: [id number]       Date of Service: 2022    Allergy: Patient has no known allergies. Subjective     Overnight, patient's P/F was 1.23 and patient was proned. Patient became hypotensive in late afternoon and required levophed but was weaned off overnight. NICOM was not fluid responsive. Patient was seen and examined this morning at bedside sedated, intubated and proned. Objective     TEMPERATURE:  Current - Temp: 98.1 °F (36.7 °C); Max - Temp  Av.2 °F (36.2 °C)  Min: 95.4 °F (35.2 °C)  Max: 99 °F (37.2 °C)  RESPIRATIONS RANGE: Resp  Av.6  Min: 20  Max: 24  PULSE RANGE: Pulse  Av.1  Min: 65  Max: 94  BLOOD PRESSURE RANGE:  No data recorded.  ; No data recorded. PULSE OXIMETRY RANGE: SpO2  Av.5 %  Min: 95 %  Max: 100 %    I & O - 24hr:    Intake/Output Summary (Last 24 hours) at 2022 1829  Last data filed at 2022 1800  Gross per 24 hour   Intake 1895.5 ml   Output 2415 ml   Net -519.5 ml     I/O last 3 completed shifts: In: 3453.4 [I.V.:2997.4; NG/GT:256; IV Piggyback:200]  Out: 2600 [Urine:2600] I/O this shift:   In: 936 [I.V.:205; NG/GT:187; IV Piggyback:350]  Out: 3232 [Urine:1625]   Weight change: 1 oz (0.028 kg)    Physical Exam:  General Appearance:    Sedated, intubated, proned   HEENT:    NC/AT, pupils 2-3 mm equally round and reactive to light   Neck:   Could not be assessed as patient was proned   Resp:     CTAB, No wheezes, no use of accessory muscles   Heart:    Sinus bradycardic on cardiac monitor    Abdomen:    Could not be assessed as patient was proned   Extremities:   Atraumatic, no cyanosis or pedal edema, +trace nonpitting edema B/L UE   Pulses:  Radial and pedal pulses are intact bilaterally +2   Neurologic:   Sedated        Medications     Continuous Infusions:   norepinephrine Stopped (22 8802)    fentaNYL 5 mcg/ml in 0.9%  ml infusion 100 mcg/hr (01/22/22 0635)    cisatracurium (NIMBEX) infusion 4 mcg/kg/min (01/22/22 1231)    sodium chloride      dextrose       Scheduled Meds:   chlorhexidine  15 mL Mouth/Throat BID    pantoprazole  40 mg IntraVENous Daily    And    sodium chloride (PF)  10 mL IntraVENous Daily    insulin lispro  0-12 Units SubCUTAneous Q4H    artificial tears   Both Eyes Q4H    polyethylene glycol  17 g Oral Daily    dexamethasone  10 mg IntraVENous Q24H    piperacillin-tazobactam  3,375 mg IntraVENous Q8H    enoxaparin  1 mg/kg SubCUTAneous BID    ipratropium-albuterol  1 ampule Inhalation Q4H WA    sodium chloride flush  5-40 mL IntraVENous 2 times per day    ascorbic acid  500 mg Oral Daily    zinc sulfate  50 mg Oral Daily    vitamin D  2,000 Units Oral Daily     PRN Meds: sodium chloride flush, sodium chloride, acetaminophen **OR** acetaminophen, glucose, dextrose, glucagon (rDNA), dextrose  Nutrition:   NG/OG tube TF type: Pulmocare/Nephro/Glucerna/Jevity        At rate: 10 ml/hr    Labs and Imaging Studies     CBC:   Recent Labs     01/20/22  0432 01/21/22  0420 01/22/22  0401   WBC 17.8* 18.2* 14.3*   HGB 9.0* 8.8* 8.1*   HCT 28.4* 28.1* 26.0*   MCV 95.6 97.2 96.7    417 434       BMP:    Recent Labs     01/20/22  0432 01/20/22  0432 01/21/22  0420 01/21/22  2343 01/22/22  0401     --  138  --  142   K 4.0   < > 3.9 3.98 3.9     --  100  --  104   CO2 26  --  28  --  30*   BUN 16  --  10  --  17   CREATININE 0.5  --  0.5  --  0.6   GLUCOSE 99  --  117*  --  151*    < > = values in this interval not displayed. LIVER PROFILE:   Recent Labs     01/22/22  0401   AST 15   ALT 23   BILIDIR <0.2   BILITOT 0.2   ALKPHOS 63       PT/INR:   No results for input(s): PROTIME, INR in the last 72 hours. APTT:   No results for input(s): APTT in the last 72 hours.     Fasting Lipid Panel:    Lab Results   Component Value Date    CHOL 145 06/29/2020    TRIG 75 06/29/2020    HDL 73 06/29/2020       Cardiac Enzymes:    No results found for: CKTOTAL, CKMB, CKMBINDEX, TROPONINI    Notable Cultures:      Blood cultures   Blood Culture, Routine   Date Value Ref Range Status   01/15/2022 5 Days no growth  Final     Respiratory cultures No results found for: RESPCULTURE No results found for: LABGRAM  Urine   Urine Culture, Routine   Date Value Ref Range Status   01/19/2022 >100,000 CFU/ml  Final     Legionella No results found for: LABLEGI  C Diff PCR No results found for: CDIFPCR  Wound culture/abscess: No results for input(s): WNDABS in the last 72 hours. Tip culture:No results for input(s): CXCATHTIP in the last 72 hours. Oxygen:     Vent Information  $Ventilation: $Subsequent Day  Skin Assessment: Clean, dry, & intact  Equipment ID: [id number]  Equipment Changed: (S) Humidification  Vent Type: 980  Vent Mode: AC/VC  Vt Ordered: 300 mL  Rate Set: 20 bmp  Peak Flow: 70 L/min  Pressure Support: 0 cmH20  FiO2 : 50 %  SpO2: 96 %  SpO2/FiO2 ratio: 192  Sensitivity: 3  PEEP/CPAP: 8  I Time/ I Time %: 0 s  Humidification Source: Heated wire  Humidification Temp: 37  Humidification Temp Measured: 36.8  Circuit Condensation: Drained  Mask Type: Full face mask  Mask Size: Medium  Additional Respiratory  Assessments  Pulse: 94  Resp: 20  SpO2: 96 %  Position: Semi-Ocampo's  Humidification Source: Heated wire  Humidification Temp: 37  Circuit Condensation: Drained  Oral Care: Mouth swabbed,Mouth suctioned,Mouth moisturizer  Subglottic Suction Done?: No  Airway Type: ET  Airway Size: 8       Urethral Catheter Double-lumen 16 fr-Output (mL): 175 mL  [REMOVED] External Urinary Catheter-Output (mL): 200 mL    Imaging Studies:  XR CHEST PORTABLE   Final Result   1. No significant change in bilateral multifocal pneumonia. Support tubes   and catheters are stable. XR CHEST PORTABLE   Final Result   1.  There is no interval change in extensive multifocal bilateral pneumonia. XR CHEST PORTABLE   Final Result   Slightly worsened bilateral pulmonary infiltrates with increasing opacity in   the bilateral bases when compared to previous. Stable lines and tubes. XR CHEST PORTABLE   Final Result   1. Good position right IJ central venous line. Negative for pneumothorax. 2.  Bilateral widespread pulmonary infiltrates with interval mild worsening   on the left and compatible with bilateral pneumonia. XR CHEST PORTABLE   Final Result   Endotracheal tube tip is 3.3 cm above the alberta. Diffuse pulmonary infiltrates are unchanged. XR ABDOMEN FOR NG/OG/NE TUBE PLACEMENT   Final Result   Nasogastric tube terminates in the stomach. XR CHEST PORTABLE   Final Result   1. Multifocal bilateral pneumonia unchanged when compared with the prior   study. XR CHEST PORTABLE   Final Result   Bilateral airspace disease likely related to pneumonia.          XR CHEST PORTABLE    (Results Pending)   CTA PULMONARY W CONTRAST    (Results Pending)   XR CHEST PORTABLE    (Results Pending)        Resident's Assessment and Plan     Assessment and Plan:    Pulmonary  Acute Hypoxic Respiratory 2/2 COVID 19 PNA   COVID + on 1/14/22  Did not receive remdesivir, received barticinib on 1/14/22  and was discontinued    CRP of 3.0 --> 8.0, Pro-maría elena of 0.22, Lactic Acid 1.6 wnl  WBC 18.2 --> 14.3  I/O net +7 L, UO 1.8 L  ABG 7.428/48/109/31 P/F 1.83   ACVC 300/20/8/60%  CXR: appears more congested left > right  respi culture negative, MRSA screen neg  urine culture >100,000 CFU candida albicans   Continue zosyn day 4  Continue on Decadron 10 mg QD day 4/5  Pending CTA of the chest to rule out PE or pneumo, also sent troponins and ekg to rule out cardiac causes   Continue breathing treatments with symbicort and douneb   Given lasix 40 mg x 1 dose  Weaned off versed, currently only on fentanyl, will supine patient, reassess ABG and try to wean off paralytics     Hx Asthma  Continue breathing treatments  On mechanical ventilation       Infectious Disease  COVID 19 Infection   -See above  -Trend inflammatory markers   -Continue Vit Bundle, C, D and zinc      Endocrine  Hyperglycemia likely 2/2 Steroids, prediabetic  BS  overnight  Still on steroids  A1C 6.0  Continue LDSS  Continue to monitor Glucose      Hematology/Oncology  Normocytic Anemia most likely 2/2 mixed inflammatory reaction and hemolytic anemia, stable  Hg 12.5 on admission  Current Hg 8.1  Iron studies follow inflammatory reaction  Retic studies support hemolytic component  Follow CBC    Resolved:  Stage 1 SAMUEL likely 2/2 poor oral intake     # Peptic ulcer prophylaxis: protonix  # DVT Prophylaxis: lovenox therapeutic dose  # Disposition: Cont current care     Bryn Boast, MD, PGY-1    Attending Physician: Dr. [facility]  Department of Pulmonary, Critical Care and Sleep Medicine  [address] [facility]  Department of Internal Medicine      During multidisciplinary team rounds Arcadio Forrest is a 78 y.o. female was seen, examined and discussed. This is confirmation that I have personally seen and examined the patient and that the key elements of the encounter were performed by me (> 85 % time). The medications & laboratory data was discussed and adjusted where necessary. The radiographic images were reviewed or with radiologist or consultant if felt dis-concordant with the exam or history. The above findings were corroborated, plans confirmed and changes made if needed. Family is updated at the bedside as available. Key issues of the case were discussed among consultants. Critical Care time is documented if appropriate.       Brady Escoto DO, FACP, FCCP, [facility],

## 2022-01-22 NOTE — PROGRESS NOTES
Physician Progress Note      Kash Vasquez  [facility] #:                  [id number]  :                       1955  ADMIT DATE:       2022 5:11 PM  DISCH DATE:  RESPONDING  PROVIDER #:        Kapil Dinh        QUERY TEXT:    Type of Shock: Please provide further specificity, if known. Clinical indicators include: blood, shock, bright red blood, blood loss  Options provided:  -- Cardiogenic shock  -- Septic shock  -- Hypovolemic shock  -- Hemorrhagic shock due to trauma  -- Hemorrhagic shock related to surgery  -- Anaphylactic shock  -- Other - I will add my own diagnosis  -- Disagree - Not applicable / Not valid  -- Disagree - Clinically Unable to determine / Unknown        PROVIDER RESPONSE TEXT:    The patient has septic shock.         Electronically signed by:  Kapil Dinh 2022 9:51 PM

## 2022-01-22 NOTE — PROGRESS NOTES
Stage  CI HR MAP TPRI SVI   Baseline 4.0 69 77 1522 58   Challenge 4.3 72 76 1406 62   Result (%Ä) 6.8 3.1 -1.3 -7.6 5.5     Not fluid responsive

## 2022-01-22 NOTE — PLAN OF CARE
Problem:  Body Temperature -  Risk of, Imbalanced  Goal: Ability to maintain a body temperature within defined limits  1/21/2022 2059 by Marily Edmondson RN  Outcome: Met This Shift  Goal: Complications related to the disease process, condition or treatment will be avoided or minimized  1/21/2022 2059 by Marily Edmondson RN  Outcome: Met This Shift     Problem: Isolation Precautions - Risk of Spread of Infection  Goal: Prevent transmission of infection  1/21/2022 2059 by Marily Edmondson RN  Outcome: Met This Shift     Problem: Risk for Fluid Volume Deficit  Goal: Maintain normal heart rhythm  1/21/2022 2059 by Marily Edmondson RN  Outcome: Met This Shift  Goal: Maintain absence of muscle cramping  1/21/2022 2059 by Marily Edmondson RN  Outcome: Met This Shift     Problem: Falls - Risk of:  Goal: Will remain free from falls  Description: Will remain free from falls  1/22/2022 0858 by Eamon Palomino RN  Outcome: Met This Shift  1/21/2022 2059 by Marily Edmondson RN  Outcome: Met This Shift  Goal: Absence of physical injury  Description: Absence of physical injury  1/22/2022 0858 by Eamon Palomino RN  Outcome: Met This Shift  1/21/2022 2059 by Marily Edmondson RN  Outcome: Met This Shift     Problem: Skin Integrity:  Goal: Will show no infection signs and symptoms  Description: Will show no infection signs and symptoms  1/22/2022 0858 by Eamon Palomino RN  Outcome: Met This Shift  1/21/2022 2059 by Marily Edmondson RN  Outcome: Met This Shift  Goal: Absence of new skin breakdown  Description: Absence of new skin breakdown  1/22/2022 0858 by Eamon Palomino RN  Outcome: Met This Shift  1/21/2022 2059 by Marily Edmondson RN  Outcome: Met This Shift

## 2022-01-22 NOTE — PROGRESS NOTES
Department of Internal Medicine  Nephrology  Progress Note    Events Reviewed. SUBJECTIVE:  We are following Ms. Gloria Rodriguez for SAMUEL. Patient is intubated. PHYSICAL EXAM:    Vitals:    VITALS:  BP (!) 137/51   Pulse 67   Temp 96.3 °F (35.7 °C)   Resp 20   Ht 5' (1.524 m)   Wt 143 lb 2 oz (64.9 kg)   SpO2 98%   BMI 27.95 kg/m²   24HR INTAKE/OUTPUT:      Intake/Output Summary (Last 24 hours) at 1/22/2022 1042  Last data filed at 1/22/2022 0800  Gross per 24 hour   Intake 1646.5 ml   Output 1435 ml   Net 211.5 ml     General appearance: Patient is intubated, sedated  HEENT: Normal cephalic, atraumatic without obvious deformity. Pupils equal, round, and reactive to light. Endotracheal tube in place  Neck: Supple, with full range of motion. No jugular venous distention. Trachea midline. Respiratory: Diminished bilaterally on bipap  Cardiovascular: RRR, no murmur noted  Abdomen: Soft, nontender, nondistended, flat  Musculoskeletal: No clubbing, cyanosis, edema of bilateral lower extremities. Brisk capillary refill. Skin: Normal skin color.  No rashes or lesions.   Neurologic: Patient sedated  Other: No edema      Scheduled Meds:   potassium phosphate IVPB  15 mmol IntraVENous Once    chlorhexidine  15 mL Mouth/Throat BID    pantoprazole  40 mg IntraVENous Daily    And    sodium chloride (PF)  10 mL IntraVENous Daily    insulin lispro  0-12 Units SubCUTAneous Q4H    artificial tears   Both Eyes Q4H    polyethylene glycol  17 g Oral Daily    dexamethasone  10 mg IntraVENous Q24H    piperacillin-tazobactam  3,375 mg IntraVENous Q8H    enoxaparin  1 mg/kg SubCUTAneous BID    ipratropium-albuterol  1 ampule Inhalation Q4H WA    sodium chloride flush  5-40 mL IntraVENous 2 times per day    ascorbic acid  500 mg Oral Daily    zinc sulfate  50 mg Oral Daily    vitamin D  2,000 Units Oral Daily     Continuous Infusions:   norepinephrine Stopped (01/22/22 0254)    fentaNYL 5 mcg/ml in 0.9% NS 250 ml infusion 100 mcg/hr (01/22/22 0635)    midazolam 3 mg/hr (01/22/22 0249)    cisatracurium (NIMBEX) infusion 2 mcg/kg/min (01/21/22 1845)    sodium chloride      dextrose       PRN Meds:.sodium chloride flush, sodium chloride, acetaminophen **OR** acetaminophen, glucose, dextrose, glucagon (rDNA), dextrose    DATA:    CBC with Differential:    Lab Results   Component Value Date    WBC 14.3 01/22/2022    RBC 2.69 01/22/2022    HGB 8.1 01/22/2022    HCT 26.0 01/22/2022     01/22/2022    MCV 96.7 01/22/2022    MCH 30.1 01/22/2022    MCHC 31.2 01/22/2022    RDW 13.9 01/22/2022    METASPCT 0.9 01/18/2022    LYMPHOPCT 2.7 01/18/2022    MONOPCT 11.6 01/18/2022    MYELOPCT 0.9 01/14/2022    BASOPCT 0.1 01/18/2022    MONOSABS 1.38 01/18/2022    LYMPHSABS 0.35 01/18/2022    EOSABS 0.00 01/18/2022    BASOSABS 0.00 01/18/2022     CMP:    Lab Results   Component Value Date     01/22/2022    K 3.9 01/22/2022    K 3.8 01/18/2022     01/22/2022    CO2 30 01/22/2022    BUN 17 01/22/2022    CREATININE 0.6 01/22/2022    GFRAA >60 01/22/2022    LABGLOM >60 01/22/2022    GLUCOSE 151 01/22/2022    PROT 5.2 01/22/2022    LABALBU 2.5 01/22/2022    CALCIUM 7.8 01/22/2022    BILITOT 0.2 01/22/2022    ALKPHOS 63 01/22/2022    AST 15 01/22/2022    ALT 23 01/22/2022     Magnesium:    Lab Results   Component Value Date    MG 2.2 01/22/2022     Phosphorus:    Lab Results   Component Value Date    PHOS 2.2 01/22/2022          Radiology Review:        Chest XR 1/14/22   Bilateral airspace disease likely related to pneumonia. BRIEF SUMMARY OF INITIAL CONSULT:     Briefly, Ms. Alena Cox is a 78year old female with a past medical history of Asthma who presented to the ER on January 14 th, 2022 with complaints of SOB and diarrhea for one week. She is not vaccinated against COVID. Reportedly her daughter found her curled into a fetal position in respiratory distress at home and EMS was summoned.  She was found to be COVID 19 positive. Labs were significant for bicarbonate 19, BUN 38, creatinine 1.9, anion gap 19, procalcitonin 1.57, CRP 12.8, , mildly elevated LFTs, D-dimer 404, ferritin 5098. Chest x-ray showed bilateral airspace disease. Renal is consulted for SAMUEL. Problems Resolved:     · SAMUEL, likely pre-renal volume responsive SAMUEL secondary to poor oral intake and GI losses (diarrhea). Creatinine 1.9mg/dL on admission. Renal function has improved to 1.0mg/dL with IVF administration. · HAGMA with low bicarbonate levels, secondary to uremia. To continue bicarbonate drip  · Hypokalemia, 2/2 poor oral intake, potassium levels improved. · xypernatremia, with water deficit, dehydration due to poor intake. Sodium levels quite improved. Resolved. IMPRESSION/RECOMMENDATIONS:      1. Hypophosphatemia, 2/2 poor intake, to replace  2. Hypocalcemia, vitamin D 25 level 39, calcium levels improved  3. Normal pH, with respiratory acidosis (permissive hypercapnia)  ----------------------------------------  4. COVID +, on dexamethasone, Baricitinib  5. Acute hypoxic respiratory failure, secondary to COVID pneumonia. Status post intubation  6. Normocytic anemia, hemoglobin stable  7. Nutrition, n.p.o.    Plan:    · Replace phosphorus   · We will follow peripherally.       Electronically signed by Wm Stevens MD on 1/22/2022 at 10:42 AM

## 2022-01-23 ENCOUNTER — APPOINTMENT (OUTPATIENT)
Dept: GENERAL RADIOLOGY | Age: 67
DRG: 207 | End: 2022-01-23
Payer: MEDICARE

## 2022-01-23 LAB
AADO2: 204.8 MMHG
ANION GAP SERPL CALCULATED.3IONS-SCNC: 8 MMOL/L (ref 7–16)
B.E.: 6.1 MMOL/L (ref -3–3)
B.E.: 8.2 MMOL/L (ref -3–3)
BUN BLDV-MCNC: 23 MG/DL (ref 6–23)
C-REACTIVE PROTEIN: 2 MG/DL (ref 0–0.4)
CALCIUM IONIZED: 1.17 MMOL/L (ref 1.15–1.33)
CALCIUM SERPL-MCNC: 8.1 MG/DL (ref 8.6–10.2)
CHLORIDE BLD-SCNC: 103 MMOL/L (ref 98–107)
CO2: 32 MMOL/L (ref 22–29)
COHB: 0.9 % (ref 0–1.5)
CREAT SERPL-MCNC: 0.6 MG/DL (ref 0.5–1)
CRITICAL: ABNORMAL
DATE ANALYZED: ABNORMAL
DATE OF COLLECTION: ABNORMAL
DELIVERY SYSTEMS: ABNORMAL
DEVICE: ABNORMAL
FERRITIN: 1126 NG/ML
FIO2 ARTERIAL: 50
FIO2: 50 %
GFR AFRICAN AMERICAN: >60
GFR NON-AFRICAN AMERICAN: >60 ML/MIN/1.73
GLUCOSE BLD-MCNC: 134 MG/DL (ref 74–99)
HCO3 ARTERIAL: 30.9 MMOL/L (ref 22–26)
HCO3: 32.8 MMOL/L (ref 22–26)
HCT VFR BLD CALC: 26.3 % (ref 34–48)
HEMOGLOBIN: 8.1 G/DL (ref 11.5–15.5)
HHB: 3.3 % (ref 0–5)
L. PNEUMOPHILA SEROGP 1 UR AG: NORMAL
LAB: ABNORMAL
LACTIC ACID: 1.3 MMOL/L (ref 0.5–2.2)
Lab: ABNORMAL
MAGNESIUM: 1.8 MG/DL (ref 1.6–2.6)
MCH RBC QN AUTO: 30.2 PG (ref 26–35)
MCHC RBC AUTO-ENTMCNC: 30.8 % (ref 32–34.5)
MCV RBC AUTO: 98.1 FL (ref 80–99.9)
METER GLUCOSE: 123 MG/DL (ref 74–99)
METER GLUCOSE: 137 MG/DL (ref 74–99)
METER GLUCOSE: 141 MG/DL (ref 74–99)
METER GLUCOSE: 141 MG/DL (ref 74–99)
METER GLUCOSE: 211 MG/DL (ref 74–99)
METER GLUCOSE: 82 MG/DL (ref 74–99)
METER GLUCOSE: 84 MG/DL (ref 74–99)
METHB: 0.2 % (ref 0–1.5)
MODE: AC
MODE: AC
O2 CONTENT: 12.1 ML/DL
O2 SATURATION: 90.5 % (ref 92–98.5)
O2 SATURATION: 96.7 % (ref 92–98.5)
O2HB: 95.6 % (ref 94–97)
OPERATOR ID: 2962
OPERATOR ID: 465
PATIENT TEMP: 37
PATIENT TEMP: 37 C
PCO2 (TEMP CORRECTED): 45.4 MMHG (ref 35–45)
PCO2: 46.4 MMHG (ref 35–45)
PDW BLD-RTO: 13.8 FL (ref 11.5–15)
PEEP/CPAP: 8 CMH2O
PFO2: 1.74 MMHG/%
PH (TEMPERATURE CORRECTED): 7.44 (ref 7.35–7.45)
PH BLOOD GAS: 7.47 (ref 7.35–7.45)
PHOSPHORUS: 2.7 MG/DL (ref 2.5–4.5)
PLATELET # BLD: 536 E9/L (ref 130–450)
PMV BLD AUTO: 12.2 FL (ref 7–12)
PO2 (TEMP CORRECTED): 58.2 MMHG (ref 60–80)
PO2: 87 MMHG (ref 75–100)
POSITIVE END EXP PRESS: 8 CMH2O
POTASSIUM SERPL-SCNC: 4.1 MMOL/L (ref 3.5–5)
RBC # BLD: 2.68 E12/L (ref 3.5–5.5)
RESPIRATORY RATE: 20 B/MIN
RI(T): 2.35
RR MECHANICAL: 20 B/MIN
SODIUM BLD-SCNC: 143 MMOL/L (ref 132–146)
SOURCE, BLOOD GAS: ABNORMAL
SOURCE, BLOOD GAS: ABNORMAL
STREP PNEUMONIAE ANTIGEN, URINE: NORMAL
THB: 8.9 G/DL (ref 11.5–16.5)
TIDAL VOLUME: 350 ML
TIME ANALYZED: 432
VT MECHANICAL: 300 ML
WBC # BLD: 18.1 E9/L (ref 4.5–11.5)

## 2022-01-23 PROCEDURE — 6360000002 HC RX W HCPCS: Performed by: STUDENT IN AN ORGANIZED HEALTH CARE EDUCATION/TRAINING PROGRAM

## 2022-01-23 PROCEDURE — 2500000003 HC RX 250 WO HCPCS: Performed by: INTERNAL MEDICINE

## 2022-01-23 PROCEDURE — 86140 C-REACTIVE PROTEIN: CPT

## 2022-01-23 PROCEDURE — 6360000002 HC RX W HCPCS

## 2022-01-23 PROCEDURE — 2580000003 HC RX 258: Performed by: INTERNAL MEDICINE

## 2022-01-23 PROCEDURE — 6370000000 HC RX 637 (ALT 250 FOR IP)

## 2022-01-23 PROCEDURE — 99233 SBSQ HOSP IP/OBS HIGH 50: CPT | Performed by: INTERNAL MEDICINE

## 2022-01-23 PROCEDURE — 87449 NOS EACH ORGANISM AG IA: CPT

## 2022-01-23 PROCEDURE — 36415 COLL VENOUS BLD VENIPUNCTURE: CPT

## 2022-01-23 PROCEDURE — 85027 COMPLETE CBC AUTOMATED: CPT

## 2022-01-23 PROCEDURE — 82330 ASSAY OF CALCIUM: CPT

## 2022-01-23 PROCEDURE — 6360000002 HC RX W HCPCS: Performed by: INTERNAL MEDICINE

## 2022-01-23 PROCEDURE — 82805 BLOOD GASES W/O2 SATURATION: CPT

## 2022-01-23 PROCEDURE — 6370000000 HC RX 637 (ALT 250 FOR IP): Performed by: INTERNAL MEDICINE

## 2022-01-23 PROCEDURE — 82728 ASSAY OF FERRITIN: CPT

## 2022-01-23 PROCEDURE — 84100 ASSAY OF PHOSPHORUS: CPT

## 2022-01-23 PROCEDURE — 82962 GLUCOSE BLOOD TEST: CPT

## 2022-01-23 PROCEDURE — C9113 INJ PANTOPRAZOLE SODIUM, VIA: HCPCS

## 2022-01-23 PROCEDURE — 83605 ASSAY OF LACTIC ACID: CPT

## 2022-01-23 PROCEDURE — 2580000003 HC RX 258: Performed by: FAMILY MEDICINE

## 2022-01-23 PROCEDURE — 87040 BLOOD CULTURE FOR BACTERIA: CPT

## 2022-01-23 PROCEDURE — 2580000003 HC RX 258

## 2022-01-23 PROCEDURE — 83735 ASSAY OF MAGNESIUM: CPT

## 2022-01-23 PROCEDURE — 2500000003 HC RX 250 WO HCPCS

## 2022-01-23 PROCEDURE — 94003 VENT MGMT INPAT SUBQ DAY: CPT

## 2022-01-23 PROCEDURE — 82803 BLOOD GASES ANY COMBINATION: CPT

## 2022-01-23 PROCEDURE — 94640 AIRWAY INHALATION TREATMENT: CPT

## 2022-01-23 PROCEDURE — 2580000003 HC RX 258: Performed by: STUDENT IN AN ORGANIZED HEALTH CARE EDUCATION/TRAINING PROGRAM

## 2022-01-23 PROCEDURE — 71045 X-RAY EXAM CHEST 1 VIEW: CPT

## 2022-01-23 PROCEDURE — 80048 BASIC METABOLIC PNL TOTAL CA: CPT

## 2022-01-23 PROCEDURE — 2000000000 HC ICU R&B

## 2022-01-23 PROCEDURE — 2500000003 HC RX 250 WO HCPCS: Performed by: STUDENT IN AN ORGANIZED HEALTH CARE EDUCATION/TRAINING PROGRAM

## 2022-01-23 PROCEDURE — 6370000000 HC RX 637 (ALT 250 FOR IP): Performed by: FAMILY MEDICINE

## 2022-01-23 PROCEDURE — 6370000000 HC RX 637 (ALT 250 FOR IP): Performed by: NURSE PRACTITIONER

## 2022-01-23 RX ORDER — FLUCONAZOLE 2 MG/ML
100 INJECTION, SOLUTION INTRAVENOUS EVERY 24 HOURS
Status: DISCONTINUED | OUTPATIENT
Start: 2022-01-23 | End: 2022-01-24

## 2022-01-23 RX ORDER — MIDODRINE HYDROCHLORIDE 5 MG/1
TABLET ORAL
Status: COMPLETED
Start: 2022-01-23 | End: 2022-01-23

## 2022-01-23 RX ORDER — MAGNESIUM SULFATE IN WATER 40 MG/ML
2000 INJECTION, SOLUTION INTRAVENOUS ONCE
Status: COMPLETED | OUTPATIENT
Start: 2022-01-23 | End: 2022-01-23

## 2022-01-23 RX ORDER — PROPOFOL 10 MG/ML
INJECTION, EMULSION INTRAVENOUS
Status: COMPLETED
Start: 2022-01-23 | End: 2022-01-23

## 2022-01-23 RX ORDER — MIDODRINE HYDROCHLORIDE 5 MG/1
5 TABLET ORAL
Status: DISCONTINUED | OUTPATIENT
Start: 2022-01-23 | End: 2022-01-24

## 2022-01-23 RX ORDER — PROPOFOL 10 MG/ML
5-50 INJECTION, EMULSION INTRAVENOUS
Status: DISCONTINUED | OUTPATIENT
Start: 2022-01-23 | End: 2022-01-24

## 2022-01-23 RX ORDER — FUROSEMIDE 10 MG/ML
40 INJECTION INTRAMUSCULAR; INTRAVENOUS ONCE
Status: COMPLETED | OUTPATIENT
Start: 2022-01-23 | End: 2022-01-23

## 2022-01-23 RX ADMIN — VANCOMYCIN HYDROCHLORIDE 1000 MG: 1 INJECTION, POWDER, LYOPHILIZED, FOR SOLUTION INTRAVENOUS at 14:03

## 2022-01-23 RX ADMIN — Medication 200 MCG/HR: at 23:22

## 2022-01-23 RX ADMIN — ENOXAPARIN SODIUM 70 MG: 100 INJECTION SUBCUTANEOUS at 20:26

## 2022-01-23 RX ADMIN — MIDAZOLAM 4 MG/HR: 5 INJECTION INTRAMUSCULAR; INTRAVENOUS at 06:31

## 2022-01-23 RX ADMIN — IPRATROPIUM BROMIDE AND ALBUTEROL SULFATE 1 AMPULE: .5; 2.5 SOLUTION RESPIRATORY (INHALATION) at 13:25

## 2022-01-23 RX ADMIN — MAGNESIUM SULFATE HEPTAHYDRATE 2000 MG: 40 INJECTION, SOLUTION INTRAVENOUS at 08:25

## 2022-01-23 RX ADMIN — CHLORHEXIDINE GLUCONATE 0.12% ORAL RINSE 15 ML: 1.2 LIQUID ORAL at 08:25

## 2022-01-23 RX ADMIN — MIDODRINE HYDROCHLORIDE 5 MG: 5 TABLET ORAL at 09:22

## 2022-01-23 RX ADMIN — ENOXAPARIN SODIUM 70 MG: 100 INJECTION SUBCUTANEOUS at 08:24

## 2022-01-23 RX ADMIN — PIPERACILLIN AND TAZOBACTAM 3375 MG: 3; .375 INJECTION, POWDER, LYOPHILIZED, FOR SOLUTION INTRAVENOUS at 04:18

## 2022-01-23 RX ADMIN — Medication 5 MCG/MIN: at 01:17

## 2022-01-23 RX ADMIN — IPRATROPIUM BROMIDE AND ALBUTEROL SULFATE 1 AMPULE: .5; 2.5 SOLUTION RESPIRATORY (INHALATION) at 17:21

## 2022-01-23 RX ADMIN — CISATRACURIUM BESYLATE 3 MCG/KG/MIN: 200 INJECTION INTRAVENOUS at 06:32

## 2022-01-23 RX ADMIN — FUROSEMIDE 40 MG: 10 INJECTION, SOLUTION INTRAMUSCULAR; INTRAVENOUS at 12:11

## 2022-01-23 RX ADMIN — Medication 10 ML: at 20:29

## 2022-01-23 RX ADMIN — IPRATROPIUM BROMIDE AND ALBUTEROL SULFATE 1 AMPULE: .5; 2.5 SOLUTION RESPIRATORY (INHALATION) at 10:13

## 2022-01-23 RX ADMIN — HYDROCORTISONE SODIUM SUCCINATE 100 MG: 100 INJECTION, POWDER, FOR SOLUTION INTRAMUSCULAR; INTRAVENOUS at 09:37

## 2022-01-23 RX ADMIN — PROPOFOL 10 MCG/KG/MIN: 10 INJECTION, EMULSION INTRAVENOUS at 22:00

## 2022-01-23 RX ADMIN — INSULIN LISPRO 4 UNITS: 100 INJECTION, SOLUTION INTRAVENOUS; SUBCUTANEOUS at 16:09

## 2022-01-23 RX ADMIN — MINERAL OIL AND WHITE PETROLATUM: 150; 830 OINTMENT OPHTHALMIC at 10:49

## 2022-01-23 RX ADMIN — MINERAL OIL AND WHITE PETROLATUM: 150; 830 OINTMENT OPHTHALMIC at 22:20

## 2022-01-23 RX ADMIN — OXYCODONE HYDROCHLORIDE AND ACETAMINOPHEN 500 MG: 500 TABLET ORAL at 08:25

## 2022-01-23 RX ADMIN — Medication 2000 UNITS: at 08:27

## 2022-01-23 RX ADMIN — MIDAZOLAM 10 MG/HR: 5 INJECTION INTRAMUSCULAR; INTRAVENOUS at 23:29

## 2022-01-23 RX ADMIN — SODIUM CHLORIDE, PRESERVATIVE FREE 10 ML: 5 INJECTION INTRAVENOUS at 08:25

## 2022-01-23 RX ADMIN — Medication: at 08:28

## 2022-01-23 RX ADMIN — INSULIN LISPRO 2 UNITS: 100 INJECTION, SOLUTION INTRAVENOUS; SUBCUTANEOUS at 04:35

## 2022-01-23 RX ADMIN — FLUCONAZOLE IN SODIUM CHLORIDE 100 MG: 2 INJECTION, SOLUTION INTRAVENOUS at 14:03

## 2022-01-23 RX ADMIN — MINERAL OIL AND WHITE PETROLATUM: 150; 830 OINTMENT OPHTHALMIC at 20:24

## 2022-01-23 RX ADMIN — MINERAL OIL AND WHITE PETROLATUM: 150; 830 OINTMENT OPHTHALMIC at 06:47

## 2022-01-23 RX ADMIN — MINERAL OIL AND WHITE PETROLATUM: 150; 830 OINTMENT OPHTHALMIC at 04:19

## 2022-01-23 RX ADMIN — Medication 10 ML: at 08:27

## 2022-01-23 RX ADMIN — Medication: at 20:28

## 2022-01-23 RX ADMIN — Medication 125 MCG/HR: at 06:32

## 2022-01-23 RX ADMIN — PANTOPRAZOLE SODIUM 40 MG: 40 INJECTION, POWDER, FOR SOLUTION INTRAVENOUS at 08:25

## 2022-01-23 RX ADMIN — PIPERACILLIN AND TAZOBACTAM 3375 MG: 3; .375 INJECTION, POWDER, LYOPHILIZED, FOR SOLUTION INTRAVENOUS at 20:19

## 2022-01-23 RX ADMIN — IPRATROPIUM BROMIDE AND ALBUTEROL SULFATE 1 AMPULE: .5; 2.5 SOLUTION RESPIRATORY (INHALATION) at 21:01

## 2022-01-23 RX ADMIN — Medication 125 MCG/HR: at 17:01

## 2022-01-23 RX ADMIN — ZINC SULFATE 220 MG (50 MG) CAPSULE 50 MG: CAPSULE at 08:27

## 2022-01-23 RX ADMIN — CHLORHEXIDINE GLUCONATE 0.12% ORAL RINSE 15 ML: 1.2 LIQUID ORAL at 20:25

## 2022-01-23 RX ADMIN — POLYETHYLENE GLYCOL 3350 17 G: 17 POWDER, FOR SOLUTION ORAL at 08:25

## 2022-01-23 RX ADMIN — PIPERACILLIN AND TAZOBACTAM 3375 MG: 3; .375 INJECTION, POWDER, LYOPHILIZED, FOR SOLUTION INTRAVENOUS at 10:49

## 2022-01-23 ASSESSMENT — PULMONARY FUNCTION TESTS
PIF_VALUE: 17
PIF_VALUE: 24
PIF_VALUE: 29
PIF_VALUE: 24
PIF_VALUE: 25
PIF_VALUE: 27
PIF_VALUE: 27
PIF_VALUE: 15
PIF_VALUE: 24
PIF_VALUE: 26
PIF_VALUE: 25
PIF_VALUE: 15
PIF_VALUE: 26
PIF_VALUE: 50
PIF_VALUE: 15
PIF_VALUE: 29
PIF_VALUE: 14
PIF_VALUE: 16
PIF_VALUE: 25
PIF_VALUE: 23
PIF_VALUE: 29
PIF_VALUE: 26
PIF_VALUE: 26
PIF_VALUE: 27
PIF_VALUE: 20
PIF_VALUE: 26
PIF_VALUE: 15
PIF_VALUE: 26
PIF_VALUE: 16

## 2022-01-23 NOTE — PROGRESS NOTES
[address] [facility]   Department of Internal Medicine   Internal Medicine Residency  MICU Progress Note    Patient:  Florencia Romero 78 y.o. female   MRN: [id number]       Date of Service: 2022    Allergy: Patient has no known allergies. Subjective     Overnight, patient was weaned off levophed. Patient was seen and examined this morning at bedside sedated, intubated and supined. There was concern for hypotension again. Patient was not fluid responsive on NICOM. Midodrine was started. Objective     TEMPERATURE:  Current - Temp: 99.3 °F (37.4 °C); Max - Temp  Av.9 °F (37.2 °C)  Min: 98.6 °F (37 °C)  Max: 99.3 °F (37.4 °C)  RESPIRATIONS RANGE: Resp  Av.8  Min: 19  Max: 25  PULSE RANGE: Pulse  Av.8  Min: 58  Max: 103  BLOOD PRESSURE RANGE:  Systolic (43LQC), JWC:722 , Min:88 , FNP:461   ; Diastolic (15SOT), UZR:29, Min:39, Max:63    PULSE OXIMETRY RANGE: SpO2  Av.3 %  Min: 93 %  Max: 98 %    I & O - 24hr:    Intake/Output Summary (Last 24 hours) at 2022 1700  Last data filed at 2022 1603  Gross per 24 hour   Intake 1754 ml   Output 2031 ml   Net -277 ml     I/O last 3 completed shifts: In: 2750.5 [I.V.:1742.5; NG/GT:658; IV Piggyback:350]  Out: 0525 [RASEC:5251] I/O this shift:   In: 671 [I.V.:275; NG/GT:124; IV Piggyback:500]  Out: 1200 [Urine:1200]   Weight change: -5 lb 11.6 oz (-2.597 kg)    Physical Exam:  General Appearance:    Sedated, intubated, supined   HEENT:    NC/AT, pupils 2-3 mm equally round and reactive to light   Neck:   Supple, trachea midline   Resp:     CTAB, No wheezes, no use of accessory muscles   Heart:    RRR, normal S1 and S2, no murmurs   Abdomen:    Soft, nondistended, normoactive bowel sounds   Extremities:   Atraumatic, no cyanosis or pedal edema, +trace nonpitting edema B/L UE   Pulses:  Radial and pedal pulses are intact bilaterally +2   Neurologic:   Sedated        Medications     Continuous Infusions:   midazolam 4 mg/hr (22 0385)  norepinephrine Stopped (01/23/22 0914)    fentaNYL 5 mcg/ml in 0.9%  ml infusion 125 mcg/hr (01/23/22 7307)    [Held by provider] cisatracurium (NIMBEX) infusion Stopped (01/23/22 1115)    sodium chloride      dextrose       Scheduled Meds:   midodrine  5 mg Oral TID WC    fluconazole  100 mg IntraVENous Q24H    vancomycin  1,000 mg IntraVENous Q12H    miconazole nitrate   Topical BID    chlorhexidine  15 mL Mouth/Throat BID    pantoprazole  40 mg IntraVENous Daily    And    sodium chloride (PF)  10 mL IntraVENous Daily    insulin lispro  0-12 Units SubCUTAneous Q4H    artificial tears   Both Eyes Q4H    polyethylene glycol  17 g Oral Daily    piperacillin-tazobactam  3,375 mg IntraVENous Q8H    enoxaparin  1 mg/kg SubCUTAneous BID    ipratropium-albuterol  1 ampule Inhalation Q4H WA    sodium chloride flush  5-40 mL IntraVENous 2 times per day    ascorbic acid  500 mg Oral Daily    zinc sulfate  50 mg Oral Daily    vitamin D  2,000 Units Oral Daily     PRN Meds: sodium chloride flush, sodium chloride, acetaminophen **OR** acetaminophen, glucose, dextrose, glucagon (rDNA), dextrose  Nutrition:   NG/OG tube TF type: Pulmocare/Nephro/Glucerna/Jevity        At rate: 10 ml/hr    Labs and Imaging Studies     CBC:   Recent Labs     01/21/22  0420 01/22/22 0401 01/23/22 0412   WBC 18.2* 14.3* 18.1*   HGB 8.8* 8.1* 8.1*   HCT 28.1* 26.0* 26.3*   MCV 97.2 96.7 98.1    434 536*       BMP:    Recent Labs     01/22/22  0401 01/22/22 2012 01/23/22 0412    141 143   K 3.9 4.3 4.1    102 103   CO2 30* 32* 32*   BUN 17 21 23   CREATININE 0.6 0.6 0.6   GLUCOSE 151* 161* 134*       LIVER PROFILE:   Recent Labs     01/22/22 0401   AST 15   ALT 23   BILIDIR <0.2   BILITOT 0.2   ALKPHOS 63       PT/INR:   No results for input(s): PROTIME, INR in the last 72 hours. APTT:   No results for input(s): APTT in the last 72 hours.     Fasting Lipid Panel:    Lab Results   Component Value Date    CHOL 145 06/29/2020    TRIG 75 06/29/2020    HDL 73 06/29/2020       Cardiac Enzymes:    No results found for: CKTOTAL, CKMB, CKMBINDEX, TROPONINI    Notable Cultures:      Blood cultures   Blood Culture, Routine   Date Value Ref Range Status   01/15/2022 5 Days no growth  Final     Respiratory cultures No results found for: RESPCULTURE No results found for: LABGRAM  Urine   Urine Culture, Routine   Date Value Ref Range Status   01/19/2022 >100,000 CFU/ml  Final     Legionella No results found for: LABLEGI  C Diff PCR No results found for: CDIFPCR  Wound culture/abscess: No results for input(s): WNDABS in the last 72 hours. Tip culture:No results for input(s): CXCATHTIP in the last 72 hours. Oxygen:     Vent Information  $Ventilation: $Subsequent Day  Skin Assessment: Clean, dry, & intact  Equipment ID: [id number]  Equipment Changed: Humidification  Vent Type: [phone]  Vent Mode: AC/VC  Vt Ordered: 300 mL  Rate Set: 20 bmp  Peak Flow: 50 L/min  Pressure Support: 0 cmH20  FiO2 : 50 %  SpO2: 95 %  SpO2/FiO2 ratio: 190  Sensitivity: 3  PEEP/CPAP: 8  I Time/ I Time %: 0 s  Humidification Source: Heated wire  Humidification Temp: 37  Humidification Temp Measured: 36.8  Circuit Condensation: Drained  Mask Type: Full face mask  Mask Size: Medium  Additional Respiratory  Assessments  Pulse: 67  Resp: 19  SpO2: 95 %  Position: Semi-Ocampo's  Humidification Source: Heated wire  Humidification Temp: 37  Circuit Condensation: Drained  Oral Care: Mouthwash with chlorhexidine,Mouth swabbed,Mouth suctioned  Subglottic Suction Done?: No  Airway Type: ET  Airway Size: 8       Urethral Catheter Double-lumen 16 fr-Output (mL): 400 mL  [REMOVED] External Urinary Catheter-Output (mL): 200 mL    Imaging Studies:  XR CHEST PORTABLE   Final Result   1. Lines and tubes are unchanged in position. 2.  Diffuse bilateral opacities are not significantly changed.   Differential   includes infection, pulmonary edema, atelectasis, ARDS, and/or layering   pleural effusions. XR CHEST PORTABLE   Final Result   1. No significant change in bilateral multifocal pneumonia. Support tubes   and catheters are stable. XR CHEST PORTABLE   Final Result   1. There is no interval change in extensive multifocal bilateral pneumonia. XR CHEST PORTABLE   Final Result   Slightly worsened bilateral pulmonary infiltrates with increasing opacity in   the bilateral bases when compared to previous. Stable lines and tubes. XR CHEST PORTABLE   Final Result   1. Good position right IJ central venous line. Negative for pneumothorax. 2.  Bilateral widespread pulmonary infiltrates with interval mild worsening   on the left and compatible with bilateral pneumonia. XR CHEST PORTABLE   Final Result   Endotracheal tube tip is 3.3 cm above the alberta. Diffuse pulmonary infiltrates are unchanged. XR ABDOMEN FOR NG/OG/NE TUBE PLACEMENT   Final Result   Nasogastric tube terminates in the stomach. XR CHEST PORTABLE   Final Result   1. Multifocal bilateral pneumonia unchanged when compared with the prior   study. XR CHEST PORTABLE   Final Result   Bilateral airspace disease likely related to pneumonia.          XR CHEST PORTABLE    (Results Pending)   CTA PULMONARY W CONTRAST    (Results Pending)   XR CHEST PORTABLE    (Results Pending)        Resident's Assessment and Plan     Assessment and Plan:    Pulmonary  Acute Hypoxic Respiratory 2/2 COVID 19 PNA   COVID + on 1/14/22  Did not receive remdesivir, received barticinib on 1/14/22  and was discontinued    CRP of 3.0 --> 8.0, Pro-maría elena of 0.22, Lactic Acid 1.6 wnl  WBC 14.3 --> 18.1  I/O net +6.3 L, UO 2.5 L  ABG 7.467/46.4/87/32.8 P/F 1.74   ACVC 300/20/8/50%  Peak 25, Plateau 18  CXR: some clearing of b/l opacities  respi culture negative, MRSA screen neg  urine culture >100,000 CFU candida albicans   Continue zosyn day 5, discontinued hydrocortisone  Pending CTA of the chest to rule out PE or pneumo, also sent troponins and ekg to rule out cardiac causes   Continue breathing treatments with symbicort and douneb   Given lasix 40 mg x 1 dose  Nimbex discontinued, goal is to stop nimbex, wean off versed and then wean off fentanyl and extubate  Concern for infection and possible sepsis with requirement of levophed in past days and increase in wbc -->Repeated CRP and lactic acid. Ordered panculture, galactomanna and beta-d-glucan. Started vanc and diflucan     Hx Asthma  Continue breathing treatments  On mechanical ventilation       Infectious Disease  COVID 19 Infection   -See above  -Trend inflammatory markers   -Continue Vit Bundle, C, D and zinc      Endocrine  Hyperglycemia likely 2/2 Steroids, prediabetic  -177 overnight  Still on steroids  A1C 6.0  Continue LDSS  Continue to monitor Glucose      Hematology/Oncology  Normocytic Anemia most likely 2/2 mixed inflammatory reaction and hemolytic anemia, stable  Hg 12.5 on admission  Current Hg 8.1  Iron studies follow inflammatory reaction  Retic studies support hemolytic component  Follow CBC    Resolved:  Stage 1 SAMUEL likely 2/2 poor oral intake     # Peptic ulcer prophylaxis: protonix  # DVT Prophylaxis: lovenox therapeutic dose  # Disposition: Cont current care     Gerardo Blood MD, PGY-1    Attending Physician: Dr. Harshad Hook  Department of Pulmonary, Critical Care and Sleep Medicine  [address] [facility]  Department of Internal Medicine      During multidisciplinary team rounds Luis Eduardo Doran is a 78 y.o. female was seen, examined and discussed. This is confirmation that I have personally seen and examined the patient and that the key elements of the encounter were performed by me (> 85 % time). The medications & laboratory data was discussed and adjusted where necessary.  The radiographic images were reviewed or with radiologist or consultant if felt dis-concordant with the exam or history. The above findings were corroborated, plans confirmed and changes made if needed. Family is updated at the bedside as available. Key issues of the case were discussed among consultants. Critical Care time is documented if appropriate.       Emily Fernandez DO, FACP, FCCP, [place],

## 2022-01-23 NOTE — PROGRESS NOTES
Hospitalist Progress Note      SYNOPSIS:     Ms. Geeta De Souza is a 78y.o. year old female who has a PMH of asthma.     She presented to the ER on 22 with complaints of SOB and diarrhea x 1 week. She was not vaccinated against COVID-19. Reportedly her daughter found her curled into the fetal position in respiratory distress at home and called 911. Vitals on arrival were significant for temperature 103.2, heart rate 110, blood pressure 122/70 and oxygen saturation 72% on room air. She was placed on 15 L via nonrebreather mask and her oxygen saturation improved to 95%. Labs were significant for bicarbonate 19, BUN 38, creatinine 1.9, anion gap 19, procalcitonin 1.57, CRP 12.8, , mildly elevated LFTs, D-dimer 404, ferritin 5098 and a positive COVID PCR. Chest x-ray showed bilateral airspace disease. She was given 1 L normal saline bolus as well Lovenox and Decadron prior to being admitted. SUBJECTIVE:    Patient seen in the ICU, Currently Intubated,sedated, paralyzed- 50 fiO2, PEEP 8  Records reviewed. Temp (24hrs), Av.4 °F (36.9 °C), Min:97.3 °F (36.3 °C), Max:99.3 °F (37.4 °C)    DIET: Diet NPO  ADULT TUBE FEEDING; Nasogastric; Standard with Fiber; Continuous; 10; No; 30; Q 3 hours  CODE: Full Code    Intake/Output Summary (Last 24 hours) at 2022 0951  Last data filed at 2022 0835  Gross per 24 hour   Intake 1547 ml   Output 2156 ml   Net -609 ml       Review of Systems  Unable to obtain- intubated , sedated      OBJECTIVE:    BP (!) 88/43   Pulse 87   Temp 99.3 °F (37.4 °C) (Esophageal)   Resp 20   Ht 5' (1.524 m)   Wt 137 lb 6.4 oz (62.3 kg)   SpO2 96%   BMI 26.83 kg/m²     General appearance: intubated, sedated, proned  HEENT: Normal cephalic, atraumatic without obvious deformity. RS-intubated, 50fiO2, PEEP 8  CVS- Tachycardic  Skin: Normal skin color. No rashes or lesions.   Neurologic:  intubated, sedated, paralyzed    Medications:  REVIEWED DAILY    Infusion Medications    midazolam 4 mg/hr (01/23/22 0631)    norepinephrine Stopped (01/23/22 0914)    fentaNYL 5 mcg/ml in 0.9%  ml infusion 125 mcg/hr (01/23/22 0769)    [Held by provider] cisatracurium (NIMBEX) infusion 3 mcg/kg/min (01/23/22 7851)    sodium chloride      dextrose       Scheduled Medications    magnesium sulfate  2,000 mg IntraVENous Once    hydrocortisone sodium succinate PF  100 mg IntraVENous Q12H    midodrine  5 mg Oral TID WC    miconazole nitrate   Topical BID    chlorhexidine  15 mL Mouth/Throat BID    pantoprazole  40 mg IntraVENous Daily    And    sodium chloride (PF)  10 mL IntraVENous Daily    insulin lispro  0-12 Units SubCUTAneous Q4H    artificial tears   Both Eyes Q4H    polyethylene glycol  17 g Oral Daily    piperacillin-tazobactam  3,375 mg IntraVENous Q8H    enoxaparin  1 mg/kg SubCUTAneous BID    ipratropium-albuterol  1 ampule Inhalation Q4H WA    sodium chloride flush  5-40 mL IntraVENous 2 times per day    ascorbic acid  500 mg Oral Daily    zinc sulfate  50 mg Oral Daily    vitamin D  2,000 Units Oral Daily     PRN Meds: sodium chloride flush, sodium chloride, acetaminophen **OR** acetaminophen, glucose, dextrose, glucagon (rDNA), dextrose    Labs:     Recent Labs     01/21/22  0420 01/22/22 0401 01/23/22  0412   WBC 18.2* 14.3* 18.1*   HGB 8.8* 8.1* 8.1*   HCT 28.1* 26.0* 26.3*    434 536*       Recent Labs     01/21/22  0420 01/21/22  2343 01/22/22 0401 01/22/22 2012 01/23/22  0412     --  142 141 143   K 3.9   < > 3.9 4.3 4.1     --  104 102 103   CO2 28  --  30* 32* 32*   BUN 10  --  17 21 23   CREATININE 0.5  --  0.6 0.6 0.6   CALCIUM 7.5*  --  7.8* 7.9* 8.1*   PHOS 1.7*  --  2.2*  --  2.7    < > = values in this interval not displayed. Recent Labs     01/22/22 0401   PROT 5.2*   ALKPHOS 63   ALT 23   AST 15   BILITOT 0.2       No results for input(s): INR in the last 72 hours.     No results for input(s): Shahla Raya in the last 72 hours. Chronic labs:    Lab Results   Component Value Date    CHOL 145 06/29/2020    TRIG 75 06/29/2020    HDL 73 06/29/2020    LDLCALC 57 06/29/2020    TSH 3.600 06/29/2020    INR 1.0 01/14/2022    LABA1C 6.0 (H) 01/19/2022       Radiology: REVIEWED DAILY      ASSESSMENT and PLAN:    COVID-19 pneumonia-unvaccinated, symptom onset approximately 1 week prior to presentation. To continue with vitamin C, vitamin D, zinc.  Lovenox prophylaxis. Trend inflammatory markers. · On decadron 10 mg PO bid s/p baricitinib 1/14-1/19    Acute hypoxic respiratory failure-2/2 above. Intubated -50fiO2, PEEP 8    Possible superimposed bacterial pneumonia-procalcitonin 1.57-->0.2--->0. 22. Blood and sputum cultures with NGTD. Urine antigens negative. Switched to Zosyn from azithromycin and ceftriaxone. One dose of Vanc given as well on 1/20/22    Normocytic anemia-continue to monitor H&H- 12.5-->11.1-->10.8--->9.0--->8.8-->8.1, fobt ordered. Possibly consider surgical evaluation    Hx asthma- states that she has never seen a pulmonologist and that it is controlled with PRN albuterol. DISPOSITION: Continued inpatient care    +++++++++++++++++++++++++++++++++++++++++++++++++  Eryn Centeno MD   [facility] - [address], [place]  +++++++++++++++++++++++++++++++++++++++++++++++++  NOTE: This report was transcribed using voice recognition software. Every effort was made to ensure accuracy; however, inadvertent computerized transcription errors may be present.

## 2022-01-23 NOTE — PLAN OF CARE
Problem: Risk for Fluid Volume Deficit  Goal: Maintain normal heart rhythm  Outcome: Met This Shift     Problem:  Body Temperature -  Risk of, Imbalanced  Goal: Will regain or maintain usual level of consciousness  Outcome: Not Met This Shift     Problem: Fatigue  Goal: Verbalize increase energy and improved vitality  Outcome: Not Met This Shift

## 2022-01-23 NOTE — PLAN OF CARE
Problem: Airway Clearance - Ineffective  Goal: Achieve or maintain patent airway  Outcome: Met This Shift     Problem: Gas Exchange - Impaired  Goal: Absence of hypoxia  Outcome: Met This Shift  Goal: Promote optimal lung function  Outcome: Met This Shift     Problem: Breathing Pattern - Ineffective  Goal: Ability to achieve and maintain a regular respiratory rate  Outcome: Met This Shift     Problem:  Body Temperature -  Risk of, Imbalanced  Goal: Ability to maintain a body temperature within defined limits  Outcome: Met This Shift  Goal: Will regain or maintain usual level of consciousness  1/23/2022 0848 by Noemi Corral RN  Outcome: Met This Shift  1/23/2022 0558 by Yelena Bradley RN  Outcome: Not Met This Shift  Goal: Complications related to the disease process, condition or treatment will be avoided or minimized  Outcome: Met This Shift     Problem: Risk for Fluid Volume Deficit  Goal: Maintain normal heart rhythm  1/23/2022 0558 by Yelena Bradley RN  Outcome: Met This Shift

## 2022-01-23 NOTE — PROGRESS NOTES
Pt noted to be breathing asynchronous with vent. Weak cough noted upon suctioning. Nimbex drip increased. See eMAR and vitals flowsheet.

## 2022-01-23 NOTE — PROGRESS NOTES
Attempted to stop 1 mg levophed. Michelle Dye MAP dropped to 51-54 and maintained. Restarted levophed.

## 2022-01-23 NOTE — PROGRESS NOTES
Pharmacy Consultation Note  (Antibiotic Dosing and Monitoring)    Initial consult date: 1/23/22  Consulting physician/provider: Dr. Mariel Marks  Drug: Vancomycin  Indication: Superimposed bacterial pneumonia    Age/  Gender Height Weight IBW  Allergy Information   66 y.o./female 5' (152.4 cm) 109 lb (49.4 kg)     Ideal body weight: 45.5 kg (100 lb 4.9 oz)  Adjusted ideal body weight: 52.2 kg (115 lb 2.3 oz)   Patient has no known allergies. Renal Function:  Recent Labs     01/22/22  0401 01/22/22 2012 01/23/22  0412   BUN 17 21 23   CREATININE 0.6 0.6 0.6       Intake/Output Summary (Last 24 hours) at 1/23/2022 1303  Last data filed at 1/23/2022 1230  Gross per 24 hour   Intake 1547 ml   Output 1906 ml   Net -359 ml       Vancomycin Monitoring:  Trough:  No results for input(s): VANCOTROUGH in the last 72 hours. Random:  No results for input(s): VANCORANDOM in the last 72 hours. Vancomycin Administration Times:  Recent vancomycin administrations      No vancomycin IV orders with administrations found. Orders not given:          vancomycin 1000 mg IVPB in 250 mL D5W addavial                Assessment:  · Patient is a 78 y.o. female who has been initiated on vancomycin  · Estimated Creatinine Clearance: 75 mL/min (based on SCr of 0.6 mg/dL). · To dose vancomycin, pharmacy will be utilizing [employer] calculation software for goal AUC/JACIEL 400-600 mg/L-hr    Plan:  · Will continue vancomycin 1000 IV every 12 hours  · Will check vancomycin levels when appropriate  · Will continue to monitor renal function   · Clinical pharmacy to follow    Law Cummings Pharm.  D Candidate 2022 1/23/2022 1:04 PM

## 2022-01-24 ENCOUNTER — APPOINTMENT (OUTPATIENT)
Dept: GENERAL RADIOLOGY | Age: 67
DRG: 207 | End: 2022-01-24
Payer: MEDICARE

## 2022-01-24 LAB
AADO2: 369.1 MMHG
AADO2: 383 MMHG
AADO2: 480 MMHG
AADO2: 584.6 MMHG
ANION GAP SERPL CALCULATED.3IONS-SCNC: 11 MMOL/L (ref 7–16)
ANION GAP SERPL CALCULATED.3IONS-SCNC: 6 MMOL/L (ref 7–16)
B.E.: 10 MMOL/L (ref -3–3)
B.E.: 5.6 MMOL/L (ref -3–3)
B.E.: 6.3 MMOL/L (ref -3–3)
B.E.: 7.7 MMOL/L (ref -3–3)
BUN BLDV-MCNC: 19 MG/DL (ref 6–23)
BUN BLDV-MCNC: 22 MG/DL (ref 6–23)
C-REACTIVE PROTEIN: 8.5 MG/DL (ref 0–0.4)
CALCIUM IONIZED: 1.15 MMOL/L (ref 1.15–1.33)
CALCIUM SERPL-MCNC: 7.7 MG/DL (ref 8.6–10.2)
CALCIUM SERPL-MCNC: 7.7 MG/DL (ref 8.6–10.2)
CHLORIDE BLD-SCNC: 97 MMOL/L (ref 98–107)
CHLORIDE BLD-SCNC: 98 MMOL/L (ref 98–107)
CO2: 31 MMOL/L (ref 22–29)
CO2: 35 MMOL/L (ref 22–29)
COHB: 0.4 % (ref 0–1.5)
COHB: 0.6 % (ref 0–1.5)
COHB: 0.7 % (ref 0–1.5)
COHB: 0.7 % (ref 0–1.5)
CREAT SERPL-MCNC: 0.6 MG/DL (ref 0.5–1)
CREAT SERPL-MCNC: 0.7 MG/DL (ref 0.5–1)
CRITICAL: ABNORMAL
D DIMER: 593 NG/ML DDU
DATE ANALYZED: ABNORMAL
DATE OF COLLECTION: ABNORMAL
FIO2: 100 %
FIO2: 80 %
FIO2: 80 %
FIO2: 85 %
GFR AFRICAN AMERICAN: >60
GFR AFRICAN AMERICAN: >60
GFR NON-AFRICAN AMERICAN: >60 ML/MIN/1.73
GFR NON-AFRICAN AMERICAN: >60 ML/MIN/1.73
GLUCOSE BLD-MCNC: 107 MG/DL (ref 74–99)
GLUCOSE BLD-MCNC: 231 MG/DL (ref 74–99)
HCO3: 31.1 MMOL/L (ref 22–26)
HCO3: 32.7 MMOL/L (ref 22–26)
HCO3: 33.4 MMOL/L (ref 22–26)
HCO3: 34.2 MMOL/L (ref 22–26)
HCT VFR BLD CALC: 25.7 % (ref 34–48)
HEMOGLOBIN: 8.1 G/DL (ref 11.5–15.5)
HHB: 1.6 % (ref 0–5)
HHB: 11.6 % (ref 0–5)
HHB: 3.2 % (ref 0–5)
HHB: 9.5 % (ref 0–5)
LAB: ABNORMAL
MAGNESIUM: 1.9 MG/DL (ref 1.6–2.6)
MAGNESIUM: 2.1 MG/DL (ref 1.6–2.6)
MCH RBC QN AUTO: 30.5 PG (ref 26–35)
MCHC RBC AUTO-ENTMCNC: 31.5 % (ref 32–34.5)
MCV RBC AUTO: 96.6 FL (ref 80–99.9)
METER GLUCOSE: 108 MG/DL (ref 74–99)
METER GLUCOSE: 115 MG/DL (ref 74–99)
METER GLUCOSE: 180 MG/DL (ref 74–99)
METER GLUCOSE: 207 MG/DL (ref 74–99)
METER GLUCOSE: 218 MG/DL (ref 74–99)
METER GLUCOSE: 91 MG/DL (ref 74–99)
METHB: 0.1 % (ref 0–1.5)
METHB: 0.1 % (ref 0–1.5)
METHB: 0.2 % (ref 0–1.5)
METHB: 0.3 % (ref 0–1.5)
MODE: AC
O2 CONTENT: 11.1 ML/DL
O2 CONTENT: 11.4 ML/DL
O2 CONTENT: 12.2 ML/DL
O2 CONTENT: 12.5 ML/DL
O2 SATURATION: 88.3 % (ref 92–98.5)
O2 SATURATION: 90.4 % (ref 92–98.5)
O2 SATURATION: 96.8 % (ref 92–98.5)
O2 SATURATION: 98.4 % (ref 92–98.5)
O2HB: 87.6 % (ref 94–97)
O2HB: 89.7 % (ref 94–97)
O2HB: 96.3 % (ref 94–97)
O2HB: 97.4 % (ref 94–97)
OPERATOR ID: 2962
OPERATOR ID: 5100
OPERATOR ID: ABNORMAL
OPERATOR ID: ABNORMAL
PATIENT TEMP: 37 C
PCO2: 45 MMHG (ref 35–45)
PCO2: 49.3 MMHG (ref 35–45)
PCO2: 50.7 MMHG (ref 35–45)
PCO2: 64.3 MMHG (ref 35–45)
PDW BLD-RTO: 13.6 FL (ref 11.5–15)
PEEP/CPAP: 8 CMH2O
PFO2: 0.54 MMHG/%
PFO2: 0.68 MMHG/%
PFO2: 1.25 MMHG/%
PFO2: 1.6 MMHG/%
PH BLOOD GAS: 7.33 (ref 7.35–7.45)
PH BLOOD GAS: 7.41 (ref 7.35–7.45)
PH BLOOD GAS: 7.44 (ref 7.35–7.45)
PH BLOOD GAS: 7.5 (ref 7.35–7.45)
PHOSPHORUS: 1.4 MG/DL (ref 2.5–4.5)
PHOSPHORUS: 4.4 MG/DL (ref 2.5–4.5)
PLATELET # BLD: 524 E9/L (ref 130–450)
PMV BLD AUTO: 11.7 FL (ref 7–12)
PO2: 128.1 MMHG (ref 75–100)
PO2: 54.1 MMHG (ref 75–100)
PO2: 58.1 MMHG (ref 75–100)
PO2: 99.9 MMHG (ref 75–100)
POTASSIUM SERPL-SCNC: 2.9 MMOL/L (ref 3.5–5)
POTASSIUM SERPL-SCNC: 4.3 MMOL/L (ref 3.5–5)
PROCALCITONIN: 0.94 NG/ML (ref 0–0.08)
RBC # BLD: 2.66 E12/L (ref 3.5–5.5)
RI(T): 10.81
RI(T): 2.88
RI(T): 3.83
RI(T): 8.26
RR MECHANICAL: 20 B/MIN
RR MECHANICAL: 26 B/MIN
SODIUM BLD-SCNC: 138 MMOL/L (ref 132–146)
SODIUM BLD-SCNC: 140 MMOL/L (ref 132–146)
SOURCE, BLOOD GAS: ABNORMAL
THB: 8.7 G/DL (ref 11.5–16.5)
THB: 9 G/DL (ref 11.5–16.5)
THB: 9 G/DL (ref 11.5–16.5)
THB: 9.1 G/DL (ref 11.5–16.5)
TIME ANALYZED: 1005
TIME ANALYZED: 2042
TIME ANALYZED: 2244
TIME ANALYZED: 347
VT MECHANICAL: 300 ML
WBC # BLD: 15.5 E9/L (ref 4.5–11.5)

## 2022-01-24 PROCEDURE — 6360000002 HC RX W HCPCS: Performed by: INTERNAL MEDICINE

## 2022-01-24 PROCEDURE — 2580000003 HC RX 258: Performed by: INTERNAL MEDICINE

## 2022-01-24 PROCEDURE — 83735 ASSAY OF MAGNESIUM: CPT

## 2022-01-24 PROCEDURE — 2580000003 HC RX 258: Performed by: STUDENT IN AN ORGANIZED HEALTH CARE EDUCATION/TRAINING PROGRAM

## 2022-01-24 PROCEDURE — 84100 ASSAY OF PHOSPHORUS: CPT

## 2022-01-24 PROCEDURE — 2500000003 HC RX 250 WO HCPCS

## 2022-01-24 PROCEDURE — 86140 C-REACTIVE PROTEIN: CPT

## 2022-01-24 PROCEDURE — 84145 PROCALCITONIN (PCT): CPT

## 2022-01-24 PROCEDURE — 6370000000 HC RX 637 (ALT 250 FOR IP): Performed by: INTERNAL MEDICINE

## 2022-01-24 PROCEDURE — 6360000002 HC RX W HCPCS

## 2022-01-24 PROCEDURE — 85378 FIBRIN DEGRADE SEMIQUANT: CPT

## 2022-01-24 PROCEDURE — 6370000000 HC RX 637 (ALT 250 FOR IP): Performed by: STUDENT IN AN ORGANIZED HEALTH CARE EDUCATION/TRAINING PROGRAM

## 2022-01-24 PROCEDURE — 2580000003 HC RX 258

## 2022-01-24 PROCEDURE — 71045 X-RAY EXAM CHEST 1 VIEW: CPT

## 2022-01-24 PROCEDURE — 2580000003 HC RX 258: Performed by: FAMILY MEDICINE

## 2022-01-24 PROCEDURE — S5553 INSULIN LONG ACTING 5 U: HCPCS | Performed by: STUDENT IN AN ORGANIZED HEALTH CARE EDUCATION/TRAINING PROGRAM

## 2022-01-24 PROCEDURE — 82330 ASSAY OF CALCIUM: CPT

## 2022-01-24 PROCEDURE — 94640 AIRWAY INHALATION TREATMENT: CPT

## 2022-01-24 PROCEDURE — 94003 VENT MGMT INPAT SUBQ DAY: CPT

## 2022-01-24 PROCEDURE — 85027 COMPLETE CBC AUTOMATED: CPT

## 2022-01-24 PROCEDURE — 2500000003 HC RX 250 WO HCPCS: Performed by: INTERNAL MEDICINE

## 2022-01-24 PROCEDURE — 6360000002 HC RX W HCPCS: Performed by: STUDENT IN AN ORGANIZED HEALTH CARE EDUCATION/TRAINING PROGRAM

## 2022-01-24 PROCEDURE — C9113 INJ PANTOPRAZOLE SODIUM, VIA: HCPCS

## 2022-01-24 PROCEDURE — 82962 GLUCOSE BLOOD TEST: CPT

## 2022-01-24 PROCEDURE — 80048 BASIC METABOLIC PNL TOTAL CA: CPT

## 2022-01-24 PROCEDURE — 2000000000 HC ICU R&B

## 2022-01-24 PROCEDURE — 6370000000 HC RX 637 (ALT 250 FOR IP): Performed by: NURSE PRACTITIONER

## 2022-01-24 PROCEDURE — 82805 BLOOD GASES W/O2 SATURATION: CPT

## 2022-01-24 PROCEDURE — 2500000003 HC RX 250 WO HCPCS: Performed by: STUDENT IN AN ORGANIZED HEALTH CARE EDUCATION/TRAINING PROGRAM

## 2022-01-24 PROCEDURE — 6370000000 HC RX 637 (ALT 250 FOR IP)

## 2022-01-24 PROCEDURE — 6370000000 HC RX 637 (ALT 250 FOR IP): Performed by: FAMILY MEDICINE

## 2022-01-24 RX ORDER — MAGNESIUM SULFATE IN WATER 40 MG/ML
2000 INJECTION, SOLUTION INTRAVENOUS ONCE
Status: COMPLETED | OUTPATIENT
Start: 2022-01-24 | End: 2022-01-24

## 2022-01-24 RX ORDER — INSULIN GLARGINE 100 [IU]/ML
5 INJECTION, SOLUTION SUBCUTANEOUS NIGHTLY
Status: DISCONTINUED | OUTPATIENT
Start: 2022-01-24 | End: 2022-01-24 | Stop reason: CLARIF

## 2022-01-24 RX ORDER — INSULIN GLARGINE-YFGN 100 [IU]/ML
5 INJECTION, SOLUTION SUBCUTANEOUS NIGHTLY
Status: DISCONTINUED | OUTPATIENT
Start: 2022-01-24 | End: 2022-01-28

## 2022-01-24 RX ORDER — DOCUSATE SODIUM 50 MG/5ML
100 LIQUID ORAL 2 TIMES DAILY
Status: DISCONTINUED | OUTPATIENT
Start: 2022-01-24 | End: 2022-02-05

## 2022-01-24 RX ORDER — FUROSEMIDE 10 MG/ML
40 INJECTION INTRAMUSCULAR; INTRAVENOUS ONCE
Status: COMPLETED | OUTPATIENT
Start: 2022-01-24 | End: 2022-01-24

## 2022-01-24 RX ORDER — POTASSIUM CHLORIDE 29.8 MG/ML
40 INJECTION INTRAVENOUS EVERY 4 HOURS
Status: COMPLETED | OUTPATIENT
Start: 2022-01-24 | End: 2022-01-24

## 2022-01-24 RX ADMIN — Medication 10 ML: at 08:35

## 2022-01-24 RX ADMIN — OXYCODONE HYDROCHLORIDE AND ACETAMINOPHEN 500 MG: 500 TABLET ORAL at 08:34

## 2022-01-24 RX ADMIN — MINERAL OIL AND WHITE PETROLATUM: 150; 830 OINTMENT OPHTHALMIC at 15:01

## 2022-01-24 RX ADMIN — MIDODRINE HYDROCHLORIDE 15 MG: 10 TABLET ORAL at 17:35

## 2022-01-24 RX ADMIN — POTASSIUM CHLORIDE 40 MEQ: 400 INJECTION, SOLUTION INTRAVENOUS at 08:03

## 2022-01-24 RX ADMIN — CHLORHEXIDINE GLUCONATE 0.12% ORAL RINSE 15 ML: 1.2 LIQUID ORAL at 20:15

## 2022-01-24 RX ADMIN — POLYETHYLENE GLYCOL 3350 17 G: 17 POWDER, FOR SOLUTION ORAL at 08:33

## 2022-01-24 RX ADMIN — CHLORHEXIDINE GLUCONATE 0.12% ORAL RINSE 15 ML: 1.2 LIQUID ORAL at 08:04

## 2022-01-24 RX ADMIN — MIDODRINE HYDROCHLORIDE 15 MG: 10 TABLET ORAL at 11:16

## 2022-01-24 RX ADMIN — IPRATROPIUM BROMIDE AND ALBUTEROL SULFATE 1 AMPULE: .5; 2.5 SOLUTION RESPIRATORY (INHALATION) at 19:32

## 2022-01-24 RX ADMIN — MIDAZOLAM 10 MG/HR: 5 INJECTION INTRAMUSCULAR; INTRAVENOUS at 07:05

## 2022-01-24 RX ADMIN — INSULIN GLARGINE-YFGN 5 UNITS: 100 INJECTION, SOLUTION SUBCUTANEOUS at 22:11

## 2022-01-24 RX ADMIN — ENOXAPARIN SODIUM 70 MG: 100 INJECTION SUBCUTANEOUS at 20:15

## 2022-01-24 RX ADMIN — IPRATROPIUM BROMIDE AND ALBUTEROL SULFATE 1 AMPULE: .5; 2.5 SOLUTION RESPIRATORY (INHALATION) at 09:12

## 2022-01-24 RX ADMIN — SODIUM CHLORIDE, PRESERVATIVE FREE 10 ML: 5 INJECTION INTRAVENOUS at 11:17

## 2022-01-24 RX ADMIN — MINERAL OIL AND WHITE PETROLATUM: 150; 830 OINTMENT OPHTHALMIC at 18:45

## 2022-01-24 RX ADMIN — MINERAL OIL AND WHITE PETROLATUM: 150; 830 OINTMENT OPHTHALMIC at 22:15

## 2022-01-24 RX ADMIN — ZINC SULFATE 220 MG (50 MG) CAPSULE 50 MG: CAPSULE at 08:34

## 2022-01-24 RX ADMIN — SODIUM CHLORIDE, PRESERVATIVE FREE 10 ML: 5 INJECTION INTRAVENOUS at 08:03

## 2022-01-24 RX ADMIN — MINERAL OIL AND WHITE PETROLATUM: 150; 830 OINTMENT OPHTHALMIC at 06:26

## 2022-01-24 RX ADMIN — PIPERACILLIN AND TAZOBACTAM 3375 MG: 3; .375 INJECTION, POWDER, LYOPHILIZED, FOR SOLUTION INTRAVENOUS at 11:19

## 2022-01-24 RX ADMIN — MINERAL OIL AND WHITE PETROLATUM: 150; 830 OINTMENT OPHTHALMIC at 11:27

## 2022-01-24 RX ADMIN — SENNOSIDES 5 ML: 8.8 SYRUP ORAL at 20:15

## 2022-01-24 RX ADMIN — Medication: at 08:03

## 2022-01-24 RX ADMIN — DOCUSATE SODIUM 100 MG: 50 LIQUID ORAL at 20:15

## 2022-01-24 RX ADMIN — HYDROCORTISONE SODIUM SUCCINATE 100 MG: 100 INJECTION, POWDER, FOR SOLUTION INTRAMUSCULAR; INTRAVENOUS at 11:17

## 2022-01-24 RX ADMIN — IPRATROPIUM BROMIDE AND ALBUTEROL SULFATE 1 AMPULE: .5; 2.5 SOLUTION RESPIRATORY (INHALATION) at 13:00

## 2022-01-24 RX ADMIN — POTASSIUM PHOSPHATE, MONOBASIC AND POTASSIUM PHOSPHATE, DIBASIC 10 MMOL: 224; 236 INJECTION, SOLUTION, CONCENTRATE INTRAVENOUS at 11:58

## 2022-01-24 RX ADMIN — POTASSIUM CHLORIDE 40 MEQ: 400 INJECTION, SOLUTION INTRAVENOUS at 11:19

## 2022-01-24 RX ADMIN — INSULIN LISPRO 4 UNITS: 100 INJECTION, SOLUTION INTRAVENOUS; SUBCUTANEOUS at 15:51

## 2022-01-24 RX ADMIN — Medication 5 MCG/MIN: at 00:53

## 2022-01-24 RX ADMIN — Medication: at 20:15

## 2022-01-24 RX ADMIN — MAGNESIUM SULFATE HEPTAHYDRATE 2000 MG: 40 INJECTION, SOLUTION INTRAVENOUS at 08:03

## 2022-01-24 RX ADMIN — PIPERACILLIN AND TAZOBACTAM 3375 MG: 3; .375 INJECTION, POWDER, LYOPHILIZED, FOR SOLUTION INTRAVENOUS at 03:39

## 2022-01-24 RX ADMIN — Medication 200 MCG/HR: at 07:04

## 2022-01-24 RX ADMIN — PROPOFOL 25 MCG/KG/MIN: 10 INJECTION, EMULSION INTRAVENOUS at 08:32

## 2022-01-24 RX ADMIN — CISATRACURIUM BESYLATE 2 MCG/KG/MIN: 200 INJECTION INTRAVENOUS at 12:08

## 2022-01-24 RX ADMIN — Medication 10 ML: at 20:15

## 2022-01-24 RX ADMIN — Medication 2000 UNITS: at 08:35

## 2022-01-24 RX ADMIN — INSULIN LISPRO 4 UNITS: 100 INJECTION, SOLUTION INTRAVENOUS; SUBCUTANEOUS at 20:35

## 2022-01-24 RX ADMIN — HYDROCORTISONE SODIUM SUCCINATE 100 MG: 100 INJECTION, POWDER, FOR SOLUTION INTRAMUSCULAR; INTRAVENOUS at 18:49

## 2022-01-24 RX ADMIN — FUROSEMIDE 40 MG: 10 INJECTION, SOLUTION INTRAMUSCULAR; INTRAVENOUS at 11:16

## 2022-01-24 RX ADMIN — IPRATROPIUM BROMIDE AND ALBUTEROL SULFATE 1 AMPULE: .5; 2.5 SOLUTION RESPIRATORY (INHALATION) at 16:18

## 2022-01-24 RX ADMIN — PIPERACILLIN AND TAZOBACTAM 3375 MG: 3; .375 INJECTION, POWDER, LYOPHILIZED, FOR SOLUTION INTRAVENOUS at 18:45

## 2022-01-24 RX ADMIN — MIDAZOLAM 10 MG/HR: 5 INJECTION INTRAMUSCULAR; INTRAVENOUS at 18:55

## 2022-01-24 RX ADMIN — ENOXAPARIN SODIUM 70 MG: 100 INJECTION SUBCUTANEOUS at 08:33

## 2022-01-24 RX ADMIN — PANTOPRAZOLE SODIUM 40 MG: 40 INJECTION, POWDER, FOR SOLUTION INTRAVENOUS at 08:33

## 2022-01-24 RX ADMIN — Medication 200 MCG/HR: at 20:36

## 2022-01-24 RX ADMIN — VANCOMYCIN HYDROCHLORIDE 1000 MG: 1 INJECTION, POWDER, LYOPHILIZED, FOR SOLUTION INTRAVENOUS at 02:01

## 2022-01-24 RX ADMIN — MIDODRINE HYDROCHLORIDE 5 MG: 5 TABLET ORAL at 08:35

## 2022-01-24 RX ADMIN — MINERAL OIL AND WHITE PETROLATUM: 150; 830 OINTMENT OPHTHALMIC at 03:42

## 2022-01-24 RX ADMIN — Medication 200 MCG/HR: at 14:10

## 2022-01-24 ASSESSMENT — PULMONARY FUNCTION TESTS
PIF_VALUE: 23
PIF_VALUE: 24
PIF_VALUE: 27
PIF_VALUE: 26
PIF_VALUE: 27
PIF_VALUE: 25
PIF_VALUE: 27
PIF_VALUE: 27
PIF_VALUE: 26
PIF_VALUE: 27
PIF_VALUE: 25
PIF_VALUE: 20
PIF_VALUE: 25
PIF_VALUE: 21
PIF_VALUE: 27
PIF_VALUE: 16
PIF_VALUE: 25
PIF_VALUE: 19
PIF_VALUE: 18
PIF_VALUE: 16
PIF_VALUE: 23
PIF_VALUE: 16
PIF_VALUE: 26
PIF_VALUE: 16
PIF_VALUE: 17
PIF_VALUE: 28
PIF_VALUE: 24
PIF_VALUE: 23
PIF_VALUE: 27
PIF_VALUE: 15
PIF_VALUE: 25
PIF_VALUE: 27
PIF_VALUE: 26
PIF_VALUE: 25
PIF_VALUE: 25
PIF_VALUE: 27
PIF_VALUE: 20

## 2022-01-24 ASSESSMENT — PAIN SCALES - GENERAL
PAINLEVEL_OUTOF10: 0

## 2022-01-24 NOTE — PLAN OF CARE
Problem: Airway Clearance - Ineffective  Goal: Achieve or maintain patent airway  Outcome: Met This Shift     Problem: Gas Exchange - Impaired  Goal: Absence of hypoxia  Outcome: Not Met This Shift  Goal: Promote optimal lung function  Outcome: Met This Shift     Problem: Breathing Pattern - Ineffective  Goal: Ability to achieve and maintain a regular respiratory rate  Outcome: Met This Shift     Problem:  Body Temperature -  Risk of, Imbalanced  Goal: Ability to maintain a body temperature within defined limits  Outcome: Met This Shift  Goal: Will regain or maintain usual level of consciousness  Outcome: Not Met This Shift  Goal: Complications related to the disease process, condition or treatment will be avoided or minimized  Outcome: Met This Shift     Problem: Isolation Precautions - Risk of Spread of Infection  Goal: Prevent transmission of infection  Outcome: Met This Shift     Problem: Nutrition Deficits  Goal: Optimize nutritional status  Outcome: Met This Shift     Problem: Risk for Fluid Volume Deficit  Goal: Maintain normal heart rhythm  Outcome: Met This Shift  Goal: Maintain absence of muscle cramping  Outcome: Met This Shift  Goal: Maintain normal serum potassium, sodium, calcium, phosphorus, and pH  Outcome: Not Met This Shift     Problem: Loneliness or Risk for Loneliness  Goal: Demonstrate positive use of time alone when socialization is not possible  Outcome: Met This Shift     Problem: Fatigue  Goal: Verbalize increase energy and improved vitality  Outcome: Not Met This Shift     Problem: Patient Education: Go to Patient Education Activity  Goal: Patient/Family Education  Outcome: Met This Shift     Problem: Falls - Risk of:  Goal: Will remain free from falls  Description: Will remain free from falls  Outcome: Met This Shift  Goal: Absence of physical injury  Description: Absence of physical injury  Outcome: Met This Shift     Problem: Skin Integrity:  Goal: Will show no infection signs and symptoms  Description: Will show no infection signs and symptoms  Outcome: Met This Shift  Goal: Absence of new skin breakdown  Description: Absence of new skin breakdown  Outcome: Met This Shift     Plan of care reviewed with pt and family, as available.

## 2022-01-24 NOTE — PLAN OF CARE
Problem: Inadequate oral food/beverage intake (NI-2.1)  Goal: Food and/or Nutrient Delivery  Description: Individualized approach for food/nutrient provision.   Outcome: Met This Shift Referral for counseling- Jenni Sarah here in [place].     Plan for mammogram  To schedule your test or specialty referral please call:  [facility]:  Radiology (imaging- mammogram, ultrasound, CT, MRI): [phone]  Speciality consultation: [phone] [address]  [facility] [address]     FIT test for colon cancer screening     Let me know if you would like the referral to meet with  in [facility]

## 2022-01-24 NOTE — PROGRESS NOTES
Baseline train of four obtained: 4/4 twitches at 30; 0/4 twitches at 20. Nimbex to be restarted per order.

## 2022-01-24 NOTE — PROGRESS NOTES
Per patient's daughter, Susannah Tang, patient has a history of anxiety and depression. She will call-in on Monday with a list of home meds and will bring a physical list on Tuesday when she visits.

## 2022-01-24 NOTE — PROGRESS NOTES
Pharmacy Consultation Note  (Antibiotic Dosing and Monitoring)    · Vancomycin has been discontinued. Clinical pharmacy will sign off, please reconsult if further assistance is needed.          Arabella Capone, [address] [place] [address] 1/24/2022 10:46 AM

## 2022-01-24 NOTE — PROGRESS NOTES
ICU Attending Addendum    Tommy Jacqueline was seen, examined and discussed with the multi-disciplinary ICU team during rounds. I have personally seen and examined the patient and the key elements of the encounter were performed by me. All other information is noted in the ICU team note. Briefly,    This is a 62-year-old female with history of asthma unvaccinated presents with respiratory failure and SAMUEL due to dehydration secondary to decreased p.o. intake and diarrhea admitted 1/14. Was being treated with steroids and baricitinib. Patient had decline in her respiratory status transferred to ICU and intubated on 1/19. Assessment:    1. Acute respiratory failure require mechanical ventilation 1/19  2. Critically severe COVID-19 pneumonia  3. Possible superimposed bacterial pneumonia  4.  Vasodilatory shock       Deep sedation with NMB   Wean off propofol   Continue mechanical ventilation with low lung VT protective strategy   Proning until P/F improves   Pressors for map goal greater than 65, M 50 mg 3 times daily   Start Solu-Cortef 100 mg every 8   Await respiratory culture data, continue Zosyn, DC Vanco   Tube feeds, bowel regiment      GI/DVT - SUP/Lovenox full AC 1 mg/kg  Consultants: Balwinder Boogie DO

## 2022-01-24 NOTE — PROGRESS NOTES
Hospitalist Progress Note      SYNOPSIS:     Ms. Alma Narayanan is a 78y.o. year old female who has a PMH of asthma.     She presented to the ER on 22 with complaints of SOB and diarrhea x 1 week. She was not vaccinated against COVID-19. Reportedly her daughter found her curled into the fetal position in respiratory distress at home and called 911. Vitals on arrival were significant for temperature 103.2, heart rate 110, blood pressure 122/70 and oxygen saturation 72% on room air. She was placed on 15 L via nonrebreather mask and her oxygen saturation improved to 95%. Labs were significant for bicarbonate 19, BUN 38, creatinine 1.9, anion gap 19, procalcitonin 1.57, CRP 12.8, , mildly elevated LFTs, D-dimer 404, ferritin 5098 and a positive COVID PCR. Chest x-ray showed bilateral airspace disease. She was given 1 L normal saline bolus as well Lovenox and Decadron prior to being admitted. Patient continued to have increased oxygen requirements and ultimately a rapid was called overnight and decision was made to intubate at that time. On decadron 10 mg PO bid s/p baricitinib -. Possible superimposed bacterial pneumonia-procalcitonin 1.57-->0.2--->0. 22. Blood and sputum cultures with NGTD. Urine antigens negative. Switched to Zosyn from azithromycin and ceftriaxone. One dose of Vanc given as well on 22      SUBJECTIVE:    Patient seen in the ICU, Currently Intubated,sedated, paralyzed- 100 fiO2, PEEP 8  Proned. Records reviewed.        Temp (24hrs), Av.8 °F (33.2 °C), Min:37.4 °F (3 °C), Max:100.4 °F (38 °C)    DIET: Diet NPO  ADULT TUBE FEEDING; Nasogastric; Standard with Fiber; Continuous; 10; No; 30; Q 3 hours  CODE: Full Code    Intake/Output Summary (Last 24 hours) at 2022 1114  Last data filed at 2022 1000  Gross per 24 hour   Intake 2618 ml   Output 2393 ml   Net 225 ml       Review of Systems  Unable to obtain- intubated , sedated      OBJECTIVE:    BP (!) 129/42   Pulse 92   Temp 99.5 °F (37.5 °C) (Esophageal)   Resp 24   Ht 5' (1.524 m)   Wt 135 lb 8 oz (61.5 kg)   SpO2 90%   BMI 26.46 kg/m²     General appearance: intubated, sedated, proned  HEENT: Normal cephalic, atraumatic without obvious deformity. RS-intubated, 837mlS5, PEEP 8  CVS- Tachycardic  Skin: Normal skin color. No rashes or lesions.   Neurologic:  intubated, sedated, paralyzed    Medications:  REVIEWED DAILY    Infusion Medications    midazolam 10 mg/hr (01/24/22 0705)    norepinephrine 9 mcg/min (01/24/22 0935)    fentaNYL 5 mcg/ml in 0.9%  ml infusion 200 mcg/hr (01/24/22 0704)    cisatracurium (NIMBEX) infusion Stopped (01/23/22 1115)    sodium chloride      dextrose       Scheduled Medications    potassium chloride  40 mEq IntraVENous Q4H    midodrine  15 mg Oral TID WC    furosemide  40 mg IntraVENous Once    hydrocortisone sodium succinate PF  100 mg IntraVENous Q8H    potassium phosphate IVPB  10 mmol IntraVENous Once    miconazole nitrate   Topical BID    chlorhexidine  15 mL Mouth/Throat BID    pantoprazole  40 mg IntraVENous Daily    And    sodium chloride (PF)  10 mL IntraVENous Daily    insulin lispro  0-12 Units SubCUTAneous Q4H    artificial tears   Both Eyes Q4H    polyethylene glycol  17 g Oral Daily    piperacillin-tazobactam  3,375 mg IntraVENous Q8H    enoxaparin  1 mg/kg SubCUTAneous BID    ipratropium-albuterol  1 ampule Inhalation Q4H WA    sodium chloride flush  5-40 mL IntraVENous 2 times per day    ascorbic acid  500 mg Oral Daily    zinc sulfate  50 mg Oral Daily    vitamin D  2,000 Units Oral Daily     PRN Meds: sodium chloride flush, sodium chloride, acetaminophen **OR** acetaminophen, glucose, dextrose, glucagon (rDNA), dextrose    Labs:     Recent Labs     01/22/22  0401 01/23/22  0412 01/24/22  0408   WBC 14.3* 18.1* 15.5*   HGB 8.1* 8.1* 8.1*   HCT 26.0* 26.3* 25.7*    536* 524*       Recent Labs     01/22/22 0401 01/22/22  0401 01/22/22 2012 01/23/22 0412 01/24/22  0408      < > 141 143 140   K 3.9   < > 4.3 4.1 2.9*      < > 102 103 98   CO2 30*   < > 32* 32* 31*   BUN 17   < > 21 23 22   CREATININE 0.6   < > 0.6 0.6 0.6   CALCIUM 7.8*   < > 7.9* 8.1* 7.7*   PHOS 2.2*  --   --  2.7 1.4*    < > = values in this interval not displayed. Recent Labs     01/22/22 0401   PROT 5.2*   ALKPHOS 63   ALT 23   AST 15   BILITOT 0.2       No results for input(s): INR in the last 72 hours. No results for input(s): Madiha Beat in the last 72 hours. Chronic labs:    Lab Results   Component Value Date    CHOL 145 06/29/2020    TRIG 75 06/29/2020    HDL 73 06/29/2020    LDLCALC 57 06/29/2020    TSH 3.600 06/29/2020    INR 1.0 01/14/2022    LABA1C 6.0 (H) 01/19/2022       Radiology: REVIEWED DAILY      ASSESSMENT and PLAN:    COVID-19 pneumonia-unvaccinated, symptom onset approximately 1 week prior to presentation. To continue with vitamin C, vitamin D, zinc.  Lovenox prophylaxis. Trend inflammatory markers. · On decadron 10 mg PO bid s/p baricitinib 1/14-1/19    Acute hypoxic respiratory failure-2/2 above. Intubated -169poB5, PEEP 8    Possible superimposed bacterial pneumonia-procalcitonin 1.57-->0. 94. Blood and sputum cultures with NGTD. Urine antigens negative. Switched to Zosyn from azithromycin and ceftriaxone. One dose of Vanc given as well on 1/20/22    Normocytic anemia-continue to monitor H&H- 12.5-->11.1-->10.8--->9.0--->8.8-->8.1, fobt ordered. Possibly consider surgical evaluation per critical care if deemed necessary    Hx asthma- states that she has never seen a pulmonologist and that it is controlled with PRN albuterol.       DISPOSITION: Continued inpatient care    +++++++++++++++++++++++++++++++++++++++++++++++++  Lucille Nettles MD   [employer] - [address], [place]  +++++++++++++++++++++++++++++++++++++++++++++++++  NOTE: This report was transcribed using voice recognition software. Every effort was made to ensure accuracy; however, inadvertent computerized transcription errors may be present.

## 2022-01-24 NOTE — PROGRESS NOTES
Comprehensive Nutrition Assessment    Type and Reason for Visit:  Reassess    Nutrition Recommendations/Plan: Continue NPO. Minimal Nutrition x ~1 week. Trickle feeds running. Goal TF Rec for optimal GI tolerance while prone  Peptide Based (Vital AF 1.2) @ 45 ml/hr  Will provide: 1080 ml tv, 1296 kcals, 81 gm pro, 875 ml free water  Pt at high risk for refeeding syndrome, monitor & replace electrolytes prn    Nutrition Assessment:  Pt status declining now intubated/ prone s/p RRT transfer to MICU 2/2 +COVID-19 PNA. Noted SAMUEL. EN support trickle feeds initiated- will provide goal TF rec based on current medical status. Malnutrition Assessment:  Malnutrition Status:   At risk for malnutrition   Context:  Acute Illness     Findings of the 6 clinical characteristics of malnutrition:  Energy Intake:  50% or less of estimated energy requirements for 5 or more days  Weight Loss:  Unable to assess (no wt hx on file)     Body Fat Loss:  Unable to assess (d/t covid iso)     Muscle Mass Loss:  Unable to assess (d/t covid iso)    Fluid Accumulation:  No significant fluid accumulation     Strength:  Not Performed      Estimated Daily Nutrient Needs:  Energy (kcal):  PS3B 1243; 1041-0844; Weight Used for Energy Requirements:  Admission     Protein (g):  75-90; Weight Used for Protein Requirements:  Admission (1.5-1.8 g/kg)        Fluid (ml/day):  per critical care    Nutrition Related Findings:  Pt intubated/sedated/paralyzed/ prone, hypotension on pressor x 1, +I/O's 6L, +1 edema, abd WDL, NGT w/ trickle feeds      Wounds:  None       Current Nutrition Therapies:    Current Tube Feeding (TF) Orders:  · Feeding Route: Nasogastric  · Formula: Standard with Fiber  · Schedule: Continuous (10 ml/hr, running at goal per doc flow)  · Water Flushes: 30 ml q 3 hr    Anthropometric Measures:  · Height: 5' (152.4 cm)  · Current Body Weight: 109 lb (49.4 kg) (1/14 admit wt as CBW elevated d/t +fluids)   · Admission Body Weight: 109 lb (49.4 kg) (1/14 first measured)    · Usual Body Weight: 131 lb (59.4 kg) (measured @ [facility] 10/2020, no recent wt hx on file)     · Ideal Body Weight: 100 lbs; % Ideal Body Weight 109 %   · BMI: 21.3 BMI Categories: Underweight (BMI less than 22) age over 72       Nutrition Diagnosis:   · Inadequate oral intake related to impaired respiratory function as evidenced by NPO or clear liquid status due to medical condition,intubation,nutrition support - enteral nutrition    Nutrition Interventions:   Nutrition Education/Counseling:  Education not appropriate   Coordination of Nutrition Care:  Continue to monitor while inpatient    Goals:  New Goal - Pt to tolerate TF at goal rate       Nutrition Monitoring and Evaluation:   Food/Nutrient Intake Outcomes:  Diet Advancement/Tolerance,Enteral Nutrition Intake/Tolerance  Physical Signs/Symptoms Outcomes:  Biochemical Data,Nutrition Focused Physical Findings,Skin,Weight,GI Status,Fluid Status or Edema,Hemodynamic Status     Discharge Planning:     Too soon to determine     Electronically signed by Anushka Deleon RD, LD on 1/24/22 at 1:42 PM EST    Contact: Ext [phone]

## 2022-01-24 NOTE — PROGRESS NOTES
Department of Internal Medicine  Nephrology  Progress Note    Events Reviewed. SUBJECTIVE:  We are following Ms. Gloria Rodriguez for SAMUEL. Patient is intubated. PHYSICAL EXAM:    Vitals:    VITALS:  BP (!) 125/45   Pulse 74   Temp 99.5 °F (37.5 °C) (Esophageal)   Resp 20   Ht 5' (1.524 m)   Wt 135 lb 8 oz (61.5 kg)   SpO2 93%   BMI 26.46 kg/m²   24HR INTAKE/OUTPUT:      Intake/Output Summary (Last 24 hours) at 1/24/2022 0951  Last data filed at 1/24/2022 8978  Gross per 24 hour   Intake 2618 ml   Output 2288 ml   Net 330 ml     General appearance: Patient is intubated, sedated  HEENT: Normal cephalic, atraumatic without obvious deformity. Pupils equal, round, and reactive to light. Endotracheal tube in place  Neck: Supple, with full range of motion. No jugular venous distention. Trachea midline. Respiratory: Diminished bilaterally on bipap  Cardiovascular: RRR, no murmur noted  Abdomen: Soft, nontender, nondistended, flat  Musculoskeletal: No clubbing or cyanosis. 1+ edema of bilateral lower extremities. Brisk capillary refill. Skin: Normal skin color.  No rashes or lesions.   Neurologic: Patient sedated        Scheduled Meds:   magnesium sulfate  2,000 mg IntraVENous Once    potassium chloride  40 mEq IntraVENous Q4H    midodrine  5 mg Oral TID WC    fluconazole  100 mg IntraVENous Q24H    vancomycin  1,000 mg IntraVENous Q12H    miconazole nitrate   Topical BID    chlorhexidine  15 mL Mouth/Throat BID    pantoprazole  40 mg IntraVENous Daily    And    sodium chloride (PF)  10 mL IntraVENous Daily    insulin lispro  0-12 Units SubCUTAneous Q4H    artificial tears   Both Eyes Q4H    polyethylene glycol  17 g Oral Daily    piperacillin-tazobactam  3,375 mg IntraVENous Q8H    enoxaparin  1 mg/kg SubCUTAneous BID    ipratropium-albuterol  1 ampule Inhalation Q4H WA    sodium chloride flush  5-40 mL IntraVENous 2 times per day    ascorbic acid  500 mg Oral Daily    zinc sulfate  50 mg Oral Daily    vitamin D  2,000 Units Oral Daily     Continuous Infusions:   propofol 25 mcg/kg/min (01/24/22 0832)    midazolam 10 mg/hr (01/24/22 0705)    norepinephrine 9 mcg/min (01/24/22 0935)    fentaNYL 5 mcg/ml in 0.9%  ml infusion 200 mcg/hr (01/24/22 0704)    [Held by provider] cisatracurium (NIMBEX) infusion Stopped (01/23/22 1115)    sodium chloride      dextrose       PRN Meds:.sodium chloride flush, sodium chloride, acetaminophen **OR** acetaminophen, glucose, dextrose, glucagon (rDNA), dextrose    DATA:    CBC with Differential:    Lab Results   Component Value Date    WBC 15.5 01/24/2022    RBC 2.66 01/24/2022    HGB 8.1 01/24/2022    HCT 25.7 01/24/2022     01/24/2022    MCV 96.6 01/24/2022    MCH 30.5 01/24/2022    MCHC 31.5 01/24/2022    RDW 13.6 01/24/2022    METASPCT 0.9 01/18/2022    LYMPHOPCT 2.7 01/18/2022    MONOPCT 11.6 01/18/2022    MYELOPCT 0.9 01/14/2022    BASOPCT 0.1 01/18/2022    MONOSABS 1.38 01/18/2022    LYMPHSABS 0.35 01/18/2022    EOSABS 0.00 01/18/2022    BASOSABS 0.00 01/18/2022     CMP:    Lab Results   Component Value Date     01/24/2022    K 2.9 01/24/2022    K 3.8 01/18/2022    CL 98 01/24/2022    CO2 31 01/24/2022    BUN 22 01/24/2022    CREATININE 0.6 01/24/2022    GFRAA >60 01/24/2022    LABGLOM >60 01/24/2022    GLUCOSE 107 01/24/2022    PROT 5.2 01/22/2022    LABALBU 2.5 01/22/2022    CALCIUM 7.7 01/24/2022    BILITOT 0.2 01/22/2022    ALKPHOS 63 01/22/2022    AST 15 01/22/2022    ALT 23 01/22/2022     Magnesium:    Lab Results   Component Value Date    MG 1.9 01/24/2022     Phosphorus:    Lab Results   Component Value Date    PHOS 1.4 01/24/2022          Radiology Review:    CXR 1/24/2022   Bilateral airspace disease which appears mildly progressive.               BRIEF SUMMARY OF INITIAL CONSULT:     Briefly, Ms. Kb Ramos is a 78year old female with a past medical history of Asthma who presented to the ER on January 14 th, 2022 with complaints of SOB and diarrhea for one week. She is not vaccinated against COVID. Reportedly her daughter found her curled into a fetal position in respiratory distress at home and EMS was summoned. She was found to be COVID 19 positive. Labs were significant for bicarbonate 19, BUN 38, creatinine 1.9, anion gap 19, procalcitonin 1.57, CRP 12.8, , mildly elevated LFTs, D-dimer 404, ferritin 5098. Chest x-ray showed bilateral airspace disease. Renal is consulted for SAMUEL. Problems Resolved:     · SAMUEL, likely pre-renal volume responsive SAMUEL secondary to poor oral intake and GI losses (diarrhea). Creatinine 1.9mg/dL on admission. Renal function has improved to 0.6 mg/dL with IVF administration. · HAGMA with low bicarbonate levels, secondary to uremia. To continue bicarbonate drip  · Hypokalemia, 2/2 poor oral intake, potassium levels improved. · hypernatremia, with water deficit, dehydration due to poor intake. Sodium levels quite improved. Resolved. IMPRESSION/RECOMMENDATIONS:      1. Hypophosphatemia, 2/2 poor intake, to replace  2. Hypokalemia likely 2/2 diuresis renal losses. 3. Hypocalcemia, vitamin D 25 level 39, calcium levels 7.7  4. Metabolic alkalosis 8.813, pCO2 45, bicarb 31  ----------------------------------------  5. COVID +, on dexamethasone, Baricitinib  6. Acute hypoxic respiratory failure, secondary to COVID pneumonia. Status post intubation  7. Normocytic anemia, hemoglobin stable  8.  Nutrition, n.p.o.    Plan:    · Replace phosphorus 10 mm kphos  · Replace potassium with 80 meq Kcl  · Check repeat levels  · Continue Lasix 40 mg today  · Agree with solucortef and addition of antibiotics      Electronically signed by Delsa Leventhal on 1/24/2022 at 9:51 AM    Discussed with Dr Jasmine Mancini as annotated

## 2022-01-24 NOTE — PROGRESS NOTES
01/24/2022    IJY8OUI 30.9 01/23/2022    HCO3 34.2 01/24/2022    BE 10.0 01/24/2022    THB 9.0 01/24/2022    O2SAT 90.4 01/24/2022        Medications     Infusions: (Fluid, Sedation, Vasopressors)  Vasopressors  Levophed at rate 7 mcg/min  Sedation  Propofol at rate of 25; Versed at rate of 10 Fentanyl at 200 mcg/hr    Nutrition:   NG/OG tube TF type: [facility]/Nephro/Glucerna/Jevity        At rate: 10 ml/h  ATB:   Antibiotics  Days   Zosyn D5   Vanco D2         Skin issues:   Patient currently has   Urinary cath: granados 1/19  Isolation: contact droplet, airborne  Restraints  DVT prophylaxis/ GI prophylaxis: enoxaparin/pantoprazole  PT/OT  SNF/NH placement  Palliative care consult   Labs     CBC with Differential:    Lab Results   Component Value Date    WBC 15.5 01/24/2022    RBC 2.66 01/24/2022    HGB 8.1 01/24/2022    HCT 25.7 01/24/2022     01/24/2022    MCV 96.6 01/24/2022    MCH 30.5 01/24/2022    MCHC 31.5 01/24/2022    RDW 13.6 01/24/2022    METASPCT 0.9 01/18/2022    LYMPHOPCT 2.7 01/18/2022    MONOPCT 11.6 01/18/2022    MYELOPCT 0.9 01/14/2022    BASOPCT 0.1 01/18/2022    MONOSABS 1.38 01/18/2022    LYMPHSABS 0.35 01/18/2022    EOSABS 0.00 01/18/2022    BASOSABS 0.00 01/18/2022     CMP:    Lab Results   Component Value Date     01/24/2022    K 2.9 01/24/2022    K 3.8 01/18/2022    CL 98 01/24/2022    CO2 31 01/24/2022    BUN 22 01/24/2022    CREATININE 0.6 01/24/2022    GFRAA >60 01/24/2022    LABGLOM >60 01/24/2022    GLUCOSE 107 01/24/2022    PROT 5.2 01/22/2022    LABALBU 2.5 01/22/2022    CALCIUM 7.7 01/24/2022    BILITOT 0.2 01/22/2022    ALKPHOS 63 01/22/2022    AST 15 01/22/2022    ALT 23 01/22/2022     Magnesium:    Lab Results   Component Value Date    MG 1.9 01/24/2022     Phosphorus:    Lab Results   Component Value Date    PHOS 1.4 01/24/2022     ABG:    Lab Results   Component Value Date    PH 7.440 01/24/2022    PCO2 49.3 01/24/2022    PO2 54.1 01/24/2022    HCO3 32.7 01/24/2022 BE 7.7 2022    O2SAT 88.3 2022     HgBA1c:    Lab Results   Component Value Date    LABA1C 6.0 2022     Microalbumen/Creatinine ratio:  No components found for: RUCREAT    Imaging Studies:  CXR: Bilateral airspace disease which appears mildly progressive. Resident's Assessment and Plan     Acute hypoxic respiratory failure 2/2 severe RDS 2/2 critical COVID-19  -D5 intubated  -sedated on fentanyl, propofol and Versed  -D5 Zosyn, D2 vancomycin  -restarted back on Levophed  -WBC trending down,  -CRP 8.5, D-dimer trending down 593  -Vent: 300/20/8/100  -AB.49/45/58.1/34.2/90.4  -P/F 68 from 174 yesterday  -CXR : diffuse bilateral opacities, appears worse than yesterday  -positive fluid balance of 6.7L since admission  -on therapeutic enoxaparin, on ascorbic acid, Vit D, zinc  Plan  -dc propofol  -vanco dcd  -started on stress dose hydrocortisone  -furosemide 40 mg IV 1x  Follow respiratory culture result    Septic shock 2/2 critical COVID pneumonia t/c superimposed bacterial pneumonia  -as above  -still on Levophed  -restarted hydrocortisone  -follow-up respiratory culture    Hx Asthma  -currently intubated  -on breathing treatments    Hyperglycemia likely 2/2 steroids, prediabetic  -HBA1C at 6%  -B-155  -Lispro TID, MDSS    Normocytic Anemia most likely 2/2 mixed inflammatory reaction and hemolytic anemia, stable  -Hb 8.1, stable  -ferritin increased 1,126;  Fe 74; Iron sat 35, TIBC low 215  Plan  -will monitor     Stage 1 SAMUEL likely 2/2 poor oral intake,resolved   -creatinine initially 1.9-->0.6, stable    Hypokalemia   -K today at 2.9, replaced  -repeat BMP this pm    Hypophosphatemia  -phos at 1.4, replaced  -repeat phosphorus this pm      # Diet: TF at 10 ml/hr  # Bowel regimen:   # Peptic ulcer prophylaxis: Protonix  # DVT Prophylaxis: Lovenox  # Disposition: Cont current care      Sanjeev Krishna MD, PGY-1  Attending physician: Dr. De Los Santos Prudent    I personally saw, examined and provided care for the patient. Radiographs, labs and medication list were reviewed by me independently. Review of Residents documentation was conducted and revisions were made as appropriate. I agree with the above documented exam, problem list and plan of care.         CCT excluding procedures >30 minutes    Ozzie Jean DO

## 2022-01-24 NOTE — PROGRESS NOTES
Patient is opening her eyes, reaching with her hands and lifting her arms. She is not following commands. BIS registering as 85. Vent set at a rate of 20 breaths/min and patient is breathing 28-33 breaths/min. See vital flowsheet for other vitals. Medical resident informed. Orders obtained to increase sedation. Holding off on proning for now.

## 2022-01-25 ENCOUNTER — APPOINTMENT (OUTPATIENT)
Dept: GENERAL RADIOLOGY | Age: 67
DRG: 207 | End: 2022-01-25
Payer: MEDICARE

## 2022-01-25 LAB
AADO2: 304.9 MMHG
ANION GAP SERPL CALCULATED.3IONS-SCNC: 8 MMOL/L (ref 7–16)
B.E.: 6.7 MMOL/L (ref -3–3)
B.E.: 9.2 MMOL/L (ref -3–3)
BUN BLDV-MCNC: 20 MG/DL (ref 6–23)
CALCIUM IONIZED: 1.12 MMOL/L (ref 1.15–1.33)
CALCIUM SERPL-MCNC: 7.9 MG/DL (ref 8.6–10.2)
CHLORIDE BLD-SCNC: 101 MMOL/L (ref 98–107)
CO2: 32 MMOL/L (ref 22–29)
COHB: 0.7 % (ref 0–1.5)
CREAT SERPL-MCNC: 0.6 MG/DL (ref 0.5–1)
CRITICAL: ABNORMAL
DATE ANALYZED: ABNORMAL
DATE OF COLLECTION: ABNORMAL
DELIVERY SYSTEMS: ABNORMAL
DEVICE: ABNORMAL
FERRITIN: 1171 NG/ML
FIO2 ARTERIAL: 80
FIO2: 65 %
GFR AFRICAN AMERICAN: >60
GFR NON-AFRICAN AMERICAN: >60 ML/MIN/1.73
GLUCOSE BLD-MCNC: 114 MG/DL (ref 74–99)
HCO3 ARTERIAL: 34.9 MMOL/L (ref 22–26)
HCO3: 31.8 MMOL/L (ref 22–26)
HCT VFR BLD CALC: 23 % (ref 34–48)
HEMOGLOBIN: 7.2 G/DL (ref 11.5–15.5)
HHB: 3.2 % (ref 0–5)
LAB: ABNORMAL
Lab: ABNORMAL
MAGNESIUM: 2 MG/DL (ref 1.6–2.6)
MCH RBC QN AUTO: 30.5 PG (ref 26–35)
MCHC RBC AUTO-ENTMCNC: 31.3 % (ref 32–34.5)
MCV RBC AUTO: 97.5 FL (ref 80–99.9)
METER GLUCOSE: 122 MG/DL (ref 74–99)
METER GLUCOSE: 126 MG/DL (ref 74–99)
METER GLUCOSE: 160 MG/DL (ref 74–99)
METER GLUCOSE: 161 MG/DL (ref 74–99)
METER GLUCOSE: 183 MG/DL (ref 74–99)
METER GLUCOSE: 187 MG/DL (ref 74–99)
METHB: 0.2 % (ref 0–1.5)
MODE: AC
MODE: AC
O2 CONTENT: 11.3 ML/DL
O2 SATURATION: 96.8 % (ref 92–98.5)
O2 SATURATION: 99.1 % (ref 92–98.5)
O2HB: 95.9 % (ref 94–97)
OPERATOR ID: 783
OPERATOR ID: ABNORMAL
PATIENT TEMP: 37 C
PCO2 ARTERIAL: 54.3 MMHG (ref 35–45)
PCO2: 48.8 MMHG (ref 35–45)
PDW BLD-RTO: 13.8 FL (ref 11.5–15)
PEEP/CPAP: 8 CMH2O
PFO2: 1.37 MMHG/%
PH BLOOD GAS: 7.42 (ref 7.35–7.45)
PH BLOOD GAS: 7.43 (ref 7.35–7.45)
PHOSPHORUS: 3.3 MG/DL (ref 2.5–4.5)
PLATELET # BLD: 375 E9/L (ref 130–450)
PMV BLD AUTO: 12 FL (ref 7–12)
PO2 ARTERIAL: 138.4 MMHG (ref 60–80)
PO2: 89.2 MMHG (ref 75–100)
POTASSIUM SERPL-SCNC: 3.4 MMOL/L (ref 3.5–5)
RBC # BLD: 2.36 E12/L (ref 3.5–5.5)
RESPIRATORY RATE: 24 B/MIN
RI(T): 3.42
RR MECHANICAL: 24 B/MIN
SODIUM BLD-SCNC: 141 MMOL/L (ref 132–146)
SOURCE, BLOOD GAS: ABNORMAL
SOURCE, BLOOD GAS: ABNORMAL
THB: 8.3 G/DL (ref 11.5–16.5)
TIDAL VOLUME: 300 ML
TIME ANALYZED: 1704
TRIGL SERPL-MCNC: 124 MG/DL (ref 0–149)
VT MECHANICAL: 300 ML
WBC # BLD: 14.8 E9/L (ref 4.5–11.5)

## 2022-01-25 PROCEDURE — 2580000003 HC RX 258

## 2022-01-25 PROCEDURE — 36556 INSERT NON-TUNNEL CV CATH: CPT

## 2022-01-25 PROCEDURE — 2580000003 HC RX 258: Performed by: INTERNAL MEDICINE

## 2022-01-25 PROCEDURE — 94640 AIRWAY INHALATION TREATMENT: CPT

## 2022-01-25 PROCEDURE — 82330 ASSAY OF CALCIUM: CPT

## 2022-01-25 PROCEDURE — 6370000000 HC RX 637 (ALT 250 FOR IP): Performed by: INTERNAL MEDICINE

## 2022-01-25 PROCEDURE — 6370000000 HC RX 637 (ALT 250 FOR IP): Performed by: NURSE PRACTITIONER

## 2022-01-25 PROCEDURE — 6360000002 HC RX W HCPCS: Performed by: INTERNAL MEDICINE

## 2022-01-25 PROCEDURE — 94003 VENT MGMT INPAT SUBQ DAY: CPT

## 2022-01-25 PROCEDURE — 05HN33Z INSERTION OF INFUSION DEVICE INTO LEFT INTERNAL JUGULAR VEIN, PERCUTANEOUS APPROACH: ICD-10-PCS | Performed by: INTERNAL MEDICINE

## 2022-01-25 PROCEDURE — 2000000000 HC ICU R&B

## 2022-01-25 PROCEDURE — 6370000000 HC RX 637 (ALT 250 FOR IP): Performed by: STUDENT IN AN ORGANIZED HEALTH CARE EDUCATION/TRAINING PROGRAM

## 2022-01-25 PROCEDURE — 82803 BLOOD GASES ANY COMBINATION: CPT

## 2022-01-25 PROCEDURE — C9113 INJ PANTOPRAZOLE SODIUM, VIA: HCPCS

## 2022-01-25 PROCEDURE — 82728 ASSAY OF FERRITIN: CPT

## 2022-01-25 PROCEDURE — 2500000003 HC RX 250 WO HCPCS: Performed by: INTERNAL MEDICINE

## 2022-01-25 PROCEDURE — 6370000000 HC RX 637 (ALT 250 FOR IP)

## 2022-01-25 PROCEDURE — 71045 X-RAY EXAM CHEST 1 VIEW: CPT

## 2022-01-25 PROCEDURE — 6370000000 HC RX 637 (ALT 250 FOR IP): Performed by: FAMILY MEDICINE

## 2022-01-25 PROCEDURE — S5553 INSULIN LONG ACTING 5 U: HCPCS | Performed by: STUDENT IN AN ORGANIZED HEALTH CARE EDUCATION/TRAINING PROGRAM

## 2022-01-25 PROCEDURE — 85027 COMPLETE CBC AUTOMATED: CPT

## 2022-01-25 PROCEDURE — 82962 GLUCOSE BLOOD TEST: CPT

## 2022-01-25 PROCEDURE — 84100 ASSAY OF PHOSPHORUS: CPT

## 2022-01-25 PROCEDURE — 83735 ASSAY OF MAGNESIUM: CPT

## 2022-01-25 PROCEDURE — 84478 ASSAY OF TRIGLYCERIDES: CPT

## 2022-01-25 PROCEDURE — 82805 BLOOD GASES W/O2 SATURATION: CPT

## 2022-01-25 PROCEDURE — 2500000003 HC RX 250 WO HCPCS

## 2022-01-25 PROCEDURE — 80048 BASIC METABOLIC PNL TOTAL CA: CPT

## 2022-01-25 PROCEDURE — 6360000002 HC RX W HCPCS

## 2022-01-25 RX ORDER — POTASSIUM CHLORIDE 29.8 MG/ML
20 INJECTION INTRAVENOUS ONCE
Status: COMPLETED | OUTPATIENT
Start: 2022-01-25 | End: 2022-01-25

## 2022-01-25 RX ORDER — FUROSEMIDE 10 MG/ML
40 INJECTION INTRAMUSCULAR; INTRAVENOUS ONCE
Status: DISCONTINUED | OUTPATIENT
Start: 2022-01-25 | End: 2022-01-25

## 2022-01-25 RX ORDER — POTASSIUM CHLORIDE 29.8 MG/ML
40 INJECTION INTRAVENOUS ONCE
Status: COMPLETED | OUTPATIENT
Start: 2022-01-25 | End: 2022-01-25

## 2022-01-25 RX ADMIN — MINERAL OIL AND WHITE PETROLATUM: 150; 830 OINTMENT OPHTHALMIC at 18:47

## 2022-01-25 RX ADMIN — Medication: at 08:07

## 2022-01-25 RX ADMIN — ZINC SULFATE 220 MG (50 MG) CAPSULE 50 MG: CAPSULE at 08:07

## 2022-01-25 RX ADMIN — MINERAL OIL AND WHITE PETROLATUM: 150; 830 OINTMENT OPHTHALMIC at 06:01

## 2022-01-25 RX ADMIN — WATER 10 ML: 1 INJECTION INTRAMUSCULAR; INTRAVENOUS; SUBCUTANEOUS at 03:58

## 2022-01-25 RX ADMIN — CISATRACURIUM BESYLATE 2.5 MCG/KG/MIN: 200 INJECTION INTRAVENOUS at 04:46

## 2022-01-25 RX ADMIN — Medication: at 20:19

## 2022-01-25 RX ADMIN — MIDAZOLAM 6 MG/HR: 5 INJECTION INTRAMUSCULAR; INTRAVENOUS at 06:42

## 2022-01-25 RX ADMIN — OXYCODONE HYDROCHLORIDE AND ACETAMINOPHEN 500 MG: 500 TABLET ORAL at 08:07

## 2022-01-25 RX ADMIN — INSULIN LISPRO 2 UNITS: 100 INJECTION, SOLUTION INTRAVENOUS; SUBCUTANEOUS at 20:14

## 2022-01-25 RX ADMIN — IPRATROPIUM BROMIDE AND ALBUTEROL SULFATE 1 AMPULE: .5; 2.5 SOLUTION RESPIRATORY (INHALATION) at 20:57

## 2022-01-25 RX ADMIN — POTASSIUM CHLORIDE 40 MEQ: 29.8 INJECTION, SOLUTION INTRAVENOUS at 09:22

## 2022-01-25 RX ADMIN — Medication 2000 UNITS: at 08:07

## 2022-01-25 RX ADMIN — INSULIN GLARGINE-YFGN 5 UNITS: 100 INJECTION, SOLUTION SUBCUTANEOUS at 20:14

## 2022-01-25 RX ADMIN — PANTOPRAZOLE SODIUM 40 MG: 40 INJECTION, POWDER, FOR SOLUTION INTRAVENOUS at 08:07

## 2022-01-25 RX ADMIN — Medication 150 MCG/HR: at 14:00

## 2022-01-25 RX ADMIN — HYDROCORTISONE SODIUM SUCCINATE 100 MG: 100 INJECTION, POWDER, FOR SOLUTION INTRAMUSCULAR; INTRAVENOUS at 03:58

## 2022-01-25 RX ADMIN — MINERAL OIL AND WHITE PETROLATUM: 150; 830 OINTMENT OPHTHALMIC at 03:00

## 2022-01-25 RX ADMIN — PIPERACILLIN AND TAZOBACTAM 3375 MG: 3; .375 INJECTION, POWDER, LYOPHILIZED, FOR SOLUTION INTRAVENOUS at 18:47

## 2022-01-25 RX ADMIN — PIPERACILLIN AND TAZOBACTAM 3375 MG: 3; .375 INJECTION, POWDER, LYOPHILIZED, FOR SOLUTION INTRAVENOUS at 03:59

## 2022-01-25 RX ADMIN — HYDROCORTISONE SODIUM SUCCINATE 100 MG: 100 INJECTION, POWDER, FOR SOLUTION INTRAMUSCULAR; INTRAVENOUS at 11:25

## 2022-01-25 RX ADMIN — MIDODRINE HYDROCHLORIDE 15 MG: 10 TABLET ORAL at 11:25

## 2022-01-25 RX ADMIN — INSULIN LISPRO 2 UNITS: 100 INJECTION, SOLUTION INTRAVENOUS; SUBCUTANEOUS at 12:56

## 2022-01-25 RX ADMIN — CISATRACURIUM BESYLATE 4 MCG/KG/MIN: 200 INJECTION INTRAVENOUS at 23:41

## 2022-01-25 RX ADMIN — MIDODRINE HYDROCHLORIDE 15 MG: 10 TABLET ORAL at 08:07

## 2022-01-25 RX ADMIN — INSULIN LISPRO 2 UNITS: 100 INJECTION, SOLUTION INTRAVENOUS; SUBCUTANEOUS at 16:55

## 2022-01-25 RX ADMIN — Medication 150 MCG/HR: at 04:46

## 2022-01-25 RX ADMIN — SODIUM CHLORIDE, PRESERVATIVE FREE 10 ML: 5 INJECTION INTRAVENOUS at 11:26

## 2022-01-25 RX ADMIN — MIDODRINE HYDROCHLORIDE 15 MG: 10 TABLET ORAL at 16:37

## 2022-01-25 RX ADMIN — PIPERACILLIN AND TAZOBACTAM 3375 MG: 3; .375 INJECTION, POWDER, LYOPHILIZED, FOR SOLUTION INTRAVENOUS at 11:26

## 2022-01-25 RX ADMIN — CHLORHEXIDINE GLUCONATE 0.12% ORAL RINSE 15 ML: 1.2 LIQUID ORAL at 20:14

## 2022-01-25 RX ADMIN — SENNOSIDES 5 ML: 8.8 SYRUP ORAL at 20:20

## 2022-01-25 RX ADMIN — DOCUSATE SODIUM 100 MG: 50 LIQUID ORAL at 20:14

## 2022-01-25 RX ADMIN — DOCUSATE SODIUM 100 MG: 50 LIQUID ORAL at 08:06

## 2022-01-25 RX ADMIN — ENOXAPARIN SODIUM 70 MG: 100 INJECTION SUBCUTANEOUS at 08:06

## 2022-01-25 RX ADMIN — POTASSIUM CHLORIDE 20 MEQ: 400 INJECTION, SOLUTION INTRAVENOUS at 12:56

## 2022-01-25 RX ADMIN — IPRATROPIUM BROMIDE AND ALBUTEROL SULFATE 1 AMPULE: .5; 2.5 SOLUTION RESPIRATORY (INHALATION) at 16:46

## 2022-01-25 RX ADMIN — INSULIN LISPRO 2 UNITS: 100 INJECTION, SOLUTION INTRAVENOUS; SUBCUTANEOUS at 00:45

## 2022-01-25 RX ADMIN — HYDROCORTISONE SODIUM SUCCINATE 100 MG: 100 INJECTION, POWDER, FOR SOLUTION INTRAMUSCULAR; INTRAVENOUS at 18:47

## 2022-01-25 RX ADMIN — MINERAL OIL AND WHITE PETROLATUM: 150; 830 OINTMENT OPHTHALMIC at 11:25

## 2022-01-25 RX ADMIN — CHLORHEXIDINE GLUCONATE 0.12% ORAL RINSE 15 ML: 1.2 LIQUID ORAL at 08:08

## 2022-01-25 RX ADMIN — IPRATROPIUM BROMIDE AND ALBUTEROL SULFATE 1 AMPULE: .5; 2.5 SOLUTION RESPIRATORY (INHALATION) at 10:18

## 2022-01-25 RX ADMIN — CALCIUM GLUCONATE 1000 MG: 98 INJECTION, SOLUTION INTRAVENOUS at 09:22

## 2022-01-25 RX ADMIN — MINERAL OIL AND WHITE PETROLATUM: 150; 830 OINTMENT OPHTHALMIC at 23:30

## 2022-01-25 RX ADMIN — MIDAZOLAM 6 MG/HR: 5 INJECTION INTRAMUSCULAR; INTRAVENOUS at 20:40

## 2022-01-25 RX ADMIN — Medication 150 MCG/HR: at 21:36

## 2022-01-25 RX ADMIN — IPRATROPIUM BROMIDE AND ALBUTEROL SULFATE 1 AMPULE: .5; 2.5 SOLUTION RESPIRATORY (INHALATION) at 12:25

## 2022-01-25 RX ADMIN — SODIUM CHLORIDE, PRESERVATIVE FREE 10 ML: 5 INJECTION INTRAVENOUS at 08:08

## 2022-01-25 RX ADMIN — POLYETHYLENE GLYCOL 3350 17 G: 17 POWDER, FOR SOLUTION ORAL at 08:06

## 2022-01-25 RX ADMIN — MINERAL OIL AND WHITE PETROLATUM: 150; 830 OINTMENT OPHTHALMIC at 14:36

## 2022-01-25 RX ADMIN — ENOXAPARIN SODIUM 30 MG: 100 INJECTION SUBCUTANEOUS at 20:14

## 2022-01-25 ASSESSMENT — PULMONARY FUNCTION TESTS
PIF_VALUE: 28
PIF_VALUE: 25
PIF_VALUE: 25
PIF_VALUE: 26
PIF_VALUE: 25
PIF_VALUE: 28
PIF_VALUE: 26
PIF_VALUE: 28
PIF_VALUE: 26
PIF_VALUE: 26
PIF_VALUE: 25
PIF_VALUE: 26
PIF_VALUE: 24
PIF_VALUE: 26
PIF_VALUE: 26
PIF_VALUE: 27
PIF_VALUE: 27
PIF_VALUE: 25
PIF_VALUE: 28
PIF_VALUE: 24

## 2022-01-25 ASSESSMENT — PAIN SCALES - GENERAL
PAINLEVEL_OUTOF10: 0

## 2022-01-25 NOTE — PLAN OF CARE
Problem: Airway Clearance - Ineffective  Goal: Achieve or maintain patent airway  Outcome: Met This Shift     Problem: Gas Exchange - Impaired  Goal: Absence of hypoxia  Outcome: Met This Shift  Goal: Promote optimal lung function  Outcome: Met This Shift     Problem: Breathing Pattern - Ineffective  Goal: Ability to achieve and maintain a regular respiratory rate  Outcome: Met This Shift     Problem:  Body Temperature -  Risk of, Imbalanced  Goal: Ability to maintain a body temperature within defined limits  Outcome: Met This Shift  Goal: Will regain or maintain usual level of consciousness  Outcome: Not Met This Shift  Goal: Complications related to the disease process, condition or treatment will be avoided or minimized  Outcome: Met This Shift     Problem: Isolation Precautions - Risk of Spread of Infection  Goal: Prevent transmission of infection  Outcome: Met This Shift     Problem: Nutrition Deficits  Goal: Optimize nutritional status  Outcome: Met This Shift     Problem: Risk for Fluid Volume Deficit  Goal: Maintain normal heart rhythm  Outcome: Met This Shift  Goal: Maintain absence of muscle cramping  Outcome: Met This Shift  Goal: Maintain normal serum potassium, sodium, calcium, phosphorus, and pH  Outcome: Not Met This Shift     Problem: Loneliness or Risk for Loneliness  Goal: Demonstrate positive use of time alone when socialization is not possible  Outcome: Met This Shift     Problem: Fatigue  Goal: Verbalize increase energy and improved vitality  Outcome: Not Met This Shift     Problem: Patient Education: Go to Patient Education Activity  Goal: Patient/Family Education  Outcome: Met This Shift     Problem: Falls - Risk of:  Goal: Will remain free from falls  Description: Will remain free from falls  1/25/2022 0909 by Leigh Spivey RN  Outcome: Met This Shift  1/25/2022 0107 by Viviane Dance, RN  Outcome: Met This Shift  Goal: Absence of physical injury  Description: Absence of physical injury  1/25/2022 4289 by Suhail Benitez RN  Outcome: Met This Shift  1/25/2022 0107 by Alvaro Bond RN  Outcome: Met This Shift     Problem: Skin Integrity:  Goal: Will show no infection signs and symptoms  Description: Will show no infection signs and symptoms  Outcome: Met This Shift  Goal: Absence of new skin breakdown  Description: Absence of new skin breakdown  Outcome: Met This Shift     Plan of care reviewed with pt and family, as available.

## 2022-01-25 NOTE — PROGRESS NOTES
01/25/22 1017   Vent Information   Vent Type 980   Vent Mode AC/VC   Vt Ordered 300 mL   Rate Set 24 bmp   Peak Flow 50 L/min   Pressure Support 0 cmH20   FiO2  80 %   SpO2 100 %   SpO2/FiO2 ratio 125   Sensitivity 3   PEEP/CPAP 8   I Time/ I Time % 0 s   Humidification Source Heated wire   Humidification Temp 37   Vent Patient Data   High Peep/I Pressure 0   Peak Inspiratory Pressure 25 cmH2O   Mean Airway Pressure 13 cmH20   Rate Measured 24 br/min   Vt Exhaled 311 mL   Minute Volume 7.44 Liters   I:E Ratio 1:2.80   Spontaneous Breathing Trial (SBT) RT Doc   Pulse 63   Additional Respiratory  Assessments   Resp 24   Alarm Settings   High Pressure Alarm 50 cmH2O

## 2022-01-25 NOTE — PROGRESS NOTES
[address] [facility]  Department of Internal Medicine   Internal Medicine Residency   MICU Progress Note    Patient:  Aracely Alonso 78 y.o. female  MRN: [id number]     Date of Service: 1/25/2022    Allergy: Patient has no known allergies. Subjective     Patient is seen and examined this morning. Patient is proned, sedated, intubated and paralyzed. P/F improved to 160. Has been off Levophed since 4 am this morning. Vitals stable. 24h change: No acute events overnight. ROS: Denies Fever/chills/CP/SOB/N/V/D/C/Dysuria/Blood in stool or urine  Objective     VS: BP (!) 115/41   Pulse 78   Temp 97.9 °F (36.6 °C) (Esophageal)   Resp 24   Ht 5' (1.524 m)   Wt 135 lb (61.2 kg)   SpO2 96%   BMI 26.37 kg/m²   ABP (Arterial line BP): 115/41  ABP mean (Arterial line mean): 67 mmHg    I & O - 24hr:     Intake/Output Summary (Last 24 hours) at 1/25/2022 1339  Last data filed at 1/25/2022 1300  Gross per 24 hour   Intake 2649.26 ml   Output 1900 ml   Net 749.26 ml       Physical Exam:  · General Appearance:  Proned, intubated, sedated, paralyzed  · Neck: (+) R IJ triple lumen, (+) pressure injury to bilaetral ears and posterior auricular area, no adenopathy, no carotid bruit, no JVD, supple, symmetrical, trachea midline and thyroid not enlarged, symmetric, no tenderness/mass/nodules  · Lung: clear to auscultation bilaterally  · Heart: regular rate and rhythm, S1, S2 normal, no murmur, click, rub or gallop  · Abdomen: soft, non-tender; bowel sounds normal; no masses,  no organomegaly  · Extremities:  extremities normal, atraumatic, no cyanosis or edema  · Musculoskeletal: No joint swelling, no muscle tenderness. ROM normal in all joints of extremities.    · Neurologic: Mental status: intubated, sedated, paralyzed, proned    Lines     site day    Art line   R Radial    TLC R IJ    PICC None    Hemoaccess None       Mechanical Ventilation:   Mode: A/C   low tidal   TV: 300 ml RR: 24  PEEP 8 cmH2O  FiO2 80   Pplat: 23 Cs: 28    ABG:     Lab Results   Component Value Date    PH 7.416 01/25/2022    MLT7TFM 54.3 01/25/2022    PCO2 50.7 01/24/2022    PO2ART 138.4 01/25/2022    PO2 128.1 01/24/2022    IVM9GOO 34.9 01/25/2022    HCO3 31.1 01/24/2022    BE 9.2 01/25/2022    THB 8.7 01/24/2022    O2SAT 99.1 01/25/2022        Medications     Infusions: (Fluid, Sedation, Vasopressors)  Vasopressors  Sedation    Versed at rate of 10 Fentanyl at 200 mcg/hr    Nutrition:   NG/OG tube TF type: Pulmocare/Nephro/Glucerna/Jevity        At rate: 10 ml/h  ATB:   Antibiotics  Days   Zosyn D7             Skin issues:   Patient currently has   Urinary cath: granados 1/19  Isolation: contact droplet, airborne  Restraints  DVT prophylaxis/ GI prophylaxis: enoxaparin/pantoprazole  PT/OT  SNF/NH placement  Palliative care consult   Labs     CBC with Differential:    Lab Results   Component Value Date    WBC 14.8 01/25/2022    RBC 2.36 01/25/2022    HGB 7.2 01/25/2022    HCT 23.0 01/25/2022     01/25/2022    MCV 97.5 01/25/2022    MCH 30.5 01/25/2022    MCHC 31.3 01/25/2022    RDW 13.8 01/25/2022    METASPCT 0.9 01/18/2022    LYMPHOPCT 2.7 01/18/2022    MONOPCT 11.6 01/18/2022    MYELOPCT 0.9 01/14/2022    BASOPCT 0.1 01/18/2022    MONOSABS 1.38 01/18/2022    LYMPHSABS 0.35 01/18/2022    EOSABS 0.00 01/18/2022    BASOSABS 0.00 01/18/2022     CMP:    Lab Results   Component Value Date     01/25/2022    K 3.4 01/25/2022    K 3.8 01/18/2022     01/25/2022    CO2 32 01/25/2022    BUN 20 01/25/2022    CREATININE 0.6 01/25/2022    GFRAA >60 01/25/2022    LABGLOM >60 01/25/2022    GLUCOSE 114 01/25/2022    PROT 5.2 01/22/2022    LABALBU 2.5 01/22/2022    CALCIUM 7.9 01/25/2022    BILITOT 0.2 01/22/2022    ALKPHOS 63 01/22/2022    AST 15 01/22/2022    ALT 23 01/22/2022     Magnesium:    Lab Results   Component Value Date    MG 2.0 01/25/2022     Phosphorus:    Lab Results   Component Value Date    PHOS 3.3 01/25/2022     ABG:    Lab Results Component Value Date    PH 7.416 2022    PCO2 50.7 2022    PO2 128.1 2022    HCO3 31.1 2022    BE 9.2 2022    O2SAT 99.1 2022     HgBA1c:    Lab Results   Component Value Date    LABA1C 6.0 2022     Microalbumen/Creatinine ratio:  No components found for: RUCREAT    Imaging Studies:  CXR: stable airspace disease      Resident's Assessment and Plan     Acute hypoxic respiratory failure 2/2 severe RDS 2/2 critical COVID-19  -D7 intubated  -sedated on fentanyl,and Versed, paralyzed and proned  -D6-7 Zosyn, vanco dcd yesterday  -off Levophed since 4 am  -on hydrocortisone 100 mg q8hrs  -WBC trending down, resp culture: no org seen  -CRP 8.5, D-dimer trending down 593  -Vent: 300/26/8/80  -AB.4/50.7/128.1///98.4  -P/F improved to 160  -CXR : Bilateral airspace disease which appears mildly progressive  -given furosemide 40 mg IV yesterday, output 2530/24 hrs  -positive fluid balance of 6.5L since admission  -on therapeutic enoxaparin, on ascorbic acid, Vit D, zinc  Plan  -continue low volume TV  -supine patient and check ABG 4 hrs after  -if P/F >/= 150, keep supine, if less then back to prone  -complete Zosyn to 7 days    Septic shock 2/2 critical COVID pneumonia t/c superimposed bacterial pneumonia  -as above  -off Levophed  -on hydrocortisone yesterday  -respiratory culture: no org seen    Hx Asthma  -currently intubated  -on breathing treatments    Hyperglycemia likely 2/2 steroids, prediabetic  -HBA1C at 6%  -B-218  -Lispro TID, MDSS    Normocytic Anemia most likely 2/2 mixed inflammatory reaction and hemolytic anemia, stable  -Hb 7.2  -ferritin increased 1,126;  Fe 74; Iron sat 35, TIBC low 215  Plan  -will monitor     Stage 1 SAMUEL likely 2/2 poor oral intake,resolved   -crea 0.7  -UO adequate    Hypokalemia   -K 4.3, Mg 2.1    Hypophosphatemia  -phos at 4.4      # Diet: TF at 10 ml/hr  # Bowel regimen: sennosides/glycolax  # Peptic ulcer prophylaxis: Protonix  # DVT Prophylaxis: Lovenox  # Disposition: Cont current care      Douglas Brennan MD, PGY-1  Attending physician: Dr. Silvana Canela    I personally saw, examined and provided care for the patient. Radiographs, labs and medication list were reviewed by me independently. Review of Residents documentation was conducted and revisions were made as appropriate. I agree with the above documented exam, problem list and plan of care.         CCT excluding procedures >30 minutes    Mabel Led, DO

## 2022-01-25 NOTE — PROCEDURES
Central Line Insertion     Procedure: left internal jugular vein triple lumen catheter placement. Indications: vascular access and centrally administered medications    Consent: The family members were counseled regarding the procedure, its indications, risks, potential complications and alternatives, and any questions were answered. Consent was obtained to proceed. Number of sticks: 1    Number of Kits used: 1    Procedure: Time Out: Immediately prior to the procedure a \"timeout\" was called to verify the correct patient and procedure. The patient was place in the trendelenburg position and the skin over the left internal jugular vein was prepped with chlorhexidine. With ultrasound guidance a large bore needle was used to identify the vein, dark non pulsatile blood returned. The guide wire was then inserted through the needle with minimal resistance. 2 mm nick was made in the skin beside the guidewire. Then a dilator was inserted and removed. A triple lumen catheter was then inserted into the vessel over the guide wire using the Seldinger technique to the 20 cm thomas. All ports showed good, free flowing blood return and were flushed with saline solution. The catheter was then securely fastened to the skin with sutures and covered with a bio patch and sterile dressing. A post procedure X-ray was ordered and is still pending at this time. Complications: None   The patient tolerated the procedure well. Estimated blood loss: 1 ml.     Gia Byrne MD PGY-1  1/25/2022  3:25 PM        Aylin Soto DO

## 2022-01-25 NOTE — PROGRESS NOTES
ICU Attending Addendum    Lance Blow was seen, examined and discussed with the multi-disciplinary ICU team during rounds. I have personally seen and examined the patient and the key elements of the encounter were performed by me. All other information is noted in the ICU team note. Briefly,    This is a 14-year-old female with history of asthma unvaccinated presents with respiratory failure and SAMUEL due to dehydration secondary to decreased p.o. intake and diarrhea admitted 1/14. Was being treated with steroids and baricitinib. Patient had decline in her respiratory status transferred to ICU and intubated on 1/19.    1/25: P/F improved    Assessment:    1. Acute respiratory failure require mechanical ventilation 1/19  2. Critically severe COVID-19 pneumonia  3. Possible superimposed bacterial pneumonia  4.  Vasodilatory shock       Deep sedation with NMB   Continue mechanical ventilation with low lung VT protective strategy   Proning until P/F improves   Pressors for map goal greater than 65, midodrine 15 mg 3 times daily   Start Solu-Cortef 100 mg every 8   Complete Abx after today, 7 days   Diuresis today, x1   Change lines   Tube feeds, bowel regiment      GI/DVT - SUP/Lovenox 30 mg BID  Consultants: Balwinder Dyer DO

## 2022-01-25 NOTE — PROGRESS NOTES
Department of Internal Medicine  Nephrology  Progress Note    Events Reviewed. SUBJECTIVE:  We are following Ms. Gloria Rodriguez for SAMUEL. Patient is intubated. PHYSICAL EXAM:    Vitals:    VITALS:  BP (!) 107/55   Pulse 67   Temp 97 °F (36.1 °C) (Esophageal)   Resp 24   Ht 5' (1.524 m)   Wt 135 lb (61.2 kg)   SpO2 100%   BMI 26.37 kg/m²   24HR INTAKE/OUTPUT:      Intake/Output Summary (Last 24 hours) at 1/25/2022 0930  Last data filed at 1/25/2022 0900  Gross per 24 hour   Intake 2649.26 ml   Output 2715 ml   Net -65.74 ml     General appearance: Patient is intubated, sedated  HEENT: Normal cephalic, atraumatic without obvious deformity. Pupils equal, round, and reactive to light. Endotracheal tube in place  Neck: Supple, with full range of motion. No jugular venous distention. Trachea midline. Respiratory: Diminished bilaterally on bipap  Cardiovascular: RRR, no murmur noted  Abdomen: Soft, nontender, nondistended, flat  Musculoskeletal: No clubbing or cyanosis. Trace edema of bilateral lower extremities. Brisk capillary refill. Skin: Normal skin color.  No rashes or lesions.   Neurologic: Patient sedated        Scheduled Meds:   potassium chloride  40 mEq IntraVENous Once    potassium chloride  20 mEq IntraVENous Once    calcium gluconate IVPB  1,000 mg IntraVENous Once    midodrine  15 mg Oral TID WC    hydrocortisone sodium succinate PF  100 mg IntraVENous Q8H    docusate sodium  100 mg Oral BID    sennosides  5 mL Oral Nightly    insulin glargine-yfgn  5 Units SubCUTAneous Nightly    miconazole nitrate   Topical BID    chlorhexidine  15 mL Mouth/Throat BID    pantoprazole  40 mg IntraVENous Daily    And    sodium chloride (PF)  10 mL IntraVENous Daily    insulin lispro  0-12 Units SubCUTAneous Q4H    artificial tears   Both Eyes Q4H    polyethylene glycol  17 g Oral Daily    piperacillin-tazobactam  3,375 mg IntraVENous Q8H    enoxaparin  1 mg/kg SubCUTAneous BID    ipratropium-albuterol  1 ampule Inhalation Q4H WA    sodium chloride flush  5-40 mL IntraVENous 2 times per day    ascorbic acid  500 mg Oral Daily    zinc sulfate  50 mg Oral Daily    vitamin D  2,000 Units Oral Daily     Continuous Infusions:   midazolam 6 mg/hr (01/25/22 0642)    norepinephrine Stopped (01/25/22 0413)    fentaNYL 5 mcg/ml in 0.9%  ml infusion 150 mcg/hr (01/25/22 0446)    cisatracurium (NIMBEX) infusion 2.5 mcg/kg/min (01/25/22 0446)    sodium chloride      dextrose       PRN Meds:.sodium chloride flush, sodium chloride, acetaminophen **OR** acetaminophen, glucose, dextrose, glucagon (rDNA), dextrose    DATA:    CBC with Differential:    Lab Results   Component Value Date    WBC 14.8 01/25/2022    RBC 2.36 01/25/2022    HGB 7.2 01/25/2022    HCT 23.0 01/25/2022     01/25/2022    MCV 97.5 01/25/2022    MCH 30.5 01/25/2022    MCHC 31.3 01/25/2022    RDW 13.8 01/25/2022    METASPCT 0.9 01/18/2022    LYMPHOPCT 2.7 01/18/2022    MONOPCT 11.6 01/18/2022    MYELOPCT 0.9 01/14/2022    BASOPCT 0.1 01/18/2022    MONOSABS 1.38 01/18/2022    LYMPHSABS 0.35 01/18/2022    EOSABS 0.00 01/18/2022    BASOSABS 0.00 01/18/2022     CMP:    Lab Results   Component Value Date     01/25/2022    K 3.4 01/25/2022    K 3.8 01/18/2022     01/25/2022    CO2 32 01/25/2022    BUN 20 01/25/2022    CREATININE 0.6 01/25/2022    GFRAA >60 01/25/2022    LABGLOM >60 01/25/2022    GLUCOSE 114 01/25/2022    PROT 5.2 01/22/2022    LABALBU 2.5 01/22/2022    CALCIUM 7.9 01/25/2022    BILITOT 0.2 01/22/2022    ALKPHOS 63 01/22/2022    AST 15 01/22/2022    ALT 23 01/22/2022     Magnesium:    Lab Results   Component Value Date    MG 2.0 01/25/2022     Phosphorus:    Lab Results   Component Value Date    PHOS 3.3 01/25/2022          Radiology Review:    CXR 1/24/2022   Bilateral airspace disease which appears mildly progressive.               BRIEF SUMMARY OF INITIAL CONSULT:     Briefly, Ms. Gloria Brown is a 78year old female with a past medical history of Asthma who presented to the ER on January 14 th, 2022 with complaints of SOB and diarrhea for one week. She is not vaccinated against COVID. Reportedly her daughter found her curled into a fetal position in respiratory distress at home and EMS was summoned. She was found to be COVID 19 positive. Labs were significant for bicarbonate 19, BUN 38, creatinine 1.9, anion gap 19, procalcitonin 1.57, CRP 12.8, , mildly elevated LFTs, D-dimer 404, ferritin 5098. Chest x-ray showed bilateral airspace disease. Renal is consulted for SAMUEL. Problems Resolved:     · SAMUEL, likely pre-renal volume responsive SAMUEL secondary to poor oral intake and GI losses (diarrhea). Creatinine 1.9mg/dL on admission. Renal function has improved to 0.6 mg/dL with IVF administration. · HAGMA with low bicarbonate levels, secondary to uremia. To continue bicarbonate drip  · Hypokalemia, 2/2 poor oral intake, potassium levels improved. · hypernatremia, with water deficit, dehydration due to poor intake. Sodium levels quite improved. Resolved. · Hypophosphatemia, 2/2 poor intake, to replace    IMPRESSION/RECOMMENDATIONS:      1. Hypokalemia likely 2/2 diuresis renal losses. 2. Hypocalcemia, vitamin D 25 level 39, calcium levels 7.7  3. Metabolic alkalosis 9.575, pCO2 45, bicarb 31  ----------------------------------------  4. COVID +, on dexamethasone, Baricitinib  5. Acute hypoxic respiratory failure, secondary to COVID pneumonia. Status post intubation  6. Normocytic anemia, hemoglobin stable  7.  Nutrition, n.p.o.    Plan:    · Replace potassium with 60 meq Kcl  · Replace Ca2+ with 1g calcium gluconate  · Hold Lasix 40 mg today in view of improved chest x ray finding and physical exam  · Agree with solucortef and addition of antibiotics      Electronically signed by Cata Jaimes on 1/25/2022 at 9:30 AM    Discussed with Dr Kimi Carlson as annotated  Boaz Bhakta MD

## 2022-01-25 NOTE — PROGRESS NOTES
Hospitalist Progress Note      SYNOPSIS:     Ms. Cintia Dickey is a 78y.o. year old female who has a PMH of asthma.     She presented to the ER on 22 with complaints of SOB and diarrhea x 1 week. She was not vaccinated against COVID-19. Reportedly her daughter found her curled into the fetal position in respiratory distress at home and called 911. Vitals on arrival were significant for temperature 103.2, heart rate 110, blood pressure 122/70 and oxygen saturation 72% on room air. She was placed on 15 L via nonrebreather mask and her oxygen saturation improved to 95%. Labs were significant for bicarbonate 19, BUN 38, creatinine 1.9, anion gap 19, procalcitonin 1.57, CRP 12.8, , mildly elevated LFTs, D-dimer 404, ferritin 5098 and a positive COVID PCR. Chest x-ray showed bilateral airspace disease. She was given 1 L normal saline bolus as well Lovenox and Decadron prior to being admitted. RRT was called on  for respiratory distress. She was transferred to ICU for intubation. She has been intermittently proned. SUBJECTIVE:    Patient seen and examined. Remains intubated and sedated. Records reviewed. Temp (24hrs), Av.2 °F (36.2 °C), Min:94.6 °F (34.8 °C), Max:99.5 °F (37.5 °C)    DIET: Diet NPO  ADULT TUBE FEEDING; Nasogastric; Standard with Fiber; Continuous; 10; No; 30; Q 3 hours  CODE: Full Code    Intake/Output Summary (Last 24 hours) at 2022 1016  Last data filed at 2022 0900  Gross per 24 hour   Intake 2649.26 ml   Output 2665 ml   Net -15.74 ml       Review of Systems  RAGHAV- intubated and sedated      OBJECTIVE:    /60   Pulse 66   Temp 97 °F (36.1 °C) (Esophageal)   Resp 24   Ht 5' (1.524 m)   Wt 135 lb (61.2 kg)   SpO2 100%   BMI 26.37 kg/m²     General appearance: Intubated and sedated, chemically paralyzed  HEENT: Normal cephalic, L ear swelling and erythema, pupils sluggish  Neck: Supple, with full range of motion.  No jugular venous distention. Trachea midline. Respiratory: ETT to vent, FiO2 70%, PEEP 8  Cardiovascular: RRR, no murmur noted  Abdomen: Soft, nontender, nondistended, flat. NGT with trickle feeds  Musculoskeletal: No clubbing, cyanosis, + edema BLE  Skin: Normal skin color.   L ear erythema, scatted bruises  Neurologic:  Sedated, chemically paralyzed  Psychiatric:  Sedated    Medications:  REVIEWED DAILY    Infusion Medications    midazolam 6 mg/hr (01/25/22 0642)    norepinephrine Stopped (01/25/22 0413)    fentaNYL 5 mcg/ml in 0.9%  ml infusion 150 mcg/hr (01/25/22 0446)    cisatracurium (NIMBEX) infusion 2.5 mcg/kg/min (01/25/22 0446)    sodium chloride      dextrose       Scheduled Medications    potassium chloride  40 mEq IntraVENous Once    potassium chloride  20 mEq IntraVENous Once    calcium gluconate IVPB  1,000 mg IntraVENous Once    midodrine  15 mg Oral TID WC    hydrocortisone sodium succinate PF  100 mg IntraVENous Q8H    docusate sodium  100 mg Oral BID    sennosides  5 mL Oral Nightly    insulin glargine-yfgn  5 Units SubCUTAneous Nightly    miconazole nitrate   Topical BID    chlorhexidine  15 mL Mouth/Throat BID    pantoprazole  40 mg IntraVENous Daily    And    sodium chloride (PF)  10 mL IntraVENous Daily    insulin lispro  0-12 Units SubCUTAneous Q4H    artificial tears   Both Eyes Q4H    polyethylene glycol  17 g Oral Daily    piperacillin-tazobactam  3,375 mg IntraVENous Q8H    enoxaparin  1 mg/kg SubCUTAneous BID    ipratropium-albuterol  1 ampule Inhalation Q4H WA    sodium chloride flush  5-40 mL IntraVENous 2 times per day    ascorbic acid  500 mg Oral Daily    zinc sulfate  50 mg Oral Daily    vitamin D  2,000 Units Oral Daily     PRN Meds: sodium chloride flush, sodium chloride, acetaminophen **OR** acetaminophen, glucose, dextrose, glucagon (rDNA), dextrose    Labs:     Recent Labs     01/23/22  0412 01/24/22  0408 01/25/22  0554   WBC 18.1* 15.5* 14.8*   HGB 8.1* 8. 1* 7.2*   HCT 26.3* 25.7* 23.0*   * 524* 375       Recent Labs     01/24/22  0408 01/24/22  1740 01/25/22  0554    138 141   K 2.9* 4.3 3.4*   CL 98 97* 101   CO2 31* 35* 32*   BUN 22 19 20   CREATININE 0.6 0.7 0.6   CALCIUM 7.7* 7.7* 7.9*   PHOS 1.4* 4.4 3.3       No results for input(s): PROT, ALB, ALKPHOS, ALT, AST, BILITOT, AMYLASE, LIPASE in the last 72 hours. No results for input(s): INR in the last 72 hours. No results for input(s): Jenna Kleber in the last 72 hours. Chronic labs:    Lab Results   Component Value Date    CHOL 145 06/29/2020    TRIG 124 01/25/2022    HDL 73 06/29/2020    LDLCALC 57 06/29/2020    TSH 3.600 06/29/2020    INR 1.0 01/14/2022    LABA1C 6.0 (H) 01/19/2022       Radiology: REVIEWED DAILY      ASSESSMENT and PLAN:    COVID-19 pneumonia-unvaccinated, symptom onset approximately 1 week prior to presentation. To continue with vitamin C, vitamin D, zinc.  Lovenox 1 mg/kg bid. · S/p decadron 10 mg PO bid x 8 days, baricitinib discontinued on 1/19. Currently on solucortef. Possible superimposed bacterial pneumonia-procalcitonin 1.57 on admission. Blood and sputum cultures with NGTD. Urine antigens negative. Currently on zosyn. Vancomycin discontinued. Acute hypoxic respiratory failure-2/2 above. S/p intubation 1/19. · Currently requiring FiO2 80%, PEEP 8. UTI-urine culture positive for Candida. Given Diflucan x1. Remains on zosyn. Hyperglycemia-likely steroid-induced, started on SSI    Hypokalemia- for supplementation    Hypocalcemia- ionized calcium 1.12, for supplementation    Normocytic anemia- likely 2/2 phlebotomy; continue to monitor H&H    Hx asthma- states that she has never seen a pulmonologist and that it is controlled with PRN albuterol.       DISPOSITION: Continued inpatient care    +++++++++++++++++++++++++++++++++++++++++++++++++  MAUREEN Carcamo - CNP   [facility] [address] [place], [place]  +++++++++++++++++++++++++++++++++++++++++++++++++  NOTE: This report was transcribed using voice recognition software. Every effort was made to ensure accuracy; however, inadvertent computerized transcription errors may be present.

## 2022-01-25 NOTE — PLAN OF CARE
Problem: Airway Clearance - Ineffective  Goal: Achieve or maintain patent airway  1/25/2022 1722 by Radha Fuchs  Outcome: Met This Shift     Problem: Gas Exchange - Impaired  Goal: Absence of hypoxia  1/25/2022 1722 by Karla Fuchs  Outcome: Met This Shift     Problem: Gas Exchange - Impaired  Goal: Promote optimal lung function  1/25/2022 1722 by Karla Fuchs  Outcome: Met This Shift     Problem: Breathing Pattern - Ineffective  Goal: Ability to achieve and maintain a regular respiratory rate  1/25/2022 1722 by Radha Fuchs  Outcome: Met This Shift     Problem: Gas Exchange - Impaired  Goal: Promote optimal lung function  1/25/2022 1722 by Karla Fuchs  Outcome: Met This Shift     Problem:  Body Temperature -  Risk of, Imbalanced  Goal: Will regain or maintain usual level of consciousness  1/25/2022 1722 by Radha Fuchs  Outcome: Met This Shift     Problem: Isolation Precautions - Risk of Spread of Infection  Goal: Prevent transmission of infection  1/25/2022 1722 by Karla Fuchs  Outcome: Met This Shift     Problem: Nutrition Deficits  Goal: Optimize nutritional status  1/25/2022 1722 by Miguel Fontaine  Outcome: Met This Shift     Problem: Risk for Fluid Volume Deficit  Goal: Maintain normal heart rhythm  1/25/2022 1722 by Karla Fuchs  Outcome: Met This Shift     Problem: Falls - Risk of:  Goal: Will remain free from falls  Description: Will remain free from falls  1/25/2022 1722 by Radha Fuchs  Outcome: Met This Shift     Problem: Falls - Risk of:  Goal: Absence of physical injury  Description: Absence of physical injury  1/25/2022 1722 by Karla Fuchs  Outcome: Met This Shift     Problem: Skin Integrity:  Goal: Will show no infection signs and symptoms  Description: Will show no infection signs and symptoms  1/25/2022 1722 by Karla Fuchs  Outcome: Met This Shift     Problem: Skin Integrity:  Goal: Absence of new skin breakdown  Description: Absence of new skin breakdown  1/25/2022 1722 by Sorin Fuchs  Outcome: Met This Shift     Problem:  Body Temperature -  Risk of, Imbalanced  Goal: Complications related to the disease process, condition or treatment will be avoided or minimized  1/25/2022 1722 by Maria C Fuchs  Outcome: Not Met This Shift     Problem: Loneliness or Risk for Loneliness  Goal: Demonstrate positive use of time alone when socialization is not possible  1/25/2022 1722 by Maria C Fuchs  Outcome: Not Met This Shift     Problem: Fatigue  Goal: Verbalize increase energy and improved vitality  1/25/2022 1722 by Sorin Fuchs  Outcome: Not Met This Shift

## 2022-01-26 ENCOUNTER — APPOINTMENT (OUTPATIENT)
Dept: GENERAL RADIOLOGY | Age: 67
DRG: 207 | End: 2022-01-26
Payer: MEDICARE

## 2022-01-26 LAB
(1,3)-BETA-D-GLUCAN (FUNGITELL) INTERPRETATION: NEGATIVE
(1,3)-BETA-D-GLUCAN (FUNGITELL): 34 PG/ML
AADO2: 221.3 MMHG
AADO2: 248.7 MMHG
ABO/RH: NORMAL
ANION GAP SERPL CALCULATED.3IONS-SCNC: 9 MMOL/L (ref 7–16)
ANTIBODY SCREEN: NORMAL
B.E.: 8 MMOL/L (ref -3–3)
B.E.: 8.2 MMOL/L (ref -3–3)
BLOOD BANK DISPENSE STATUS: NORMAL
BLOOD BANK PRODUCT CODE: NORMAL
BPU ID: NORMAL
BUN BLDV-MCNC: 25 MG/DL (ref 6–23)
C-REACTIVE PROTEIN: 14.6 MG/DL (ref 0–0.4)
CALCIUM IONIZED: 1.24 MMOL/L (ref 1.15–1.33)
CALCIUM SERPL-MCNC: 8.5 MG/DL (ref 8.6–10.2)
CHLORIDE BLD-SCNC: 103 MMOL/L (ref 98–107)
CO2: 30 MMOL/L (ref 22–29)
COHB: 0.9 % (ref 0–1.5)
COHB: 1.2 % (ref 0–1.5)
CREAT SERPL-MCNC: 0.6 MG/DL (ref 0.5–1)
CRITICAL: ABNORMAL
CRITICAL: ABNORMAL
D DIMER: 460 NG/ML DDU
DATE ANALYZED: ABNORMAL
DATE ANALYZED: ABNORMAL
DATE OF COLLECTION: ABNORMAL
DATE OF COLLECTION: ABNORMAL
DESCRIPTION BLOOD BANK: NORMAL
FIO2: 50 %
FIO2: 60 %
GFR AFRICAN AMERICAN: >60
GFR NON-AFRICAN AMERICAN: >60 ML/MIN/1.73
GLUCOSE BLD-MCNC: 116 MG/DL (ref 74–99)
HCO3: 32 MMOL/L (ref 22–26)
HCO3: 32.6 MMOL/L (ref 22–26)
HCT VFR BLD CALC: 22.1 % (ref 34–48)
HCT VFR BLD CALC: 22.1 % (ref 34–48)
HCT VFR BLD CALC: 25.9 % (ref 34–48)
HCT VFR BLD CALC: 26.8 % (ref 34–48)
HEMOGLOBIN: 6.9 G/DL (ref 11.5–15.5)
HEMOGLOBIN: 7 G/DL (ref 11.5–15.5)
HEMOGLOBIN: 8.2 G/DL (ref 11.5–15.5)
HEMOGLOBIN: 8.7 G/DL (ref 11.5–15.5)
HHB: 1.9 % (ref 0–5)
HHB: 4.4 % (ref 0–5)
LAB: ABNORMAL
MAGNESIUM: 2 MG/DL (ref 1.6–2.6)
MCH RBC QN AUTO: 30.8 PG (ref 26–35)
MCHC RBC AUTO-ENTMCNC: 31.7 % (ref 32–34.5)
MCV RBC AUTO: 97.4 FL (ref 80–99.9)
METER GLUCOSE: 115 MG/DL (ref 74–99)
METER GLUCOSE: 125 MG/DL (ref 74–99)
METER GLUCOSE: 125 MG/DL (ref 74–99)
METER GLUCOSE: 126 MG/DL (ref 74–99)
METER GLUCOSE: 137 MG/DL (ref 74–99)
METER GLUCOSE: 139 MG/DL (ref 74–99)
METER GLUCOSE: 170 MG/DL (ref 74–99)
METHB: 0.2 % (ref 0–1.5)
METHB: 0.3 % (ref 0–1.5)
MODE: AC
MODE: AC
O2 CONTENT: 10.5 ML/DL
O2 CONTENT: 11.1 ML/DL
O2 SATURATION: 95.5 % (ref 92–98.5)
O2 SATURATION: 98.1 % (ref 92–98.5)
O2HB: 94.4 % (ref 94–97)
O2HB: 96.7 % (ref 94–97)
OPERATOR ID: 2962
OPERATOR ID: 2962
PATIENT TEMP: 37 C
PATIENT TEMP: 37 C
PCO2: 41.6 MMHG (ref 35–45)
PCO2: 46.9 MMHG (ref 35–45)
PDW BLD-RTO: 13.9 FL (ref 11.5–15)
PEEP/CPAP: 8 CMH2O
PEEP/CPAP: 8 CMH2O
PFO2: 1.52 MMHG/%
PFO2: 1.88 MMHG/%
PH BLOOD GAS: 7.46 (ref 7.35–7.45)
PH BLOOD GAS: 7.5 (ref 7.35–7.45)
PHOSPHORUS: 2.5 MG/DL (ref 2.5–4.5)
PLATELET # BLD: 345 E9/L (ref 130–450)
PMV BLD AUTO: 11.5 FL (ref 7–12)
PO2: 112.5 MMHG (ref 75–100)
PO2: 75.9 MMHG (ref 75–100)
POTASSIUM SERPL-SCNC: 3.9 MMOL/L (ref 3.5–5)
RBC # BLD: 2.27 E12/L (ref 3.5–5.5)
RI(T): 2.21
RI(T): 2.92
RR MECHANICAL: 24 B/MIN
RR MECHANICAL: 24 B/MIN
SODIUM BLD-SCNC: 142 MMOL/L (ref 132–146)
SOURCE, BLOOD GAS: ABNORMAL
SOURCE, BLOOD GAS: ABNORMAL
THB: 7.8 G/DL (ref 11.5–16.5)
THB: 8 G/DL (ref 11.5–16.5)
TIME ANALYZED: 437
TIME ANALYZED: 628
VT MECHANICAL: 300 ML
VT MECHANICAL: 300 ML
WBC # BLD: 15.1 E9/L (ref 4.5–11.5)

## 2022-01-26 PROCEDURE — 6360000002 HC RX W HCPCS: Performed by: INTERNAL MEDICINE

## 2022-01-26 PROCEDURE — 2580000003 HC RX 258

## 2022-01-26 PROCEDURE — 94640 AIRWAY INHALATION TREATMENT: CPT

## 2022-01-26 PROCEDURE — 2000000000 HC ICU R&B

## 2022-01-26 PROCEDURE — 6370000000 HC RX 637 (ALT 250 FOR IP): Performed by: STUDENT IN AN ORGANIZED HEALTH CARE EDUCATION/TRAINING PROGRAM

## 2022-01-26 PROCEDURE — 80048 BASIC METABOLIC PNL TOTAL CA: CPT

## 2022-01-26 PROCEDURE — 86850 RBC ANTIBODY SCREEN: CPT

## 2022-01-26 PROCEDURE — 6370000000 HC RX 637 (ALT 250 FOR IP): Performed by: INTERNAL MEDICINE

## 2022-01-26 PROCEDURE — 83735 ASSAY OF MAGNESIUM: CPT

## 2022-01-26 PROCEDURE — 36415 COLL VENOUS BLD VENIPUNCTURE: CPT

## 2022-01-26 PROCEDURE — 85018 HEMOGLOBIN: CPT

## 2022-01-26 PROCEDURE — 86140 C-REACTIVE PROTEIN: CPT

## 2022-01-26 PROCEDURE — 6370000000 HC RX 637 (ALT 250 FOR IP): Performed by: NURSE PRACTITIONER

## 2022-01-26 PROCEDURE — 2500000003 HC RX 250 WO HCPCS: Performed by: INTERNAL MEDICINE

## 2022-01-26 PROCEDURE — 84100 ASSAY OF PHOSPHORUS: CPT

## 2022-01-26 PROCEDURE — 2580000003 HC RX 258: Performed by: INTERNAL MEDICINE

## 2022-01-26 PROCEDURE — 86923 COMPATIBILITY TEST ELECTRIC: CPT

## 2022-01-26 PROCEDURE — 82805 BLOOD GASES W/O2 SATURATION: CPT

## 2022-01-26 PROCEDURE — 71045 X-RAY EXAM CHEST 1 VIEW: CPT

## 2022-01-26 PROCEDURE — 82330 ASSAY OF CALCIUM: CPT

## 2022-01-26 PROCEDURE — 6370000000 HC RX 637 (ALT 250 FOR IP)

## 2022-01-26 PROCEDURE — P9016 RBC LEUKOCYTES REDUCED: HCPCS

## 2022-01-26 PROCEDURE — C9113 INJ PANTOPRAZOLE SODIUM, VIA: HCPCS | Performed by: INTERNAL MEDICINE

## 2022-01-26 PROCEDURE — 2580000003 HC RX 258: Performed by: FAMILY MEDICINE

## 2022-01-26 PROCEDURE — 82962 GLUCOSE BLOOD TEST: CPT

## 2022-01-26 PROCEDURE — 85378 FIBRIN DEGRADE SEMIQUANT: CPT

## 2022-01-26 PROCEDURE — 86901 BLOOD TYPING SEROLOGIC RH(D): CPT

## 2022-01-26 PROCEDURE — 86900 BLOOD TYPING SEROLOGIC ABO: CPT

## 2022-01-26 PROCEDURE — 85027 COMPLETE CBC AUTOMATED: CPT

## 2022-01-26 PROCEDURE — 36620 INSERTION CATHETER ARTERY: CPT

## 2022-01-26 PROCEDURE — 85014 HEMATOCRIT: CPT

## 2022-01-26 PROCEDURE — 94003 VENT MGMT INPAT SUBQ DAY: CPT

## 2022-01-26 PROCEDURE — S5553 INSULIN LONG ACTING 5 U: HCPCS | Performed by: STUDENT IN AN ORGANIZED HEALTH CARE EDUCATION/TRAINING PROGRAM

## 2022-01-26 PROCEDURE — 37799 UNLISTED PX VASCULAR SURGERY: CPT

## 2022-01-26 PROCEDURE — 6370000000 HC RX 637 (ALT 250 FOR IP): Performed by: FAMILY MEDICINE

## 2022-01-26 RX ORDER — FUROSEMIDE 10 MG/ML
40 INJECTION INTRAMUSCULAR; INTRAVENOUS ONCE
Status: COMPLETED | OUTPATIENT
Start: 2022-01-26 | End: 2022-01-26

## 2022-01-26 RX ORDER — PANTOPRAZOLE SODIUM 40 MG/10ML
40 INJECTION, POWDER, LYOPHILIZED, FOR SOLUTION INTRAVENOUS 2 TIMES DAILY
Status: DISCONTINUED | OUTPATIENT
Start: 2022-01-26 | End: 2022-02-02

## 2022-01-26 RX ORDER — SODIUM CHLORIDE 9 MG/ML
10 INJECTION INTRAVENOUS DAILY
Status: DISCONTINUED | OUTPATIENT
Start: 2022-01-26 | End: 2022-02-02

## 2022-01-26 RX ORDER — SODIUM CHLORIDE 9 MG/ML
INJECTION, SOLUTION INTRAVENOUS PRN
Status: DISCONTINUED | OUTPATIENT
Start: 2022-01-26 | End: 2022-02-01

## 2022-01-26 RX ORDER — MIDODRINE HYDROCHLORIDE 5 MG/1
5 TABLET ORAL
Status: DISCONTINUED | OUTPATIENT
Start: 2022-01-26 | End: 2022-01-27

## 2022-01-26 RX ORDER — HYDRALAZINE HYDROCHLORIDE 20 MG/ML
INJECTION INTRAMUSCULAR; INTRAVENOUS
Status: DISPENSED
Start: 2022-01-26 | End: 2022-01-26

## 2022-01-26 RX ORDER — HYDRALAZINE HYDROCHLORIDE 20 MG/ML
10 INJECTION INTRAMUSCULAR; INTRAVENOUS EVERY 6 HOURS PRN
Status: DISCONTINUED | OUTPATIENT
Start: 2022-01-26 | End: 2022-01-26

## 2022-01-26 RX ADMIN — Medication: at 08:33

## 2022-01-26 RX ADMIN — PANTOPRAZOLE SODIUM 40 MG: 40 INJECTION, POWDER, FOR SOLUTION INTRAVENOUS at 20:04

## 2022-01-26 RX ADMIN — Medication 10 ML: at 20:05

## 2022-01-26 RX ADMIN — CHLORHEXIDINE GLUCONATE 0.12% ORAL RINSE 15 ML: 1.2 LIQUID ORAL at 20:03

## 2022-01-26 RX ADMIN — MINERAL OIL AND WHITE PETROLATUM: 150; 830 OINTMENT OPHTHALMIC at 02:48

## 2022-01-26 RX ADMIN — CHLORHEXIDINE GLUCONATE 0.12% ORAL RINSE 15 ML: 1.2 LIQUID ORAL at 08:32

## 2022-01-26 RX ADMIN — HYDROCORTISONE SODIUM SUCCINATE 100 MG: 100 INJECTION, POWDER, FOR SOLUTION INTRAMUSCULAR; INTRAVENOUS at 15:19

## 2022-01-26 RX ADMIN — FUROSEMIDE 40 MG: 10 INJECTION, SOLUTION INTRAMUSCULAR; INTRAVENOUS at 11:52

## 2022-01-26 RX ADMIN — SODIUM CHLORIDE, PRESERVATIVE FREE 10 ML: 5 INJECTION INTRAVENOUS at 08:34

## 2022-01-26 RX ADMIN — MINERAL OIL AND WHITE PETROLATUM: 150; 830 OINTMENT OPHTHALMIC at 11:48

## 2022-01-26 RX ADMIN — SODIUM CHLORIDE, PRESERVATIVE FREE 10 ML: 5 INJECTION INTRAVENOUS at 02:48

## 2022-01-26 RX ADMIN — INSULIN GLARGINE-YFGN 5 UNITS: 100 INJECTION, SOLUTION SUBCUTANEOUS at 20:05

## 2022-01-26 RX ADMIN — MINERAL OIL AND WHITE PETROLATUM: 150; 830 OINTMENT OPHTHALMIC at 23:02

## 2022-01-26 RX ADMIN — HYDROCORTISONE SODIUM SUCCINATE 100 MG: 100 INJECTION, POWDER, FOR SOLUTION INTRAMUSCULAR; INTRAVENOUS at 02:48

## 2022-01-26 RX ADMIN — Medication 150 MCG/HR: at 06:04

## 2022-01-26 RX ADMIN — Medication: at 20:04

## 2022-01-26 RX ADMIN — IPRATROPIUM BROMIDE AND ALBUTEROL SULFATE 1 AMPULE: .5; 2.5 SOLUTION RESPIRATORY (INHALATION) at 10:15

## 2022-01-26 RX ADMIN — OXYCODONE HYDROCHLORIDE AND ACETAMINOPHEN 500 MG: 500 TABLET ORAL at 08:33

## 2022-01-26 RX ADMIN — IPRATROPIUM BROMIDE AND ALBUTEROL SULFATE 1 AMPULE: .5; 2.5 SOLUTION RESPIRATORY (INHALATION) at 17:19

## 2022-01-26 RX ADMIN — DOCUSATE SODIUM 100 MG: 50 LIQUID ORAL at 20:04

## 2022-01-26 RX ADMIN — IPRATROPIUM BROMIDE AND ALBUTEROL SULFATE 1 AMPULE: .5; 2.5 SOLUTION RESPIRATORY (INHALATION) at 21:07

## 2022-01-26 RX ADMIN — HYDRALAZINE HYDROCHLORIDE 10 MG: 20 INJECTION INTRAMUSCULAR; INTRAVENOUS at 01:28

## 2022-01-26 RX ADMIN — POLYETHYLENE GLYCOL 3350 17 G: 17 POWDER, FOR SOLUTION ORAL at 08:32

## 2022-01-26 RX ADMIN — MINERAL OIL AND WHITE PETROLATUM: 150; 830 OINTMENT OPHTHALMIC at 06:21

## 2022-01-26 RX ADMIN — INSULIN LISPRO 2 UNITS: 100 INJECTION, SOLUTION INTRAVENOUS; SUBCUTANEOUS at 00:11

## 2022-01-26 RX ADMIN — PANTOPRAZOLE SODIUM 40 MG: 40 INJECTION, POWDER, FOR SOLUTION INTRAVENOUS at 08:34

## 2022-01-26 RX ADMIN — ZINC SULFATE 220 MG (50 MG) CAPSULE 50 MG: CAPSULE at 08:33

## 2022-01-26 RX ADMIN — Medication 10 ML: at 08:33

## 2022-01-26 RX ADMIN — MINERAL OIL AND WHITE PETROLATUM: 150; 830 OINTMENT OPHTHALMIC at 20:03

## 2022-01-26 RX ADMIN — SENNOSIDES 5 ML: 8.8 SYRUP ORAL at 20:04

## 2022-01-26 RX ADMIN — DOCUSATE SODIUM 100 MG: 50 LIQUID ORAL at 08:32

## 2022-01-26 RX ADMIN — MIDODRINE HYDROCHLORIDE 5 MG: 5 TABLET ORAL at 08:33

## 2022-01-26 RX ADMIN — Medication 125 MCG/HR: at 16:00

## 2022-01-26 RX ADMIN — Medication 2000 UNITS: at 08:33

## 2022-01-26 RX ADMIN — MINERAL OIL AND WHITE PETROLATUM: 150; 830 OINTMENT OPHTHALMIC at 15:36

## 2022-01-26 RX ADMIN — IPRATROPIUM BROMIDE AND ALBUTEROL SULFATE 1 AMPULE: .5; 2.5 SOLUTION RESPIRATORY (INHALATION) at 13:32

## 2022-01-26 RX ADMIN — MIDAZOLAM 7 MG/HR: 5 INJECTION INTRAMUSCULAR; INTRAVENOUS at 10:38

## 2022-01-26 ASSESSMENT — PULMONARY FUNCTION TESTS
PIF_VALUE: 26
PIF_VALUE: 22
PIF_VALUE: 17
PIF_VALUE: 23
PIF_VALUE: 28
PIF_VALUE: 20
PIF_VALUE: 27
PIF_VALUE: 25
PIF_VALUE: 26
PIF_VALUE: 28
PIF_VALUE: 27
PIF_VALUE: 25
PIF_VALUE: 25
PIF_VALUE: 27
PIF_VALUE: 34
PIF_VALUE: 23
PIF_VALUE: 25
PIF_VALUE: 21
PIF_VALUE: 28
PIF_VALUE: 24
PIF_VALUE: 27
PIF_VALUE: 26
PIF_VALUE: 26
PIF_VALUE: 21
PIF_VALUE: 27
PIF_VALUE: 27

## 2022-01-26 ASSESSMENT — PAIN SCALES - GENERAL
PAINLEVEL_OUTOF10: 0

## 2022-01-26 NOTE — PROGRESS NOTES
[address] [facility]  Department of Internal Medicine   Internal Medicine Residency   MICU Progress Note    Patient:  Stanley Found 78 y.o. female  MRN: [id number]     Date of Service: 1/26/2022    Allergy: Patient has no known allergies. Subjective     Patient is seen and examined this morning. P/F this morning at 188, patient placed supine. Been off Levophed since yesterday morning. Vitals stable. Reported to have anisocoria, pt still on Nimbex. 24h change: Hb dropped to 6.9 this morning. No overt signs of bleeding. Ordered 1 unit PRBC transfusion. ROS: Denies Fever/chills/CP/SOB/N/V/D/C/Dysuria/Blood in stool or urine  Objective     VS: BP (!) 156/55   Pulse 73   Temp 96.3 °F (35.7 °C) (Bladder)   Resp 24   Ht 5' (1.524 m)   Wt 129 lb 1.6 oz (58.6 kg)   SpO2 94%   BMI 25.21 kg/m²   ABP (Arterial line BP): 156/55  ABP mean (Arterial line mean): 74 mmHg    I & O - 24hr:     Intake/Output Summary (Last 24 hours) at 1/26/2022 1554  Last data filed at 1/26/2022 1500  Gross per 24 hour   Intake 2317 ml   Output 1545 ml   Net 772 ml       Physical Exam:  General Appearance:  supine, intubated, sedated, ventilated  Neck: (+) L IJ triple lumen, (+) pressure injury to bilaetral ears and posterior auricular area, no adenopathy, no carotid bruit, no JVD, supple, symmetrical, trachea midline and thyroid not enlarged, symmetric, no tenderness/mass/nodules  Lung: clear to auscultation bilaterally  Heart: regular rate and rhythm, S1, S2 normal, no murmur, click, rub or gallop  Abdomen: soft, non-tender; bowel sounds normal; no masses,  no organomegaly  Extremities:  extremities normal, atraumatic, no cyanosis or edema  Musculoskeletal: No joint swelling, no muscle tenderness. ROM normal in all joints of extremities.    Neurologic: Mental status: intubated, sedated, ventilated, on supine    Lines     site day    Art line   R Radial    TLC R IJ    PICC None    Hemoaccess None      Mechanical Ventilation:  Mode: A/C   low tidal  TV: 300 ml RR: 24  PEEP 8 cmH2O  FiO2 50  Pplat: 25    ABG:     Lab Results   Component Value Date    PH 7.504 01/26/2022    UNH1DKN 54.3 01/25/2022    PCO2 41.6 01/26/2022    PO2ART 138.4 01/25/2022    PO2 75.9 01/26/2022    QDO4WYT 34.9 01/25/2022    HCO3 32.0 01/26/2022    BE 8.2 01/26/2022    THB 7.8 01/26/2022    O2SAT 95.5 01/26/2022        Medications     Infusions: (Fluid, Sedation, Vasopressors)  Vasopressors  Sedation   Versed at rate of 6 Fentanyl at 125 mcg/hr    Nutrition:   NG/OG tube TF type: Jevity 1.5       At rate: 10 ml/h  ATB:   Antibiotics  Days                 Skin issues:   Patient currently has   Urinary cath: granados 1/19  Isolation: contact droplet, airborne  Restraints  DVT prophylaxis/ GI prophylaxis: enoxaparin/pantoprazole  PT/OT  SNF/NH placement  Palliative care consult   Labs     CBC with Differential:    Lab Results   Component Value Date    WBC 15.1 01/26/2022    RBC 2.27 01/26/2022    HGB 8.2 01/26/2022    HCT 25.9 01/26/2022     01/26/2022    MCV 97.4 01/26/2022    MCH 30.8 01/26/2022    MCHC 31.7 01/26/2022    RDW 13.9 01/26/2022    METASPCT 0.9 01/18/2022    LYMPHOPCT 2.7 01/18/2022    MONOPCT 11.6 01/18/2022    MYELOPCT 0.9 01/14/2022    BASOPCT 0.1 01/18/2022    MONOSABS 1.38 01/18/2022    LYMPHSABS 0.35 01/18/2022    EOSABS 0.00 01/18/2022    BASOSABS 0.00 01/18/2022     CMP:    Lab Results   Component Value Date     01/26/2022    K 3.9 01/26/2022    K 3.8 01/18/2022     01/26/2022    CO2 30 01/26/2022    BUN 25 01/26/2022    CREATININE 0.6 01/26/2022    GFRAA >60 01/26/2022    LABGLOM >60 01/26/2022    GLUCOSE 116 01/26/2022    PROT 5.2 01/22/2022    LABALBU 2.5 01/22/2022    CALCIUM 8.5 01/26/2022    BILITOT 0.2 01/22/2022    ALKPHOS 63 01/22/2022    AST 15 01/22/2022    ALT 23 01/22/2022     Magnesium:    Lab Results   Component Value Date    MG 2.0 01/26/2022     Phosphorus:    Lab Results   Component Value Date PHOS 2.5 2022     ABG:    Lab Results   Component Value Date    PH 7.504 2022    PCO2 41.6 2022    PO2 75.9 2022    HCO3 32.0 2022    BE 8.2 2022    O2SAT 95.5 2022     HgBA1c:    Lab Results   Component Value Date    LABA1C 6.0 2022     Microalbumen/Creatinine ratio:  No components found for: RUCREAT    Imaging Studies:  CXR: stable airspace disease      Resident's Assessment and Plan     Acute hypoxic respiratory failure 2/2 severe RDS 2/2 critical COVID-19  -D8 intubated  -sedated on fentanyl,and Versed, on Nimbex  -completed Zosyn yesterday  -on hydrocortisone 100 mg q8hrs  -WBC up a bit, afebrile  -Vent: 300/24/8/50  -AB.46/46.9/112.5/32.6/1/98.1  -P/F improved to 188 from 137 after being proned last night  -CXR : Bilateral airspace disease which appears mildly progressive  -positive fluid balance of 7.4L since admission  -enoxaparin cahnged to 30 mg daily yesterday from therapeutic dosing  -on ascorbic acid, Vit D, zinc  Plan  -continue low volume TV  -keep patient supine  -dc Nimbex  -if patient desaturates, may restart Nimbex  -taper hydrocortisone to 100 mg IV BID  -given furosemide 40 mg IV per Nephro  -strict I & O    Septic shock 2/2 critical COVID pneumonia t/c superimposed bacterial pneumonia  -as above  -off Levophed  -completed D7 Zosyn  -tapering hydrocortisone    Hx Asthma  -currently intubated  -on breathing treatments    Hyperglycemia likely 2/2 steroids, prediabetic  -HBA1C at 6%  -B-180  -Lantus 5, MDSS    Acute normocytic anemia etio?  -Hb 6.9 this morning, no overt signs of bleeding  -FOBT pending  -ordered 1 unit PRBC  -Hb up to 8.2  Plan  -will monitor     Stage 1 SAMUEL likely 2/2 poor oral intake,resolved   -crea 0.6  -UO adequate  -still with positive fluid balance  Plan  -Nephro gave furosemide 40 mg IV once today  -strict I & O    Hypokalemia   -K 3.9, Mg 2    Hypophosphatemia, resolved  -phos at 2.5      # Diet: TF at 10 ml/hr  # Bowel regimen: sennosides/glycolax  # Peptic ulcer prophylaxis: Protonix  # DVT Prophylaxis: Lovenox  # Disposition: Cont current care      Elder Neighbor, MD, PGY-1  Attending physician: Dr. Mango Dowell    I personally saw, examined and provided care for the patient. Radiographs, labs and medication list were reviewed by me independently. Review of Residents documentation was conducted and revisions were made as appropriate. I agree with the above documented exam, problem list and plan of care. Tolerating supine. Daughter updated at [facility].     CCT excluding procedures >30 minutes    Virgel , DO

## 2022-01-26 NOTE — PROGRESS NOTES
Physician Progress Note      Tyrel Hannon  CSN #:                  [id number]  :                       1955  ADMIT DATE:       2022 5:11 PM  DISCH DATE:  RESPONDING  PROVIDER #:        Margie Becerra CNP          QUERY TEXT:    Pt admitted with Covid 19 pneumonia and superimposed bacterial pneumonia. Pt   noted to have Sepsis by Pulmonology consult and Septic shock by Internal   Medicine . If possible, please document in the progress notes and discharge   summary if you are evaluating and /or treating any of the following: The medical record reflects the following:  Risk Factors: Covid 19, pneumonia, Acute hypoxic respiratory failure, SAMUEL  Clinical Indicators: Admit VS/Labs- Procal 1.47, CRP 12.8, Temp 100.9, ,   RR 28, 72% Spo2 placed on Non rebreather at 15L, SOB, diarrhea. Intubated vented  RRT, CRP increase 14.6, WBC to 18.2  hypotensive with   Levophed. UTI Cx Candida  Treatment: Zosyn IV, Decadron IV, NRB, BiPAP, Ventilator, Vanc IV, Diflucan   IV, Lactated ringers, NS, consults, labs, cultures, imaging    Thank You,  Courtney Ross BSN, CRCR, RN, CDS  CDI  [email or web address]  [phone]  Options provided:  -- Sepsis, present on admission  -- Sepsis, not present on admission  -- Other - I will add my own diagnosis  -- Disagree - Not applicable / Not valid  -- Disagree - Clinically unable to determine / Unknown  -- Refer to Clinical Documentation Reviewer    PROVIDER RESPONSE TEXT:    This patient has sepsis that was not present on admission    Query created by: Rusty Yates on 2022 10:15 AM      Electronically signed by:  Margie Becerra CNP 2022 12:48 PM

## 2022-01-26 NOTE — PROGRESS NOTES
Department of Internal Medicine  Nephrology  Progress Note    Events Reviewed. SUBJECTIVE:  We are following Ms. Gloria Rodriguez for SAMUEL. Patient is intubated and supine. PHYSICAL EXAM:    Vitals:    VITALS:  BP (!) 118/47   Pulse 52   Temp 95.4 °F (35.2 °C)   Resp 27   Ht 5' (1.524 m)   Wt 129 lb 1.6 oz (58.6 kg)   SpO2 96%   BMI 25.21 kg/m²   24HR INTAKE/OUTPUT:      Intake/Output Summary (Last 24 hours) at 1/26/2022 0936  Last data filed at 1/26/2022 0900  Gross per 24 hour   Intake 2294 ml   Output 1385 ml   Net 909 ml     General appearance: Patient is intubated, sedated  HEENT: Normal cephalic, atraumatic without obvious deformity. Pupils equal, round, and reactive to light. Endotracheal tube in place  Neck: Supple, with full range of motion. No jugular venous distention. Trachea midline. Respiratory: Diminished bilaterally on bipap  Cardiovascular: RRR, no murmur noted  Abdomen: Soft, nontender, nondistended, flat  Musculoskeletal: No clubbing or cyanosis. Trace edema of bilateral lower extremities. Brisk capillary refill. Skin: Normal skin color.  No rashes or lesions.   Neurologic: Patient sedated        Scheduled Meds:   midodrine  5 mg Oral TID WC    hydrALAZINE        pantoprazole  40 mg IntraVENous BID    And    sodium chloride (PF)  10 mL IntraVENous Daily    hydrocortisone sodium succinate PF  100 mg IntraVENous Q12H    [Held by provider] enoxaparin  30 mg SubCUTAneous BID    docusate sodium  100 mg Oral BID    sennosides  5 mL Oral Nightly    insulin glargine-yfgn  5 Units SubCUTAneous Nightly    miconazole nitrate   Topical BID    chlorhexidine  15 mL Mouth/Throat BID    insulin lispro  0-12 Units SubCUTAneous Q4H    artificial tears   Both Eyes Q4H    polyethylene glycol  17 g Oral Daily    ipratropium-albuterol  1 ampule Inhalation Q4H WA    sodium chloride flush  5-40 mL IntraVENous 2 times per day    ascorbic acid  500 mg Oral Daily    zinc sulfate  50 mg Oral Daily    vitamin D  2,000 Units Oral Daily     Continuous Infusions:   sodium chloride      midazolam 6 mg/hr (01/25/22 2040)    fentaNYL 5 mcg/ml in 0.9%  ml infusion 150 mcg/hr (01/26/22 0604)    [Held by provider] cisatracurium (NIMBEX) infusion 4 mcg/kg/min (01/25/22 2341)    sodium chloride      dextrose       PRN Meds:.sodium chloride, sodium chloride flush, sodium chloride, acetaminophen **OR** acetaminophen, glucose, dextrose, glucagon (rDNA), dextrose    DATA:    CBC with Differential:    Lab Results   Component Value Date    WBC 15.1 01/26/2022    RBC 2.27 01/26/2022    HGB 6.9 01/26/2022    HCT 22.1 01/26/2022     01/26/2022    MCV 97.4 01/26/2022    MCH 30.8 01/26/2022    MCHC 31.7 01/26/2022    RDW 13.9 01/26/2022    METASPCT 0.9 01/18/2022    LYMPHOPCT 2.7 01/18/2022    MONOPCT 11.6 01/18/2022    MYELOPCT 0.9 01/14/2022    BASOPCT 0.1 01/18/2022    MONOSABS 1.38 01/18/2022    LYMPHSABS 0.35 01/18/2022    EOSABS 0.00 01/18/2022    BASOSABS 0.00 01/18/2022     CMP:    Lab Results   Component Value Date     01/26/2022    K 3.9 01/26/2022    K 3.8 01/18/2022     01/26/2022    CO2 30 01/26/2022    BUN 25 01/26/2022    CREATININE 0.6 01/26/2022    GFRAA >60 01/26/2022    LABGLOM >60 01/26/2022    GLUCOSE 116 01/26/2022    PROT 5.2 01/22/2022    LABALBU 2.5 01/22/2022    CALCIUM 8.5 01/26/2022    BILITOT 0.2 01/22/2022    ALKPHOS 63 01/22/2022    AST 15 01/22/2022    ALT 23 01/22/2022     Magnesium:    Lab Results   Component Value Date    MG 2.0 01/26/2022     Phosphorus:    Lab Results   Component Value Date    PHOS 2.5 01/26/2022          Radiology Review:        CXR 1/26/2022   Mild decrease in the diffuse left lung infiltrates, when compared to the   previous study performed 1 day earlier.  Diffuse right lung infiltrates   without significant interval change.                 BRIEF SUMMARY OF INITIAL CONSULT:     Briefly, Ms. Alena Cox is a 78year old female with a past medical history of Asthma who presented to the ER on January 14 th, 2022 with complaints of SOB and diarrhea for one week. She is not vaccinated against COVID. Reportedly her daughter found her curled into a fetal position in respiratory distress at home and EMS was summoned. She was found to be COVID 19 positive. Labs were significant for bicarbonate 19, BUN 38, creatinine 1.9, anion gap 19, procalcitonin 1.57, CRP 12.8, , mildly elevated LFTs, D-dimer 404, ferritin 5098. Chest x-ray showed bilateral airspace disease. Renal is consulted for SAMUEL. Problems Resolved:     · SAMUEL, likely pre-renal volume responsive SAMUEL secondary to poor oral intake and GI losses (diarrhea). Creatinine 1.9mg/dL on admission. Renal function has improved to 0.6 mg/dL with IVF administration. · HAGMA with low bicarbonate levels, secondary to uremia. To continue bicarbonate drip  · Hypokalemia, 2/2 poor oral intake, potassium levels improved. · hypernatremia, with water deficit, dehydration due to poor intake. Sodium levels quite improved. Resolved. · Hypophosphatemia, 2/2 poor intake, to replace  · Hypokalemia likely 2/2 diuresis renal losses. IMPRESSION/RECOMMENDATIONS:        1. Hypocalcemia, vitamin D 25 level 39, calcium levels 8.5  2. Metabolic alkalosis 5.571, pCO2 41.6, bicarb 32  ----------------------------------------  3. COVID +, on dexamethasone, Baricitinib  4. Acute hypoxic respiratory failure, secondary to COVID pneumonia. Status post intubation  5. Normocytic anemia, hemoglobin 6.9  6.  Nutrition, n.p.o.    Plan:    · Order Lasix 40 mg for today   · Receiving 1 unit [facility]  · Agree with solucortef and addition of antibiotics      Electronically signed by  Landing on 1/26/2022 at 9:36 AM    Discussed with MICU team  Agree as annotated  Yaya Ulrich MD

## 2022-01-26 NOTE — PROCEDURES
Arterial line placement    Procedure: Left Radial arterial line placement. Indications: Continuous monitoring of blood pressure in a patient with hypotension +/- shock, on Levophed. Anesthesia: Local infiltration of 1% lidocaine. Consent: The family members were counseled regarding the procedure, its indications, risks, potential complications and alternatives, and any questions were answered. Consent was obtained to proceed. Technique: Time Out: Immediately prior to the procedure a \"timeout\" was called to verify the correct patient and procedure. Procedure was done using strict aseptic technique. Guilherme's test was performed and was normal. Left Radial site was cleaned with chloraprep and draped. Left Radial was identified, then Lidocaine 1% was infiltrated locally. Arterial line was inserted, a good blood flow was obtained, after which guidewire was inserted all the way with no resistance. Then the canula was inserted and needle with guidewire was withdrawn. Pulsatile bright red blood flow was observed. The canula was connected to BP monitoring apparatus and a good quality waveform was noted. Then the canula was secured with 2 stay sutures of 2-0 silk after Lidocaine infiltration, following which dressing was applied. Number of sticks: 2     Number of Kits used: 1. Complications: No immediate complication. Estimated blood loss: About 2 ml. Comment: Patient tolerated the procedure well.      Donna Pierson MD PGY-1  1/26/2022 3:01 PM      Mery Dinh DO

## 2022-01-26 NOTE — PROGRESS NOTES
Hospitalist Progress Note      SYNOPSIS:     Ms. Eather Cranker is a 78y.o. year old female who has a PMH of asthma.     She presented to the ER on 22 with complaints of SOB and diarrhea x 1 week. She was not vaccinated against COVID-19. Reportedly her daughter found her curled into the fetal position in respiratory distress at home and called 911. Vitals on arrival were significant for temperature 103.2, heart rate 110, blood pressure 122/70 and oxygen saturation 72% on room air. She was placed on 15 L via nonrebreather mask and her oxygen saturation improved to 95%. Labs were significant for bicarbonate 19, BUN 38, creatinine 1.9, anion gap 19, procalcitonin 1.57, CRP 12.8, , mildly elevated LFTs, D-dimer 404, ferritin 5098 and a positive COVID PCR. Chest x-ray showed bilateral airspace disease. She was given 1 L normal saline bolus as well Lovenox and Decadron prior to being admitted. RRT was called on  for respiratory distress. She was transferred to ICU for intubation. She has been intermittently proned. SUBJECTIVE:    Patient seen and examined. Daughter at the bedside. Remains intubated and sedated. Records reviewed. Temp (24hrs), Av.9 °F (36.1 °C), Min:95.5 °F (35.3 °C), Max:98.1 °F (36.7 °C)    DIET: Diet NPO  ADULT TUBE FEEDING; Nasogastric; Standard with Fiber; Continuous; 10; No; 30; Q 3 hours  CODE: Full Code    Intake/Output Summary (Last 24 hours) at 2022 0921  Last data filed at 2022 0900  Gross per 24 hour   Intake 2294 ml   Output 1385 ml   Net 909 ml       Review of Systems  RAGHAV- intubated and sedated      OBJECTIVE:    BP (!) 118/48   Pulse 57   Temp 95.5 °F (35.3 °C) (Bladder)   Resp 25   Ht 5' (1.524 m)   Wt 129 lb 1.6 oz (58.6 kg)   SpO2 96%   BMI 25.21 kg/m²     General appearance: Intubated and sedated  HEENT: Normal cephalic, L ear swelling and erythema  Neck: Supple, with full range of motion. No jugular venous distention. Trachea midline. Respiratory: ETT to vent, FiO2 50%, PEEP 8  Cardiovascular: RRR, no murmur noted  Abdomen: Soft, nontender, nondistended, flat. NGT with TF  Musculoskeletal: No clubbing, cyanosis, + edema BLE  Skin: Normal skin color. L ear erythema, scatted bruises  Neurologic:  Sedated  Psychiatric:  Sedated    Medications:  REVIEWED DAILY    Infusion Medications    sodium chloride      midazolam 6 mg/hr (01/25/22 2040)    fentaNYL 5 mcg/ml in 0.9%  ml infusion 150 mcg/hr (01/26/22 0604)    [Held by provider] cisatracurium (NIMBEX) infusion 4 mcg/kg/min (01/25/22 2341)    sodium chloride      dextrose       Scheduled Medications    midodrine  5 mg Oral TID WC    hydrALAZINE        pantoprazole  40 mg IntraVENous BID    And    sodium chloride (PF)  10 mL IntraVENous Daily    hydrocortisone sodium succinate PF  100 mg IntraVENous Q12H    [Held by provider] enoxaparin  30 mg SubCUTAneous BID    docusate sodium  100 mg Oral BID    sennosides  5 mL Oral Nightly    insulin glargine-yfgn  5 Units SubCUTAneous Nightly    miconazole nitrate   Topical BID    chlorhexidine  15 mL Mouth/Throat BID    insulin lispro  0-12 Units SubCUTAneous Q4H    artificial tears   Both Eyes Q4H    polyethylene glycol  17 g Oral Daily    ipratropium-albuterol  1 ampule Inhalation Q4H WA    sodium chloride flush  5-40 mL IntraVENous 2 times per day    ascorbic acid  500 mg Oral Daily    zinc sulfate  50 mg Oral Daily    vitamin D  2,000 Units Oral Daily     PRN Meds: sodium chloride, sodium chloride flush, sodium chloride, acetaminophen **OR** acetaminophen, glucose, dextrose, glucagon (rDNA), dextrose    Labs:     Recent Labs     01/24/22  0408 01/24/22  0408 01/25/22  0554 01/26/22  0408 01/26/22  0615   WBC 15.5*  --  14.8* 15.1*  --    HGB 8.1*   < > 7.2* 7.0* 6.9*   HCT 25.7*   < > 23.0* 22.1* 22.1*   *  --  375 345  --     < > = values in this interval not displayed.        Recent Labs 01/24/22  1740 01/25/22  0554 01/26/22  0408    141 142   K 4.3 3.4* 3.9   CL 97* 101 103   CO2 35* 32* 30*   BUN 19 20 25*   CREATININE 0.7 0.6 0.6   CALCIUM 7.7* 7.9* 8.5*   PHOS 4.4 3.3 2.5       No results for input(s): PROT, ALB, ALKPHOS, ALT, AST, BILITOT, AMYLASE, LIPASE in the last 72 hours. No results for input(s): INR in the last 72 hours. No results for input(s): Connee Matar in the last 72 hours. Chronic labs:    Lab Results   Component Value Date    CHOL 145 06/29/2020    TRIG 124 01/25/2022    HDL 73 06/29/2020    LDLCALC 57 06/29/2020    TSH 3.600 06/29/2020    INR 1.0 01/14/2022    LABA1C 6.0 (H) 01/19/2022       Radiology: REVIEWED DAILY      ASSESSMENT and PLAN:    COVID-19 pneumonia-unvaccinated, symptom onset approximately 1 week prior to presentation. To continue with vitamin C, vitamin D, zinc.  Lovenox 1 mg/kg bid. · S/p decadron 10 mg PO bid x 8 days, baricitinib discontinued on 1/19. Currently on solucortef. Possible superimposed bacterial pneumonia-procalcitonin 1.57 on admission. Blood and sputum cultures with NGTD. Urine antigens negative. Vancomycin and zosyn completed. Acute hypoxic respiratory failure-2/2 above. S/p intubation 1/19. · Currently requiring FiO2 50%, PEEP 8. UTI-urine culture positive for Candida. Given Diflucan x1. Completed zosyn. Alkalemia- pH 7.504 with post hypercapnic metabolic alkalosis    Hyperglycemia-likely steroid-induced, started on SSI and lantus    Normocytic anemia- likely 2/2 phlebotomy; continue to monitor H&H    Hx asthma- states that she has never seen a pulmonologist and that it is controlled with PRN albuterol.       DISPOSITION: Continued inpatient care    +++++++++++++++++++++++++++++++++++++++++++++++++  Rozetta Karma, APRN - CNP   [employer] - Hospitalist  [address], [place]  +++++++++++++++++++++++++++++++++++++++++++++++++  NOTE: This report was transcribed using voice recognition software. Every effort was made to ensure accuracy; however, inadvertent computerized transcription errors may be present.

## 2022-01-27 ENCOUNTER — APPOINTMENT (OUTPATIENT)
Dept: GENERAL RADIOLOGY | Age: 67
DRG: 207 | End: 2022-01-27
Payer: MEDICARE

## 2022-01-27 LAB
AADO2: 367.4 MMHG
AADO2: 435.5 MMHG
ANION GAP SERPL CALCULATED.3IONS-SCNC: 12 MMOL/L (ref 7–16)
ANION GAP SERPL CALCULATED.3IONS-SCNC: 9 MMOL/L (ref 7–16)
ANION GAP SERPL CALCULATED.3IONS-SCNC: 9 MMOL/L (ref 7–16)
B.E.: 11.3 MMOL/L (ref -3–3)
B.E.: 9 MMOL/L (ref -3–3)
BUN BLDV-MCNC: 26 MG/DL (ref 6–23)
BUN BLDV-MCNC: 27 MG/DL (ref 6–23)
BUN BLDV-MCNC: 28 MG/DL (ref 6–23)
C-REACTIVE PROTEIN: 14.9 MG/DL (ref 0–0.4)
CALCIUM IONIZED: 1.29 MMOL/L (ref 1.15–1.33)
CALCIUM SERPL-MCNC: 8.3 MG/DL (ref 8.6–10.2)
CALCIUM SERPL-MCNC: 8.4 MG/DL (ref 8.6–10.2)
CALCIUM SERPL-MCNC: 8.6 MG/DL (ref 8.6–10.2)
CHLORIDE BLD-SCNC: 101 MMOL/L (ref 98–107)
CHLORIDE BLD-SCNC: 103 MMOL/L (ref 98–107)
CHLORIDE BLD-SCNC: 104 MMOL/L (ref 98–107)
CO2: 31 MMOL/L (ref 22–29)
CO2: 32 MMOL/L (ref 22–29)
CO2: 32 MMOL/L (ref 22–29)
COHB: 0.4 % (ref 0–1.5)
COHB: 0.6 % (ref 0–1.5)
CREAT SERPL-MCNC: 0.6 MG/DL (ref 0.5–1)
CREAT SERPL-MCNC: 0.7 MG/DL (ref 0.5–1)
CREAT SERPL-MCNC: 0.7 MG/DL (ref 0.5–1)
CRITICAL: ABNORMAL
CRITICAL: ABNORMAL
DATE ANALYZED: ABNORMAL
DATE ANALYZED: ABNORMAL
DATE OF COLLECTION: ABNORMAL
DATE OF COLLECTION: ABNORMAL
FERRITIN: 914 NG/ML
FIO2: 80 %
FIO2: 80 %
GFR AFRICAN AMERICAN: >60
GFR NON-AFRICAN AMERICAN: >60 ML/MIN/1.73
GLUCOSE BLD-MCNC: 118 MG/DL (ref 74–99)
GLUCOSE BLD-MCNC: 170 MG/DL (ref 74–99)
GLUCOSE BLD-MCNC: 98 MG/DL (ref 74–99)
HCO3: 34.1 MMOL/L (ref 22–26)
HCO3: 34.8 MMOL/L (ref 22–26)
HCT VFR BLD CALC: 25.6 % (ref 34–48)
HEMOGLOBIN: 8.4 G/DL (ref 11.5–15.5)
HHB: 1.8 % (ref 0–5)
HHB: 5 % (ref 0–5)
LAB: ABNORMAL
MAGNESIUM: 1.8 MG/DL (ref 1.6–2.6)
MAGNESIUM: 2.1 MG/DL (ref 1.6–2.6)
MCH RBC QN AUTO: 30.5 PG (ref 26–35)
MCHC RBC AUTO-ENTMCNC: 32.8 % (ref 32–34.5)
MCV RBC AUTO: 93.1 FL (ref 80–99.9)
METER GLUCOSE: 113 MG/DL (ref 74–99)
METER GLUCOSE: 131 MG/DL (ref 74–99)
METER GLUCOSE: 137 MG/DL (ref 74–99)
METER GLUCOSE: 143 MG/DL (ref 74–99)
METER GLUCOSE: 167 MG/DL (ref 74–99)
METER GLUCOSE: 71 MG/DL (ref 74–99)
METER GLUCOSE: 77 MG/DL (ref 74–99)
METHB: 0.2 % (ref 0–1.5)
METHB: 0.3 % (ref 0–1.5)
MODE: AC
MODE: AC
O2 CONTENT: 11.8 ML/DL
O2 CONTENT: 11.9 ML/DL
O2 SATURATION: 95 % (ref 92–98.5)
O2 SATURATION: 98.2 % (ref 92–98.5)
O2HB: 94.1 % (ref 94–97)
O2HB: 97.6 % (ref 94–97)
OPERATOR ID: 1721
OPERATOR ID: ABNORMAL
PATIENT TEMP: 37 C
PATIENT TEMP: 37 C
PCO2: 41.4 MMHG (ref 35–45)
PCO2: 50.5 MMHG (ref 35–45)
PDW BLD-RTO: 15 FL (ref 11.5–15)
PEEP/CPAP: 12 CMH2O
PEEP/CPAP: 8 CMH2O
PFO2: 0.89 MMHG/%
PFO2: 1.63 MMHG/%
PH BLOOD GAS: 7.45 (ref 7.35–7.45)
PH BLOOD GAS: 7.54 (ref 7.35–7.45)
PHOSPHORUS: 1.7 MG/DL (ref 2.5–4.5)
PHOSPHORUS: 4.1 MG/DL (ref 2.5–4.5)
PHOSPHORUS: 5.2 MG/DL (ref 2.5–4.5)
PLATELET # BLD: 329 E9/L (ref 130–450)
PMV BLD AUTO: 12 FL (ref 7–12)
PO2: 130 MMHG (ref 75–100)
PO2: 71.4 MMHG (ref 75–100)
POTASSIUM SERPL-SCNC: 3 MMOL/L (ref 3.5–5)
POTASSIUM SERPL-SCNC: 3 MMOL/L (ref 3.5–5)
POTASSIUM SERPL-SCNC: 4.4 MMOL/L (ref 3.5–5)
RBC # BLD: 2.75 E12/L (ref 3.5–5.5)
RI(T): 2.83
RI(T): 6.1
RR MECHANICAL: 24 B/MIN
RR MECHANICAL: 24 B/MIN
SODIUM BLD-SCNC: 142 MMOL/L (ref 132–146)
SODIUM BLD-SCNC: 144 MMOL/L (ref 132–146)
SODIUM BLD-SCNC: 147 MMOL/L (ref 132–146)
SOURCE, BLOOD GAS: ABNORMAL
SOURCE, BLOOD GAS: ABNORMAL
THB: 8.4 G/DL (ref 11.5–16.5)
THB: 8.9 G/DL (ref 11.5–16.5)
TIME ANALYZED: 1213
TIME ANALYZED: 438
VT MECHANICAL: 3 ML
VT MECHANICAL: 300 ML
WBC # BLD: 16.2 E9/L (ref 4.5–11.5)

## 2022-01-27 PROCEDURE — 6360000002 HC RX W HCPCS: Performed by: INTERNAL MEDICINE

## 2022-01-27 PROCEDURE — 94003 VENT MGMT INPAT SUBQ DAY: CPT

## 2022-01-27 PROCEDURE — 6370000000 HC RX 637 (ALT 250 FOR IP): Performed by: INTERNAL MEDICINE

## 2022-01-27 PROCEDURE — 83735 ASSAY OF MAGNESIUM: CPT

## 2022-01-27 PROCEDURE — 94640 AIRWAY INHALATION TREATMENT: CPT

## 2022-01-27 PROCEDURE — 2580000003 HC RX 258: Performed by: INTERNAL MEDICINE

## 2022-01-27 PROCEDURE — 82330 ASSAY OF CALCIUM: CPT

## 2022-01-27 PROCEDURE — C9113 INJ PANTOPRAZOLE SODIUM, VIA: HCPCS | Performed by: INTERNAL MEDICINE

## 2022-01-27 PROCEDURE — 36415 COLL VENOUS BLD VENIPUNCTURE: CPT

## 2022-01-27 PROCEDURE — 82962 GLUCOSE BLOOD TEST: CPT

## 2022-01-27 PROCEDURE — 2000000000 HC ICU R&B

## 2022-01-27 PROCEDURE — 71045 X-RAY EXAM CHEST 1 VIEW: CPT

## 2022-01-27 PROCEDURE — 6370000000 HC RX 637 (ALT 250 FOR IP): Performed by: STUDENT IN AN ORGANIZED HEALTH CARE EDUCATION/TRAINING PROGRAM

## 2022-01-27 PROCEDURE — 82728 ASSAY OF FERRITIN: CPT

## 2022-01-27 PROCEDURE — 6370000000 HC RX 637 (ALT 250 FOR IP)

## 2022-01-27 PROCEDURE — 6370000000 HC RX 637 (ALT 250 FOR IP): Performed by: NURSE PRACTITIONER

## 2022-01-27 PROCEDURE — 85027 COMPLETE CBC AUTOMATED: CPT

## 2022-01-27 PROCEDURE — 2580000003 HC RX 258: Performed by: FAMILY MEDICINE

## 2022-01-27 PROCEDURE — 86140 C-REACTIVE PROTEIN: CPT

## 2022-01-27 PROCEDURE — 82805 BLOOD GASES W/O2 SATURATION: CPT

## 2022-01-27 PROCEDURE — 80048 BASIC METABOLIC PNL TOTAL CA: CPT

## 2022-01-27 PROCEDURE — 74018 RADEX ABDOMEN 1 VIEW: CPT

## 2022-01-27 PROCEDURE — 2500000003 HC RX 250 WO HCPCS: Performed by: INTERNAL MEDICINE

## 2022-01-27 PROCEDURE — 84100 ASSAY OF PHOSPHORUS: CPT

## 2022-01-27 PROCEDURE — S5553 INSULIN LONG ACTING 5 U: HCPCS | Performed by: STUDENT IN AN ORGANIZED HEALTH CARE EDUCATION/TRAINING PROGRAM

## 2022-01-27 PROCEDURE — 6370000000 HC RX 637 (ALT 250 FOR IP): Performed by: FAMILY MEDICINE

## 2022-01-27 RX ORDER — 0.9 % SODIUM CHLORIDE 0.9 %
500 INTRAVENOUS SOLUTION INTRAVENOUS ONCE
Status: DISCONTINUED | OUTPATIENT
Start: 2022-01-27 | End: 2022-01-27

## 2022-01-27 RX ORDER — MIDODRINE HYDROCHLORIDE 10 MG/1
10 TABLET ORAL
Status: DISCONTINUED | OUTPATIENT
Start: 2022-01-27 | End: 2022-01-29

## 2022-01-27 RX ORDER — POTASSIUM CHLORIDE 29.8 MG/ML
40 INJECTION INTRAVENOUS ONCE
Status: COMPLETED | OUTPATIENT
Start: 2022-01-27 | End: 2022-01-27

## 2022-01-27 RX ORDER — 0.9 % SODIUM CHLORIDE 0.9 %
500 INTRAVENOUS SOLUTION INTRAVENOUS ONCE
Status: COMPLETED | OUTPATIENT
Start: 2022-01-27 | End: 2022-01-28

## 2022-01-27 RX ORDER — 0.9 % SODIUM CHLORIDE 0.9 %
250 INTRAVENOUS SOLUTION INTRAVENOUS ONCE
Status: DISCONTINUED | OUTPATIENT
Start: 2022-01-27 | End: 2022-01-27

## 2022-01-27 RX ORDER — MAGNESIUM SULFATE 1 G/100ML
1000 INJECTION INTRAVENOUS ONCE
Status: COMPLETED | OUTPATIENT
Start: 2022-01-27 | End: 2022-01-27

## 2022-01-27 RX ORDER — ACETAZOLAMIDE 500 MG/5ML
500 INJECTION, POWDER, LYOPHILIZED, FOR SOLUTION INTRAVENOUS ONCE
Status: COMPLETED | OUTPATIENT
Start: 2022-01-27 | End: 2022-01-27

## 2022-01-27 RX ORDER — FUROSEMIDE 10 MG/ML
40 INJECTION INTRAMUSCULAR; INTRAVENOUS 2 TIMES DAILY
Status: DISCONTINUED | OUTPATIENT
Start: 2022-01-27 | End: 2022-01-28

## 2022-01-27 RX ADMIN — INSULIN LISPRO 2 UNITS: 100 INJECTION, SOLUTION INTRAVENOUS; SUBCUTANEOUS at 12:15

## 2022-01-27 RX ADMIN — MIDODRINE HYDROCHLORIDE 10 MG: 10 TABLET ORAL at 22:13

## 2022-01-27 RX ADMIN — Medication 150 MCG/HR: at 06:48

## 2022-01-27 RX ADMIN — Medication 2000 UNITS: at 08:09

## 2022-01-27 RX ADMIN — ZINC SULFATE 220 MG (50 MG) CAPSULE 50 MG: CAPSULE at 08:07

## 2022-01-27 RX ADMIN — INSULIN GLARGINE-YFGN 5 UNITS: 100 INJECTION, SOLUTION SUBCUTANEOUS at 20:21

## 2022-01-27 RX ADMIN — SENNOSIDES 5 ML: 8.8 SYRUP ORAL at 20:20

## 2022-01-27 RX ADMIN — POLYETHYLENE GLYCOL 3350 17 G: 17 POWDER, FOR SOLUTION ORAL at 08:08

## 2022-01-27 RX ADMIN — INSULIN LISPRO 2 UNITS: 100 INJECTION, SOLUTION INTRAVENOUS; SUBCUTANEOUS at 23:39

## 2022-01-27 RX ADMIN — Medication: at 20:20

## 2022-01-27 RX ADMIN — FUROSEMIDE 40 MG: 10 INJECTION, SOLUTION INTRAMUSCULAR; INTRAVENOUS at 18:05

## 2022-01-27 RX ADMIN — IPRATROPIUM BROMIDE AND ALBUTEROL SULFATE 1 AMPULE: .5; 2.5 SOLUTION RESPIRATORY (INHALATION) at 09:18

## 2022-01-27 RX ADMIN — CHLORHEXIDINE GLUCONATE 0.12% ORAL RINSE 15 ML: 1.2 LIQUID ORAL at 08:08

## 2022-01-27 RX ADMIN — MINERAL OIL AND WHITE PETROLATUM: 150; 830 OINTMENT OPHTHALMIC at 23:34

## 2022-01-27 RX ADMIN — Medication 150 MCG/HR: at 00:06

## 2022-01-27 RX ADMIN — MINERAL OIL AND WHITE PETROLATUM: 150; 830 OINTMENT OPHTHALMIC at 02:31

## 2022-01-27 RX ADMIN — POTASSIUM CHLORIDE 40 MEQ: 29.8 INJECTION, SOLUTION INTRAVENOUS at 07:46

## 2022-01-27 RX ADMIN — OXYCODONE HYDROCHLORIDE AND ACETAMINOPHEN 500 MG: 500 TABLET ORAL at 08:07

## 2022-01-27 RX ADMIN — FUROSEMIDE 40 MG: 10 INJECTION, SOLUTION INTRAMUSCULAR; INTRAVENOUS at 11:55

## 2022-01-27 RX ADMIN — POTASSIUM PHOSPHATE, MONOBASIC POTASSIUM PHOSPHATE, DIBASIC 20 MMOL: 224; 236 INJECTION, SOLUTION, CONCENTRATE INTRAVENOUS at 09:52

## 2022-01-27 RX ADMIN — MIDODRINE HYDROCHLORIDE 10 MG: 10 TABLET ORAL at 11:55

## 2022-01-27 RX ADMIN — PANTOPRAZOLE SODIUM 40 MG: 40 INJECTION, POWDER, FOR SOLUTION INTRAVENOUS at 08:08

## 2022-01-27 RX ADMIN — MINERAL OIL AND WHITE PETROLATUM: 150; 830 OINTMENT OPHTHALMIC at 15:24

## 2022-01-27 RX ADMIN — ACETAZOLAMIDE 500 MG: 500 INJECTION, POWDER, LYOPHILIZED, FOR SOLUTION INTRAVENOUS at 11:54

## 2022-01-27 RX ADMIN — MIDODRINE HYDROCHLORIDE 5 MG: 5 TABLET ORAL at 08:09

## 2022-01-27 RX ADMIN — IPRATROPIUM BROMIDE AND ALBUTEROL SULFATE 1 AMPULE: .5; 2.5 SOLUTION RESPIRATORY (INHALATION) at 16:45

## 2022-01-27 RX ADMIN — MAGNESIUM SULFATE 1000 MG: 1 INJECTION INTRAVENOUS at 07:45

## 2022-01-27 RX ADMIN — PANTOPRAZOLE SODIUM 40 MG: 40 INJECTION, POWDER, FOR SOLUTION INTRAVENOUS at 20:21

## 2022-01-27 RX ADMIN — DOCUSATE SODIUM 100 MG: 50 LIQUID ORAL at 08:08

## 2022-01-27 RX ADMIN — MIDAZOLAM 7 MG/HR: 5 INJECTION INTRAMUSCULAR; INTRAVENOUS at 23:42

## 2022-01-27 RX ADMIN — MINERAL OIL AND WHITE PETROLATUM: 150; 830 OINTMENT OPHTHALMIC at 18:06

## 2022-01-27 RX ADMIN — MIDAZOLAM 6 MG/HR: 5 INJECTION INTRAMUSCULAR; INTRAVENOUS at 12:36

## 2022-01-27 RX ADMIN — Medication 150 MCG/HR: at 15:26

## 2022-01-27 RX ADMIN — IPRATROPIUM BROMIDE AND ALBUTEROL SULFATE 1 AMPULE: .5; 2.5 SOLUTION RESPIRATORY (INHALATION) at 13:05

## 2022-01-27 RX ADMIN — SODIUM CHLORIDE, PRESERVATIVE FREE 10 ML: 5 INJECTION INTRAVENOUS at 08:08

## 2022-01-27 RX ADMIN — MIDAZOLAM 6 MG/HR: 5 INJECTION INTRAMUSCULAR; INTRAVENOUS at 00:06

## 2022-01-27 RX ADMIN — HYDROCORTISONE SODIUM SUCCINATE 50 MG: 100 INJECTION, POWDER, FOR SOLUTION INTRAMUSCULAR; INTRAVENOUS at 15:35

## 2022-01-27 RX ADMIN — DOCUSATE SODIUM 100 MG: 50 LIQUID ORAL at 20:19

## 2022-01-27 RX ADMIN — HYDROCORTISONE SODIUM SUCCINATE 100 MG: 100 INJECTION, POWDER, FOR SOLUTION INTRAMUSCULAR; INTRAVENOUS at 02:30

## 2022-01-27 RX ADMIN — Medication 10 ML: at 08:09

## 2022-01-27 RX ADMIN — Medication 150 MCG/HR: at 23:39

## 2022-01-27 RX ADMIN — CHLORHEXIDINE GLUCONATE 0.12% ORAL RINSE 15 ML: 1.2 LIQUID ORAL at 20:19

## 2022-01-27 RX ADMIN — MINERAL OIL AND WHITE PETROLATUM: 150; 830 OINTMENT OPHTHALMIC at 07:44

## 2022-01-27 RX ADMIN — Medication 10 ML: at 20:21

## 2022-01-27 RX ADMIN — SODIUM CHLORIDE 500 ML: 9 INJECTION, SOLUTION INTRAVENOUS at 23:32

## 2022-01-27 RX ADMIN — MINERAL OIL AND WHITE PETROLATUM: 150; 830 OINTMENT OPHTHALMIC at 11:55

## 2022-01-27 RX ADMIN — IPRATROPIUM BROMIDE AND ALBUTEROL SULFATE 1 AMPULE: .5; 2.5 SOLUTION RESPIRATORY (INHALATION) at 20:32

## 2022-01-27 RX ADMIN — Medication: at 08:08

## 2022-01-27 ASSESSMENT — PAIN SCALES - GENERAL
PAINLEVEL_OUTOF10: 0

## 2022-01-27 ASSESSMENT — PULMONARY FUNCTION TESTS
PIF_VALUE: 37
PIF_VALUE: 18
PIF_VALUE: 29
PIF_VALUE: 18
PIF_VALUE: 28
PIF_VALUE: 25
PIF_VALUE: 19
PIF_VALUE: 23
PIF_VALUE: 30
PIF_VALUE: 21
PIF_VALUE: 17
PIF_VALUE: 23
PIF_VALUE: 18
PIF_VALUE: 19
PIF_VALUE: 21
PIF_VALUE: 17
PIF_VALUE: 28
PIF_VALUE: 21
PIF_VALUE: 22
PIF_VALUE: 18
PIF_VALUE: 29
PIF_VALUE: 41

## 2022-01-27 NOTE — PROGRESS NOTES
ICU Attending Addendum    Yazan Rio was seen, examined and discussed with the multi-disciplinary ICU team during rounds. I have personally seen and examined the patient and the key elements of the encounter were performed by me. All other information is noted in the ICU team note. Briefly,    This is a 27-year-old female with history of asthma unvaccinated presents with respiratory failure and SAMUEL due to dehydration secondary to decreased p.o. intake and diarrhea admitted 1/14. Was being treated with steroids and baricitinib. Patient had decline in her respiratory status transferred to ICU and intubated on 1/19.    1/25: P/F improved  1/26: mell supine    Assessment:    1. Acute respiratory failure require mechanical ventilation 1/19  2. Critically severe COVID-19 pneumonia  3. Possible superimposed bacterial pneumonia  4. Vasodilatory shock - resolved       Deep sedation   Continue mechanical ventilation with low lung VT protective strategy, vent adjsted   Midodrine 10 mg 3 times daily   Solu-Cortef wean as mell   Lasix 40 q12, dose of diaamox   Tube feeds, bowel regiment      GI/DVT - SUP/Lovenox 30 mg BID  Consultants: Nephro    Daughter updated.     Kennerdell Standard, DO

## 2022-01-27 NOTE — PROGRESS NOTES
Hospitalist Progress Note      SYNOPSIS:     Ms. Law Felder is a 78y.o. year old female who has a PMH of asthma.     She presented to the ER on 22 with complaints of SOB and diarrhea x 1 week. She was not vaccinated against COVID-19. Reportedly her daughter found her curled into the fetal position in respiratory distress at home and called 911. Vitals on arrival were significant for temperature 103.2, heart rate 110, blood pressure 122/70 and oxygen saturation 72% on room air. She was placed on 15 L via nonrebreather mask and her oxygen saturation improved to 95%. Labs were significant for bicarbonate 19, BUN 38, creatinine 1.9, anion gap 19, procalcitonin 1.57, CRP 12.8, , mildly elevated LFTs, D-dimer 404, ferritin 5098 and a positive COVID PCR. Chest x-ray showed bilateral airspace disease. She was given 1 L normal saline bolus as well Lovenox and Decadron prior to being admitted. RRT was called on  for respiratory distress. She was transferred to ICU for intubation. She has been intermittently proned. SUBJECTIVE:    Patient seen and examined. Remains intubated and sedated. Records reviewed. Temp (24hrs), Av °F (36.1 °C), Min:95.2 °F (35.1 °C), Max:99 °F (37.2 °C)    DIET: Diet NPO  ADULT TUBE FEEDING; Nasogastric; Standard with Fiber; Continuous; 10; Yes; 10; Q 4 hours; 40; 30; Q 3 hours  CODE: Full Code    Intake/Output Summary (Last 24 hours) at 2022 0942  Last data filed at 2022 0800  Gross per 24 hour   Intake 2131 ml   Output 2070 ml   Net 61 ml       Review of Systems  RAGHAV- intubated and sedated      OBJECTIVE:    BP (!) 102/48   Pulse 80   Temp 99 °F (37.2 °C) (Esophageal)   Resp 23   Ht 5' (1.524 m)   Wt 128 lb 4 oz (58.2 kg)   SpO2 95%   BMI 25.05 kg/m²     General appearance: Intubated and sedated  HEENT: Normal cephalic, facial edema noted  Neck: Supple, with full range of motion. No jugular venous distention.  Trachea midline. Respiratory: ETT to vent, FiO2 80%, PEEP 12  Cardiovascular: RRR, no murmur noted  Abdomen: Soft, nontender, nondistended, flat. NGT clamped. Musculoskeletal: No clubbing, cyanosis, + edema generalized  Skin: Normal skin color.  scatted bruises  Neurologic:  Sedated  Psychiatric:  Sedated    Medications:  REVIEWED DAILY    Infusion Medications    sodium chloride      midazolam 6 mg/hr (01/27/22 0006)    fentaNYL 5 mcg/ml in 0.9%  ml infusion 150 mcg/hr (01/27/22 0648)    [Held by provider] cisatracurium (NIMBEX) infusion 4 mcg/kg/min (01/25/22 2341)    sodium chloride      dextrose       Scheduled Medications    potassium chloride  40 mEq IntraVENous Once    potassium phosphate IVPB  20 mmol IntraVENous Once    hydrocortisone sodium succinate PF  50 mg IntraVENous Q12H    midodrine  5 mg Oral TID WC    pantoprazole  40 mg IntraVENous BID    And    sodium chloride (PF)  10 mL IntraVENous Daily    [Held by provider] enoxaparin  30 mg SubCUTAneous BID    docusate sodium  100 mg Oral BID    sennosides  5 mL Oral Nightly    insulin glargine-yfgn  5 Units SubCUTAneous Nightly    miconazole nitrate   Topical BID    chlorhexidine  15 mL Mouth/Throat BID    insulin lispro  0-12 Units SubCUTAneous Q4H    artificial tears   Both Eyes Q4H    polyethylene glycol  17 g Oral Daily    ipratropium-albuterol  1 ampule Inhalation Q4H WA    sodium chloride flush  5-40 mL IntraVENous 2 times per day    ascorbic acid  500 mg Oral Daily    zinc sulfate  50 mg Oral Daily    vitamin D  2,000 Units Oral Daily     PRN Meds: sodium chloride, sodium chloride flush, sodium chloride, acetaminophen **OR** acetaminophen, glucose, dextrose, glucagon (rDNA), dextrose    Labs:     Recent Labs     01/25/22  0554 01/25/22  0554 01/26/22  0408 01/26/22  0615 01/26/22  1115 01/26/22  1730 01/27/22  0430   WBC 14.8*  --  15.1*  --   --   --  16.2*   HGB 7.2*   < > 7.0*   < > 8.2* 8.7* 8.4*   HCT 23.0*   < > 22.1* < > 25.9* 26.8* 25.6*     --  345  --   --   --  329    < > = values in this interval not displayed. Recent Labs     01/25/22  0554 01/26/22  0408 01/27/22  0430    142 142   K 3.4* 3.9 3.0*    103 101   CO2 32* 30* 32*   BUN 20 25* 26*   CREATININE 0.6 0.6 0.7   CALCIUM 7.9* 8.5* 8.4*   PHOS 3.3 2.5 1.7*       No results for input(s): PROT, ALB, ALKPHOS, ALT, AST, BILITOT, AMYLASE, LIPASE in the last 72 hours. No results for input(s): INR in the last 72 hours. No results for input(s): Dg Osmany in the last 72 hours. Chronic labs:    Lab Results   Component Value Date    CHOL 145 06/29/2020    TRIG 124 01/25/2022    HDL 73 06/29/2020    LDLCALC 57 06/29/2020    TSH 3.600 06/29/2020    INR 1.0 01/14/2022    LABA1C 6.0 (H) 01/19/2022       Radiology: REVIEWED DAILY      ASSESSMENT and PLAN:    COVID-19 pneumonia-unvaccinated, symptom onset approximately 1 week prior to presentation. To continue with vitamin C, vitamin D, zinc.  Lovenox 1 mg/kg bid. · S/p decadron 10 mg PO bid x 8 days, baricitinib discontinued on 1/19. Currently on solucortef. Possible superimposed bacterial pneumonia-procalcitonin 1.57 on admission. Blood and sputum cultures with NGTD. Urine antigens negative. Vancomycin and zosyn completed. Acute hypoxic respiratory failure-2/2 above. S/p intubation 1/19. · Currently requiring FiO2 80%, PEEP 12. UTI-urine culture positive for Candida. Given Diflucan x1. Completed zosyn. Normal pH 7.447- with respiratory acidosis and post hypercapnic/contraction metabolic alkalosis- given diamox    Hyperglycemia-likely steroid-induced, started on SSI and lantus    Normocytic anemia- likely 2/2 phlebotomy; continue to monitor H&H    Hx asthma- states that she has never seen a pulmonologist and that it is controlled with PRN albuterol.       DISPOSITION: Continued inpatient care    +++++++++++++++++++++++++++++++++++++++++++++++++  Madeline Brian, APRN - CNP [address], [address]  +++++++++++++++++++++++++++++++++++++++++++++++++  NOTE: This report was transcribed using voice recognition software. Every effort was made to ensure accuracy; however, inadvertent computerized transcription errors may be present.

## 2022-01-27 NOTE — PLAN OF CARE
Problem:  Body Temperature -  Risk of, Imbalanced  Goal: Ability to maintain a body temperature within defined limits  Outcome: Met This Shift     Problem: Risk for Fluid Volume Deficit  Goal: Maintain normal heart rhythm  Outcome: Met This Shift     Problem: Falls - Risk of:  Goal: Will remain free from falls  Description: Will remain free from falls  Outcome: Met This Shift  Goal: Absence of physical injury  Description: Absence of physical injury  Outcome: Met This Shift     Problem: Skin Integrity:  Goal: Will show no infection signs and symptoms  Description: Will show no infection signs and symptoms  Outcome: Met This Shift  Goal: Absence of new skin breakdown  Description: Absence of new skin breakdown  Outcome: Met This Shift

## 2022-01-27 NOTE — PROGRESS NOTES
Department of Internal Medicine  Nephrology  Progress Note    Events Reviewed. SUBJECTIVE:  We are following Ms. Gloria Rodriguez for SAMUEL. Patient is intubated and supine. PHYSICAL EXAM:    Vitals:    VITALS:  BP (!) 117/51   Pulse 86   Temp 98.6 °F (37 °C) (Esophageal)   Resp 28   Ht 5' (1.524 m)   Wt 128 lb 4 oz (58.2 kg)   SpO2 92%   BMI 25.05 kg/m²   24HR INTAKE/OUTPUT:      Intake/Output Summary (Last 24 hours) at 1/27/2022 1026  Last data filed at 1/27/2022 1000  Gross per 24 hour   Intake 2131 ml   Output 2085 ml   Net 46 ml     General appearance: Patient is intubated, sedated  HEENT: Normal cephalic, atraumatic without obvious deformity. Pupils equal, round, and reactive to light. Endotracheal tube in place  Neck: Supple, with full range of motion. No jugular venous distention. Trachea midline. Respiratory: Diminished bilaterally, intubated. Cardiovascular: RRR, no murmur noted  Abdomen: Soft, nontender, nondistended, flat  Musculoskeletal: No clubbing or cyanosis. 1+ edema of bilateral lower extremities. Brisk capillary refill. Skin: Normal skin color.  No rashes. Scattered ecchymosis.   Neurologic: Patient sedated        Scheduled Meds:   potassium chloride  40 mEq IntraVENous Once    potassium phosphate IVPB  20 mmol IntraVENous Once    hydrocortisone sodium succinate PF  50 mg IntraVENous Q12H    midodrine  5 mg Oral TID WC    pantoprazole  40 mg IntraVENous BID    And    sodium chloride (PF)  10 mL IntraVENous Daily    [Held by provider] enoxaparin  30 mg SubCUTAneous BID    docusate sodium  100 mg Oral BID    sennosides  5 mL Oral Nightly    insulin glargine-yfgn  5 Units SubCUTAneous Nightly    miconazole nitrate   Topical BID    chlorhexidine  15 mL Mouth/Throat BID    insulin lispro  0-12 Units SubCUTAneous Q4H    artificial tears   Both Eyes Q4H    polyethylene glycol  17 g Oral Daily    ipratropium-albuterol  1 ampule Inhalation Q4H WA    sodium chloride flush 5-40 mL IntraVENous 2 times per day    ascorbic acid  500 mg Oral Daily    zinc sulfate  50 mg Oral Daily    vitamin D  2,000 Units Oral Daily     Continuous Infusions:   sodium chloride      midazolam 6 mg/hr (01/27/22 0006)    fentaNYL 5 mcg/ml in 0.9%  ml infusion 150 mcg/hr (01/27/22 0648)    [Held by provider] cisatracurium (NIMBEX) infusion 4 mcg/kg/min (01/25/22 2341)    sodium chloride      dextrose       PRN Meds:.sodium chloride, sodium chloride flush, sodium chloride, acetaminophen **OR** acetaminophen, glucose, dextrose, glucagon (rDNA), dextrose    DATA:    CBC with Differential:    Lab Results   Component Value Date    WBC 16.2 01/27/2022    RBC 2.75 01/27/2022    HGB 8.4 01/27/2022    HCT 25.6 01/27/2022     01/27/2022    MCV 93.1 01/27/2022    MCH 30.5 01/27/2022    MCHC 32.8 01/27/2022    RDW 15.0 01/27/2022    METASPCT 0.9 01/18/2022    LYMPHOPCT 2.7 01/18/2022    MONOPCT 11.6 01/18/2022    MYELOPCT 0.9 01/14/2022    BASOPCT 0.1 01/18/2022    MONOSABS 1.38 01/18/2022    LYMPHSABS 0.35 01/18/2022    EOSABS 0.00 01/18/2022    BASOSABS 0.00 01/18/2022     CMP:    Lab Results   Component Value Date     01/27/2022    K 3.0 01/27/2022    K 3.8 01/18/2022     01/27/2022    CO2 32 01/27/2022    BUN 26 01/27/2022    CREATININE 0.7 01/27/2022    GFRAA >60 01/27/2022    LABGLOM >60 01/27/2022    GLUCOSE 118 01/27/2022    PROT 5.2 01/22/2022    LABALBU 2.5 01/22/2022    CALCIUM 8.4 01/27/2022    BILITOT 0.2 01/22/2022    ALKPHOS 63 01/22/2022    AST 15 01/22/2022    ALT 23 01/22/2022     Magnesium:    Lab Results   Component Value Date    MG 1.8 01/27/2022     Phosphorus:    Lab Results   Component Value Date    PHOS 1.7 01/27/2022          Radiology Review:        CXR 1/26/2022   Mild decrease in the diffuse left lung infiltrates, when compared to the   previous study performed 1 day earlier.  Diffuse right lung infiltrates   without significant interval change.     BRIEF SUMMARY OF INITIAL CONSULT:     Briefly, Ms. Sally Bradshaw is a 78year old female with a past medical history of Asthma who presented to the ER on January 14 th, 2022 with complaints of SOB and diarrhea for one week. She is not vaccinated against COVID. Reportedly her daughter found her curled into a fetal position in respiratory distress at home and EMS was summoned. She was found to be COVID 19 positive. Labs were significant for bicarbonate 19, BUN 38, creatinine 1.9, anion gap 19, procalcitonin 1.57, CRP 12.8, , mildly elevated LFTs, D-dimer 404, ferritin 5098. Chest x-ray showed bilateral airspace disease. Renal is consulted for SAMUEL. Problems Resolved:     · SAMUEL, likely pre-renal volume responsive SAMUEL secondary to poor oral intake and GI losses (diarrhea). Creatinine 1.9mg/dL on admission. Renal function has improved to 0.6 mg/dL with IVF administration. · HAGMA with low bicarbonate levels, secondary to uremia. To continue bicarbonate drip  · Hypokalemia, 2/2 poor oral intake, potassium levels improved. · hypernatremia, with water deficit, dehydration due to poor intake. Sodium levels quite improved. Resolved. · Hypophosphatemia, 2/2 poor intake, to replace  · Hypokalemia likely 2/2 diuresis renal losses. IMPRESSION/RECOMMENDATIONS:        1. Hypocalcemia, vitamin D 25 level 39, calcium levels 8.5  2. Metabolic alkalosis 0.956, pCO2 50.5, bicarb 34  3. ----------------------------------------  4. COVID +, on hydrocortisone   5. Acute hypoxic respiratory failure, secondary to COVID pneumonia. Status post intubation  6. Normocytic anemia, hemoglobin 8.4. monitoring. 7. Nutrition, receiving tube feeds. Plan:    · Order Lasix 40 mg BID for today  · Increase midodrine to 10mg TID.    · Replace phosphorus, 20 mmol potassium phosphate  · Replace potassium 40 mEq  · Replace magnesium 1g  · Agree with solucortef and addition of antibiotics      Electronically signed by Matteo Bruno Sushil Yoon on 1/27/2022 at 10:26 AM    Discussed with MICU team  Agree as annotated  Sadie Lott MD

## 2022-01-28 ENCOUNTER — APPOINTMENT (OUTPATIENT)
Dept: GENERAL RADIOLOGY | Age: 67
DRG: 207 | End: 2022-01-28
Payer: MEDICARE

## 2022-01-28 LAB
AADO2: 378.7 MMHG
AADO2: 381.5 MMHG
AMORPHOUS: ABNORMAL
ANION GAP SERPL CALCULATED.3IONS-SCNC: 10 MMOL/L (ref 7–16)
ANION GAP SERPL CALCULATED.3IONS-SCNC: 9 MMOL/L (ref 7–16)
B.E.: 6.4 MMOL/L (ref -3–3)
B.E.: 6.4 MMOL/L (ref -3–3)
BACTERIA: ABNORMAL /HPF
BILIRUBIN URINE: NEGATIVE
BLOOD CULTURE, ROUTINE: NORMAL
BLOOD, URINE: ABNORMAL
BUN BLDV-MCNC: 28 MG/DL (ref 6–23)
BUN BLDV-MCNC: 28 MG/DL (ref 6–23)
C-REACTIVE PROTEIN: 15.4 MG/DL (ref 0–0.4)
CALCIUM IONIZED: 1.21 MMOL/L (ref 1.15–1.33)
CALCIUM SERPL-MCNC: 8.4 MG/DL (ref 8.6–10.2)
CALCIUM SERPL-MCNC: 8.8 MG/DL (ref 8.6–10.2)
CHLORIDE BLD-SCNC: 105 MMOL/L (ref 98–107)
CHLORIDE BLD-SCNC: 107 MMOL/L (ref 98–107)
CLARITY: CLEAR
CO2: 29 MMOL/L (ref 22–29)
CO2: 32 MMOL/L (ref 22–29)
COHB: 0.2 % (ref 0–1.5)
COHB: 1.1 % (ref 0–1.5)
COLOR: YELLOW
CREAT SERPL-MCNC: 0.6 MG/DL (ref 0.5–1)
CREAT SERPL-MCNC: 0.7 MG/DL (ref 0.5–1)
CRITICAL: ABNORMAL
CRITICAL: ABNORMAL
CULTURE, BLOOD 2: NORMAL
D DIMER: 1700 NG/ML DDU
DATE ANALYZED: ABNORMAL
DATE ANALYZED: ABNORMAL
DATE OF COLLECTION: ABNORMAL
DATE OF COLLECTION: ABNORMAL
EPITHELIAL CELLS, UA: ABNORMAL /HPF
FIO2: 75 %
FIO2: 75 %
GFR AFRICAN AMERICAN: >60
GFR AFRICAN AMERICAN: >60
GFR NON-AFRICAN AMERICAN: >60 ML/MIN/1.73
GFR NON-AFRICAN AMERICAN: >60 ML/MIN/1.73
GLUCOSE BLD-MCNC: 76 MG/DL (ref 74–99)
GLUCOSE BLD-MCNC: 79 MG/DL (ref 74–99)
GLUCOSE URINE: NEGATIVE MG/DL
HCO3: 31.2 MMOL/L (ref 22–26)
HCO3: 31.3 MMOL/L (ref 22–26)
HCT VFR BLD CALC: 23.6 % (ref 34–48)
HEMOGLOBIN: 7.5 G/DL (ref 11.5–15.5)
HHB: 3.7 % (ref 0–5)
HHB: 3.9 % (ref 0–5)
KETONES, URINE: NEGATIVE MG/DL
LAB: ABNORMAL
LAB: ABNORMAL
LEUKOCYTE ESTERASE, URINE: ABNORMAL
Lab: ABNORMAL
Lab: ABNORMAL
MAGNESIUM: 2.1 MG/DL (ref 1.6–2.6)
MAGNESIUM: 2.1 MG/DL (ref 1.6–2.6)
MCH RBC QN AUTO: 29.9 PG (ref 26–35)
MCHC RBC AUTO-ENTMCNC: 31.8 % (ref 32–34.5)
MCV RBC AUTO: 94 FL (ref 80–99.9)
METER GLUCOSE: 103 MG/DL (ref 74–99)
METER GLUCOSE: 116 MG/DL (ref 74–99)
METER GLUCOSE: 72 MG/DL (ref 74–99)
METER GLUCOSE: 75 MG/DL (ref 74–99)
METER GLUCOSE: 79 MG/DL (ref 74–99)
METER GLUCOSE: 96 MG/DL (ref 74–99)
METHB: 0.2 % (ref 0–1.5)
METHB: 0.5 % (ref 0–1.5)
MODE: AC
MODE: AC
NITRITE, URINE: NEGATIVE
O2 CONTENT: 11.1 ML/DL
O2 CONTENT: 13.2 ML/DL
O2 SATURATION: 96.1 % (ref 92–98.5)
O2 SATURATION: 96.2 % (ref 92–98.5)
O2HB: 94.7 % (ref 94–97)
O2HB: 95.7 % (ref 94–97)
OPERATOR ID: 2593
OPERATOR ID: 359
PATIENT TEMP: 37 C
PATIENT TEMP: 37 C
PCO2: 46 MMHG (ref 35–45)
PCO2: 47.1 MMHG (ref 35–45)
PDW BLD-RTO: 15.1 FL (ref 11.5–15)
PEEP/CPAP: 12 CMH2O
PEEP/CPAP: 14 CMH2O
PFO2: 1.14 MMHG/%
PFO2: 1.16 MMHG/%
PH BLOOD GAS: 7.44 (ref 7.35–7.45)
PH BLOOD GAS: 7.45 (ref 7.35–7.45)
PH UA: 6 (ref 5–9)
PHOSPHORUS: 2.3 MG/DL (ref 2.5–4.5)
PHOSPHORUS: 4 MG/DL (ref 2.5–4.5)
PLATELET # BLD: 262 E9/L (ref 130–450)
PMV BLD AUTO: 11.8 FL (ref 7–12)
PO2: 85.6 MMHG (ref 75–100)
PO2: 87.3 MMHG (ref 75–100)
POTASSIUM SERPL-SCNC: 2.8 MMOL/L (ref 3.5–5)
POTASSIUM SERPL-SCNC: 4.2 MMOL/L (ref 3.5–5)
PROTEIN UA: 100 MG/DL
RBC # BLD: 2.51 E12/L (ref 3.5–5.5)
RBC UA: ABNORMAL /HPF (ref 0–2)
RI(T): 4.34
RI(T): 4.46
RR MECHANICAL: 24 B/MIN
RR MECHANICAL: 24 B/MIN
SODIUM BLD-SCNC: 145 MMOL/L (ref 132–146)
SODIUM BLD-SCNC: 147 MMOL/L (ref 132–146)
SOURCE, BLOOD GAS: ABNORMAL
SOURCE, BLOOD GAS: ABNORMAL
SPECIFIC GRAVITY UA: 1.01 (ref 1–1.03)
THB: 8.2 G/DL (ref 11.5–16.5)
THB: 9.7 G/DL (ref 11.5–16.5)
TIME ANALYZED: 1537
TIME ANALYZED: 424
UROBILINOGEN, URINE: 1 E.U./DL
VT MECHANICAL: 300 ML
VT MECHANICAL: 300 ML
WBC # BLD: 11 E9/L (ref 4.5–11.5)
WBC UA: ABNORMAL /HPF (ref 0–5)
YEAST: PRESENT /HPF

## 2022-01-28 PROCEDURE — 6360000002 HC RX W HCPCS: Performed by: INTERNAL MEDICINE

## 2022-01-28 PROCEDURE — 94003 VENT MGMT INPAT SUBQ DAY: CPT

## 2022-01-28 PROCEDURE — 85378 FIBRIN DEGRADE SEMIQUANT: CPT

## 2022-01-28 PROCEDURE — 82330 ASSAY OF CALCIUM: CPT

## 2022-01-28 PROCEDURE — 83735 ASSAY OF MAGNESIUM: CPT

## 2022-01-28 PROCEDURE — 2580000003 HC RX 258: Performed by: INTERNAL MEDICINE

## 2022-01-28 PROCEDURE — 2500000003 HC RX 250 WO HCPCS: Performed by: INTERNAL MEDICINE

## 2022-01-28 PROCEDURE — 85027 COMPLETE CBC AUTOMATED: CPT

## 2022-01-28 PROCEDURE — 2580000003 HC RX 258

## 2022-01-28 PROCEDURE — 82962 GLUCOSE BLOOD TEST: CPT

## 2022-01-28 PROCEDURE — 82805 BLOOD GASES W/O2 SATURATION: CPT

## 2022-01-28 PROCEDURE — 81001 URINALYSIS AUTO W/SCOPE: CPT

## 2022-01-28 PROCEDURE — 71045 X-RAY EXAM CHEST 1 VIEW: CPT

## 2022-01-28 PROCEDURE — 6370000000 HC RX 637 (ALT 250 FOR IP): Performed by: FAMILY MEDICINE

## 2022-01-28 PROCEDURE — 6360000002 HC RX W HCPCS: Performed by: STUDENT IN AN ORGANIZED HEALTH CARE EDUCATION/TRAINING PROGRAM

## 2022-01-28 PROCEDURE — 2500000003 HC RX 250 WO HCPCS

## 2022-01-28 PROCEDURE — 80048 BASIC METABOLIC PNL TOTAL CA: CPT

## 2022-01-28 PROCEDURE — 84145 PROCALCITONIN (PCT): CPT

## 2022-01-28 PROCEDURE — 6370000000 HC RX 637 (ALT 250 FOR IP): Performed by: NURSE PRACTITIONER

## 2022-01-28 PROCEDURE — 6370000000 HC RX 637 (ALT 250 FOR IP): Performed by: INTERNAL MEDICINE

## 2022-01-28 PROCEDURE — 6360000002 HC RX W HCPCS

## 2022-01-28 PROCEDURE — 6370000000 HC RX 637 (ALT 250 FOR IP)

## 2022-01-28 PROCEDURE — 2580000003 HC RX 258: Performed by: FAMILY MEDICINE

## 2022-01-28 PROCEDURE — 36415 COLL VENOUS BLD VENIPUNCTURE: CPT

## 2022-01-28 PROCEDURE — 84100 ASSAY OF PHOSPHORUS: CPT

## 2022-01-28 PROCEDURE — C9113 INJ PANTOPRAZOLE SODIUM, VIA: HCPCS | Performed by: INTERNAL MEDICINE

## 2022-01-28 PROCEDURE — 2000000000 HC ICU R&B

## 2022-01-28 PROCEDURE — 86140 C-REACTIVE PROTEIN: CPT

## 2022-01-28 PROCEDURE — 94640 AIRWAY INHALATION TREATMENT: CPT

## 2022-01-28 RX ORDER — PROPOFOL 10 MG/ML
INJECTION, EMULSION INTRAVENOUS
Status: COMPLETED
Start: 2022-01-28 | End: 2022-01-28

## 2022-01-28 RX ORDER — POTASSIUM CHLORIDE 29.8 MG/ML
40 INJECTION INTRAVENOUS
Status: COMPLETED | OUTPATIENT
Start: 2022-01-28 | End: 2022-01-28

## 2022-01-28 RX ORDER — PROPOFOL 10 MG/ML
5-50 INJECTION, EMULSION INTRAVENOUS
Status: DISCONTINUED | OUTPATIENT
Start: 2022-01-28 | End: 2022-01-29

## 2022-01-28 RX ORDER — FUROSEMIDE 10 MG/ML
40 INJECTION INTRAMUSCULAR; INTRAVENOUS DAILY
Status: DISCONTINUED | OUTPATIENT
Start: 2022-01-29 | End: 2022-01-29

## 2022-01-28 RX ADMIN — CHLORHEXIDINE GLUCONATE 0.12% ORAL RINSE 15 ML: 1.2 LIQUID ORAL at 08:11

## 2022-01-28 RX ADMIN — DOCUSATE SODIUM 100 MG: 50 LIQUID ORAL at 22:26

## 2022-01-28 RX ADMIN — MINERAL OIL AND WHITE PETROLATUM: 150; 830 OINTMENT OPHTHALMIC at 15:07

## 2022-01-28 RX ADMIN — MIDODRINE HYDROCHLORIDE 10 MG: 10 TABLET ORAL at 12:26

## 2022-01-28 RX ADMIN — MIDAZOLAM 7 MG/HR: 5 INJECTION INTRAMUSCULAR; INTRAVENOUS at 12:00

## 2022-01-28 RX ADMIN — MINERAL OIL AND WHITE PETROLATUM: 150; 830 OINTMENT OPHTHALMIC at 11:14

## 2022-01-28 RX ADMIN — POTASSIUM CHLORIDE 40 MEQ: 29.8 INJECTION, SOLUTION INTRAVENOUS at 07:19

## 2022-01-28 RX ADMIN — POTASSIUM PHOSPHATE, MONOBASIC POTASSIUM PHOSPHATE, DIBASIC 15 MMOL: 224; 236 INJECTION, SOLUTION, CONCENTRATE INTRAVENOUS at 22:05

## 2022-01-28 RX ADMIN — Medication 150 MCG/HR: at 12:00

## 2022-01-28 RX ADMIN — Medication: at 08:12

## 2022-01-28 RX ADMIN — PROPOFOL 30 MCG/KG/MIN: 10 INJECTION, EMULSION INTRAVENOUS at 21:52

## 2022-01-28 RX ADMIN — OXYCODONE HYDROCHLORIDE AND ACETAMINOPHEN 500 MG: 500 TABLET ORAL at 08:11

## 2022-01-28 RX ADMIN — POTASSIUM CHLORIDE 40 MEQ: 29.8 INJECTION, SOLUTION INTRAVENOUS at 09:14

## 2022-01-28 RX ADMIN — IPRATROPIUM BROMIDE AND ALBUTEROL SULFATE 1 AMPULE: .5; 2.5 SOLUTION RESPIRATORY (INHALATION) at 10:32

## 2022-01-28 RX ADMIN — ZINC SULFATE 220 MG (50 MG) CAPSULE 50 MG: CAPSULE at 08:11

## 2022-01-28 RX ADMIN — Medication 150 MCG/HR: at 20:43

## 2022-01-28 RX ADMIN — HYDROCORTISONE SODIUM SUCCINATE 50 MG: 100 INJECTION, POWDER, FOR SOLUTION INTRAMUSCULAR; INTRAVENOUS at 04:15

## 2022-01-28 RX ADMIN — Medication 10 ML: at 21:54

## 2022-01-28 RX ADMIN — SENNOSIDES 5 ML: 8.8 SYRUP ORAL at 22:07

## 2022-01-28 RX ADMIN — POTASSIUM CHLORIDE 40 MEQ: 29.8 INJECTION, SOLUTION INTRAVENOUS at 11:15

## 2022-01-28 RX ADMIN — Medication 2 MCG/MIN: at 22:30

## 2022-01-28 RX ADMIN — MINERAL OIL AND WHITE PETROLATUM: 150; 830 OINTMENT OPHTHALMIC at 18:11

## 2022-01-28 RX ADMIN — PANTOPRAZOLE SODIUM 40 MG: 40 INJECTION, POWDER, FOR SOLUTION INTRAVENOUS at 22:25

## 2022-01-28 RX ADMIN — Medication 150 MCG/HR: at 07:54

## 2022-01-28 RX ADMIN — CHLORHEXIDINE GLUCONATE 0.12% ORAL RINSE 15 ML: 1.2 LIQUID ORAL at 22:25

## 2022-01-28 RX ADMIN — IPRATROPIUM BROMIDE AND ALBUTEROL SULFATE 1 AMPULE: .5; 2.5 SOLUTION RESPIRATORY (INHALATION) at 18:05

## 2022-01-28 RX ADMIN — FUROSEMIDE 40 MG: 10 INJECTION, SOLUTION INTRAMUSCULAR; INTRAVENOUS at 08:11

## 2022-01-28 RX ADMIN — SODIUM CHLORIDE, PRESERVATIVE FREE 10 ML: 5 INJECTION INTRAVENOUS at 08:11

## 2022-01-28 RX ADMIN — Medication 10 ML: at 08:11

## 2022-01-28 RX ADMIN — IPRATROPIUM BROMIDE AND ALBUTEROL SULFATE 1 AMPULE: .5; 2.5 SOLUTION RESPIRATORY (INHALATION) at 13:05

## 2022-01-28 RX ADMIN — ACETAMINOPHEN 650 MG: 325 TABLET ORAL at 18:39

## 2022-01-28 RX ADMIN — IPRATROPIUM BROMIDE AND ALBUTEROL SULFATE 1 AMPULE: .5; 2.5 SOLUTION RESPIRATORY (INHALATION) at 21:41

## 2022-01-28 RX ADMIN — Medication: at 21:54

## 2022-01-28 RX ADMIN — Medication 2000 UNITS: at 08:12

## 2022-01-28 RX ADMIN — MINERAL OIL AND WHITE PETROLATUM: 150; 830 OINTMENT OPHTHALMIC at 04:15

## 2022-01-28 RX ADMIN — MINERAL OIL AND WHITE PETROLATUM: 150; 830 OINTMENT OPHTHALMIC at 22:30

## 2022-01-28 RX ADMIN — PANTOPRAZOLE SODIUM 40 MG: 40 INJECTION, POWDER, FOR SOLUTION INTRAVENOUS at 08:11

## 2022-01-28 RX ADMIN — POLYETHYLENE GLYCOL 3350 17 G: 17 POWDER, FOR SOLUTION ORAL at 08:11

## 2022-01-28 RX ADMIN — DOCUSATE SODIUM 100 MG: 50 LIQUID ORAL at 08:11

## 2022-01-28 ASSESSMENT — PULMONARY FUNCTION TESTS
PIF_VALUE: 19
PIF_VALUE: 30
PIF_VALUE: 24
PIF_VALUE: 32
PIF_VALUE: 28
PIF_VALUE: 21
PIF_VALUE: 30
PIF_VALUE: 30
PIF_VALUE: 19
PIF_VALUE: 20
PIF_VALUE: 19
PIF_VALUE: 18
PIF_VALUE: 36
PIF_VALUE: 35
PIF_VALUE: 19
PIF_VALUE: 20
PIF_VALUE: 22
PIF_VALUE: 26
PIF_VALUE: 22
PIF_VALUE: 17
PIF_VALUE: 41
PIF_VALUE: 19
PIF_VALUE: 26
PIF_VALUE: 17
PIF_VALUE: 57
PIF_VALUE: 21

## 2022-01-28 ASSESSMENT — PAIN SCALES - GENERAL
PAINLEVEL_OUTOF10: 0

## 2022-01-28 NOTE — PROGRESS NOTES
Department of Internal Medicine  Nephrology  Progress Note    Events Reviewed. SUBJECTIVE:  We are following Ms. Gloria Rodriguez for SAMUEL. Patient is intubated and supine. Received acetazolamide IV and 500 cc bolus last night. Potassium being replaced. PHYSICAL EXAM:    Vitals:    VITALS:  BP (!) 104/54   Pulse 92   Temp 98.8 °F (37.1 °C) (Esophageal)   Resp 21   Ht 5' (1.524 m)   Wt 136 lb (61.7 kg)   SpO2 92%   BMI 26.56 kg/m²   24HR INTAKE/OUTPUT:      Intake/Output Summary (Last 24 hours) at 1/28/2022 1002  Last data filed at 1/28/2022 0900  Gross per 24 hour   Intake 2156.89 ml   Output 4440 ml   Net -2283.11 ml     General appearance: Patient is intubated, sedated  HEENT: Normal cephalic, atraumatic without obvious deformity. Pupils equal, round, and reactive to light. Endotracheal tube in place  Neck: Supple, with full range of motion. No jugular venous distention. Trachea midline. Respiratory: Diminished bilaterally, intubated. Cardiovascular: RRR, no murmur noted  Abdomen: Soft, nontender, nondistended, flat  Musculoskeletal: No clubbing or cyanosis. No edema of bilateral lower extremities. Brisk capillary refill. Skin: Normal skin color.  No rashes. Scattered ecchymosis.   Neurologic: Patient sedated        Scheduled Meds:   potassium chloride  40 mEq IntraVENous Q2H    [START ON 1/29/2022] hydrocortisone sodium succinate PF  50 mg IntraVENous Daily    insulin lispro  0-6 Units SubCUTAneous Q4H    furosemide  40 mg IntraVENous BID    midodrine  10 mg Oral TID WC    pantoprazole  40 mg IntraVENous BID    And    sodium chloride (PF)  10 mL IntraVENous Daily    [Held by provider] enoxaparin  30 mg SubCUTAneous BID    docusate sodium  100 mg Oral BID    sennosides  5 mL Oral Nightly    miconazole nitrate   Topical BID    chlorhexidine  15 mL Mouth/Throat BID    artificial tears   Both Eyes Q4H    polyethylene glycol  17 g Oral Daily    ipratropium-albuterol  1 ampule Inhalation Q4H WA    sodium chloride flush  5-40 mL IntraVENous 2 times per day    ascorbic acid  500 mg Oral Daily    zinc sulfate  50 mg Oral Daily    vitamin D  2,000 Units Oral Daily     Continuous Infusions:   sodium chloride      midazolam 7 mg/hr (01/27/22 2342)    fentaNYL 5 mcg/ml in 0.9%  ml infusion 150 mcg/hr (01/28/22 3224)    sodium chloride      dextrose       PRN Meds:.sodium chloride, sodium chloride flush, sodium chloride, acetaminophen **OR** acetaminophen, glucose, dextrose, glucagon (rDNA), dextrose    DATA:    CBC with Differential:    Lab Results   Component Value Date    WBC 11.0 01/28/2022    RBC 2.51 01/28/2022    HGB 7.5 01/28/2022    HCT 23.6 01/28/2022     01/28/2022    MCV 94.0 01/28/2022    MCH 29.9 01/28/2022    MCHC 31.8 01/28/2022    RDW 15.1 01/28/2022    METASPCT 0.9 01/18/2022    LYMPHOPCT 2.7 01/18/2022    MONOPCT 11.6 01/18/2022    MYELOPCT 0.9 01/14/2022    BASOPCT 0.1 01/18/2022    MONOSABS 1.38 01/18/2022    LYMPHSABS 0.35 01/18/2022    EOSABS 0.00 01/18/2022    BASOSABS 0.00 01/18/2022     CMP:    Lab Results   Component Value Date     01/28/2022    K 2.8 01/28/2022    K 3.8 01/18/2022     01/28/2022    CO2 32 01/28/2022    BUN 28 01/28/2022    CREATININE 0.7 01/28/2022    GFRAA >60 01/28/2022    LABGLOM >60 01/28/2022    GLUCOSE 76 01/28/2022    PROT 5.2 01/22/2022    LABALBU 2.5 01/22/2022    CALCIUM 8.4 01/28/2022    BILITOT 0.2 01/22/2022    ALKPHOS 63 01/22/2022    AST 15 01/22/2022    ALT 23 01/22/2022     Magnesium:    Lab Results   Component Value Date    MG 2.1 01/28/2022     Phosphorus:    Lab Results   Component Value Date    PHOS 4.0 01/28/2022          Radiology Review:    CXR 1/28/2022   Bilateral airspace disease without appreciable change.                 BRIEF SUMMARY OF INITIAL CONSULT:     Briefly, Ms. Florencia Romero is a 78year old female with a past medical history of Asthma who presented to the ER on January 14 th, 2022 with complaints of SOB and diarrhea for one week. She is not vaccinated against COVID. Reportedly her daughter found her curled into a fetal position in respiratory distress at home and EMS was summoned. She was found to be COVID 19 positive. Labs were significant for bicarbonate 19, BUN 38, creatinine 1.9, anion gap 19, procalcitonin 1.57, CRP 12.8, , mildly elevated LFTs, D-dimer 404, ferritin 5098. Chest x-ray showed bilateral airspace disease. Renal is consulted for SAMUEL. Problems Resolved:     · SAMUEL, likely pre-renal volume responsive SAMUEL secondary to poor oral intake and GI losses (diarrhea). Creatinine 1.9mg/dL on admission. Renal function has improved to 0.6 mg/dL with IVF administration. · HAGMA with low bicarbonate levels, secondary to uremia. To continue bicarbonate drip  · Hypokalemia, 2/2 poor oral intake, potassium levels improved. · hypernatremia, with water deficit, dehydration due to poor intake. Sodium levels quite improved. Resolved. · Hypophosphatemia, 2/2 poor intake, to replace  · Hypokalemia likely 2/2 diuresis renal losses. IMPRESSION/RECOMMENDATIONS:        1. Hypocalcemia, vitamin D 25 level 39, calcium levels 8.4- improved  2. Metabolic alkalosis 5.417, pCO2 47, bicarb 31- improved  3. ----------------------------------------  4. COVID +, on hydrocortisone   5. Acute hypoxic respiratory failure, secondary to COVID pneumonia. Status post intubation  6. Normocytic anemia, hemoglobin 7.5. monitoring. 7. Nutrition, receiving tube feeds. Reduced to 10cc with concern for refeeding syndrome. Mg2+ 2.1 and phosphorus 4.0 today. Plan:    · Switch to Lasix 40 mg daily for today since fluid balance  -1663 ml last 24 hours  · Continue midodrine to 10mg TID.    · Replace potassium 120 mEq  · Agree with solucortef and addition of antibiotics      Electronically signed by Kellie Hodgkin on 1/28/2022 at 10:02 AM    Discussed with MICU team  Agree as annotated  Malia العلي MD

## 2022-01-28 NOTE — PROGRESS NOTES
Hospitalist Progress Note      SYNOPSIS:     Ms. Margie Valle is a 78y.o. year old female who has a PMH of asthma.     She presented to the ER on 22 with complaints of SOB and diarrhea x 1 week. She was not vaccinated against COVID-19. Reportedly her daughter found her curled into the fetal position in respiratory distress at home and called 911. Vitals on arrival were significant for temperature 103.2, heart rate 110, blood pressure 122/70 and oxygen saturation 72% on room air. She was placed on 15 L via nonrebreather mask and her oxygen saturation improved to 95%. Labs were significant for bicarbonate 19, BUN 38, creatinine 1.9, anion gap 19, procalcitonin 1.57, CRP 12.8, , mildly elevated LFTs, D-dimer 404, ferritin 5098 and a positive COVID PCR. Chest x-ray showed bilateral airspace disease. She was given 1 L normal saline bolus as well Lovenox and Decadron prior to being admitted. RRT was called on  for respiratory distress. She was transferred to ICU for intubation. She has been intermittently proned. SUBJECTIVE:    Patient seen and examined. Remains intubated and sedated with versed and fentanyl. Records reviewed. Temp (24hrs), Av.6 °F (37 °C), Min:97.9 °F (36.6 °C), Max:99.3 °F (37.4 °C)    DIET: Diet NPO  ADULT TUBE FEEDING; Nasogastric; Standard with Fiber; Continuous; 10; No; 30; Q 3 hours  CODE: Full Code    Intake/Output Summary (Last 24 hours) at 2022 0900  Last data filed at 2022 0800  Gross per 24 hour   Intake 2156.89 ml   Output 3920 ml   Net -1763.11 ml       Review of Systems  RAGHAV- intubated and sedated      OBJECTIVE:    BP (!) 167/70   Pulse 93   Temp 98.8 °F (37.1 °C) (Esophageal)   Resp (!) 39   Ht 5' (1.524 m)   Wt 136 lb (61.7 kg)   SpO2 92%   BMI 26.56 kg/m²     General appearance: Intubated and sedated  HEENT: Normal cephalic, facial edema noted  Neck: Supple, with full range of motion. No jugular venous distention. Trachea midline. Respiratory: ETT to vent, FiO2 75%, PEEP 12  Cardiovascular: RRR, no murmur noted  Abdomen: Soft, nontender, nondistended, flat. NGT with trickle feeds, FW 30Q3  Musculoskeletal: No clubbing, cyanosis, + edema generalized  Skin: Normal skin color. scatted bruises  Neurologic:  Sedated  Psychiatric:  Sedated    Medications:  REVIEWED DAILY    Infusion Medications    sodium chloride      midazolam 7 mg/hr (01/27/22 0022)    fentaNYL 5 mcg/ml in 0.9%  ml infusion 150 mcg/hr (01/28/22 9624)    sodium chloride      dextrose       Scheduled Medications    potassium chloride  40 mEq IntraVENous Q2H    [START ON 1/29/2022] hydrocortisone sodium succinate PF  50 mg IntraVENous Daily    insulin lispro  0-6 Units SubCUTAneous Q4H    furosemide  40 mg IntraVENous BID    midodrine  10 mg Oral TID WC    pantoprazole  40 mg IntraVENous BID    And    sodium chloride (PF)  10 mL IntraVENous Daily    [Held by provider] enoxaparin  30 mg SubCUTAneous BID    docusate sodium  100 mg Oral BID    sennosides  5 mL Oral Nightly    miconazole nitrate   Topical BID    chlorhexidine  15 mL Mouth/Throat BID    artificial tears   Both Eyes Q4H    polyethylene glycol  17 g Oral Daily    ipratropium-albuterol  1 ampule Inhalation Q4H WA    sodium chloride flush  5-40 mL IntraVENous 2 times per day    ascorbic acid  500 mg Oral Daily    zinc sulfate  50 mg Oral Daily    vitamin D  2,000 Units Oral Daily     PRN Meds: sodium chloride, sodium chloride flush, sodium chloride, acetaminophen **OR** acetaminophen, glucose, dextrose, glucagon (rDNA), dextrose    Labs:     Recent Labs     01/26/22  0408 01/26/22  0615 01/26/22  1730 01/27/22  0430 01/28/22  0402   WBC 15.1*  --   --  16.2* 11.0   HGB 7.0*   < > 8.7* 8.4* 7.5*   HCT 22.1*   < > 26.8* 25.6* 23.6*     --   --  329 262    < > = values in this interval not displayed.        Recent Labs     01/27/22  1200 01/27/22  1726 01/28/22  0402   NA 144 147* 147*   K 4.4 3.0* 2.8*    104 105   CO2 32* 31* 32*   BUN 27* 28* 28*   CREATININE 0.6 0.7 0.7   CALCIUM 8.3* 8.6 8.4*   PHOS 5.2* 4.1 4.0       No results for input(s): PROT, ALB, ALKPHOS, ALT, AST, BILITOT, AMYLASE, LIPASE in the last 72 hours. No results for input(s): INR in the last 72 hours. No results for input(s): [place] in the last 72 hours. Chronic labs:    Lab Results   Component Value Date    CHOL 145 06/29/2020    TRIG 124 01/25/2022    HDL 73 06/29/2020    LDLCALC 57 06/29/2020    TSH 3.600 06/29/2020    INR 1.0 01/14/2022    LABA1C 6.0 (H) 01/19/2022       Radiology: REVIEWED DAILY      ASSESSMENT and PLAN:    COVID-19 pneumonia-unvaccinated, symptom onset approximately 1 week prior to presentation. To continue with vitamin C, vitamin D, zinc.  Lovenox 1 mg/kg bid. · S/p decadron 10 mg PO bid x 8 days, baricitinib discontinued on 1/19. Currently on solucortef. Possible superimposed bacterial pneumonia-procalcitonin 1.57 on admission. Blood and sputum cultures with NGTD. Urine antigens negative. Vancomycin and zosyn completed. Acute hypoxic respiratory failure-2/2 above. S/p intubation 1/19. · Currently requiring FiO2 75%, PEEP 12. UTI-urine culture positive for Candida. Given Diflucan x1. Completed zosyn. Hypernatremia- 2/2 lack of PO free water intake- to increase FW to 250 mL Q6 hours     Hypervolemia- +5.86 L, generalized edema. Remains on lasix. Normal pH 7.440- with respiratory acidosis and renal compensation    Hyperglycemia-likely steroid-induced, started on SSI and lantus    Hypokalemia- for supplementation    Normocytic anemia- likely 2/2 phlebotomy; continue to monitor H&H    Hx asthma- states that she has never seen a pulmonologist and that it is controlled with PRN albuterol.       DISPOSITION: Continued inpatient care    +++++++++++++++++++++++++++++++++++++++++++++++++  MAUREEN Powell - CNP   [facility] [place] [facility] - [address], [place]  +++++++++++++++++++++++++++++++++++++++++++++++++  NOTE: This report was transcribed using voice recognition software. Every effort was made to ensure accuracy; however, inadvertent computerized transcription errors may be present.

## 2022-01-28 NOTE — PROGRESS NOTES
[address] [facility]   Department of Internal Medicine   Internal Medicine Residency  MICU Progress Note    Patient:  Flash Buchanan 78 y.o. female   MRN: [id number]       Date of Service: 2022    Allergy: Patient has no known allergies. Subjective     Patient was seen and examined this morning at bedside in no acute distress. Overnight, patient's potassium, magnesium, and phosphorus were repleted. Tube feeding was resumed and there was concern for refeeding syndrome. Electrolytes will be replaced accordingly. This morning, patient is a still sedated and intubated. Patient's PF ratio was 1.16. Patient was opening yesterday. Patient still not had any bowel movement. Patient blood glucose is in low side 100s. Patient made 4 L of urine still 5.8 L positive receiving Lasix twice daily. Objective     TEMPERATURE:  Current - Temp: 98.8 °F (37.1 °C); Max - Temp  Av.6 °F (37 °C)  Min: 97.9 °F (36.6 °C)  Max: 99.3 °F (37.4 °C)  RESPIRATIONS RANGE: Resp  Av.9  Min: 23  Max: 40  PULSE RANGE: Pulse  Av.6  Min: 69  Max: 112  BLOOD PRESSURE RANGE:  Systolic (42CCO), MYT:007 , Min:93 , MARCELO:901   ; Diastolic (58MRQ), PYJ:84, Min:48, Max:70    PULSE OXIMETRY RANGE: SpO2  Av.5 %  Min: 90 %  Max: 98 %    I & O - 24hr:    Intake/Output Summary (Last 24 hours) at 2022 0850  Last data filed at 2022 0800  Gross per 24 hour   Intake 2156.89 ml   Output 3950 ml   Net -1793.11 ml     I/O last 3 completed shifts: In: 3374.9 [I.V.:1708.9; NG/GT:1316; IV Piggyback:350]  Out: 1662 [Urine:4825] I/O this shift: In: 0   Out: 100 [Urine:100]   Weight change: 7 lb 12 oz (3.515 kg)    Physical Exam  Constitutional:       Comments: Sedated and intubated   HENT:      Mouth/Throat:      Mouth: Mucous membranes are moist.   Cardiovascular:      Rate and Rhythm: Normal rate and regular rhythm. Pulses: Normal pulses. Heart sounds: Normal heart sounds. No murmur heard. No friction rub.  No gallop. Pulmonary:      Effort: Pulmonary effort is normal. No respiratory distress. Breath sounds: Normal breath sounds. No stridor. No wheezing, rhonchi or rales. Chest:      Chest wall: No tenderness. Abdominal:      General: Abdomen is flat. Bowel sounds are normal.      Palpations: Abdomen is soft. Musculoskeletal:         General: No swelling. Skin:     General: Skin is warm. Capillary Refill: Capillary refill takes less than 2 seconds.           Diet:   Diet NPO  ADULT TUBE FEEDING; Nasogastric; Standard with Fiber; Continuous; 10; No; 30; Q 3 hours    FLOW([id number])@      Additional Respiratory  Assessments  Pulse: 93  Resp: (!) 39  SpO2: 92 %  Position: Semi-Ocampo's  Humidification Source: Heated wire  Humidification Temp: 37  Circuit Condensation: Drained  Oral Care: Mouth suctioned,Suction toothette,Mouthwash with chlorhexidine,Mouth moisturizer  Subglottic Suction Done?: No  Airway Type: ET  Airway Size: 8       Urethral Catheter Double-lumen 16 fr-Output (mL): 100 mL  [REMOVED] External Urinary Catheter-Output (mL): 200 mL      Oxygen:     Vent Information  $Ventilation: $Subsequent Day  Skin Assessment: Clean, dry, & intact  Equipment ID: [id number]  Equipment Changed: Airway securing device  Vent Type: 980  Vent Mode: AC/VC  Vt Ordered: 300 mL  Rate Set: 24 bmp  Peak Flow: 50 L/min  Pressure Support: 0 cmH20  FiO2 : 75 %  SpO2: 92 %  SpO2/FiO2 ratio: 122.67  PaO2/FiO2 ratio: 152  Sensitivity: 3  PEEP/CPAP: 12  I Time/ I Time %: 0.8 s  Humidification Source: Heated wire  Humidification Temp: 37  Humidification Temp Measured: 37  Circuit Condensation: Drained  Mask Type: Full face mask  Mask Size: Medium  Additional Respiratory  Assessments  Pulse: 93  Resp: (!) 39  SpO2: 92 %  Position: Semi-Ocampo's  Humidification Source: Heated wire  Humidification Temp: 37  Circuit Condensation: Drained  Oral Care: Mouth suctioned,Suction toothette,Mouthwash with chlorhexidine,Mouth moisturizer  Subglottic Suction Done?: No  Airway Type: ET  Airway Size: 8       Urethral Catheter Double-lumen 16 fr-Output (mL): 100 mL  [REMOVED] External Urinary Catheter-Output (mL): 200 mL    Medications     Continuous Infusions:   sodium chloride      midazolam 7 mg/hr (01/27/22 0802)    fentaNYL 5 mcg/ml in 0.9%  ml infusion 150 mcg/hr (01/28/22 6504)    sodium chloride      dextrose       Scheduled Meds:   potassium chloride  40 mEq IntraVENous Q2H    hydrocortisone sodium succinate PF  50 mg IntraVENous Q12H    furosemide  40 mg IntraVENous BID    midodrine  10 mg Oral TID WC    pantoprazole  40 mg IntraVENous BID    And    sodium chloride (PF)  10 mL IntraVENous Daily    [Held by provider] enoxaparin  30 mg SubCUTAneous BID    docusate sodium  100 mg Oral BID    sennosides  5 mL Oral Nightly    insulin glargine-yfgn  5 Units SubCUTAneous Nightly    miconazole nitrate   Topical BID    chlorhexidine  15 mL Mouth/Throat BID    insulin lispro  0-12 Units SubCUTAneous Q4H    artificial tears   Both Eyes Q4H    polyethylene glycol  17 g Oral Daily    ipratropium-albuterol  1 ampule Inhalation Q4H WA    sodium chloride flush  5-40 mL IntraVENous 2 times per day    ascorbic acid  500 mg Oral Daily    zinc sulfate  50 mg Oral Daily    vitamin D  2,000 Units Oral Daily     PRN Meds: sodium chloride, sodium chloride flush, sodium chloride, acetaminophen **OR** acetaminophen, glucose, dextrose, glucagon (rDNA), dextrose  Nutrition:   NG/OG tube TF type: Pulmocare/Nephro/Glucerna/Jevity        At rate: 10 ml/h    Labs and Imaging Studies     ve  CBC:   Recent Labs     01/26/22  0408 01/26/22  0615 01/26/22  1730 01/27/22  0430 01/28/22  0402   WBC 15.1*  --   --  16.2* 11.0   HGB 7.0*   < > 8.7* 8.4* 7.5*   HCT 22.1*   < > 26.8* 25.6* 23.6*   MCV 97.4  --   --  93.1 94.0     --   --  329 262    < > = values in this interval not displayed.        BMP:    Recent Labs     01/27/22  1200 01/27/22  1726 01/28/22  0402    147* 147*   K 4.4 3.0* 2.8*    104 105   CO2 32* 31* 32*   BUN 27* 28* 28*   CREATININE 0.6 0.7 0.7   GLUCOSE 170* 98 76       LIVER PROFILE:   No results for input(s): AST, ALT, BILIDIR, BILITOT, ALKPHOS in the last 72 hours. Invalid input(s):  ALB    PT/INR:   No results for input(s): PROTIME, INR in the last 72 hours. APTT:   No results for input(s): APTT in the last 72 hours. Fasting Lipid Panel:    Lab Results   Component Value Date    CHOL 145 06/29/2020    TRIG 124 01/25/2022    HDL 73 06/29/2020       Cardiac Enzymes:    No results found for: CKTOTAL, CKMB, CKMBINDEX, TROPONINI    ABGs:   Lab Results   Component Value Date    PO2ART 138.4 01/25/2022    TRJ2ZME 54.3 01/25/2022       Imaging Studies:     XR ABDOMEN (KUB) (SINGLE AP VIEW)    Result Date: 1/27/2022  EXAMINATION: ONE SUPINE XRAY VIEW(S) OF THE ABDOMEN 1/27/2022 9:07 am COMPARISON: 19 January 2022 HISTORY: ORDERING SYSTEM PROVIDED HISTORY: r/o obstruction or ileus TECHNOLOGIST PROVIDED HISTORY: Reason for exam:->r/o obstruction or ileus What reading provider will be dictating this exam?->CRC FINDINGS: NG tube tip is in the gastric lumen. There is no free air, bowel wall pneumatosis or dilated bowel. No abnormal calcifications are present. .     No active process. NG tube in the gastric lumen as before. XR CHEST PORTABLE    Result Date: 1/27/2022  EXAMINATION: ONE XRAY VIEW OF THE CHEST 1/27/2022 7:53 am COMPARISON: Chest x-ray 01/26/2022 HISTORY: ORDERING SYSTEM PROVIDED HISTORY: COVID ICU TECHNOLOGIST PROVIDED HISTORY: Reason for exam:->COVID ICU What reading provider will be dictating this exam?->CRC FINDINGS: Cardiac size enlarged. Bilateral pulmonary opacifications right greater than left have increased in the interim midlung involvement of worsening airspace disease.   No pneumothorax or pleural effusion     Progression of bilateral airspace disease remaining midlung predominant in the periphery of airspace disease bronchopneumonia with increased from prior     XR CHEST PORTABLE    Result Date: 1/26/2022  EXAMINATION: ONE XRAY VIEW OF THE CHEST 1/26/2022 8:56 am COMPARISON: The previous study performed 01/25/2022. HISTORY: ORDERING SYSTEM PROVIDED HISTORY: COVID ICU TECHNOLOGIST PROVIDED HISTORY: Reason for exam:->COVID ICU What reading provider will be dictating this exam?->CRC FINDINGS: There has been a mild decrease in the diffuse left lung infiltrates. The right lung infiltrates are without significant interval change. No active pleural disease is seen. The cardiac silhouette and mediastinal structures are without significant interval change. An endotracheal tube is again noted, with the tip 3.0 cm above the alberta. An NG tube is again noted entering the stomach. The tip is cut off. A left-sided IJ line is again present, with tip in the SVC. Mild decrease in the diffuse left lung infiltrates, when compared to the previous study performed 1 day earlier. Diffuse right lung infiltrates without significant interval change. XR CHEST PORTABLE    Result Date: 1/25/2022  EXAMINATION: ONE XRAY VIEW OF THE CHEST 1/25/2022 4:58 pm COMPARISON: 7:55 a.m. HISTORY: ORDERING SYSTEM PROVIDED HISTORY: L IJ CVC placement TECHNOLOGIST PROVIDED HISTORY: Reason for exam:->L IJ CVC placement What reading provider will be dictating this exam?->CRC FINDINGS: Stable bilateral pulmonary infiltrates. There is no effusion or pneumothorax. The cardiomediastinal silhouette is without acute process. The osseous structures are without acute process. Left internal jugular line tip in the distal SVC. The remainder of the lines and tubes are stable in position. Left internal jugular line tip in the distal SVC. No pneumothorax. The remainder of the exam is essentially stable. .     XR CHEST PORTABLE    Result Date: 1/25/2022  EXAMINATION: ONE XRAY VIEW OF THE CHEST 1/25/2022 7:41 am COMPARISON: 24 January 2022 HISTORY: ORDERING SYSTEM PROVIDED HISTORY: COVID ICU TECHNOLOGIST PROVIDED HISTORY: Reason for exam:->COVID ICU What reading provider will be dictating this exam?->CRC FINDINGS: Bilateral airspace disease is not appreciably changed allowing for some differences in positioning. Stable support lines. No new findings. Stable airspace disease. See above. XR CHEST PORTABLE    Result Date: 1/24/2022  EXAMINATION: ONE XRAY VIEW OF THE CHEST 1/24/2022 7:11 am COMPARISON: 23 January 2022 HISTORY: ORDERING SYSTEM PROVIDED HISTORY: COVID ICU TECHNOLOGIST PROVIDED HISTORY: Reason for exam:->COVID ICU What reading provider will be dictating this exam?->CRC FINDINGS: Chest is notably rotated to the left. Support lines are stable. Bilateral airspace disease appears mildly progressive. Bilateral airspace disease which appears mildly progressive. XR CHEST PORTABLE    Result Date: 1/23/2022  EXAMINATION: ONE XRAY VIEW OF THE CHEST 1/23/2022 8:17 am COMPARISON: Multiple prior studies, the most recent dated January 22, 2022 HISTORY: [address] [place] [address]: COVID ICU TECHNOLOGIST PROVIDED HISTORY: Reason for exam:->COVID ICU What reading provider will be dictating this exam?->CRC FINDINGS: Frontal view of the chest.  Lines and tubes are unchanged in position. Stable cardiomediastinal silhouette. Diffuse bilateral opacities are not significantly changed given differences in patient positioning. No pneumothorax seen. There are degenerative changes in the spine. 1.  Lines and tubes are unchanged in position. 2.  Diffuse bilateral opacities are not significantly changed. Differential includes infection, pulmonary edema, atelectasis, ARDS, and/or layering pleural effusions.      XR CHEST PORTABLE    Result Date: 1/22/2022  EXAMINATION: ONE XRAY VIEW OF THE CHEST 1/22/2022 8:28 am COMPARISON: 01/21/2022 HISTORY: ORDERING SYSTEM PROVIDED HISTORY: COVID ICU TECHNOLOGIST PROVIDED HISTORY: Reason for exam:->COVID ICU previous. Stable lines and tubes. XR CHEST PORTABLE    Result Date: 1/19/2022  EXAMINATION: ONE XRAY VIEW OF THE CHEST PORTABLE UPRIGHT 1/19/2022 12:38 pm COMPARISON: 0238 hours HISTORY: ORDERING SYSTEM PROVIDED HISTORY: central line placement TECHNOLOGIST PROVIDED HISTORY: Reason for exam:->central line placement What reading provider will be dictating this exam?->CRC FINDINGS: Medical devices: Right IJ central venous line tip at the cavoatrial junction. No pneumothorax. The endotracheal and enteric tubes remain in satisfactory position. Redemonstration of bilateral widespread pulmonary infiltrates. Right lung infiltrate shows no significant change. Left perihilar and left upper lobe infiltrate shows mild progression and is more confluent. Normal heart size. No significant pleural effusion. Atherosclerotic thoracic aorta. 1.  Good position right IJ central venous line. Negative for pneumothorax. 2.  Bilateral widespread pulmonary infiltrates with interval mild worsening on the left and compatible with bilateral pneumonia. XR CHEST PORTABLE    Result Date: 1/19/2022  EXAMINATION: ONE XRAY VIEW OF THE CHEST 1/19/2022 3:01 am COMPARISON: 01/16/2022 HISTORY: ORDERING SYSTEM PROVIDED HISTORY: ETT placement TECHNOLOGIST PROVIDED HISTORY: Reason for exam:->ETT placement What reading provider will be dictating this exam?->CRC FINDINGS: Endotracheal tube tip is 3.3 cm above alberta. Nasogastric tube extends into the stomach. There are diffuse infiltrates which are more notable in the lower lungs. When accounting for technical differences, these infiltrates have not changed significantly. There is no pleural effusion or pneumothorax seen. There is no cardiomegaly. Endotracheal tube tip is 3.3 cm above the alberta. Diffuse pulmonary infiltrates are unchanged.      XR CHEST PORTABLE    Result Date: 1/16/2022  EXAMINATION: ONE XRAY VIEW OF THE CHEST 1/16/2022 10:59 am COMPARISON: 01/14/2022 HISTORY: ORDERING SYSTEM PROVIDED HISTORY: hypoxia TECHNOLOGIST PROVIDED HISTORY: Reason for exam:->hypoxia What reading provider will be dictating this exam?->CRC FINDINGS: The cardiac silhouette is within normals. Extensive multifocal bilateral pneumonia is noted unchanged when compared to prior study. There is no pneumothorax or pneumomediastinum. There is no pleural effusion. 1. Multifocal bilateral pneumonia unchanged when compared with the prior study. XR CHEST PORTABLE    Result Date: 1/14/2022  EXAMINATION: ONE XRAY VIEW OF THE CHEST 1/14/2022 4:37 pm COMPARISON: None. HISTORY: ORDERING SYSTEM PROVIDED HISTORY: hypoxia TECHNOLOGIST PROVIDED HISTORY: Reason for exam:->hypoxia What reading provider will be dictating this exam?->CRC FINDINGS: There is extensive bilateral airspace disease bilaterally. The findings are most compatible with pneumonia. Heart size is borderline. Pulmonary vascularity is not clearly congested. Bilateral airspace disease likely related to pneumonia. XR ABDOMEN FOR NG/OG/NE TUBE PLACEMENT    Result Date: 1/19/2022  EXAMINATION: ONE SUPINE XRAY VIEW(S) OF THE ABDOMEN 1/19/2022 3:01 am COMPARISON: None. HISTORY: ORDERING SYSTEM PROVIDED HISTORY: Confirmation of course of NG/OG/NE tube and location of tip of tube TECHNOLOGIST PROVIDED HISTORY: Reason for exam:->Confirmation of course of NG/OG/NE tube and location of tip of tube Portable? ->Yes What reading provider will be dictating this exam?->CRC FINDINGS: The nasogastric tube terminates in the stomach. There are infiltrates in visualized lungs. Visualized gas pattern is nonspecific demonstrating a paucity of bowel gas. Nasogastric tube terminates in the stomach.        Notable Cultures:      Blood cultures   Blood Culture, Routine   Date Value Ref Range Status   01/23/2022 24 Hours no growth  Preliminary       Respiratory cultures No results found for: RESPCULTURE No results found for: LABGRAM  Legionella No results found for: MADDI    Urine cultures   Urine Culture, Routine   Date Value Ref Range Status   01/19/2022 >100,000 CFU/ml  Final         Resident's Assessment and Plan       78year old female with a past medical history of Asthma who presented to the ER on January 14 th, 2022 with complaints of SOB and diarrhea for one week    She is currently being  managed for       Acute hypoxic respiratory failure 2/2 Covid PNA  Patient is supine  Day 9 of intubation  Sedated with fentanyl and Versed  Off Nimbex  Completed Zosyn for 7 days  On hydrocortisone  Leukocytosis, afebrile  ABG showed metabolic alkalosis, patient receiving Lasix 40 mg twice daily  Patient received Diamox yesterday  Chest x-ray showed bilateral airspace disease  Patient is 7.4 L positive since admission  Plan  Continue respiratory status monitoring  Wean off ventilation as per patient tolerates  Decrease Solu-Cortef 50 mg daily, plan to wean off tomorrow.   Continue vitamin cocktail  Continue diuresis with Lasix  Continue Lovenox for DVT prophylaxis  Keep supine and follow ABG        Septic shock 2/2 COVID-19 PNA with superimposed bacterial PNA  For COVID-19 management as above  Patient was requiring Levophed, currently off Levophed  Blood pressure has been stable  Plan  Monitor blood pressure  Tapering hydrocortisone dose      History of asthma  Currently intubated and on breathing treatment      Hyperglycemia 2/2 steroids  Blood glucose has been low side, 80s today  Resume tube feed yesterday, possible concern for refeeding syndrome  Changed to low-dose sliding scale and Lantus of 5  Plan  Monitor blood glucose level every 4 hours  And adjust insulin dose according to blood glucose level      Acute normocytic normochromic anemia 2/2 ACD versus sepsis  Hemoglobin was 6.9 received 1 unit of PRBC  Hemoglobin this morning was 7.5  Plan  Monitor CBC daily  Transfuse PRBC if hemoglobin is less than 7      Constipation 2/2 fentanyl  Patient is on bowel regiment  X-ray KUB negative for acute obstruction  Plan  Monitor bowel movement and continue bowel regimen      Refeeding syndrome  Patient had hypokalemia, hypomagnesemia, hypophosphatemia  Plan  Decrease tube feed to 10 mL/h  Monitor electrolytes and replace accordingly      Resolved issue  SAMUEL        Code Status: Full Code  PT/OT evaluation:  Disposition: home +/- home [facility] / [facility] / Kelly Casas / Akshat Pena MD, PGY-1  Attending physician: Dr. Jasiel Sandra      I personally saw, examined and provided care for the patient. Radiographs, labs and medication list were reviewed by me independently. Review of Residents documentation was conducted and revisions were made as appropriate. I agree with the above documented exam, problem list and plan of care.         CCT excluding procedures >30 minutes    Lyle Collazo DO

## 2022-01-28 NOTE — CARE COORDINATION
1/28: Update CM Note: Pt was transferred to MICU on 1/19 for respiratory distress & intubated. Pt remains intubated, sedated on Versed, & fentanyl, Spo2 92% & Fio2 75%. Covid +1/14. Pt is on Iv Solucortef, Iv Lasix & Zoysn. PT/OT pending eval once pt medically stable. Pt's original d/c plan with home. Needs unclear. [facility] following. SW/CM will continue to follow for d/c planning.  Electronically signed by Aniceto Brasher RN on 1/28/2022 at 10:45 AM

## 2022-01-28 NOTE — PLAN OF CARE
Problem: Airway Clearance - Ineffective  Goal: Achieve or maintain patent airway  Outcome: Met This Shift     Problem: Gas Exchange - Impaired  Goal: Absence of hypoxia  Outcome: Met This Shift  Goal: Promote optimal lung function  Outcome: Met This Shift     Problem: Breathing Pattern - Ineffective  Goal: Ability to achieve and maintain a regular respiratory rate  Outcome: Met This Shift     Problem:  Body Temperature -  Risk of, Imbalanced  Goal: Ability to maintain a body temperature within defined limits  1/28/2022 0518 by Fred Corrales RN  Outcome: Met This Shift  1/27/2022 1837 by Julito Escalona RN  Outcome: Met This Shift     Problem: Risk for Fluid Volume Deficit  Goal: Maintain normal heart rhythm  1/28/2022 0518 by Fred Corrales RN  Outcome: Met This Shift  1/27/2022 1837 by Julito Escalona RN  Outcome: Met This Shift     Problem: Falls - Risk of:  Goal: Will remain free from falls  Description: Will remain free from falls  1/28/2022 0518 by Fred Corrales RN  Outcome: Met This Shift  1/27/2022 1837 by Julito Escalona RN  Outcome: Met This Shift  Goal: Absence of physical injury  Description: Absence of physical injury  1/28/2022 0518 by Fred Corrales RN  Outcome: Met This Shift  1/27/2022 1837 by Julito Escalona RN  Outcome: Met This Shift     Problem: Skin Integrity:  Goal: Will show no infection signs and symptoms  Description: Will show no infection signs and symptoms  1/27/2022 1837 by Julito Escalona RN  Outcome: Met This Shift  Goal: Absence of new skin breakdown  Description: Absence of new skin breakdown  1/28/2022 0518 by Fred Corrales RN  Outcome: Met This Shift  1/27/2022 1837 by Julito Escalona RN  Outcome: Met This Shift

## 2022-01-28 NOTE — PROGRESS NOTES
[address] [facility]   Department of Internal Medicine   Internal Medicine Residency  MICU Progress Note    Patient:  Alma Narayanan 78 y.o. female   MRN: [id number]       Date of Service: 2022    Allergy: Patient has no known allergies. Subjective     Patient was seen and examined this morning at bedside in no acute distress. Overnight, patient's potassium, magnesium, and phosphorus were repleted. Tube feeding was resumed and there was concern for refeeding syndrome. Electrolytes will be replaced accordingly. This morning, patient is a still sedated and intubated. Patient's PF ratio was 0.6. Patient was opening yesterday. Patient's PEEP was increased from 8 to 12 and repeat PF ratio was 1.6. Patient Solu-Cortef is slowly weaning off and is 50 mg twice daily today. Vital signs been stable overnight. Objective     TEMPERATURE:  Current - Temp: 99.3 °F (37.4 °C); Max - Temp  Av.2 °F (36.8 °C)  Min: 96.8 °F (36 °C)  Max: 99.3 °F (37.4 °C)  RESPIRATIONS RANGE: Resp  Av.9  Min: 23  Max: 46  PULSE RANGE: Pulse  Av.2  Min: 78  Max: 112  BLOOD PRESSURE RANGE:  Systolic (57FZV), QVS:302 , Min:93 , VRK:371   ; Diastolic (17DLS), NDH:50, Min:48, Max:64    PULSE OXIMETRY RANGE: SpO2  Av.3 %  Min: 90 %  Max: 97 %    I & O - 24hr:    Intake/Output Summary (Last 24 hours) at 2022 1918  Last data filed at 2022 1800  Gross per 24 hour   Intake 2185 ml   Output 2685 ml   Net -500 ml     I/O last 3 completed shifts: In: 3522 [I.V.:1376; Blood:350; NG/GT:1202; IV Piggyback:350]  Out: 4807 [Urine:3920] No intake/output data recorded. Weight change: -13.6 oz (-0.386 kg)    Physical Exam  Constitutional:       Comments: Sedated and intubated   HENT:      Mouth/Throat:      Mouth: Mucous membranes are moist.   Cardiovascular:      Rate and Rhythm: Normal rate and regular rhythm. Pulses: Normal pulses. Heart sounds: Normal heart sounds. No murmur heard. No friction rub.  No gallop. Pulmonary:      Effort: Pulmonary effort is normal. No respiratory distress. Breath sounds: Normal breath sounds. No stridor. No wheezing, rhonchi or rales. Chest:      Chest wall: No tenderness. Abdominal:      General: Abdomen is flat. Bowel sounds are normal.      Palpations: Abdomen is soft. Musculoskeletal:         General: No swelling. Skin:     General: Skin is warm. Capillary Refill: Capillary refill takes less than 2 seconds.           Diet:   Diet NPO  ADULT TUBE FEEDING; Nasogastric; Standard with Fiber; Continuous; 10; No; 30; Q 3 hours    FLOW([id number])@      Additional Respiratory  Assessments  Pulse: 100  Resp: (!) 31  SpO2: 90 %  Position: Semi-Ocampo's  Humidification Source: Heated wire  Humidification Temp: 37  Circuit Condensation: Drained  Oral Care: Mouth suctioned  Subglottic Suction Done?: No  Airway Type: ET  Airway Size: 8       Urethral Catheter Double-lumen 16 fr-Output (mL): 150 mL  [REMOVED] External Urinary Catheter-Output (mL): 200 mL      Oxygen:     Vent Information  $Ventilation: $Subsequent Day  Skin Assessment: Clean, dry, & intact  Equipment ID: [id number]  Equipment Changed: Airway securing device  Vent Type: 980  Vent Mode: AC/VC  Vt Ordered: 300 mL  Rate Set: 24 bmp  Peak Flow: 50 L/min  Pressure Support: 0 cmH20  FiO2 : 80 %  SpO2: 90 %  SpO2/FiO2 ratio: 112.5  PaO2/FiO2 ratio: 152  Sensitivity: 3  PEEP/CPAP: 12  I Time/ I Time %: 0 s  Humidification Source: Heated wire  Humidification Temp: 37  Humidification Temp Measured: 36.7  Circuit Condensation: Drained  Mask Type: Full face mask  Mask Size: Medium  Additional Respiratory  Assessments  Pulse: 100  Resp: (!) 31  SpO2: 90 %  Position: Semi-Ocampo's  Humidification Source: Heated wire  Humidification Temp: 37  Circuit Condensation: Drained  Oral Care: Mouth suctioned  Subglottic Suction Done?: No  Airway Type: ET  Airway Size: 8       Urethral Catheter Double-lumen 16 fr-Output (mL): 150 mL  [REMOVED] External Urinary Catheter-Output (mL): 200 mL    Medications     Continuous Infusions:   sodium chloride      midazolam 7 mg/hr (01/27/22 1705)    fentaNYL 5 mcg/ml in 0.9%  ml infusion 150 mcg/hr (01/27/22 1526)    sodium chloride      dextrose       Scheduled Meds:   hydrocortisone sodium succinate PF  50 mg IntraVENous Q12H    furosemide  40 mg IntraVENous BID    midodrine  10 mg Oral TID WC    pantoprazole  40 mg IntraVENous BID    And    sodium chloride (PF)  10 mL IntraVENous Daily    [Held by provider] enoxaparin  30 mg SubCUTAneous BID    docusate sodium  100 mg Oral BID    sennosides  5 mL Oral Nightly    insulin glargine-yfgn  5 Units SubCUTAneous Nightly    miconazole nitrate   Topical BID    chlorhexidine  15 mL Mouth/Throat BID    insulin lispro  0-12 Units SubCUTAneous Q4H    artificial tears   Both Eyes Q4H    polyethylene glycol  17 g Oral Daily    ipratropium-albuterol  1 ampule Inhalation Q4H WA    sodium chloride flush  5-40 mL IntraVENous 2 times per day    ascorbic acid  500 mg Oral Daily    zinc sulfate  50 mg Oral Daily    vitamin D  2,000 Units Oral Daily     PRN Meds: sodium chloride, sodium chloride flush, sodium chloride, acetaminophen **OR** acetaminophen, glucose, dextrose, glucagon (rDNA), dextrose  Nutrition:   NG/OG tube TF type: Pulmocare/Nephro/Glucerna/Jevity        At rate: 10 ml/h    Labs and Imaging Studies     ve  CBC:   Recent Labs     01/25/22  0554 01/25/22  0554 01/26/22  0408 01/26/22  0615 01/26/22  1115 01/26/22  1730 01/27/22  0430   WBC 14.8*  --  15.1*  --   --   --  16.2*   HGB 7.2*   < > 7.0*   < > 8.2* 8.7* 8.4*   HCT 23.0*   < > 22.1*   < > 25.9* 26.8* 25.6*   MCV 97.5  --  97.4  --   --   --  93.1     --  345  --   --   --  329    < > = values in this interval not displayed.        BMP:    Recent Labs     01/27/22  0430 01/27/22  1200 01/27/22  1726    144 147*   K 3.0* 4.4 3.0*    103 104   CO2 32* 32* 31*   BUN 26* 27* 28*   CREATININE 0.7 0.6 0.7   GLUCOSE 118* 170* 98       LIVER PROFILE:   No results for input(s): AST, ALT, BILIDIR, BILITOT, ALKPHOS in the last 72 hours. Invalid input(s):  ALB    PT/INR:   No results for input(s): PROTIME, INR in the last 72 hours. APTT:   No results for input(s): APTT in the last 72 hours. Fasting Lipid Panel:    Lab Results   Component Value Date    CHOL 145 06/29/2020    TRIG 124 01/25/2022    HDL 73 06/29/2020       Cardiac Enzymes:    No results found for: CKTOTAL, CKMB, CKMBINDEX, TROPONINI    ABGs:   Lab Results   Component Value Date    PO2ART 138.4 01/25/2022    OFQ3LXS 54.3 01/25/2022       Imaging Studies:     XR ABDOMEN (KUB) (SINGLE AP VIEW)    Result Date: 1/27/2022  EXAMINATION: ONE SUPINE XRAY VIEW(S) OF THE ABDOMEN 1/27/2022 9:07 am COMPARISON: 19 January 2022 HISTORY: ORDERING SYSTEM PROVIDED HISTORY: r/o obstruction or ileus TECHNOLOGIST PROVIDED HISTORY: Reason for exam:->r/o obstruction or ileus What reading provider will be dictating this exam?->CRC FINDINGS: NG tube tip is in the gastric lumen. There is no free air, bowel wall pneumatosis or dilated bowel. No abnormal calcifications are present. .     No active process. NG tube in the gastric lumen as before. XR CHEST PORTABLE    Result Date: 1/27/2022  EXAMINATION: ONE XRAY VIEW OF THE CHEST 1/27/2022 7:53 am COMPARISON: Chest x-ray 01/26/2022 HISTORY: ORDERING SYSTEM PROVIDED HISTORY: COVID ICU TECHNOLOGIST PROVIDED HISTORY: Reason for exam:->COVID ICU What reading provider will be dictating this exam?->CRC FINDINGS: Cardiac size enlarged. Bilateral pulmonary opacifications right greater than left have increased in the interim midlung involvement of worsening airspace disease.   No pneumothorax or pleural effusion     Progression of bilateral airspace disease remaining midlung predominant in the periphery of airspace disease bronchopneumonia with increased from prior     XR CHEST PORTABLE    Result Date: 1/26/2022  EXAMINATION: ONE XRAY VIEW OF THE CHEST 1/26/2022 8:56 am COMPARISON: The previous study performed 01/25/2022. HISTORY: ORDERING SYSTEM PROVIDED HISTORY: [facility] ICU TECHNOLOGIST PROVIDED HISTORY: Reason for exam:->COVID ICU What reading provider will be dictating this exam?->CRC FINDINGS: There has been a mild decrease in the diffuse left lung infiltrates. The right lung infiltrates are without significant interval change. No active pleural disease is seen. The cardiac silhouette and mediastinal structures are without significant interval change. An endotracheal tube is again noted, with the tip 3.0 cm above the alberta. An NG tube is again noted entering the stomach. The tip is cut off. A left-sided IJ line is again present, with tip in the SVC. Mild decrease in the diffuse left lung infiltrates, when compared to the previous study performed 1 day earlier. Diffuse right lung infiltrates without significant interval change. XR CHEST PORTABLE    Result Date: 1/25/2022  EXAMINATION: ONE XRAY VIEW OF THE CHEST 1/25/2022 4:58 pm COMPARISON: 7:55 a.m. HISTORY: ORDERING SYSTEM PROVIDED HISTORY: L IJ CVC placement TECHNOLOGIST PROVIDED HISTORY: Reason for exam:->L IJ CVC placement What reading provider will be dictating this exam?->CRC FINDINGS: Stable bilateral pulmonary infiltrates. There is no effusion or pneumothorax. The cardiomediastinal silhouette is without acute process. The osseous structures are without acute process. Left internal jugular line tip in the distal SVC. The remainder of the lines and tubes are stable in position. Left internal jugular line tip in the distal SVC. No pneumothorax. The remainder of the exam is essentially stable. .     XR CHEST PORTABLE    Result Date: 1/25/2022  EXAMINATION: ONE XRAY VIEW OF THE CHEST 1/25/2022 7:41 am COMPARISON: 24 January 2022 HISTORY: ORDERING SYSTEM PROVIDED HISTORY: [facility] TECHNOLOGIST PROVIDED HISTORY: Reason for exam:->COVID ICU What reading provider will be dictating this exam?->CRC FINDINGS: Bilateral airspace disease is not appreciably changed allowing for some differences in positioning. Stable support lines. No new findings. Stable airspace disease. See above. XR CHEST PORTABLE    Result Date: 1/24/2022  EXAMINATION: ONE XRAY VIEW OF THE CHEST 1/24/2022 7:11 am COMPARISON: 23 January 2022 HISTORY: ORDERING SYSTEM PROVIDED HISTORY: COVID ICU TECHNOLOGIST PROVIDED HISTORY: Reason for exam:->COVID ICU What reading provider will be dictating this exam?->CRC FINDINGS: Chest is notably rotated to the left. Support lines are stable. Bilateral airspace disease appears mildly progressive. Bilateral airspace disease which appears mildly progressive. XR CHEST PORTABLE    Result Date: 1/23/2022  EXAMINATION: ONE XRAY VIEW OF THE CHEST 1/23/2022 8:17 am COMPARISON: Multiple prior studies, the most recent dated January 22, 2022 HISTORY: [facility]: COVID ICU TECHNOLOGIST PROVIDED HISTORY: Reason for exam:->COVID ICU What reading provider will be dictating this exam?->CRC FINDINGS: Frontal view of the chest.  Lines and tubes are unchanged in position. Stable cardiomediastinal silhouette. Diffuse bilateral opacities are not significantly changed given differences in patient positioning. No pneumothorax seen. There are degenerative changes in the spine. 1.  Lines and tubes are unchanged in position. 2.  Diffuse bilateral opacities are not significantly changed. Differential includes infection, pulmonary edema, atelectasis, ARDS, and/or layering pleural effusions.      XR CHEST PORTABLE    Result Date: 1/22/2022  EXAMINATION: ONE XRAY VIEW OF THE CHEST 1/22/2022 8:28 am COMPARISON: 01/21/2022 HISTORY: ORDERING SYSTEM PROVIDED HISTORY: COVID ICU TECHNOLOGIST PROVIDED HISTORY: Reason for exam:->COVID ICU What reading provider will be dictating this exam?->CRC FINDINGS: Heart is normal in size. Moderate bilateral interstitial and alveolar opacities with central air bronchograms have not significantly improved. No pneumothorax detected. The gastric catheter passes into the stomach. Endotracheal tube tip is 4 cm above the alberta. Right IJ central venous catheter in good position. Osseous structures are stable. 1.  No significant change in bilateral multifocal pneumonia. Support tubes and catheters are stable. XR CHEST PORTABLE    Result Date: 1/21/2022  EXAMINATION: ONE XRAY VIEW OF THE CHEST 1/21/2022 8:33 am COMPARISON: None. HISTORY: ORDERING SYSTEM PROVIDED HISTORY: COVID ICU TECHNOLOGIST PROVIDED HISTORY: Reason for exam:->COVID ICU What reading provider will be dictating this exam?->CRC FINDINGS: There is stable position of support lines and tubes. There is no pneumothorax or pneumomediastinum. Extensive multifocal bilateral pneumonia is noted unchanged when compared to the prior study. 1. There is no interval change in extensive multifocal bilateral pneumonia. XR CHEST PORTABLE    Result Date: 1/20/2022  EXAMINATION: ONE XRAY VIEW OF THE CHEST 1/20/2022 8:06 am COMPARISON: January 19, 2022 HISTORY: ORDERING SYSTEM PROVIDED HISTORY: COVID ICU TECHNOLOGIST PROVIDED HISTORY: Reason for exam:->COVID ICU What reading provider will be dictating this exam?->CRC FINDINGS: There is no evidence of pneumothorax or effusion. There is continued bilateral diffuse pulmonary infiltrates with mildly increased opacity in the bilateral bases when compared to previous. ET tube is in good position with tip above the alberta. NG tube courses below the diaphragm. Stable appearance of right IJ central venous catheter projecting over the SVC. Visualized bony thorax shows no acute abnormality. Slightly worsened bilateral pulmonary infiltrates with increasing opacity in the bilateral bases when compared to previous. Stable lines and tubes.      XR CHEST PORTABLE    Result Date: 1/19/2022  EXAMINATION: ONE XRAY VIEW OF THE CHEST PORTABLE UPRIGHT 1/19/2022 12:38 pm COMPARISON: 0238 hours HISTORY: ORDERING SYSTEM PROVIDED HISTORY: central line placement TECHNOLOGIST PROVIDED HISTORY: Reason for exam:->central line placement What reading provider will be dictating this exam?->CRC FINDINGS: Medical devices: Right IJ central venous line tip at the cavoatrial junction. No pneumothorax. The endotracheal and enteric tubes remain in satisfactory position. Redemonstration of bilateral widespread pulmonary infiltrates. Right lung infiltrate shows no significant change. Left perihilar and left upper lobe infiltrate shows mild progression and is more confluent. Normal heart size. No significant pleural effusion. Atherosclerotic thoracic aorta. 1.  Good position right IJ central venous line. Negative for pneumothorax. 2.  Bilateral widespread pulmonary infiltrates with interval mild worsening on the left and compatible with bilateral pneumonia. XR CHEST PORTABLE    Result Date: 1/19/2022  EXAMINATION: ONE XRAY VIEW OF THE CHEST 1/19/2022 3:01 am COMPARISON: 01/16/2022 HISTORY: ORDERING SYSTEM PROVIDED HISTORY: ETT placement TECHNOLOGIST PROVIDED HISTORY: Reason for exam:->ETT placement What reading provider will be dictating this exam?->CRC FINDINGS: Endotracheal tube tip is 3.3 cm above alberta. Nasogastric tube extends into the stomach. There are diffuse infiltrates which are more notable in the lower lungs. When accounting for technical differences, these infiltrates have not changed significantly. There is no pleural effusion or pneumothorax seen. There is no cardiomegaly. Endotracheal tube tip is 3.3 cm above the alberta. Diffuse pulmonary infiltrates are unchanged.      XR CHEST PORTABLE    Result Date: 1/16/2022  EXAMINATION: ONE XRAY VIEW OF THE CHEST 1/16/2022 10:59 am COMPARISON: 01/14/2022 HISTORY: ORDERING SYSTEM PROVIDED HISTORY: hypoxia TECHNOLOGIST PROVIDED HISTORY: Reason for exam:->hypoxia What reading provider will be dictating this exam?->CRC FINDINGS: The cardiac silhouette is within normals. Extensive multifocal bilateral pneumonia is noted unchanged when compared to prior study. There is no pneumothorax or pneumomediastinum. There is no pleural effusion. 1. Multifocal bilateral pneumonia unchanged when compared with the prior study. XR CHEST PORTABLE    Result Date: 1/14/2022  EXAMINATION: ONE XRAY VIEW OF THE CHEST 1/14/2022 4:37 pm COMPARISON: None. HISTORY: ORDERING SYSTEM PROVIDED HISTORY: hypoxia TECHNOLOGIST PROVIDED HISTORY: Reason for exam:->hypoxia What reading provider will be dictating this exam?->CRC FINDINGS: There is extensive bilateral airspace disease bilaterally. The findings are most compatible with pneumonia. Heart size is borderline. Pulmonary vascularity is not clearly congested. Bilateral airspace disease likely related to pneumonia. XR ABDOMEN FOR NG/OG/NE TUBE PLACEMENT    Result Date: 1/19/2022  EXAMINATION: ONE SUPINE XRAY VIEW(S) OF THE ABDOMEN 1/19/2022 3:01 am COMPARISON: None. HISTORY: ORDERING SYSTEM PROVIDED HISTORY: Confirmation of course of NG/OG/NE tube and location of tip of tube TECHNOLOGIST PROVIDED HISTORY: Reason for exam:->Confirmation of course of NG/OG/NE tube and location of tip of tube Portable? ->Yes What reading provider will be dictating this exam?->CRC FINDINGS: The nasogastric tube terminates in the stomach. There are infiltrates in visualized lungs. Visualized gas pattern is nonspecific demonstrating a paucity of bowel gas. Nasogastric tube terminates in the stomach.        Notable Cultures:      Blood cultures   Blood Culture, Routine   Date Value Ref Range Status   01/23/2022 24 Hours no growth  Preliminary       Respiratory cultures No results found for: RESPCULTURE No results found for: LABGRAM  Legionella No results found for: LABLEGI    Urine cultures   Urine Culture, Routine   Date Value Ref Range Status   01/19/2022 >100,000 CFU/ml  Final         Resident's Assessment and Plan       78year old female with a past medical history of Asthma who presented to the ER on January 14 th, 2022 with complaints of SOB and diarrhea for one week    She is currently being  managed for       Acute hypoxic respiratory failure 2/2 Covid PNA  · Patient is supine  · Day 9 of intubation  · Sedated with fentanyl and Versed  · Off Nimbex  · Completed Zosyn for 7 days  · On hydrocortisone  · Leukocytosis, afebrile  · ABG showed metabolic alkalosis, patient receiving Lasix 40 mg twice daily  · Chest x-ray showed bilateral airspace disease  · Patient is 7.4 L positive since admission  · Plan  · Continue respiratory status monitoring  · Wean off ventilation as per patient tolerates  · Patient received Diamox today  · Continue diuresis with Lasix  · Continue Lovenox for DVT prophylaxis  · Keep supine and follow ABG        Septic shock 2/2 COVID-19 PNA with superimposed bacterial PNA  · For COVID-19 management as above  · Patient was requiring Levophed, currently off Levophed  · Blood pressure has been stable  · Plan  · Monitor blood pressure  · Tapering hydrocortisone dose      History of asthma  · Currently intubated and on breathing treatment      Hyperglycemia 2/2 steroids  · Blood glucose has been low side, 80s today  · Resume tube feed yesterday, possible concern for refeeding syndrome  · On medium dose sliding scale and Lantus of 5  · Plan  · Monitor blood glucose level every 4 hours  · And adjust insulin dose according to blood glucose level      Acute normocytic normochromic anemia 2/2 ACD versus sepsis  · Hemoglobin was 6.9 received 1 unit of PRBC  · Hemoglobin this morning was 8.4  · Plan  · Monitor CBC daily  · Transfuse PRBC if hemoglobin is less than 7      Constipation 2/2 fentanyl  · Patient is on bowel regiment  · X-ray KUB negative for acute obstruction  · Plan  · Monitor bowel movement and continue bowel regimen      Refeeding syndrome  · Patient had hypokalemia, hypomagnesemia, hypophosphatemia  · Plan  · Decrease tube feed to 10 mL/h  · Monitor electrolytes and replace accordingly      Resolved issue  SAMUEL        Code Status: Full Code  PT/OT evaluation:  Disposition: home +/- [facility] / Gene Ferris MD, PGY-1  Attending physician: Dr. Matt Cao    I personally saw, examined and provided care for the patient. Radiographs, labs and medication list were reviewed by me independently. Review of Residents documentation was conducted and revisions were made as appropriate. I agree with the above documented exam, problem list and plan of care.         CCT excluding procedures >30 minutes    Mohsen Bejarano DO

## 2022-01-28 NOTE — PROGRESS NOTES
Stage  CI HR MAP TPRI SVI   Baseline 3.2 86 58 1431 38   Challenge 3.8 87 56 1189 44   Result (%Ä) 16.2% 0.6% -3.4% -16.9% 17.5%     NICOM performed with 250mL normal saline bolus. Change in SVI = 17.5%. Patient is fluid responsive.

## 2022-01-28 NOTE — PROGRESS NOTES
Comprehensive Nutrition Assessment    Type and Reason for Visit:  Reassess    Nutrition Recommendations/Plan: Continue NPO. Continue trickle feeds until electrolytes stable. Goal when needed: Standard with fiber @ 30 ml/hr + 1 protein modular  Will provide: 720 ml tv, 1080 kcals, 46 gm pro (1180 kcals & 72 gm pro w/ mod), 547 ml free water    Nutrition Assessment:  Pt remains at nutritional risk d/t ongoing intubation 2/2 +COVID-19 PNA/ intermittent prone. Noted SAMUEL. EN support initiated however rate decreased back to trickle d/t re-feeding syndrome. Will provide goal rec when needed & monitor. Malnutrition Assessment:  Malnutrition Status:   At risk for malnutrition  Context:  Acute Illness     Findings of the 6 clinical characteristics of malnutrition:  Energy Intake:  50% or less of estimated energy requirements for 5 or more days  Weight Loss:  Unable to assess (no wt hx on file)     Body Fat Loss:  Unable to assess (d/t covid iso)     Muscle Mass Loss:  Unable to assess (d/t covid iso)    Fluid Accumulation:  No significant fluid accumulation     Strength:  Not Performed    Estimated Daily Nutrient Needs:  Energy (kcal):  PS3B 1192; 4111-6941; Weight Used for Energy Requirements:  Admission     Protein (g):  75-90; Weight Used for Protein Requirements:  Admission (1.5-1.8 g/kg)        Fluid (ml/day):  per critical care    Nutrition Related Findings:  Pt remains intubated/sedated, supine today, intermittent hypotension (not on pressor), +I/O's 5L, +1 edema, hypoactive BS, NGT w/ trickle feeds      Wounds:  None       Current Nutrition Therapies:    Current Tube Feeding (TF) Orders:  · Feeding Route: Nasogastric  · Formula: Standard with Fiber  · Schedule: Continuous (10 ml/hr, running per doc flow)    Anthropometric Measures:  · Height: 5' (152.4 cm)  · Current Body Weight: 109 lb (49.4 kg) (1/14 admit wt as CBW elevated)   · Admission Body Weight: 109 lb (49.4 kg) (1/14 first measured)    · Usual

## 2022-01-29 ENCOUNTER — APPOINTMENT (OUTPATIENT)
Dept: ULTRASOUND IMAGING | Age: 67
DRG: 207 | End: 2022-01-29
Payer: MEDICARE

## 2022-01-29 ENCOUNTER — APPOINTMENT (OUTPATIENT)
Dept: GENERAL RADIOLOGY | Age: 67
DRG: 207 | End: 2022-01-29
Payer: MEDICARE

## 2022-01-29 LAB
AADO2: 541.9 MMHG
ALBUMIN SERPL-MCNC: 2.4 G/DL (ref 3.5–5.2)
ALP BLD-CCNC: 97 U/L (ref 35–104)
ALT SERPL-CCNC: 16 U/L (ref 0–32)
ANION GAP SERPL CALCULATED.3IONS-SCNC: 11 MMOL/L (ref 7–16)
ANION GAP SERPL CALCULATED.3IONS-SCNC: 7 MMOL/L (ref 7–16)
AST SERPL-CCNC: 16 U/L (ref 0–31)
B.E.: 1.7 MMOL/L (ref -3–3)
BILIRUB SERPL-MCNC: 0.2 MG/DL (ref 0–1.2)
BILIRUBIN DIRECT: <0.2 MG/DL (ref 0–0.3)
BILIRUBIN, INDIRECT: ABNORMAL MG/DL (ref 0–1)
BUN BLDV-MCNC: 24 MG/DL (ref 6–23)
BUN BLDV-MCNC: 25 MG/DL (ref 6–23)
CALCIUM IONIZED: 1.23 MMOL/L (ref 1.15–1.33)
CALCIUM SERPL-MCNC: 8.5 MG/DL (ref 8.6–10.2)
CALCIUM SERPL-MCNC: 8.7 MG/DL (ref 8.6–10.2)
CHLORIDE BLD-SCNC: 100 MMOL/L (ref 98–107)
CHLORIDE BLD-SCNC: 102 MMOL/L (ref 98–107)
CO2: 28 MMOL/L (ref 22–29)
CO2: 35 MMOL/L (ref 22–29)
COHB: 0.7 % (ref 0–1.5)
CREAT SERPL-MCNC: 0.7 MG/DL (ref 0.5–1)
CREAT SERPL-MCNC: 0.7 MG/DL (ref 0.5–1)
CRITICAL: ABNORMAL
D DIMER: 3070 NG/ML DDU
DATE ANALYZED: ABNORMAL
DATE OF COLLECTION: ABNORMAL
FERRITIN: 1198 NG/ML
FIO2: 100 %
GFR AFRICAN AMERICAN: >60
GFR AFRICAN AMERICAN: >60
GFR NON-AFRICAN AMERICAN: >60 ML/MIN/1.73
GFR NON-AFRICAN AMERICAN: >60 ML/MIN/1.73
GLUCOSE BLD-MCNC: 138 MG/DL (ref 74–99)
GLUCOSE BLD-MCNC: 172 MG/DL (ref 74–99)
HCO3: 27.9 MMOL/L (ref 22–26)
HCT VFR BLD CALC: 27.8 % (ref 34–48)
HEMOGLOBIN: 8.9 G/DL (ref 11.5–15.5)
HHB: 3.3 % (ref 0–5)
LAB: ABNORMAL
Lab: ABNORMAL
MAGNESIUM: 1.8 MG/DL (ref 1.6–2.6)
MAGNESIUM: 1.9 MG/DL (ref 1.6–2.6)
MCH RBC QN AUTO: 30.6 PG (ref 26–35)
MCHC RBC AUTO-ENTMCNC: 32 % (ref 32–34.5)
MCV RBC AUTO: 95.5 FL (ref 80–99.9)
METER GLUCOSE: 116 MG/DL (ref 74–99)
METER GLUCOSE: 120 MG/DL (ref 74–99)
METER GLUCOSE: 126 MG/DL (ref 74–99)
METER GLUCOSE: 131 MG/DL (ref 74–99)
METER GLUCOSE: 143 MG/DL (ref 74–99)
METER GLUCOSE: 182 MG/DL (ref 74–99)
METHB: 0.3 % (ref 0–1.5)
MODE: AC
O2 CONTENT: 13.3 ML/DL
O2 SATURATION: 96.7 % (ref 92–98.5)
O2HB: 95.7 % (ref 94–97)
OPERATOR ID: ABNORMAL
PATIENT TEMP: 37 C
PCO2: 51.4 MMHG (ref 35–45)
PDW BLD-RTO: 14.9 FL (ref 11.5–15)
PEEP/CPAP: 14 CMH2O
PFO2: 0.95 MMHG/%
PH BLOOD GAS: 7.35 (ref 7.35–7.45)
PHOSPHORUS: 4.7 MG/DL (ref 2.5–4.5)
PHOSPHORUS: 5.8 MG/DL (ref 2.5–4.5)
PLATELET # BLD: 281 E9/L (ref 130–450)
PMV BLD AUTO: 11.6 FL (ref 7–12)
PO2: 94.7 MMHG (ref 75–100)
POTASSIUM SERPL-SCNC: 4.2 MMOL/L (ref 3.5–5)
POTASSIUM SERPL-SCNC: 4.4 MMOL/L (ref 3.5–5)
PROCALCITONIN: 1.59 NG/ML (ref 0–0.08)
RBC # BLD: 2.91 E12/L (ref 3.5–5.5)
RI(T): 5.72
RR MECHANICAL: 24 B/MIN
SODIUM BLD-SCNC: 139 MMOL/L (ref 132–146)
SODIUM BLD-SCNC: 144 MMOL/L (ref 132–146)
SOURCE, BLOOD GAS: ABNORMAL
THB: 9.8 G/DL (ref 11.5–16.5)
TIME ANALYZED: 454
TOTAL PROTEIN: 5.6 G/DL (ref 6.4–8.3)
VT MECHANICAL: 300 ML
WBC # BLD: 16.7 E9/L (ref 4.5–11.5)

## 2022-01-29 PROCEDURE — 82962 GLUCOSE BLOOD TEST: CPT

## 2022-01-29 PROCEDURE — 87205 SMEAR GRAM STAIN: CPT

## 2022-01-29 PROCEDURE — 94003 VENT MGMT INPAT SUBQ DAY: CPT

## 2022-01-29 PROCEDURE — 71045 X-RAY EXAM CHEST 1 VIEW: CPT

## 2022-01-29 PROCEDURE — 2580000003 HC RX 258: Performed by: INTERNAL MEDICINE

## 2022-01-29 PROCEDURE — 2500000003 HC RX 250 WO HCPCS: Performed by: INTERNAL MEDICINE

## 2022-01-29 PROCEDURE — 6370000000 HC RX 637 (ALT 250 FOR IP): Performed by: NURSE PRACTITIONER

## 2022-01-29 PROCEDURE — 82330 ASSAY OF CALCIUM: CPT

## 2022-01-29 PROCEDURE — 87088 URINE BACTERIA CULTURE: CPT

## 2022-01-29 PROCEDURE — 80048 BASIC METABOLIC PNL TOTAL CA: CPT

## 2022-01-29 PROCEDURE — 6360000002 HC RX W HCPCS: Performed by: INTERNAL MEDICINE

## 2022-01-29 PROCEDURE — 6360000002 HC RX W HCPCS: Performed by: STUDENT IN AN ORGANIZED HEALTH CARE EDUCATION/TRAINING PROGRAM

## 2022-01-29 PROCEDURE — 6370000000 HC RX 637 (ALT 250 FOR IP): Performed by: INTERNAL MEDICINE

## 2022-01-29 PROCEDURE — 82805 BLOOD GASES W/O2 SATURATION: CPT

## 2022-01-29 PROCEDURE — 93970 EXTREMITY STUDY: CPT

## 2022-01-29 PROCEDURE — 82728 ASSAY OF FERRITIN: CPT

## 2022-01-29 PROCEDURE — 80076 HEPATIC FUNCTION PANEL: CPT

## 2022-01-29 PROCEDURE — 83735 ASSAY OF MAGNESIUM: CPT

## 2022-01-29 PROCEDURE — 87070 CULTURE OTHR SPECIMN AEROBIC: CPT

## 2022-01-29 PROCEDURE — 36415 COLL VENOUS BLD VENIPUNCTURE: CPT

## 2022-01-29 PROCEDURE — 6370000000 HC RX 637 (ALT 250 FOR IP): Performed by: FAMILY MEDICINE

## 2022-01-29 PROCEDURE — 87449 NOS EACH ORGANISM AG IA: CPT

## 2022-01-29 PROCEDURE — 87040 BLOOD CULTURE FOR BACTERIA: CPT

## 2022-01-29 PROCEDURE — 85378 FIBRIN DEGRADE SEMIQUANT: CPT

## 2022-01-29 PROCEDURE — 84100 ASSAY OF PHOSPHORUS: CPT

## 2022-01-29 PROCEDURE — 6370000000 HC RX 637 (ALT 250 FOR IP)

## 2022-01-29 PROCEDURE — 94640 AIRWAY INHALATION TREATMENT: CPT

## 2022-01-29 PROCEDURE — 85027 COMPLETE CBC AUTOMATED: CPT

## 2022-01-29 PROCEDURE — 2000000000 HC ICU R&B

## 2022-01-29 PROCEDURE — 2580000003 HC RX 258: Performed by: FAMILY MEDICINE

## 2022-01-29 PROCEDURE — C9113 INJ PANTOPRAZOLE SODIUM, VIA: HCPCS | Performed by: INTERNAL MEDICINE

## 2022-01-29 PROCEDURE — 87305 ASPERGILLUS AG IA: CPT

## 2022-01-29 PROCEDURE — 87206 SMEAR FLUORESCENT/ACID STAI: CPT

## 2022-01-29 RX ORDER — HEPARIN SODIUM 10000 [USP'U]/ML
5000 INJECTION, SOLUTION INTRAVENOUS; SUBCUTANEOUS EVERY 8 HOURS
Status: DISCONTINUED | OUTPATIENT
Start: 2022-01-29 | End: 2022-01-29

## 2022-01-29 RX ADMIN — OXYCODONE HYDROCHLORIDE AND ACETAMINOPHEN 500 MG: 500 TABLET ORAL at 09:00

## 2022-01-29 RX ADMIN — SODIUM CHLORIDE, PRESERVATIVE FREE 10 ML: 5 INJECTION INTRAVENOUS at 09:07

## 2022-01-29 RX ADMIN — MIDODRINE HYDROCHLORIDE 15 MG: 5 TABLET ORAL at 18:05

## 2022-01-29 RX ADMIN — MIDAZOLAM 12 MG/HR: 5 INJECTION INTRAMUSCULAR; INTRAVENOUS at 00:50

## 2022-01-29 RX ADMIN — PANTOPRAZOLE SODIUM 40 MG: 40 INJECTION, POWDER, FOR SOLUTION INTRAVENOUS at 20:39

## 2022-01-29 RX ADMIN — PANTOPRAZOLE SODIUM 40 MG: 40 INJECTION, POWDER, FOR SOLUTION INTRAVENOUS at 09:00

## 2022-01-29 RX ADMIN — MINERAL OIL AND WHITE PETROLATUM: 150; 830 OINTMENT OPHTHALMIC at 06:53

## 2022-01-29 RX ADMIN — HYDROCORTISONE SODIUM SUCCINATE 50 MG: 100 INJECTION, POWDER, FOR SOLUTION INTRAMUSCULAR; INTRAVENOUS at 18:25

## 2022-01-29 RX ADMIN — Medication 200 MCG/HR: at 18:59

## 2022-01-29 RX ADMIN — MIDODRINE HYDROCHLORIDE 15 MG: 5 TABLET ORAL at 12:16

## 2022-01-29 RX ADMIN — MINERAL OIL AND WHITE PETROLATUM: 150; 830 OINTMENT OPHTHALMIC at 15:00

## 2022-01-29 RX ADMIN — ENOXAPARIN SODIUM 30 MG: 100 INJECTION SUBCUTANEOUS at 20:38

## 2022-01-29 RX ADMIN — HYDROCORTISONE SODIUM SUCCINATE 50 MG: 100 INJECTION, POWDER, FOR SOLUTION INTRAMUSCULAR; INTRAVENOUS at 09:00

## 2022-01-29 RX ADMIN — IPRATROPIUM BROMIDE AND ALBUTEROL SULFATE 1 AMPULE: .5; 2.5 SOLUTION RESPIRATORY (INHALATION) at 12:24

## 2022-01-29 RX ADMIN — PROPOFOL 50 MCG/KG/MIN: 10 INJECTION, EMULSION INTRAVENOUS at 02:11

## 2022-01-29 RX ADMIN — Medication 10 ML: at 09:08

## 2022-01-29 RX ADMIN — MINERAL OIL AND WHITE PETROLATUM: 150; 830 OINTMENT OPHTHALMIC at 03:20

## 2022-01-29 RX ADMIN — IPRATROPIUM BROMIDE AND ALBUTEROL SULFATE 1 AMPULE: .5; 2.5 SOLUTION RESPIRATORY (INHALATION) at 16:09

## 2022-01-29 RX ADMIN — Medication: at 09:01

## 2022-01-29 RX ADMIN — Medication: at 20:42

## 2022-01-29 RX ADMIN — IPRATROPIUM BROMIDE AND ALBUTEROL SULFATE 1 AMPULE: .5; 2.5 SOLUTION RESPIRATORY (INHALATION) at 20:34

## 2022-01-29 RX ADMIN — MIDODRINE HYDROCHLORIDE 10 MG: 10 TABLET ORAL at 08:59

## 2022-01-29 RX ADMIN — FUROSEMIDE 40 MG: 10 INJECTION, SOLUTION INTRAMUSCULAR; INTRAVENOUS at 09:25

## 2022-01-29 RX ADMIN — INSULIN LISPRO 1 UNITS: 100 INJECTION, SOLUTION INTRAVENOUS; SUBCUTANEOUS at 21:00

## 2022-01-29 RX ADMIN — PROPOFOL 50 MCG/KG/MIN: 10 INJECTION, EMULSION INTRAVENOUS at 09:19

## 2022-01-29 RX ADMIN — DOCUSATE SODIUM 100 MG: 50 LIQUID ORAL at 20:39

## 2022-01-29 RX ADMIN — CISATRACURIUM BESYLATE 2 MCG/KG/MIN: 200 INJECTION INTRAVENOUS at 12:59

## 2022-01-29 RX ADMIN — MINERAL OIL AND WHITE PETROLATUM: 150; 830 OINTMENT OPHTHALMIC at 22:50

## 2022-01-29 RX ADMIN — MINERAL OIL AND WHITE PETROLATUM: 150; 830 OINTMENT OPHTHALMIC at 18:25

## 2022-01-29 RX ADMIN — POLYETHYLENE GLYCOL 3350 17 G: 17 POWDER, FOR SOLUTION ORAL at 09:19

## 2022-01-29 RX ADMIN — Medication 10 ML: at 20:39

## 2022-01-29 RX ADMIN — DOCUSATE SODIUM 100 MG: 50 LIQUID ORAL at 09:00

## 2022-01-29 RX ADMIN — CHLORHEXIDINE GLUCONATE 0.12% ORAL RINSE 15 ML: 1.2 LIQUID ORAL at 20:39

## 2022-01-29 RX ADMIN — MINERAL OIL AND WHITE PETROLATUM: 150; 830 OINTMENT OPHTHALMIC at 09:00

## 2022-01-29 RX ADMIN — ZINC SULFATE 220 MG (50 MG) CAPSULE 50 MG: CAPSULE at 09:01

## 2022-01-29 RX ADMIN — HEPARIN SODIUM 5000 UNITS: 10000 INJECTION INTRAVENOUS; SUBCUTANEOUS at 09:19

## 2022-01-29 RX ADMIN — Medication 2000 UNITS: at 09:04

## 2022-01-29 RX ADMIN — MIDAZOLAM 12 MG/HR: 5 INJECTION INTRAMUSCULAR; INTRAVENOUS at 09:20

## 2022-01-29 RX ADMIN — Medication 200 MCG/HR: at 03:19

## 2022-01-29 RX ADMIN — SENNOSIDES 5 ML: 8.8 SYRUP ORAL at 20:39

## 2022-01-29 RX ADMIN — Medication 200 MCG/HR: at 11:50

## 2022-01-29 RX ADMIN — CHLORHEXIDINE GLUCONATE 0.12% ORAL RINSE 15 ML: 1.2 LIQUID ORAL at 09:00

## 2022-01-29 ASSESSMENT — PULMONARY FUNCTION TESTS
PIF_VALUE: 38
PIF_VALUE: 38
PIF_VALUE: 25
PIF_VALUE: 35
PIF_VALUE: 46
PIF_VALUE: 46
PIF_VALUE: 47
PIF_VALUE: 39
PIF_VALUE: 39
PIF_VALUE: 36
PIF_VALUE: 45
PIF_VALUE: 44
PIF_VALUE: 46
PIF_VALUE: 35
PIF_VALUE: 40
PIF_VALUE: 38
PIF_VALUE: 47
PIF_VALUE: 43
PIF_VALUE: 45
PIF_VALUE: 44
PIF_VALUE: 35
PIF_VALUE: 38

## 2022-01-29 ASSESSMENT — PAIN SCALES - GENERAL
PAINLEVEL_OUTOF10: 0

## 2022-01-29 NOTE — PROGRESS NOTES
01/29/22 1608   Vent Information   Vent Type 980   Vent Mode AC/VC+   Vt Ordered 300 mL   Rate Set 24 bmp   Peak Flow 0 L/min   Pressure Support 0 cmH20   FiO2  100 %   SpO2 98 %   SpO2/FiO2 ratio 98   Sensitivity 3   PEEP/CPAP 14   I Time/ I Time % 0.55 s   Vent Patient Data   High Peep/I Pressure 0   Peak Inspiratory Pressure 46 cmH2O   Mean Airway Pressure 21 cmH20   Rate Measured 24 br/min   Vt Exhaled 305 mL   Minute Volume 7.27 Liters   I:E Ratio 1:3.50   Spontaneous Breathing Trial (SBT) RT Doc   Pulse 74   Additional Respiratory  Assessments   Resp 25   Alarm Settings   High Pressure Alarm 60 cmH2O

## 2022-01-29 NOTE — PROGRESS NOTES
01/29/22 0930   Vent Information   Vent Type 980   Vent Mode AC/VC+   Vt Ordered 300 mL   Rate Set 24 bmp   Peak Flow 0 L/min   Pressure Support 0 cmH20   FiO2  100 %   SpO2 99 %   SpO2/FiO2 ratio 99   PaO2/FiO2 ratio 0.95   Sensitivity 0   PEEP/CPAP 14   I Time/ I Time % 0.55 s   Humidification Source Heated wire   Humidification Temp 37   Vent Patient Data   High Peep/I Pressure 0   Peak Inspiratory Pressure 38 cmH2O   Mean Airway Pressure 19 cmH20   Rate Measured 24 br/min   Vt Exhaled 306 mL   Minute Volume 7.37 Liters   I:E Ratio 1:3.50   Spontaneous Breathing Trial (SBT) RT Doc   Pulse 58   Additional Respiratory  Assessments   Resp 27   Alarm Settings   High Pressure Alarm 60 cmH2O

## 2022-01-29 NOTE — PLAN OF CARE
Problem: Airway Clearance - Ineffective  Goal: Achieve or maintain patent airway  Outcome: Met This Shift     Problem: Gas Exchange - Impaired  Goal: Promote optimal lung function  Outcome: Met This Shift     Problem: Falls - Risk of:  Goal: Will remain free from falls  Description: Will remain free from falls  Outcome: Met This Shift  Goal: Absence of physical injury  Description: Absence of physical injury  Outcome: Met This Shift     Problem: Skin Integrity:  Goal: Absence of new skin breakdown  Description: Absence of new skin breakdown  Outcome: Met This Shift

## 2022-01-29 NOTE — PROGRESS NOTES
Hospitalist Progress Note      SYNOPSIS:     Ms. Kb Ramos is a 78y.o. year old female who has a PMH of asthma.     She presented to the ER on 22 with complaints of SOB and diarrhea x 1 week. She was not vaccinated against COVID-19. Reportedly her daughter found her curled into the fetal position in respiratory distress at home and called 911. Vitals on arrival were significant for temperature 103.2, heart rate 110, blood pressure 122/70 and oxygen saturation 72% on room air. She was placed on 15 L via nonrebreather mask and her oxygen saturation improved to 95%. Labs were significant for bicarbonate 19, BUN 38, creatinine 1.9, anion gap 19, procalcitonin 1.57, CRP 12.8, , mildly elevated LFTs, D-dimer 404, ferritin 5098 and a positive COVID PCR. Chest x-ray showed bilateral airspace disease. She was given 1 L normal saline bolus as well Lovenox and Decadron prior to being admitted. RRT was called on  for respiratory distress. She was transferred to ICU for intubation. She has been intermittently proned. SUBJECTIVE:    Patient seen and examined. Remains intubated and sedated, chemically paralyzed. Requiring norepinephrine. Records reviewed. Temp (24hrs), Av.3 °F (37.4 °C), Min:98.4 °F (36.9 °C), Max:100.4 °F (38 °C)    DIET: Diet NPO  ADULT TUBE FEEDING; Nasogastric; Standard with Fiber; Continuous; 10; No; 30; Q 3 hours  CODE: Full Code    Intake/Output Summary (Last 24 hours) at 2022 1012  Last data filed at 2022 0900  Gross per 24 hour   Intake 2869.25 ml   Output 2000 ml   Net 869.25 ml       Review of Systems  RAGHAV- intubated and sedated      OBJECTIVE:    BP (!) 155/64   Pulse 58   Temp 98.4 °F (36.9 °C)   Resp 27   Ht 5' (1.524 m)   Wt 134 lb (60.8 kg)   SpO2 99%   BMI 26.17 kg/m²     General appearance: Intubated and sedated  HEENT: Normal cephalic, facial edema noted  Neck: Supple, with full range of motion.  No jugular venous distention. Trachea midline. Respiratory: ETT to vent, FiO2 100%, PEEP 14  Cardiovascular: RRR, no murmur noted  Abdomen: Soft, nontender, nondistended, flat. NGT with trickle feeds, FW.  Musculoskeletal: No clubbing, cyanosis, + edema generalized  Skin: Normal skin color.  scatted bruises  Neurologic:  Sedated  Psychiatric:  Sedated    Medications:  REVIEWED DAILY    Infusion Medications    fentaNYL 5 mcg/ml in D5W 250 ml infusion 200 mcg/hr (01/29/22 0319)    propofol 50 mcg/kg/min (01/29/22 0919)    norepinephrine 8 mcg/min (01/29/22 0850)    sodium chloride      midazolam 12 mg/hr (01/29/22 0920)    sodium chloride      dextrose       Scheduled Medications    heparin (porcine)  5,000 Units SubCUTAneous Q8H    hydrocortisone sodium succinate PF  50 mg IntraVENous Daily    insulin lispro  0-6 Units SubCUTAneous Q4H    furosemide  40 mg IntraVENous Daily    midodrine  10 mg Oral TID WC    pantoprazole  40 mg IntraVENous BID    And    sodium chloride (PF)  10 mL IntraVENous Daily    [Held by provider] enoxaparin  30 mg SubCUTAneous BID    docusate sodium  100 mg Oral BID    sennosides  5 mL Oral Nightly    miconazole nitrate   Topical BID    chlorhexidine  15 mL Mouth/Throat BID    artificial tears   Both Eyes Q4H    polyethylene glycol  17 g Oral Daily    ipratropium-albuterol  1 ampule Inhalation Q4H WA    sodium chloride flush  5-40 mL IntraVENous 2 times per day    ascorbic acid  500 mg Oral Daily    zinc sulfate  50 mg Oral Daily    vitamin D  2,000 Units Oral Daily     PRN Meds: sodium chloride, sodium chloride flush, sodium chloride, acetaminophen **OR** acetaminophen, glucose, dextrose, glucagon (rDNA), dextrose    Labs:     Recent Labs     01/27/22  0430 01/28/22  0402 01/29/22  0435   WBC 16.2* 11.0 16.7*   HGB 8.4* 7.5* 8.9*   HCT 25.6* 23.6* 27.8*    262 281       Recent Labs     01/28/22  0402 01/28/22  1810 01/29/22  0435   * 145 139   K 2.8* 4.2 4.2    107 100   CO2 32* 29 28   BUN 28* 28* 25*   CREATININE 0.7 0.6 0.7   CALCIUM 8.4* 8.8 8.5*   PHOS 4.0 2.3* 4.7*       No results for input(s): PROT, ALB, ALKPHOS, ALT, AST, BILITOT, AMYLASE, LIPASE in the last 72 hours. No results for input(s): INR in the last 72 hours. No results for input(s): Festus Yasmines in the last 72 hours. Chronic labs:    Lab Results   Component Value Date    CHOL 145 06/29/2020    TRIG 124 01/25/2022    HDL 73 06/29/2020    LDLCALC 57 06/29/2020    TSH 3.600 06/29/2020    INR 1.0 01/14/2022    LABA1C 6.0 (H) 01/19/2022       Radiology: REVIEWED DAILY      ASSESSMENT and PLAN:    COVID-19 pneumonia-unvaccinated, symptom onset approximately 1 week prior to presentation. To continue with vitamin C, vitamin D, zinc.  Lovenox 1 mg/kg bid. · S/p decadron 10 mg PO bid x 8 days, baricitinib discontinued on 1/19. Currently on solucortef. Possible superimposed bacterial pneumonia-procalcitonin 1.57 on admission. Blood and sputum cultures with NGTD. Urine antigens negative. Vancomycin and zosyn completed. Acute hypoxic respiratory failure-2/2 above. S/p intubation 1/19. · Currently requiring FiO2 100%, PEEP 14. Hemodynamic shock- likely septic, requiring norepinephrine in addition to midodrine. Leukocytosis worsening. Pancultures repeated. UTI-urine culture positive for Candida. Given Diflucan x1. Completed zosyn. Hypernatremia- 2/2 lack of PO free water intake- to increase FW to 250 mL Q6 hours     Hypervolemia- +4.82 L, generalized edema. Remains on lasix. Normal pH 7.352- with respiratory acidosis and renal compensation    Hyperglycemia-likely steroid-induced, started on SSI and lantus    Hypokalemia- for supplementation    Normocytic anemia- likely 2/2 phlebotomy; s/p 1U PRBC on 1/26. Continue to monitor H&H    Hx asthma- states that she has never seen a pulmonologist and that it is controlled with PRN albuterol.       DISPOSITION: Continued inpatient care    +++++++++++++++++++++++++++++++++++++++++++++++++  Rozetta Karma, APRN - CNP   [employer] - Hospitalist  [address], [place]  +++++++++++++++++++++++++++++++++++++++++++++++++  NOTE: This report was transcribed using voice recognition software. Every effort was made to ensure accuracy; however, inadvertent computerized transcription errors may be present.

## 2022-01-29 NOTE — PROGRESS NOTES
Department of Internal Medicine  Nephrology  Progress Note    Events Reviewed. SUBJECTIVE:  We are following Ms. Gloria Rodriguez for SAMUEL. Patient is intubated and supine. With low grade temp today    PHYSICAL EXAM:    Vitals:    VITALS:  BP (!) 155/64   Pulse 58   Temp 98.4 °F (36.9 °C)   Resp 27   Ht 5' (1.524 m)   Wt 134 lb (60.8 kg)   SpO2 99%   BMI 26.17 kg/m²   24HR INTAKE/OUTPUT:      Intake/Output Summary (Last 24 hours) at 1/29/2022 1105  Last data filed at 1/29/2022 0900  Gross per 24 hour   Intake 2869.25 ml   Output 1565 ml   Net 1304.25 ml     General appearance: Patient is intubated, sedated  HEENT: Normal cephalic, atraumatic without obvious deformity. Pupils equal, round, and reactive to light. Endotracheal tube in place  Neck: Supple, with full range of motion. No jugular venous distention. Trachea midline. Respiratory: Diminished bilaterally, intubated. Cardiovascular: RRR, no murmur noted  Abdomen: Soft, nontender, nondistended, flat  Musculoskeletal: No clubbing or cyanosis. No edema of bilateral lower extremities. Brisk capillary refill. Skin: Normal skin color.  No rashes. Scattered ecchymosis.   Neurologic: Patient sedated        Scheduled Meds:   heparin (porcine)  5,000 Units SubCUTAneous Q8H    hydrocortisone sodium succinate PF  50 mg IntraVENous Daily    insulin lispro  0-6 Units SubCUTAneous Q4H    furosemide  40 mg IntraVENous Daily    midodrine  10 mg Oral TID WC    pantoprazole  40 mg IntraVENous BID    And    sodium chloride (PF)  10 mL IntraVENous Daily    [Held by provider] enoxaparin  30 mg SubCUTAneous BID    docusate sodium  100 mg Oral BID    sennosides  5 mL Oral Nightly    miconazole nitrate   Topical BID    chlorhexidine  15 mL Mouth/Throat BID    artificial tears   Both Eyes Q4H    polyethylene glycol  17 g Oral Daily    ipratropium-albuterol  1 ampule Inhalation Q4H WA    sodium chloride flush  5-40 mL IntraVENous 2 times per day    ascorbic acid  500 mg Oral Daily    zinc sulfate  50 mg Oral Daily    vitamin D  2,000 Units Oral Daily     Continuous Infusions:   fentaNYL 5 mcg/ml in D5W 250 ml infusion 200 mcg/hr (01/29/22 0319)    propofol 50 mcg/kg/min (01/29/22 0919)    norepinephrine 8 mcg/min (01/29/22 0850)    sodium chloride      midazolam 12 mg/hr (01/29/22 0920)    sodium chloride      dextrose       PRN Meds:.sodium chloride, sodium chloride flush, sodium chloride, acetaminophen **OR** acetaminophen, glucose, dextrose, glucagon (rDNA), dextrose    DATA:    CBC with Differential:    Lab Results   Component Value Date    WBC 16.7 01/29/2022    RBC 2.91 01/29/2022    HGB 8.9 01/29/2022    HCT 27.8 01/29/2022     01/29/2022    MCV 95.5 01/29/2022    MCH 30.6 01/29/2022    MCHC 32.0 01/29/2022    RDW 14.9 01/29/2022    METASPCT 0.9 01/18/2022    LYMPHOPCT 2.7 01/18/2022    MONOPCT 11.6 01/18/2022    MYELOPCT 0.9 01/14/2022    BASOPCT 0.1 01/18/2022    MONOSABS 1.38 01/18/2022    LYMPHSABS 0.35 01/18/2022    EOSABS 0.00 01/18/2022    BASOSABS 0.00 01/18/2022     CMP:    Lab Results   Component Value Date     01/29/2022    K 4.2 01/29/2022    K 3.8 01/18/2022     01/29/2022    CO2 28 01/29/2022    BUN 25 01/29/2022    CREATININE 0.7 01/29/2022    GFRAA >60 01/29/2022    LABGLOM >60 01/29/2022    GLUCOSE 172 01/29/2022    PROT 5.2 01/22/2022    LABALBU 2.5 01/22/2022    CALCIUM 8.5 01/29/2022    BILITOT 0.2 01/22/2022    ALKPHOS 63 01/22/2022    AST 15 01/22/2022    ALT 23 01/22/2022     Magnesium:    Lab Results   Component Value Date    MG 1.8 01/29/2022     Phosphorus:    Lab Results   Component Value Date    PHOS 4.7 01/29/2022          Radiology Review:    CXR 1/28/2022   Bilateral airspace disease without appreciable change.               No significant interval change, when compared to the previous study performed   1 day earlier.           BRIEF SUMMARY OF INITIAL CONSULT:     Briefly, Ms. Prashant Mcleod is a 78 year old female with a past medical history of Asthma who presented to the ER on January 14 th, 2022 with complaints of SOB and diarrhea for one week. She is not vaccinated against COVID. Reportedly her daughter found her curled into a fetal position in respiratory distress at home and EMS was summoned. She was found to be COVID 19 positive. Labs were significant for bicarbonate 19, BUN 38, creatinine 1.9, anion gap 19, procalcitonin 1.57, CRP 12.8, , mildly elevated LFTs, D-dimer 404, ferritin 5098. Chest x-ray showed bilateral airspace disease. Renal is consulted for SAMUEL. Problems Resolved:     · SAMUEL, likely pre-renal volume responsive SAMUEL secondary to poor oral intake and GI losses (diarrhea). Creatinine 1.9mg/dL on admission. Renal function has improved to 0.6 mg/dL with IVF administration. · HAGMA with low bicarbonate levels, secondary to uremia. To continue bicarbonate drip  · Hypokalemia, 2/2 poor oral intake, potassium levels improved. · hypernatremia, with water deficit, dehydration due to poor intake. Sodium levels quite improved. Resolved. · Hypophosphatemia, 2/2 poor intake, to replace  · Hypokalemia likely 2/2 diuresis renal losses. IMPRESSION/RECOMMENDATIONS:        1. Hypocalcemia, vitamin D 25 level 39, calcium levels 8.4- improved  2. Metabolic alkalosis 0.339, pCO2 47, bicarb 31- improved  3. ----------------------------------------  4. COVID +, on hydrocortisone   5. Acute hypoxic respiratory failure, secondary to COVID pneumonia. Status post intubation  6. Normocytic anemia, hemoglobin 7.5. monitoring. 7. Nutrition, receiving tube feeds. Reduced to 10cc with concern for refeeding syndrome. Mg2+ 2.1 and phosphorus 4.0 today. 8. Low grade temp/neutrophilia? on levophed now     Plan:    · Hold  Lasix 40 mg daily for today due to low grade temp and vasopressor requirement  · Continue midodrine to 10mg TID.    · Replace potassium 120 mEq  · Will increase solucortef to 50 mg iv tid in view of hypotension      Electronically signed by Yaya Ulrich MD on 1/29/2022 at 11:05 AM    Discussed with MICU team  Agree as annotated  Yaya Ulrich MD

## 2022-01-29 NOTE — PROGRESS NOTES
1/28 2230: called support services for blood cultures to be done  1/29 1619: called a second time for support services to get blood cultures

## 2022-01-30 ENCOUNTER — APPOINTMENT (OUTPATIENT)
Dept: GENERAL RADIOLOGY | Age: 67
DRG: 207 | End: 2022-01-30
Payer: MEDICARE

## 2022-01-30 LAB
AADO2: 338.3 MMHG
ANION GAP SERPL CALCULATED.3IONS-SCNC: 6 MMOL/L (ref 7–16)
ANION GAP SERPL CALCULATED.3IONS-SCNC: 7 MMOL/L (ref 7–16)
B.E.: 5.6 MMOL/L (ref -3–3)
BUN BLDV-MCNC: 25 MG/DL (ref 6–23)
BUN BLDV-MCNC: 27 MG/DL (ref 6–23)
C-REACTIVE PROTEIN: 30.9 MG/DL (ref 0–0.4)
CALCIUM IONIZED: 1.18 MMOL/L (ref 1.15–1.33)
CALCIUM SERPL-MCNC: 8.7 MG/DL (ref 8.6–10.2)
CALCIUM SERPL-MCNC: 8.9 MG/DL (ref 8.6–10.2)
CHLORIDE BLD-SCNC: 96 MMOL/L (ref 98–107)
CHLORIDE BLD-SCNC: 98 MMOL/L (ref 98–107)
CO2: 34 MMOL/L (ref 22–29)
CO2: 38 MMOL/L (ref 22–29)
COHB: 0.6 % (ref 0–1.5)
CREAT SERPL-MCNC: 0.6 MG/DL (ref 0.5–1)
CREAT SERPL-MCNC: 0.6 MG/DL (ref 0.5–1)
CRITICAL: ABNORMAL
D DIMER: 2110 NG/ML DDU
DATE ANALYZED: ABNORMAL
DATE OF COLLECTION: ABNORMAL
FIO2: 80 %
GFR AFRICAN AMERICAN: >60
GFR AFRICAN AMERICAN: >60
GFR NON-AFRICAN AMERICAN: >60 ML/MIN/1.73
GFR NON-AFRICAN AMERICAN: >60 ML/MIN/1.73
GLUCOSE BLD-MCNC: 117 MG/DL (ref 74–99)
GLUCOSE BLD-MCNC: 145 MG/DL (ref 74–99)
GRAM STAIN ORDERABLE: NORMAL
HCO3: 31.4 MMOL/L (ref 22–26)
HCT VFR BLD CALC: 25.4 % (ref 34–48)
HEMOGLOBIN: 8 G/DL (ref 11.5–15.5)
HHB: 1 % (ref 0–5)
LAB: ABNORMAL
Lab: ABNORMAL
MAGNESIUM: 2 MG/DL (ref 1.6–2.6)
MAGNESIUM: 2.2 MG/DL (ref 1.6–2.6)
MCH RBC QN AUTO: 30.8 PG (ref 26–35)
MCHC RBC AUTO-ENTMCNC: 31.5 % (ref 32–34.5)
MCV RBC AUTO: 97.7 FL (ref 80–99.9)
METER GLUCOSE: 114 MG/DL (ref 74–99)
METER GLUCOSE: 118 MG/DL (ref 74–99)
METER GLUCOSE: 121 MG/DL (ref 74–99)
METER GLUCOSE: 124 MG/DL (ref 74–99)
METER GLUCOSE: 154 MG/DL (ref 74–99)
METER GLUCOSE: 168 MG/DL (ref 74–99)
METHB: 0.2 % (ref 0–1.5)
MODE: AC
O2 CONTENT: 12.8 ML/DL
O2 SATURATION: 99 % (ref 92–98.5)
O2HB: 98.2 % (ref 94–97)
OPERATOR ID: 2593
PATIENT TEMP: 37 C
PCO2: 53 MMHG (ref 35–45)
PDW BLD-RTO: 14.6 FL (ref 11.5–15)
PEEP/CPAP: 14 CMH2O
PFO2: 1.96 MMHG/%
PH BLOOD GAS: 7.39 (ref 7.35–7.45)
PHOSPHORUS: 3.1 MG/DL (ref 2.5–4.5)
PHOSPHORUS: 4 MG/DL (ref 2.5–4.5)
PLATELET # BLD: 232 E9/L (ref 130–450)
PMV BLD AUTO: 12.1 FL (ref 7–12)
PO2: 156.5 MMHG (ref 75–100)
POTASSIUM SERPL-SCNC: 3.2 MMOL/L (ref 3.5–5)
POTASSIUM SERPL-SCNC: 4.2 MMOL/L (ref 3.5–5)
RBC # BLD: 2.6 E12/L (ref 3.5–5.5)
RI(T): 2.16
RR MECHANICAL: 24 B/MIN
SODIUM BLD-SCNC: 139 MMOL/L (ref 132–146)
SODIUM BLD-SCNC: 140 MMOL/L (ref 132–146)
SOURCE, BLOOD GAS: ABNORMAL
THB: 9 G/DL (ref 11.5–16.5)
TIME ANALYZED: 501
VT MECHANICAL: 300 ML
WBC # BLD: 14.5 E9/L (ref 4.5–11.5)

## 2022-01-30 PROCEDURE — 6370000000 HC RX 637 (ALT 250 FOR IP): Performed by: NURSE PRACTITIONER

## 2022-01-30 PROCEDURE — C9113 INJ PANTOPRAZOLE SODIUM, VIA: HCPCS | Performed by: INTERNAL MEDICINE

## 2022-01-30 PROCEDURE — 94640 AIRWAY INHALATION TREATMENT: CPT

## 2022-01-30 PROCEDURE — 94003 VENT MGMT INPAT SUBQ DAY: CPT

## 2022-01-30 PROCEDURE — 6370000000 HC RX 637 (ALT 250 FOR IP): Performed by: INTERNAL MEDICINE

## 2022-01-30 PROCEDURE — 2580000003 HC RX 258: Performed by: INTERNAL MEDICINE

## 2022-01-30 PROCEDURE — 2580000003 HC RX 258: Performed by: STUDENT IN AN ORGANIZED HEALTH CARE EDUCATION/TRAINING PROGRAM

## 2022-01-30 PROCEDURE — 6360000002 HC RX W HCPCS: Performed by: INTERNAL MEDICINE

## 2022-01-30 PROCEDURE — 2000000000 HC ICU R&B

## 2022-01-30 PROCEDURE — 86140 C-REACTIVE PROTEIN: CPT

## 2022-01-30 PROCEDURE — 85027 COMPLETE CBC AUTOMATED: CPT

## 2022-01-30 PROCEDURE — 2500000003 HC RX 250 WO HCPCS: Performed by: INTERNAL MEDICINE

## 2022-01-30 PROCEDURE — 71045 X-RAY EXAM CHEST 1 VIEW: CPT

## 2022-01-30 PROCEDURE — 6370000000 HC RX 637 (ALT 250 FOR IP): Performed by: FAMILY MEDICINE

## 2022-01-30 PROCEDURE — 82962 GLUCOSE BLOOD TEST: CPT

## 2022-01-30 PROCEDURE — 85378 FIBRIN DEGRADE SEMIQUANT: CPT

## 2022-01-30 PROCEDURE — 83735 ASSAY OF MAGNESIUM: CPT

## 2022-01-30 PROCEDURE — 82330 ASSAY OF CALCIUM: CPT

## 2022-01-30 PROCEDURE — 2580000003 HC RX 258: Performed by: FAMILY MEDICINE

## 2022-01-30 PROCEDURE — 82805 BLOOD GASES W/O2 SATURATION: CPT

## 2022-01-30 PROCEDURE — 6360000002 HC RX W HCPCS: Performed by: STUDENT IN AN ORGANIZED HEALTH CARE EDUCATION/TRAINING PROGRAM

## 2022-01-30 PROCEDURE — 6370000000 HC RX 637 (ALT 250 FOR IP)

## 2022-01-30 PROCEDURE — 80048 BASIC METABOLIC PNL TOTAL CA: CPT

## 2022-01-30 PROCEDURE — 84100 ASSAY OF PHOSPHORUS: CPT

## 2022-01-30 RX ORDER — POTASSIUM CHLORIDE 29.8 MG/ML
40 INJECTION INTRAVENOUS EVERY 4 HOURS
Status: COMPLETED | OUTPATIENT
Start: 2022-01-31 | End: 2022-01-31

## 2022-01-30 RX ORDER — FUROSEMIDE 10 MG/ML
40 INJECTION INTRAMUSCULAR; INTRAVENOUS ONCE
Status: COMPLETED | OUTPATIENT
Start: 2022-01-30 | End: 2022-01-30

## 2022-01-30 RX ORDER — METOCLOPRAMIDE HYDROCHLORIDE 5 MG/ML
5 INJECTION INTRAMUSCULAR; INTRAVENOUS EVERY 6 HOURS
Status: DISCONTINUED | OUTPATIENT
Start: 2022-01-30 | End: 2022-02-04

## 2022-01-30 RX ADMIN — POLYETHYLENE GLYCOL 3350 17 G: 17 POWDER, FOR SOLUTION ORAL at 08:18

## 2022-01-30 RX ADMIN — MINERAL OIL AND WHITE PETROLATUM: 150; 830 OINTMENT OPHTHALMIC at 19:57

## 2022-01-30 RX ADMIN — INSULIN LISPRO 1 UNITS: 100 INJECTION, SOLUTION INTRAVENOUS; SUBCUTANEOUS at 14:05

## 2022-01-30 RX ADMIN — METOCLOPRAMIDE HYDROCHLORIDE 5 MG: 5 INJECTION INTRAMUSCULAR; INTRAVENOUS at 11:37

## 2022-01-30 RX ADMIN — MIDODRINE HYDROCHLORIDE 15 MG: 5 TABLET ORAL at 18:52

## 2022-01-30 RX ADMIN — IPRATROPIUM BROMIDE AND ALBUTEROL SULFATE 1 AMPULE: .5; 2.5 SOLUTION RESPIRATORY (INHALATION) at 16:25

## 2022-01-30 RX ADMIN — SODIUM CHLORIDE, PRESERVATIVE FREE 10 ML: 5 INJECTION INTRAVENOUS at 08:16

## 2022-01-30 RX ADMIN — DOCUSATE SODIUM 100 MG: 50 LIQUID ORAL at 08:15

## 2022-01-30 RX ADMIN — CHLORHEXIDINE GLUCONATE 0.12% ORAL RINSE 15 ML: 1.2 LIQUID ORAL at 20:33

## 2022-01-30 RX ADMIN — MINERAL OIL AND WHITE PETROLATUM: 150; 830 OINTMENT OPHTHALMIC at 23:30

## 2022-01-30 RX ADMIN — PANTOPRAZOLE SODIUM 40 MG: 40 INJECTION, POWDER, FOR SOLUTION INTRAVENOUS at 08:16

## 2022-01-30 RX ADMIN — MIDAZOLAM 12 MG/HR: 5 INJECTION INTRAMUSCULAR; INTRAVENOUS at 12:12

## 2022-01-30 RX ADMIN — Medication 200 MCG/HR: at 15:06

## 2022-01-30 RX ADMIN — ENOXAPARIN SODIUM 30 MG: 100 INJECTION SUBCUTANEOUS at 20:33

## 2022-01-30 RX ADMIN — HYDROCORTISONE SODIUM SUCCINATE 50 MG: 100 INJECTION, POWDER, FOR SOLUTION INTRAMUSCULAR; INTRAVENOUS at 01:29

## 2022-01-30 RX ADMIN — HYDROCORTISONE SODIUM SUCCINATE 50 MG: 100 INJECTION, POWDER, FOR SOLUTION INTRAMUSCULAR; INTRAVENOUS at 08:15

## 2022-01-30 RX ADMIN — Medication 10 ML: at 08:17

## 2022-01-30 RX ADMIN — MIDAZOLAM 13 MG/HR: 5 INJECTION INTRAMUSCULAR; INTRAVENOUS at 21:29

## 2022-01-30 RX ADMIN — OXYCODONE HYDROCHLORIDE AND ACETAMINOPHEN 500 MG: 500 TABLET ORAL at 08:18

## 2022-01-30 RX ADMIN — CHLORHEXIDINE GLUCONATE 0.12% ORAL RINSE 15 ML: 1.2 LIQUID ORAL at 08:16

## 2022-01-30 RX ADMIN — MIDODRINE HYDROCHLORIDE 15 MG: 5 TABLET ORAL at 12:13

## 2022-01-30 RX ADMIN — IPRATROPIUM BROMIDE AND ALBUTEROL SULFATE 1 AMPULE: .5; 2.5 SOLUTION RESPIRATORY (INHALATION) at 09:05

## 2022-01-30 RX ADMIN — Medication 2000 UNITS: at 08:18

## 2022-01-30 RX ADMIN — Medication 200 MCG/HR: at 07:45

## 2022-01-30 RX ADMIN — MINERAL OIL AND WHITE PETROLATUM: 150; 830 OINTMENT OPHTHALMIC at 06:30

## 2022-01-30 RX ADMIN — MINERAL OIL AND WHITE PETROLATUM: 150; 830 OINTMENT OPHTHALMIC at 02:30

## 2022-01-30 RX ADMIN — MINERAL OIL AND WHITE PETROLATUM: 150; 830 OINTMENT OPHTHALMIC at 11:15

## 2022-01-30 RX ADMIN — ENOXAPARIN SODIUM 30 MG: 100 INJECTION SUBCUTANEOUS at 08:15

## 2022-01-30 RX ADMIN — METOCLOPRAMIDE HYDROCHLORIDE 5 MG: 5 INJECTION INTRAMUSCULAR; INTRAVENOUS at 18:52

## 2022-01-30 RX ADMIN — IPRATROPIUM BROMIDE AND ALBUTEROL SULFATE 1 AMPULE: .5; 2.5 SOLUTION RESPIRATORY (INHALATION) at 20:41

## 2022-01-30 RX ADMIN — INSULIN LISPRO 1 UNITS: 100 INJECTION, SOLUTION INTRAVENOUS; SUBCUTANEOUS at 05:17

## 2022-01-30 RX ADMIN — Medication 200 MCG/HR: at 21:57

## 2022-01-30 RX ADMIN — CISATRACURIUM BESYLATE 3 MCG/KG/MIN: 200 INJECTION INTRAVENOUS at 06:01

## 2022-01-30 RX ADMIN — PANTOPRAZOLE SODIUM 40 MG: 40 INJECTION, POWDER, FOR SOLUTION INTRAVENOUS at 20:33

## 2022-01-30 RX ADMIN — Medication 200 MCG/HR: at 01:32

## 2022-01-30 RX ADMIN — Medication 10 ML: at 20:33

## 2022-01-30 RX ADMIN — DOCUSATE SODIUM 100 MG: 50 LIQUID ORAL at 20:33

## 2022-01-30 RX ADMIN — MIDODRINE HYDROCHLORIDE 15 MG: 5 TABLET ORAL at 08:14

## 2022-01-30 RX ADMIN — SENNOSIDES 5 ML: 8.8 SYRUP ORAL at 20:33

## 2022-01-30 RX ADMIN — HYDROCORTISONE SODIUM SUCCINATE 50 MG: 100 INJECTION, POWDER, FOR SOLUTION INTRAMUSCULAR; INTRAVENOUS at 18:52

## 2022-01-30 RX ADMIN — MIDAZOLAM 12 MG/HR: 5 INJECTION INTRAMUSCULAR; INTRAVENOUS at 02:17

## 2022-01-30 RX ADMIN — ZINC SULFATE 220 MG (50 MG) CAPSULE 50 MG: CAPSULE at 08:15

## 2022-01-30 RX ADMIN — METOCLOPRAMIDE HYDROCHLORIDE 5 MG: 5 INJECTION INTRAMUSCULAR; INTRAVENOUS at 23:30

## 2022-01-30 RX ADMIN — Medication: at 20:33

## 2022-01-30 RX ADMIN — Medication: at 08:18

## 2022-01-30 RX ADMIN — FUROSEMIDE 40 MG: 10 INJECTION, SOLUTION INTRAMUSCULAR; INTRAVENOUS at 11:36

## 2022-01-30 ASSESSMENT — PAIN SCALES - GENERAL
PAINLEVEL_OUTOF10: 0

## 2022-01-30 ASSESSMENT — PULMONARY FUNCTION TESTS
PIF_VALUE: 39
PIF_VALUE: 38
PIF_VALUE: 45
PIF_VALUE: 41
PIF_VALUE: 45
PIF_VALUE: 44
PIF_VALUE: 44
PIF_VALUE: 40
PIF_VALUE: 36
PIF_VALUE: 35
PIF_VALUE: 37
PIF_VALUE: 46
PIF_VALUE: 42
PIF_VALUE: 36
PIF_VALUE: 45
PIF_VALUE: 47
PIF_VALUE: 42

## 2022-01-30 NOTE — PLAN OF CARE
Problem: Airway Clearance - Ineffective  Goal: Achieve or maintain patent airway  1/29/2022 1917 by Palak Aguilera RN  Outcome: Not Met This Shift     Problem: Gas Exchange - Impaired  Goal: Absence of hypoxia  1/29/2022 1917 by Palak Aguilera RN  Outcome: Not Met This Shift     Problem: Gas Exchange - Impaired  Goal: Promote optimal lung function  1/29/2022 1917 by Palak Aguilera RN  Outcome: Not Met This Shift     Problem:  Body Temperature -  Risk of, Imbalanced  Goal: Ability to maintain a body temperature within defined limits  1/29/2022 1917 by Palak Aguilera RN  Outcome: Met This Shift     Problem: Isolation Precautions - Risk of Spread of Infection  Goal: Prevent transmission of infection  Outcome: Met This Shift

## 2022-01-30 NOTE — PROGRESS NOTES
Department of Internal Medicine  Nephrology  Progress Note    Events Reviewed. SUBJECTIVE:  We are following Ms. Gloria Rodriguez for SAMUEL. Patient is intubated and supine. With low grade temp today    PHYSICAL EXAM:    Vitals:    VITALS:  BP (!) 155/64   Pulse 68   Temp 97.3 °F (36.3 °C) (Esophageal)   Resp 24   Ht 5' (1.524 m)   Wt 134 lb 1.6 oz (60.8 kg)   SpO2 97%   BMI 26.19 kg/m²   24HR INTAKE/OUTPUT:      Intake/Output Summary (Last 24 hours) at 1/30/2022 1134  Last data filed at 1/30/2022 1000  Gross per 24 hour   Intake 2144 ml   Output 3355 ml   Net -1211 ml     General appearance: Patient is intubated, sedated  HEENT: Normal cephalic, atraumatic without obvious deformity. Pupils equal, round, and reactive to light. Endotracheal tube in place  Neck: Supple, with full range of motion. No jugular venous distention. Trachea midline. Respiratory: Diminished bilaterally, intubated. Cardiovascular: RRR, no murmur noted  Abdomen: Soft, nontender, nondistended, flat  Musculoskeletal: No clubbing or cyanosis. No edema of bilateral lower extremities. Brisk capillary refill. Skin: Normal skin color.  No rashes. Scattered ecchymosis.   Neurologic: Patient sedated        Scheduled Meds:   furosemide  40 mg IntraVENous Once    metoclopramide  5 mg IntraVENous Q6H    midodrine  15 mg Oral TID WC    hydrocortisone sodium succinate PF  50 mg IntraVENous Q8H    insulin lispro  0-6 Units SubCUTAneous Q4H    pantoprazole  40 mg IntraVENous BID    And    sodium chloride (PF)  10 mL IntraVENous Daily    enoxaparin  30 mg SubCUTAneous BID    docusate sodium  100 mg Oral BID    sennosides  5 mL Oral Nightly    miconazole nitrate   Topical BID    chlorhexidine  15 mL Mouth/Throat BID    artificial tears   Both Eyes Q4H    polyethylene glycol  17 g Oral Daily    ipratropium-albuterol  1 ampule Inhalation Q4H WA    sodium chloride flush  5-40 mL IntraVENous 2 times per day    ascorbic acid  500 mg Oral Daily    zinc sulfate  50 mg Oral Daily    vitamin D  2,000 Units Oral Daily     Continuous Infusions:   cisatracurium (NIMBEX) infusion 3 mcg/kg/min (01/30/22 0601)    fentaNYL 5 mcg/ml in D5W 250 ml infusion 200 mcg/hr (01/30/22 0745)    norepinephrine 2 mcg/min (01/29/22 2108)    sodium chloride      midazolam 12 mg/hr (01/30/22 0217)    sodium chloride      dextrose       PRN Meds:.sodium chloride, sodium chloride flush, sodium chloride, acetaminophen **OR** acetaminophen, glucose, dextrose, glucagon (rDNA), dextrose    DATA:    CBC with Differential:    Lab Results   Component Value Date    WBC 14.5 01/30/2022    RBC 2.60 01/30/2022    HGB 8.0 01/30/2022    HCT 25.4 01/30/2022     01/30/2022    MCV 97.7 01/30/2022    MCH 30.8 01/30/2022    MCHC 31.5 01/30/2022    RDW 14.6 01/30/2022    METASPCT 0.9 01/18/2022    LYMPHOPCT 2.7 01/18/2022    MONOPCT 11.6 01/18/2022    MYELOPCT 0.9 01/14/2022    BASOPCT 0.1 01/18/2022    MONOSABS 1.38 01/18/2022    LYMPHSABS 0.35 01/18/2022    EOSABS 0.00 01/18/2022    BASOSABS 0.00 01/18/2022     CMP:    Lab Results   Component Value Date     01/30/2022    K 4.2 01/30/2022    K 3.8 01/18/2022    CL 98 01/30/2022    CO2 34 01/30/2022    BUN 27 01/30/2022    CREATININE 0.6 01/30/2022    GFRAA >60 01/30/2022    LABGLOM >60 01/30/2022    GLUCOSE 145 01/30/2022    PROT 5.6 01/29/2022    LABALBU 2.4 01/29/2022    CALCIUM 8.9 01/30/2022    BILITOT 0.2 01/29/2022    ALKPHOS 97 01/29/2022    AST 16 01/29/2022    ALT 16 01/29/2022     Magnesium:    Lab Results   Component Value Date    MG 2.0 01/30/2022     Phosphorus:    Lab Results   Component Value Date    PHOS 4.0 01/30/2022          Radiology Review:    CXR 1/28/2022   Bilateral airspace disease without appreciable change.               No significant interval change, when compared to the previous study performed   1 day earlier.           BRIEF SUMMARY OF INITIAL CONSULT:     Briefly, Ms. Scarlett Salazar is a 78 year old female with a past medical history of Asthma who presented to the ER on January 14 th, 2022 with complaints of SOB and diarrhea for one week. She is not vaccinated against COVID. Reportedly her daughter found her curled into a fetal position in respiratory distress at home and EMS was summoned. She was found to be COVID 19 positive. Labs were significant for bicarbonate 19, BUN 38, creatinine 1.9, anion gap 19, procalcitonin 1.57, CRP 12.8, , mildly elevated LFTs, D-dimer 404, ferritin 5098. Chest x-ray showed bilateral airspace disease. Renal is consulted for SAMUEL. Problems Resolved:     · SAMUEL, likely pre-renal volume responsive SAMUEL secondary to poor oral intake and GI losses (diarrhea). Creatinine 1.9mg/dL on admission. Renal function has improved to 0.6 mg/dL with IVF administration. · HAGMA with low bicarbonate levels, secondary to uremia. To continue bicarbonate drip  · Hypokalemia, 2/2 poor oral intake, potassium levels improved. · hypernatremia, with water deficit, dehydration due to poor intake. Sodium levels quite improved. Resolved. · Hypophosphatemia, 2/2 poor intake, to replace  · Hypokalemia likely 2/2 diuresis renal losses. IMPRESSION/RECOMMENDATIONS:        1. Hypocalcemia, vitamin D 25 level 39, calcium levels 8.4- improved  2. Metabolic alkalosis 0.861, pCO2 47, bicarb 31- improved  3. ----------------------------------------  4. COVID +, on hydrocortisone   5. Acute hypoxic respiratory failure, secondary to COVID pneumonia. Status post intubation  6. Normocytic anemia, hemoglobin 7.5. monitoring. 7. Nutrition, receiving tube feeds. Reduced to 10cc with concern for refeeding syndrome. Mg2+ 2.1 and phosphorus 4.0 today. 8. Low grade temp/neutrophilia? on levophed now     Plan:    · Lasix 40 mg IV today   · Continue midodrine to 10mg TID.    · Replace potassium 120 mEq  · Will increase solucortef to 50 mg iv tid in view of hypotension      Electronically signed by Alejandro Thompson MD on 1/30/2022 at 11:34 AM  Discussed with RN

## 2022-01-30 NOTE — PLAN OF CARE
Problem: Risk for Fluid Volume Deficit  Goal: Maintain normal heart rhythm  1/30/2022 0937 by Gerry Buckner RN  Outcome: Met This Shift     Problem: Risk for Fluid Volume Deficit  Goal: Maintain normal serum potassium, sodium, calcium, phosphorus, and pH  1/30/2022 0937 by Gerry Buckner RN  Outcome: Met This Shift     Problem: Falls - Risk of:  Goal: Will remain free from falls  Description: Will remain free from falls  1/30/2022 0937 by Gerry Buckner RN  Outcome: Met This Shift     Problem: Falls - Risk of:  Goal: Absence of physical injury  Description: Absence of physical injury  1/30/2022 3497 by Gerry Buckner RN  Outcome: Met This Shift     Problem: Skin Integrity:  Goal: Will show no infection signs and symptoms  Description: Will show no infection signs and symptoms  1/30/2022 0937 by Gerry Buckner RN  Outcome: Met This Shift     Problem: Skin Integrity:  Goal: Absence of new skin breakdown  Description: Absence of new skin breakdown  1/30/2022 0937 by Gerry Buckner RN  Outcome: Met This Shift

## 2022-01-30 NOTE — PLAN OF CARE
Problem: Airway Clearance - Ineffective  Goal: Achieve or maintain patent airway  1/30/2022 0607 by Jasmine Cramer RN  Outcome: Met This Shift  1/29/2022 1917 by Simeon Scott RN  Outcome: Not Met This Shift     Problem: Gas Exchange - Impaired  Goal: Absence of hypoxia  1/30/2022 0607 by Jasmine Cramer RN  Outcome: Met This Shift  1/29/2022 1917 by Simeon Scott RN  Outcome: Not Met This Shift  Goal: Promote optimal lung function  1/30/2022 0607 by Jasmine Cramer RN  Outcome: Met This Shift  1/29/2022 1917 by Simeon Scott RN  Outcome: Not Met This Shift     Problem: Breathing Pattern - Ineffective  Goal: Ability to achieve and maintain a regular respiratory rate  Outcome: Met This Shift     Problem:  Body Temperature -  Risk of, Imbalanced  Goal: Ability to maintain a body temperature within defined limits  1/30/2022 0607 by Jasmine Cramer RN  Outcome: Met This Shift  1/29/2022 1917 by Simeon Scott RN  Outcome: Met This Shift  Goal: Will regain or maintain usual level of consciousness  Outcome: Met This Shift  Goal: Complications related to the disease process, condition or treatment will be avoided or minimized  Outcome: Met This Shift     Problem: Isolation Precautions - Risk of Spread of Infection  Goal: Prevent transmission of infection  1/30/2022 0607 by Jasmine Cramer RN  Outcome: Met This Shift  1/29/2022 1917 by Simeon Scott RN  Outcome: Met This Shift     Problem: Nutrition Deficits  Goal: Optimize nutritional status  Outcome: Met This Shift     Problem: Risk for Fluid Volume Deficit  Goal: Maintain normal heart rhythm  Outcome: Met This Shift  Goal: Maintain absence of muscle cramping  Outcome: Met This Shift  Goal: Maintain normal serum potassium, sodium, calcium, phosphorus, and pH  Outcome: Met This Shift     Problem: Loneliness or Risk for Loneliness  Goal: Demonstrate positive use of time alone when socialization is not possible  Outcome: Met This Shift Problem: Fatigue  Goal: Verbalize increase energy and improved vitality  Outcome: Met This Shift     Problem: Patient Education: Go to Patient Education Activity  Goal: Patient/Family Education  Outcome: Met This Shift     Problem: Falls - Risk of:  Goal: Will remain free from falls  Description: Will remain free from falls  Outcome: Met This Shift  Goal: Absence of physical injury  Description: Absence of physical injury  Outcome: Met This Shift     Problem: Skin Integrity:  Goal: Will show no infection signs and symptoms  Description: Will show no infection signs and symptoms  Outcome: Met This Shift  Goal: Absence of new skin breakdown  Description: Absence of new skin breakdown  Outcome: Met This Shift

## 2022-01-30 NOTE — PROGRESS NOTES
[address] [facility]   Department of Internal Medicine   Internal Medicine Residency  MICU Progress Note    Patient:  Izzy Holden 78 y.o. female   MRN: [id number]       Date of Service: 2022    Allergy: Patient has no known allergies. Subjective     Patient was seen and examined this morning at bedside in no acute distress. Patient is sedated, intubated and paralyzed. PF ratio improved, and CXR improved today. Patient is getting diuresis with lasix. Objective     TEMPERATURE:  Current - Temp: 97.3 °F (36.3 °C); Max - Temp  Av.6 °F (36.4 °C)  Min: 97.3 °F (36.3 °C)  Max: 99 °F (37.2 °C)  RESPIRATIONS RANGE: Resp  Av.1  Min: 17  Max: 32  PULSE RANGE: Pulse  Av.7  Min: 54  Max: 79  BLOOD PRESSURE RANGE:  No data recorded.  ; No data recorded. PULSE OXIMETRY RANGE: SpO2  Av.5 %  Min: 96 %  Max: 100 %    I & O - 24hr:    Intake/Output Summary (Last 24 hours) at 2022 1348  Last data filed at 2022 1000  Gross per 24 hour   Intake 2144 ml   Output 1680 ml   Net 464 ml     I/O last 3 completed shifts: In: 3759.3 [I.V.:2876. 3; NG/GT:883]  Out: 1228 [Urine:4065] I/O this shift:  In: -   Out: 280 [Urine:280]   Weight change: 1.6 oz (0.045 kg)    Physical Exam  Constitutional:       Comments: Sedated and intubated   HENT:      Mouth/Throat:      Mouth: Mucous membranes are moist.   Cardiovascular:      Rate and Rhythm: Normal rate and regular rhythm. Pulses: Normal pulses. Heart sounds: Normal heart sounds. No murmur heard. No friction rub. No gallop. Pulmonary:      Effort: Pulmonary effort is normal. No respiratory distress. Breath sounds: Normal breath sounds. No stridor. No wheezing, rhonchi or rales. Chest:      Chest wall: No tenderness. Abdominal:      General: Abdomen is flat. Bowel sounds are normal.      Palpations: Abdomen is soft. Musculoskeletal:         General: No swelling. Skin:     General: Skin is warm.       Capillary Refill: Capillary refill takes less than 2 seconds.           Diet:   Diet NPO  ADULT TUBE FEEDING; Nasogastric; Standard with Fiber; Continuous; 10; No; 30; Q 3 hours    FLOW([id number])@      Additional Respiratory  Assessments  Pulse: 64  Resp: 18  SpO2: 96 %  Position: Semi-Ocampo's  Humidification Source: Heated wire  Humidification Temp: 37  Circuit Condensation: Drained  Oral Care: Mouth moisturizer,Mouth suctioned,Mouth swabbed,Mouthwash with chlorhexidine,Lip moisturizer applied  Subglottic Suction Done?: No  Airway Type: ET  Airway Size: 8  Cuff Pressure (cm H2O):  (mlt)       [REMOVED] Urethral Catheter Double-lumen 16 fr-Output (mL): 130 mL  Urethral Catheter Double-lumen 16 fr-Output (mL): 130 mL  [REMOVED] External Urinary Catheter-Output (mL): 200 mL      Oxygen:     Vent Information  $Ventilation: $Subsequent Day  Skin Assessment: Clean, dry, & intact  Suction Catheter Diameter:  (16)  Equipment ID: [id number]  Equipment Changed: Humidification  Vent Type: 980  Vent Mode: AC/VC  Vt Ordered: 300 mL  Rate Set: 24 bmp  Peak Flow: 65 L/min  Pressure Support: 0 cmH20  FiO2 : 70 %  SpO2: 96 %  SpO2/FiO2 ratio: 137.14  PaO2/FiO2 ratio: 196  Sensitivity: 3  PEEP/CPAP: 14  I Time/ I Time %: 0 s  Humidification Source: Heated wire  Humidification Temp: 37  Humidification Temp Measured: 37  Circuit Condensation: Drained  Mask Type: Full face mask  Mask Size: Medium  Additional Respiratory  Assessments  Pulse: 64  Resp: 18  SpO2: 96 %  Position: Semi-Ocampo's  Humidification Source: Heated wire  Humidification Temp: 37  Circuit Condensation: Drained  Oral Care: Mouth moisturizer,Mouth suctioned,Mouth swabbed,Mouthwash with chlorhexidine,Lip moisturizer applied  Subglottic Suction Done?: No  Airway Type: ET  Airway Size: 8  Cuff Pressure (cm H2O):  (mlt)       [REMOVED] Urethral Catheter Double-lumen 16 fr-Output (mL): 130 mL  Urethral Catheter Double-lumen 16 fr-Output (mL): 130 mL  [REMOVED] External Urinary Catheter-Output (mL): 200 mL    Medications     Continuous Infusions:   cisatracurium (NIMBEX) infusion 3 mcg/kg/min (01/30/22 0601)    fentaNYL 5 mcg/ml in D5W 250 ml infusion 200 mcg/hr (01/30/22 0745)    norepinephrine 1 mcg/min (01/30/22 1213)    sodium chloride      midazolam 12 mg/hr (01/30/22 1212)    sodium chloride      dextrose       Scheduled Meds:   metoclopramide  5 mg IntraVENous Q6H    midodrine  15 mg Oral TID WC    hydrocortisone sodium succinate PF  50 mg IntraVENous Q8H    insulin lispro  0-6 Units SubCUTAneous Q4H    pantoprazole  40 mg IntraVENous BID    And    sodium chloride (PF)  10 mL IntraVENous Daily    enoxaparin  30 mg SubCUTAneous BID    docusate sodium  100 mg Oral BID    sennosides  5 mL Oral Nightly    miconazole nitrate   Topical BID    chlorhexidine  15 mL Mouth/Throat BID    artificial tears   Both Eyes Q4H    polyethylene glycol  17 g Oral Daily    ipratropium-albuterol  1 ampule Inhalation Q4H WA    sodium chloride flush  5-40 mL IntraVENous 2 times per day    ascorbic acid  500 mg Oral Daily    zinc sulfate  50 mg Oral Daily    vitamin D  2,000 Units Oral Daily     PRN Meds: sodium chloride, sodium chloride flush, sodium chloride, acetaminophen **OR** acetaminophen, glucose, dextrose, glucagon (rDNA), dextrose  Nutrition:   NG/OG tube TF type: Pulmocare/Nephro/Glucerna/Jevity        At rate: 10 ml/h    Labs and Imaging Studies     ve  CBC:   Recent Labs     01/28/22  0402 01/29/22  0435 01/30/22  0433   WBC 11.0 16.7* 14.5*   HGB 7.5* 8.9* 8.0*   HCT 23.6* 27.8* 25.4*   MCV 94.0 95.5 97.7    281 232       BMP:    Recent Labs     01/29/22  0435 01/29/22  1757 01/30/22  0433    144 139   K 4.2 4.4 4.2    102 98   CO2 28 35* 34*   BUN 25* 24* 27*   CREATININE 0.7 0.7 0.6   GLUCOSE 172* 138* 145*       LIVER PROFILE:   Recent Labs     01/29/22 1757   AST 16   ALT 16   BILIDIR <0.2   BILITOT 0.2   ALKPHOS 97       PT/INR:   No results for input(s): PROTIME, INR in the last 72 hours.    APTT:   No results for input(s): APTT in the last 72 hours. Fasting Lipid Panel:    Lab Results   Component Value Date    CHOL 145 06/29/2020    TRIG 124 01/25/2022    HDL 73 06/29/2020       Cardiac Enzymes:    No results found for: CKTOTAL, CKMB, CKMBINDEX, TROPONINI    ABGs:   Lab Results   Component Value Date    PO2ART 138.4 01/25/2022    GJW1UWA 54.3 01/25/2022       Imaging Studies:     XR ABDOMEN (KUB) (SINGLE AP VIEW)    Result Date: 1/27/2022  EXAMINATION: ONE SUPINE XRAY VIEW(S) OF THE ABDOMEN 1/27/2022 9:07 am COMPARISON: 19 January 2022 HISTORY: ORDERING SYSTEM PROVIDED HISTORY: r/o obstruction or ileus TECHNOLOGIST PROVIDED HISTORY: Reason for exam:->r/o obstruction or ileus What reading provider will be dictating this exam?->CRC FINDINGS: NG tube tip is in the gastric lumen. There is no free air, bowel wall pneumatosis or dilated bowel. No abnormal calcifications are present. .     No active process. NG tube in the gastric lumen as before. XR CHEST PORTABLE    Result Date: 1/27/2022  EXAMINATION: ONE XRAY VIEW OF THE CHEST 1/27/2022 7:53 am COMPARISON: Chest x-ray 01/26/2022 HISTORY: ORDERING SYSTEM PROVIDED HISTORY: COVID ICU TECHNOLOGIST PROVIDED HISTORY: Reason for exam:->COVID ICU What reading provider will be dictating this exam?->CRC FINDINGS: Cardiac size enlarged. Bilateral pulmonary opacifications right greater than left have increased in the interim midlung involvement of worsening airspace disease. No pneumothorax or pleural effusion     Progression of bilateral airspace disease remaining midlung predominant in the periphery of airspace disease bronchopneumonia with increased from prior     XR CHEST PORTABLE    Result Date: 1/26/2022  EXAMINATION: ONE XRAY VIEW OF THE CHEST 1/26/2022 8:56 am COMPARISON: The previous study performed 01/25/2022.  HISTORY: ORDERING SYSTEM PROVIDED HISTORY: COVID ICU TECHNOLOGIST PROVIDED HISTORY: Reason for exam:->COVID ICU What reading provider will be dictating this exam?->CRC FINDINGS: There has been a mild decrease in the diffuse left lung infiltrates. The right lung infiltrates are without significant interval change. No active pleural disease is seen. The cardiac silhouette and mediastinal structures are without significant interval change. An endotracheal tube is again noted, with the tip 3.0 cm above the alberta. An NG tube is again noted entering the stomach. The tip is cut off. A left-sided IJ line is again present, with tip in the SVC. Mild decrease in the diffuse left lung infiltrates, when compared to the previous study performed 1 day earlier. Diffuse right lung infiltrates without significant interval change. XR CHEST PORTABLE    Result Date: 1/25/2022  EXAMINATION: ONE XRAY VIEW OF THE CHEST 1/25/2022 4:58 pm COMPARISON: 7:55 a.m. HISTORY: ORDERING SYSTEM PROVIDED HISTORY: L IJ CVC placement TECHNOLOGIST PROVIDED HISTORY: Reason for exam:->L IJ CVC placement What reading provider will be dictating this exam?->CRC FINDINGS: Stable bilateral pulmonary infiltrates. There is no effusion or pneumothorax. The cardiomediastinal silhouette is without acute process. The osseous structures are without acute process. Left internal jugular line tip in the distal SVC. The remainder of the lines and tubes are stable in position. Left internal jugular line tip in the distal SVC. No pneumothorax. The remainder of the exam is essentially stable. .     XR CHEST PORTABLE    Result Date: 1/25/2022  EXAMINATION: ONE XRAY VIEW OF THE CHEST 1/25/2022 7:41 am COMPARISON: 24 January 2022 HISTORY: ORDERING SYSTEM PROVIDED HISTORY: COVID ICU TECHNOLOGIST PROVIDED HISTORY: Reason for exam:->COVID ICU What reading provider will be dictating this exam?->CRC FINDINGS: Bilateral airspace disease is not appreciably changed allowing for some differences in positioning. Stable support lines. No new findings. Stable airspace disease.   See above.     XR CHEST PORTABLE    Result Date: 1/24/2022  EXAMINATION: ONE XRAY VIEW OF THE CHEST 1/24/2022 7:11 am COMPARISON: 23 January 2022 HISTORY: ORDERING SYSTEM PROVIDED HISTORY: COVID ICU TECHNOLOGIST PROVIDED HISTORY: Reason for exam:->COVID ICU What reading provider will be dictating this exam?->CRC FINDINGS: Chest is notably rotated to the left. Support lines are stable. Bilateral airspace disease appears mildly progressive. Bilateral airspace disease which appears mildly progressive. XR CHEST PORTABLE    Result Date: 1/23/2022  EXAMINATION: ONE XRAY VIEW OF THE CHEST 1/23/2022 8:17 am COMPARISON: Multiple prior studies, the most recent dated January 22, 2022 HISTORY: [address] [facility] [place] [address] COVID ICU TECHNOLOGIST PROVIDED HISTORY: Reason for exam:->COVID ICU What reading provider will be dictating this exam?->CRC FINDINGS: Frontal view of the chest.  Lines and tubes are unchanged in position. Stable cardiomediastinal silhouette. Diffuse bilateral opacities are not significantly changed given differences in patient positioning. No pneumothorax seen. There are degenerative changes in the spine. 1.  Lines and tubes are unchanged in position. 2.  Diffuse bilateral opacities are not significantly changed. Differential includes infection, pulmonary edema, atelectasis, ARDS, and/or layering pleural effusions. XR CHEST PORTABLE    Result Date: 1/22/2022  EXAMINATION: ONE XRAY VIEW OF THE CHEST 1/22/2022 8:28 am COMPARISON: 01/21/2022 HISTORY: ORDERING SYSTEM PROVIDED HISTORY: COVID ICU TECHNOLOGIST PROVIDED HISTORY: Reason for exam:->COVID ICU What reading provider will be dictating this exam?->CRC FINDINGS: Heart is normal in size. Moderate bilateral interstitial and alveolar opacities with central air bronchograms have not significantly improved. No pneumothorax detected. The gastric catheter passes into the stomach. Endotracheal tube tip is 4 cm above the alberta.   Right IJ central venous catheter in good position. Osseous structures are stable. 1.  No significant change in bilateral multifocal pneumonia. Support tubes and catheters are stable. XR CHEST PORTABLE    Result Date: 1/21/2022  EXAMINATION: ONE XRAY VIEW OF THE CHEST 1/21/2022 8:33 am COMPARISON: None. HISTORY: ORDERING SYSTEM PROVIDED HISTORY: COVID ICU TECHNOLOGIST PROVIDED HISTORY: Reason for exam:->COVID ICU What reading provider will be dictating this exam?->CRC FINDINGS: There is stable position of support lines and tubes. There is no pneumothorax or pneumomediastinum. Extensive multifocal bilateral pneumonia is noted unchanged when compared to the prior study. 1. There is no interval change in extensive multifocal bilateral pneumonia. XR CHEST PORTABLE    Result Date: 1/20/2022  EXAMINATION: ONE XRAY VIEW OF THE CHEST 1/20/2022 8:06 am COMPARISON: January 19, 2022 HISTORY: ORDERING SYSTEM PROVIDED HISTORY: COVID ICU TECHNOLOGIST PROVIDED HISTORY: Reason for exam:->COVID ICU What reading provider will be dictating this exam?->CRC FINDINGS: There is no evidence of pneumothorax or effusion. There is continued bilateral diffuse pulmonary infiltrates with mildly increased opacity in the bilateral bases when compared to previous. ET tube is in good position with tip above the alberta. NG tube courses below the diaphragm. Stable appearance of right IJ central venous catheter projecting over the SVC. Visualized bony thorax shows no acute abnormality. Slightly worsened bilateral pulmonary infiltrates with increasing opacity in the bilateral bases when compared to previous. Stable lines and tubes.      XR CHEST PORTABLE    Result Date: 1/19/2022  EXAMINATION: ONE XRAY VIEW OF THE CHEST PORTABLE UPRIGHT 1/19/2022 12:38 pm COMPARISON: 0238 hours HISTORY: ORDERING SYSTEM PROVIDED HISTORY: central line placement TECHNOLOGIST PROVIDED HISTORY: Reason for exam:->central line placement What reading provider will be dictating this exam?->CRC FINDINGS: Medical devices: Right IJ central venous line tip at the cavoatrial junction. No pneumothorax. The endotracheal and enteric tubes remain in satisfactory position. Redemonstration of bilateral widespread pulmonary infiltrates. Right lung infiltrate shows no significant change. Left perihilar and left upper lobe infiltrate shows mild progression and is more confluent. Normal heart size. No significant pleural effusion. Atherosclerotic thoracic aorta. 1.  Good position right IJ central venous line. Negative for pneumothorax. 2.  Bilateral widespread pulmonary infiltrates with interval mild worsening on the left and compatible with bilateral pneumonia. XR CHEST PORTABLE    Result Date: 1/19/2022  EXAMINATION: ONE XRAY VIEW OF THE CHEST 1/19/2022 3:01 am COMPARISON: 01/16/2022 HISTORY: ORDERING SYSTEM PROVIDED HISTORY: ETT placement TECHNOLOGIST PROVIDED HISTORY: Reason for exam:->ETT placement What reading provider will be dictating this exam?->CRC FINDINGS: Endotracheal tube tip is 3.3 cm above alberta. Nasogastric tube extends into the stomach. There are diffuse infiltrates which are more notable in the lower lungs. When accounting for technical differences, these infiltrates have not changed significantly. There is no pleural effusion or pneumothorax seen. There is no cardiomegaly. Endotracheal tube tip is 3.3 cm above the alberta. Diffuse pulmonary infiltrates are unchanged. XR CHEST PORTABLE    Result Date: 1/16/2022  EXAMINATION: ONE XRAY VIEW OF THE CHEST 1/16/2022 10:59 am COMPARISON: 01/14/2022 HISTORY: ORDERING SYSTEM PROVIDED HISTORY: hypoxia TECHNOLOGIST PROVIDED HISTORY: Reason for exam:->hypoxia What reading provider will be dictating this exam?->CRC FINDINGS: The cardiac silhouette is within normals. Extensive multifocal bilateral pneumonia is noted unchanged when compared to prior study. There is no pneumothorax or pneumomediastinum. There is no pleural effusion. 1. Multifocal bilateral pneumonia unchanged when compared with the prior study. XR CHEST PORTABLE    Result Date: 1/14/2022  EXAMINATION: ONE XRAY VIEW OF THE CHEST 1/14/2022 4:37 pm COMPARISON: None. HISTORY: ORDERING SYSTEM PROVIDED HISTORY: hypoxia TECHNOLOGIST PROVIDED HISTORY: Reason for exam:->hypoxia What reading provider will be dictating this exam?->CRC FINDINGS: There is extensive bilateral airspace disease bilaterally. The findings are most compatible with pneumonia. Heart size is borderline. Pulmonary vascularity is not clearly congested. Bilateral airspace disease likely related to pneumonia. XR ABDOMEN FOR NG/OG/NE TUBE PLACEMENT    Result Date: 1/19/2022  EXAMINATION: ONE SUPINE XRAY VIEW(S) OF THE ABDOMEN 1/19/2022 3:01 am COMPARISON: None. HISTORY: ORDERING SYSTEM PROVIDED HISTORY: Confirmation of course of NG/OG/NE tube and location of tip of tube TECHNOLOGIST PROVIDED HISTORY: Reason for exam:->Confirmation of course of NG/OG/NE tube and location of tip of tube Portable? ->Yes What reading provider will be dictating this exam?->CRC FINDINGS: The nasogastric tube terminates in the stomach. There are infiltrates in visualized lungs. Visualized gas pattern is nonspecific demonstrating a paucity of bowel gas. Nasogastric tube terminates in the stomach.        Notable Cultures:      Blood cultures   Blood Culture, Routine   Date Value Ref Range Status   01/23/2022 5 Days no growth  Final       Respiratory cultures No results found for: RESPCULTURE No results found for: LABGRAM  Legionella No results found for: LABLEGI    Urine cultures   Urine Culture, Routine   Date Value Ref Range Status   01/29/2022 Growth not present, incubation continues  Preliminary         Resident's Assessment and Plan       78year old female with a past medical history of Asthma who presented to the ER on January 14 th, 2022 with complaints of SOB and diarrhea for one week    She is currently being  managed for       Acute hypoxic respiratory failure 2/2 Covid PNA  Patient is supine  Day 11 of intubation  Sedated with fentanyl and Versed, on paralysis  Completed Zosyn for 7 days  On hydrocortisone  Leukocytosis, afebrile  ABG showed metabolic alkalosis, patient receiving Lasix 40 mg twice daily  Patient received Diamox yesterday  Chest x-ray showed bilateral airspace disease  Patient is 7.4 L positive since admission  Continue Solu-Cortef for today  Multiple episodes of ventricular dyssynchrony  Plan  Continue respiratory status monitoring  Wean off ventilation as per patient tolerates  Decrease Solu-Cortef 50 mg daily, plan to wean off tomorrow. Continue vitamin cocktail  Continue diuresis with Lasix  Continue Lovenox for DVT prophylaxis  Keep supine and follow ABG      New onset of fever, tachycardia 2/2 infection?   VS PE/DVT  Pancultures sent, infectious work-up sent  White count elevated 14.5  D-dimer was elevated 3000  Duplex ultrasound of bilateral upper showed cephalic thrombus and lower extremity  negative  Plan   Follow panculture  Started lovenox 30         Septic shock 2/2 COVID-19 PNA with superimposed bacterial PNA  For COVID-19 management as above  Patient was requiring Levophed, currently off Levophed  Blood pressure has been stable  Plan  Monitor blood pressure  Tapering hydrocortisone dose  On midodrine      History of asthma  Currently intubated and on breathing treatment      Hyperglycemia 2/2 steroids (resolved)  Blood glucose has been low side, 154 this AM  Resume tube feed yesterday, possible concern for refeeding syndrome  Changed to low-dose sliding scale and Lantus of 5  Plan  Monitor blood glucose level every 4 hours  And adjust insulin dose according to blood glucose level      Acute normocytic normochromic anemia 2/2 ACD versus sepsis  Hemoglobin was 6.9 received 1 unit of PRBC (3 days ago)  Hemoglobin this morning was 8.0 (stable since 3 days)  Plan  Monitor CBC daily  Transfuse PRBC if hemoglobin is less than 7      Constipation 2/2 fentanyl  Patient is on bowel regiment  X-ray KUB negative for acute obstruction  Plan  Monitor bowel movement and continue bowel regimen      Refeeding syndrome  Patient had hypokalemia, hypomagnesemia, hypophosphatemia  Plan  Decrease tube feed to 10 mL/h  Monitor electrolytes and replace accordingly      Resolved issue  SAMUEL        Code Status: Full Code  PT/OT evaluation:  Disposition: home +/- home health / [facility] and [facility] / Yelena Beard / Sarai Lopez MD, PGY-1  Attending physician: Dr. Cecil Rahman        I personally saw, examined and provided care for the patient. Radiographs, labs and medication list were reviewed by me independently. Review of Residents documentation was conducted and revisions were made as appropriate. I agree with the above documented exam, problem list and plan of care.         CCT excluding procedures >30 minutes    Hurtwan Bhakta, DO

## 2022-01-30 NOTE — PROGRESS NOTES
[address] [facility]   Department of Internal Medicine   Internal Medicine Residency  MICU Progress Note    Patient:  Arcadio Forrest 78 y.o. female   MRN: [id number]       Date of Service: 2022    Allergy: Patient has no known allergies. Subjective     Patient was seen and examined this morning at bedside in no acute distress. Overnight, patient's temperature was elevated T-max 100.7. Patient with infectious disease work-up was sent. Patient was having a synchronizing with the vent, propofol started. Patient blood pressure dropped map was 61. Patient started on Levophed. This morning, patient was exclusive intubated. Patient still did not had any bowel movement. Objective     TEMPERATURE:  Current - Temp: 99 °F (37.2 °C); Max - Temp  Av.3 °F (37.4 °C)  Min: 98.4 °F (36.9 °C)  Max: 100.4 °F (38 °C)  RESPIRATIONS RANGE: Resp  Av.8  Min: 21  Max: 43  PULSE RANGE: Pulse  Av.8  Min: 58  Max: 113  BLOOD PRESSURE RANGE:  No data recorded.  ; No data recorded. PULSE OXIMETRY RANGE: SpO2  Av.2 %  Min: 89 %  Max: 99 %    I & O - 24hr:    Intake/Output Summary (Last 24 hours) at 2022 1928  Last data filed at 2022 1800  Gross per 24 hour   Intake 2602.25 ml   Output 3340 ml   Net -737.75 ml     I/O last 3 completed shifts: In: 3976.3 [I.V.:2301.3; NG/GT:1375; IV Piggyback:300]  Out: 9739 [Urine:5600] No intake/output data recorded. Weight change: -2 lb (-0.907 kg)    Physical Exam  Constitutional:       Comments: Sedated and intubated   HENT:      Mouth/Throat:      Mouth: Mucous membranes are moist.   Cardiovascular:      Rate and Rhythm: Normal rate and regular rhythm. Pulses: Normal pulses. Heart sounds: Normal heart sounds. No murmur heard. No friction rub. No gallop. Pulmonary:      Effort: Pulmonary effort is normal. No respiratory distress. Breath sounds: Normal breath sounds. No stridor. No wheezing, rhonchi or rales.    Chest:      Chest wall: No tenderness. Abdominal:      General: Abdomen is flat. Bowel sounds are normal.      Palpations: Abdomen is soft. Musculoskeletal:         General: No swelling. Skin:     General: Skin is warm. Capillary Refill: Capillary refill takes less than 2 seconds.           Diet:   Diet NPO  ADULT TUBE FEEDING; Nasogastric; Standard with Fiber; Continuous; 10; No; 30; Q 3 hours    FLOW([id number])@      Additional Respiratory  Assessments  Pulse: 77  Resp: (!) 32  SpO2: 98 %  Position: Semi-Ocampo's  Humidification Source: Heated wire  Humidification Temp: 37  Circuit Condensation: Drained  Oral Care: Suction toothette,Mouth suctioned,Mouth moisturizer,Lip moisturizer applied  Subglottic Suction Done?: No  Airway Type: ET  Airway Size: 8  Cuff Pressure (cm H2O):  (mlt)       [REMOVED] Urethral Catheter Double-lumen 16 fr-Output (mL): 130 mL  Urethral Catheter Double-lumen 16 fr-Output (mL): 275 mL  [REMOVED] External Urinary Catheter-Output (mL): 200 mL      Oxygen:     Vent Information  $Ventilation: $Subsequent Day  Skin Assessment: Clean, dry, & intact  Suction Catheter Diameter:  (16)  Equipment ID: [id number]  Equipment Changed: Humidification  Vent Type: 980  Vent Mode: AC/VC+  Vt Ordered: 300 mL  Rate Set: 24 bmp  Peak Flow: 0 L/min  Pressure Support: 0 cmH20  FiO2 : 90 %  SpO2: 98 %  SpO2/FiO2 ratio: 108.89  PaO2/FiO2 ratio: 0.95  Sensitivity: 3  PEEP/CPAP: 14  I Time/ I Time %: 0.55 s  Humidification Source: Heated wire  Humidification Temp: 37  Humidification Temp Measured: 36.9  Circuit Condensation: Drained  Mask Type: Full face mask  Mask Size: Medium  Additional Respiratory  Assessments  Pulse: 77  Resp: (!) 32  SpO2: 98 %  Position: Semi-Ocampo's  Humidification Source: Heated wire  Humidification Temp: 37  Circuit Condensation: Drained  Oral Care: Suction toothette,Mouth suctioned,Mouth moisturizer,Lip moisturizer applied  Subglottic Suction Done?: No  Airway Type: ET  Airway Size: 8  Cuff Pressure (cm H2O):  (mlt)       [REMOVED] Urethral Catheter Double-lumen 16 fr-Output (mL): 130 mL  Urethral Catheter Double-lumen 16 fr-Output (mL): 275 mL  [REMOVED] External Urinary Catheter-Output (mL): 200 mL    Medications     Continuous Infusions:   cisatracurium (NIMBEX) infusion 3 mcg/kg/min (01/29/22 1730)    fentaNYL 5 mcg/ml in D5W 250 ml infusion 200 mcg/hr (01/29/22 1859)    norepinephrine 5 mcg/min (01/29/22 1315)    sodium chloride      midazolam 12 mg/hr (01/29/22 1752)    sodium chloride      dextrose       Scheduled Meds:   midodrine  15 mg Oral TID WC    hydrocortisone sodium succinate PF  50 mg IntraVENous Q8H    insulin lispro  0-6 Units SubCUTAneous Q4H    pantoprazole  40 mg IntraVENous BID    And    sodium chloride (PF)  10 mL IntraVENous Daily    enoxaparin  30 mg SubCUTAneous BID    docusate sodium  100 mg Oral BID    sennosides  5 mL Oral Nightly    miconazole nitrate   Topical BID    chlorhexidine  15 mL Mouth/Throat BID    artificial tears   Both Eyes Q4H    polyethylene glycol  17 g Oral Daily    ipratropium-albuterol  1 ampule Inhalation Q4H WA    sodium chloride flush  5-40 mL IntraVENous 2 times per day    ascorbic acid  500 mg Oral Daily    zinc sulfate  50 mg Oral Daily    vitamin D  2,000 Units Oral Daily     PRN Meds: sodium chloride, sodium chloride flush, sodium chloride, acetaminophen **OR** acetaminophen, glucose, dextrose, glucagon (rDNA), dextrose  Nutrition:   NG/OG tube TF type: Pulmocare/Nephro/Glucerna/Jevity        At rate: 10 ml/h    Labs and Imaging Studies     ve  CBC:   Recent Labs     01/27/22  0430 01/28/22  0402 01/29/22  0435   WBC 16.2* 11.0 16.7*   HGB 8.4* 7.5* 8.9*   HCT 25.6* 23.6* 27.8*   MCV 93.1 94.0 95.5    262 281       BMP:    Recent Labs     01/28/22  1810 01/29/22  0435 01/29/22  1757    139 144   K 4.2 4.2 4.4    100 102   CO2 29 28 35*   BUN 28* 25* 24*   CREATININE 0.6 0.7 0.7   GLUCOSE 79 172* 138*       LIVER PROFILE:   Recent Labs 01/29/22  1757   AST 16   ALT 16   BILIDIR <0.2   BILITOT 0.2   ALKPHOS 97       PT/INR:   No results for input(s): PROTIME, INR in the last 72 hours. APTT:   No results for input(s): APTT in the last 72 hours. Fasting Lipid Panel:    Lab Results   Component Value Date    CHOL 145 06/29/2020    TRIG 124 01/25/2022    HDL 73 06/29/2020       Cardiac Enzymes:    No results found for: CKTOTAL, CKMB, CKMBINDEX, TROPONINI    ABGs:   Lab Results   Component Value Date    PO2ART 138.4 01/25/2022    NZZ9HEF 54.3 01/25/2022       Imaging Studies:     XR ABDOMEN (KUB) (SINGLE AP VIEW)    Result Date: 1/27/2022  EXAMINATION: ONE SUPINE XRAY VIEW(S) OF THE ABDOMEN 1/27/2022 9:07 am COMPARISON: 19 January 2022 HISTORY: ORDERING SYSTEM PROVIDED HISTORY: r/o obstruction or ileus TECHNOLOGIST PROVIDED HISTORY: Reason for exam:->r/o obstruction or ileus What reading provider will be dictating this exam?->CRC FINDINGS: NG tube tip is in the gastric lumen. There is no free air, bowel wall pneumatosis or dilated bowel. No abnormal calcifications are present. .     No active process. NG tube in the gastric lumen as before. XR CHEST PORTABLE    Result Date: 1/27/2022  EXAMINATION: ONE XRAY VIEW OF THE CHEST 1/27/2022 7:53 am COMPARISON: Chest x-ray 01/26/2022 HISTORY: ORDERING SYSTEM PROVIDED HISTORY: COVID ICU TECHNOLOGIST PROVIDED HISTORY: Reason for exam:->COVID ICU What reading provider will be dictating this exam?->CRC FINDINGS: Cardiac size enlarged. Bilateral pulmonary opacifications right greater than left have increased in the interim midlung involvement of worsening airspace disease.   No pneumothorax or pleural effusion     Progression of bilateral airspace disease remaining midlung predominant in the periphery of airspace disease bronchopneumonia with increased from prior     XR CHEST PORTABLE    Result Date: 1/26/2022  EXAMINATION: ONE XRAY VIEW OF THE CHEST 1/26/2022 8:56 am COMPARISON: The previous study performed 01/25/2022. HISTORY: ORDERING SYSTEM PROVIDED HISTORY: COVID ICU TECHNOLOGIST PROVIDED HISTORY: Reason for exam:->COVID ICU What reading provider will be dictating this exam?->CRC FINDINGS: There has been a mild decrease in the diffuse left lung infiltrates. The right lung infiltrates are without significant interval change. No active pleural disease is seen. The cardiac silhouette and mediastinal structures are without significant interval change. An endotracheal tube is again noted, with the tip 3.0 cm above the alberta. An NG tube is again noted entering the stomach. The tip is cut off. A left-sided IJ line is again present, with tip in the SVC. Mild decrease in the diffuse left lung infiltrates, when compared to the previous study performed 1 day earlier. Diffuse right lung infiltrates without significant interval change. XR CHEST PORTABLE    Result Date: 1/25/2022  EXAMINATION: ONE XRAY VIEW OF THE CHEST 1/25/2022 4:58 pm COMPARISON: 7:55 a.m. HISTORY: ORDERING SYSTEM PROVIDED HISTORY: L IJ CVC placement TECHNOLOGIST PROVIDED HISTORY: Reason for exam:->L IJ CVC placement What reading provider will be dictating this exam?->CRC FINDINGS: Stable bilateral pulmonary infiltrates. There is no effusion or pneumothorax. The cardiomediastinal silhouette is without acute process. The osseous structures are without acute process. Left internal jugular line tip in the distal SVC. The remainder of the lines and tubes are stable in position. Left internal jugular line tip in the distal SVC. No pneumothorax. The remainder of the exam is essentially stable. .     XR CHEST PORTABLE    Result Date: 1/25/2022  EXAMINATION: ONE XRAY VIEW OF THE CHEST 1/25/2022 7:41 am COMPARISON: 24 January 2022 HISTORY: ORDERING SYSTEM PROVIDED HISTORY: COVID ICU TECHNOLOGIST PROVIDED HISTORY: Reason for exam:->COVID ICU What reading provider will be dictating this exam?->CRC FINDINGS: Bilateral airspace disease is not appreciably changed allowing for some differences in positioning. Stable support lines. No new findings. Stable airspace disease. See above. XR CHEST PORTABLE    Result Date: 1/24/2022  EXAMINATION: ONE XRAY VIEW OF THE CHEST 1/24/2022 7:11 am COMPARISON: 23 January 2022 HISTORY: ORDERING SYSTEM PROVIDED HISTORY: COVID ICU TECHNOLOGIST PROVIDED HISTORY: Reason for exam:->COVID ICU What reading provider will be dictating this exam?->CRC FINDINGS: Chest is notably rotated to the left. Support lines are stable. Bilateral airspace disease appears mildly progressive. Bilateral airspace disease which appears mildly progressive. XR CHEST PORTABLE    Result Date: 1/23/2022  EXAMINATION: ONE XRAY VIEW OF THE CHEST 1/23/2022 8:17 am COMPARISON: Multiple prior studies, the most recent dated January 22, 2022 HISTORY: [address]: COVID ICU TECHNOLOGIST PROVIDED HISTORY: Reason for exam:->COVID ICU What reading provider will be dictating this exam?->CRC FINDINGS: Frontal view of the chest.  Lines and tubes are unchanged in position. Stable cardiomediastinal silhouette. Diffuse bilateral opacities are not significantly changed given differences in patient positioning. No pneumothorax seen. There are degenerative changes in the spine. 1.  Lines and tubes are unchanged in position. 2.  Diffuse bilateral opacities are not significantly changed. Differential includes infection, pulmonary edema, atelectasis, ARDS, and/or layering pleural effusions. XR CHEST PORTABLE    Result Date: 1/22/2022  EXAMINATION: ONE XRAY VIEW OF THE CHEST 1/22/2022 8:28 am COMPARISON: 01/21/2022 HISTORY: ORDERING SYSTEM PROVIDED HISTORY: COVID ICU TECHNOLOGIST PROVIDED HISTORY: Reason for exam:->COVID ICU What reading provider will be dictating this exam?->CRC FINDINGS: Heart is normal in size.   Moderate bilateral interstitial and alveolar opacities with central air bronchograms have not significantly improved. No pneumothorax detected. The gastric catheter passes into the stomach. Endotracheal tube tip is 4 cm above the alberta. Right IJ central venous catheter in good position. Osseous structures are stable. 1.  No significant change in bilateral multifocal pneumonia. Support tubes and catheters are stable. XR CHEST PORTABLE    Result Date: 1/21/2022  EXAMINATION: ONE XRAY VIEW OF THE CHEST 1/21/2022 8:33 am COMPARISON: None. HISTORY: ORDERING SYSTEM PROVIDED HISTORY: COVID ICU TECHNOLOGIST PROVIDED HISTORY: Reason for exam:->COVID ICU What reading provider will be dictating this exam?->CRC FINDINGS: There is stable position of support lines and tubes. There is no pneumothorax or pneumomediastinum. Extensive multifocal bilateral pneumonia is noted unchanged when compared to the prior study. 1. There is no interval change in extensive multifocal bilateral pneumonia. XR CHEST PORTABLE    Result Date: 1/20/2022  EXAMINATION: ONE XRAY VIEW OF THE CHEST 1/20/2022 8:06 am COMPARISON: January 19, 2022 HISTORY: ORDERING SYSTEM PROVIDED HISTORY: COVID ICU TECHNOLOGIST PROVIDED HISTORY: Reason for exam:->COVID ICU What reading provider will be dictating this exam?->CRC FINDINGS: There is no evidence of pneumothorax or effusion. There is continued bilateral diffuse pulmonary infiltrates with mildly increased opacity in the bilateral bases when compared to previous. ET tube is in good position with tip above the alberta. NG tube courses below the diaphragm. Stable appearance of right IJ central venous catheter projecting over the SVC. Visualized bony thorax shows no acute abnormality. Slightly worsened bilateral pulmonary infiltrates with increasing opacity in the bilateral bases when compared to previous. Stable lines and tubes.      XR CHEST PORTABLE    Result Date: 1/19/2022  EXAMINATION: ONE XRAY VIEW OF THE CHEST PORTABLE UPRIGHT 1/19/2022 12:38 pm COMPARISON: 0238 hours HISTORY: ORDERING SYSTEM PROVIDED HISTORY: central line placement TECHNOLOGIST PROVIDED HISTORY: Reason for exam:->central line placement What reading provider will be dictating this exam?->CRC FINDINGS: Medical devices: Right IJ central venous line tip at the cavoatrial junction. No pneumothorax. The endotracheal and enteric tubes remain in satisfactory position. Redemonstration of bilateral widespread pulmonary infiltrates. Right lung infiltrate shows no significant change. Left perihilar and left upper lobe infiltrate shows mild progression and is more confluent. Normal heart size. No significant pleural effusion. Atherosclerotic thoracic aorta. 1.  Good position right IJ central venous line. Negative for pneumothorax. 2.  Bilateral widespread pulmonary infiltrates with interval mild worsening on the left and compatible with bilateral pneumonia. XR CHEST PORTABLE    Result Date: 1/19/2022  EXAMINATION: ONE XRAY VIEW OF THE CHEST 1/19/2022 3:01 am COMPARISON: 01/16/2022 HISTORY: ORDERING SYSTEM PROVIDED HISTORY: ETT placement TECHNOLOGIST PROVIDED HISTORY: Reason for exam:->ETT placement What reading provider will be dictating this exam?->CRC FINDINGS: Endotracheal tube tip is 3.3 cm above alberta. Nasogastric tube extends into the stomach. There are diffuse infiltrates which are more notable in the lower lungs. When accounting for technical differences, these infiltrates have not changed significantly. There is no pleural effusion or pneumothorax seen. There is no cardiomegaly. Endotracheal tube tip is 3.3 cm above the alberta. Diffuse pulmonary infiltrates are unchanged. XR CHEST PORTABLE    Result Date: 1/16/2022  EXAMINATION: ONE XRAY VIEW OF THE CHEST 1/16/2022 10:59 am COMPARISON: 01/14/2022 HISTORY: ORDERING SYSTEM PROVIDED HISTORY: hypoxia TECHNOLOGIST PROVIDED HISTORY: Reason for exam:->hypoxia What reading provider will be dictating this exam?->CRC FINDINGS: The cardiac silhouette is within normals. Extensive multifocal bilateral pneumonia is noted unchanged when compared to prior study. There is no pneumothorax or pneumomediastinum. There is no pleural effusion. 1. Multifocal bilateral pneumonia unchanged when compared with the prior study. XR CHEST PORTABLE    Result Date: 1/14/2022  EXAMINATION: ONE XRAY VIEW OF THE CHEST 1/14/2022 4:37 pm COMPARISON: None. HISTORY: ORDERING SYSTEM PROVIDED HISTORY: hypoxia TECHNOLOGIST PROVIDED HISTORY: Reason for exam:->hypoxia What reading provider will be dictating this exam?->CRC FINDINGS: There is extensive bilateral airspace disease bilaterally. The findings are most compatible with pneumonia. Heart size is borderline. Pulmonary vascularity is not clearly congested. Bilateral airspace disease likely related to pneumonia. XR ABDOMEN FOR NG/OG/NE TUBE PLACEMENT    Result Date: 1/19/2022  EXAMINATION: ONE SUPINE XRAY VIEW(S) OF THE ABDOMEN 1/19/2022 3:01 am COMPARISON: None. HISTORY: ORDERING SYSTEM PROVIDED HISTORY: Confirmation of course of NG/OG/NE tube and location of tip of tube TECHNOLOGIST PROVIDED HISTORY: Reason for exam:->Confirmation of course of NG/OG/NE tube and location of tip of tube Portable? ->Yes What reading provider will be dictating this exam?->CRC FINDINGS: The nasogastric tube terminates in the stomach. There are infiltrates in visualized lungs. Visualized gas pattern is nonspecific demonstrating a paucity of bowel gas. Nasogastric tube terminates in the stomach.        Notable Cultures:      Blood cultures   Blood Culture, Routine   Date Value Ref Range Status   01/23/2022 5 Days no growth  Final       Respiratory cultures No results found for: RESPCULTURE No results found for: LABGRAM  Legionella No results found for: LABLEGI    Urine cultures   Urine Culture, Routine   Date Value Ref Range Status   01/19/2022 >100,000 CFU/ml  Final         Resident's Assessment and Plan       78year old female with a past medical history of Asthma who presented to the ER on January 14 th, 2022 with complaints of SOB and diarrhea for one week    She is currently being  managed for       Acute hypoxic respiratory failure 2/2 Covid PNA  Patient is supine  Day 10 of intubation  Sedated with fentanyl and Versed  Off Nimbex  Completed Zosyn for 7 days  On hydrocortisone  Leukocytosis, afebrile  ABG showed metabolic alkalosis, patient receiving Lasix 40 mg twice daily  Patient received Diamox yesterday  Chest x-ray showed bilateral airspace disease  Patient is 7.4 L positive since admission  Continue Solu-Cortef for today  Multiple episodes of ventricular dyssynchrony  Plan  Continue respiratory status monitoring  Wean off ventilation as per patient tolerates  Patient is off propofol, back to Nimbex and continue Versed ~15. Decrease Solu-Cortef 50 mg daily, plan to wean off tomorrow. Continue vitamin cocktail  Continue diuresis with Lasix  Continue Lovenox for DVT prophylaxis  Keep supine and follow ABG      New onset of fever, tachycardia 2/2 infection? VS PE/DVT  Pancultures sent, infectious work-up sent  White count elevated 16.77  D-dimer was elevated 3000  Plan   Follow panculture  Started DVT 5000 units 3 times daily. Duplex ultrasound of bilateral upper and lower extremity ordered need to follow.       Septic shock 2/2 COVID-19 PNA with superimposed bacterial PNA  For COVID-19 management as above  Patient was requiring Levophed, currently off Levophed  Blood pressure has been stable  Plan  Monitor blood pressure  Tapering hydrocortisone dose      History of asthma  Currently intubated and on breathing treatment      Hyperglycemia 2/2 steroids  Blood glucose has been low side, 80s today  Resume tube feed yesterday, possible concern for refeeding syndrome  Changed to low-dose sliding scale and Lantus of 5  Plan  Monitor blood glucose level every 4 hours  And adjust insulin dose according to blood glucose level      Acute normocytic normochromic anemia 2/2 ACD versus sepsis  Hemoglobin was 6.9 received 1 unit of PRBC (3 days ago)  Hemoglobin this morning was 8.9  Plan  Monitor CBC daily  Transfuse PRBC if hemoglobin is less than 7      Constipation 2/2 fentanyl  Patient is on bowel regiment  X-ray KUB negative for acute obstruction  Plan  Monitor bowel movement and continue bowel regimen      Refeeding syndrome  Patient had hypokalemia, hypomagnesemia, hypophosphatemia  Plan  Decrease tube feed to 10 mL/h  Monitor electrolytes and replace accordingly      Resolved issue  SAMUEL        Code Status: Full Code  PT/OT evaluation:  Disposition: home +/- home health / [facility] / Yoselyn Hatchet / Carmen Cortes MD, PGY-1  Attending physician: Dr. Flako Ruiz        I personally saw, examined and provided care for the patient. Radiographs, labs and medication list were reviewed by me independently. Review of Residents documentation was conducted and revisions were made as appropriate. I agree with the above documented exam, problem list and plan of care.         CCT excluding procedures >30 minutes    Daniel Bright DO

## 2022-01-30 NOTE — PROGRESS NOTES
Hospitalist Progress Note      SYNOPSIS:     Ms. Alta Paul is a 78y.o. year old female who has a PMH of asthma.     She presented to the ER on 22 with complaints of SOB and diarrhea x 1 week. She was not vaccinated against COVID-19. Reportedly her daughter found her curled into the fetal position in respiratory distress at home and called 911. Vitals on arrival were significant for temperature 103.2, heart rate 110, blood pressure 122/70 and oxygen saturation 72% on room air. She was placed on 15 L via nonrebreather mask and her oxygen saturation improved to 95%. Labs were significant for bicarbonate 19, BUN 38, creatinine 1.9, anion gap 19, procalcitonin 1.57, CRP 12.8, , mildly elevated LFTs, D-dimer 404, ferritin 5098 and a positive COVID PCR. Chest x-ray showed bilateral airspace disease. She was given 1 L normal saline bolus as well Lovenox and Decadron prior to being admitted. RRT was called on  for respiratory distress. She was transferred to ICU for intubation. She has been intermittently proned. SUBJECTIVE:    Patient seen and examined. Remains intubated and sedated, chemically paralyzed. Requiring low dose norepinephrine. Records reviewed. Temp (24hrs), Av.9 °F (36.6 °C), Min:97.3 °F (36.3 °C), Max:99 °F (37.2 °C)    DIET: Diet NPO  ADULT TUBE FEEDING; Nasogastric; Standard with Fiber; Continuous; 10; No; 30; Q 3 hours  CODE: Full Code    Intake/Output Summary (Last 24 hours) at 2022 0730  Last data filed at 2022 0600  Gross per 24 hour   Intake 2144 ml   Output 3300 ml   Net -1156 ml       Review of Systems  RAGHAV- intubated and sedated      OBJECTIVE:    BP (!) 155/64   Pulse 54   Temp 97.3 °F (36.3 °C) (Esophageal)   Resp 27   Ht 5' (1.524 m)   Wt 134 lb 1.6 oz (60.8 kg)   SpO2 100%   BMI 26.19 kg/m²     General appearance: Intubated and sedated  HEENT: Normal cephalic, facial edema noted  Neck: Supple, with full range of motion.  No jugular venous distention. Trachea midline. Respiratory: ETT to vent, FiO2 70%, PEEP 14  Cardiovascular: RRR, no murmur noted  Abdomen: Soft, nontender, nondistended, flat. NGT with trickle feeds, FW.  Musculoskeletal: No clubbing, cyanosis, + edema generalized  Skin: Normal skin color.  scatted bruises  Neurologic:  Sedated  Psychiatric:  Sedated    Medications:  REVIEWED DAILY    Infusion Medications    cisatracurium (NIMBEX) infusion 3 mcg/kg/min (01/30/22 0601)    fentaNYL 5 mcg/ml in D5W 250 ml infusion 200 mcg/hr (01/30/22 0132)    norepinephrine 2 mcg/min (01/29/22 2108)    sodium chloride      midazolam 12 mg/hr (01/30/22 0217)    sodium chloride      dextrose       Scheduled Medications    midodrine  15 mg Oral TID WC    hydrocortisone sodium succinate PF  50 mg IntraVENous Q8H    insulin lispro  0-6 Units SubCUTAneous Q4H    pantoprazole  40 mg IntraVENous BID    And    sodium chloride (PF)  10 mL IntraVENous Daily    enoxaparin  30 mg SubCUTAneous BID    docusate sodium  100 mg Oral BID    sennosides  5 mL Oral Nightly    miconazole nitrate   Topical BID    chlorhexidine  15 mL Mouth/Throat BID    artificial tears   Both Eyes Q4H    polyethylene glycol  17 g Oral Daily    ipratropium-albuterol  1 ampule Inhalation Q4H WA    sodium chloride flush  5-40 mL IntraVENous 2 times per day    ascorbic acid  500 mg Oral Daily    zinc sulfate  50 mg Oral Daily    vitamin D  2,000 Units Oral Daily     PRN Meds: sodium chloride, sodium chloride flush, sodium chloride, acetaminophen **OR** acetaminophen, glucose, dextrose, glucagon (rDNA), dextrose    Labs:     Recent Labs     01/28/22  0402 01/29/22  0435 01/30/22  0433   WBC 11.0 16.7* 14.5*   HGB 7.5* 8.9* 8.0*   HCT 23.6* 27.8* 25.4*    281 232       Recent Labs     01/29/22  0435 01/29/22  1757 01/30/22 0433    144 139   K 4.2 4.4 4.2    102 98   CO2 28 35* 34*   BUN 25* 24* 27*   CREATININE 0.7 0.7 0.6   CALCIUM 8.5* OH  +++++++++++++++++++++++++++++++++++++++++++++++++  NOTE: This report was transcribed using voice recognition software. Every effort was made to ensure accuracy; however, inadvertent computerized transcription errors may be present.

## 2022-01-31 ENCOUNTER — APPOINTMENT (OUTPATIENT)
Dept: GENERAL RADIOLOGY | Age: 67
DRG: 207 | End: 2022-01-31
Payer: MEDICARE

## 2022-01-31 LAB
(1,3)-BETA-D-GLUCAN (FUNGITELL) INTERPRETATION: NEGATIVE
(1,3)-BETA-D-GLUCAN (FUNGITELL): <31 PG/ML
AADO2: 259.1 MMHG
AADO2: 264.5 MMHG
AADO2: 300.3 MMHG
ANION GAP SERPL CALCULATED.3IONS-SCNC: 5 MMOL/L (ref 7–16)
ANION GAP SERPL CALCULATED.3IONS-SCNC: 9 MMOL/L (ref 7–16)
B.E.: -0.6 MMOL/L (ref -3–3)
B.E.: 5.7 MMOL/L (ref -3–3)
B.E.: 7.2 MMOL/L (ref -3–3)
BUN BLDV-MCNC: 23 MG/DL (ref 6–23)
BUN BLDV-MCNC: 25 MG/DL (ref 6–23)
CALCIUM IONIZED: 1.23 MMOL/L (ref 1.15–1.33)
CALCIUM SERPL-MCNC: 8.5 MG/DL (ref 8.6–10.2)
CALCIUM SERPL-MCNC: 9.1 MG/DL (ref 8.6–10.2)
CHLORIDE BLD-SCNC: 100 MMOL/L (ref 98–107)
CHLORIDE BLD-SCNC: 99 MMOL/L (ref 98–107)
CO2: 32 MMOL/L (ref 22–29)
CO2: 36 MMOL/L (ref 22–29)
COHB: 0.5 % (ref 0–1.5)
COHB: 0.6 % (ref 0–1.5)
COHB: 0.8 % (ref 0–1.5)
CREAT SERPL-MCNC: 0.5 MG/DL (ref 0.5–1)
CREAT SERPL-MCNC: 0.5 MG/DL (ref 0.5–1)
CRITICAL: ABNORMAL
CULTURE, RESPIRATORY: ABNORMAL
CULTURE, RESPIRATORY: ABNORMAL
DATE ANALYZED: ABNORMAL
DATE OF COLLECTION: ABNORMAL
FERRITIN: 1036 NG/ML
FIO2: 55 %
FIO2: 55 %
FIO2: 65 %
GFR AFRICAN AMERICAN: >60
GFR AFRICAN AMERICAN: >60
GFR NON-AFRICAN AMERICAN: >60 ML/MIN/1.73
GFR NON-AFRICAN AMERICAN: >60 ML/MIN/1.73
GLUCOSE BLD-MCNC: 108 MG/DL (ref 74–99)
GLUCOSE BLD-MCNC: 137 MG/DL (ref 74–99)
HCO3: 24.2 MMOL/L (ref 22–26)
HCO3: 30.2 MMOL/L (ref 22–26)
HCO3: 32.6 MMOL/L (ref 22–26)
HCT VFR BLD CALC: 24.4 % (ref 34–48)
HEMOGLOBIN: 7.5 G/DL (ref 11.5–15.5)
HHB: 2.9 % (ref 0–5)
HHB: 6.5 % (ref 0–5)
HHB: 7.9 % (ref 0–5)
LAB: ABNORMAL
MAGNESIUM: 2 MG/DL (ref 1.6–2.6)
MAGNESIUM: 2.2 MG/DL (ref 1.6–2.6)
MCH RBC QN AUTO: 30 PG (ref 26–35)
MCHC RBC AUTO-ENTMCNC: 30.7 % (ref 32–34.5)
MCV RBC AUTO: 97.6 FL (ref 80–99.9)
METER GLUCOSE: 102 MG/DL (ref 74–99)
METER GLUCOSE: 116 MG/DL (ref 74–99)
METER GLUCOSE: 119 MG/DL (ref 74–99)
METER GLUCOSE: 137 MG/DL (ref 74–99)
METER GLUCOSE: 143 MG/DL (ref 74–99)
METER GLUCOSE: 163 MG/DL (ref 74–99)
METHB: 0.3 % (ref 0–1.5)
METHB: 0.3 % (ref 0–1.5)
METHB: 0.4 % (ref 0–1.5)
MODE: AC
O2 CONTENT: 10.9 ML/DL
O2 CONTENT: 11.8 ML/DL
O2 CONTENT: 11.8 ML/DL
O2 SATURATION: 92 % (ref 92–98.5)
O2 SATURATION: 93.4 % (ref 92–98.5)
O2 SATURATION: 97.1 % (ref 92–98.5)
O2HB: 91.2 % (ref 94–97)
O2HB: 92.6 % (ref 94–97)
O2HB: 96 % (ref 94–97)
OPERATOR ID: 1768
OPERATOR ID: 2067
OPERATOR ID: 2593
ORGANISM: ABNORMAL
PATIENT TEMP: 37 C
PCO2: 40.3 MMHG (ref 35–45)
PCO2: 43.8 MMHG (ref 35–45)
PCO2: 51.1 MMHG (ref 35–45)
PDW BLD-RTO: 14.2 FL (ref 11.5–15)
PEEP/CPAP: 12 CMH2O
PEEP/CPAP: 12 CMH2O
PEEP/CPAP: 14 CMH2O
PFO2: 1.26 MMHG/%
PFO2: 1.28 MMHG/%
PFO2: 1.4 MMHG/%
PH BLOOD GAS: 7.4 (ref 7.35–7.45)
PH BLOOD GAS: 7.42 (ref 7.35–7.45)
PH BLOOD GAS: 7.46 (ref 7.35–7.45)
PHOSPHORUS: 2.5 MG/DL (ref 2.5–4.5)
PHOSPHORUS: 2.6 MG/DL (ref 2.5–4.5)
PLATELET # BLD: 210 E9/L (ref 130–450)
PMV BLD AUTO: 11.9 FL (ref 7–12)
PO2: 69.1 MMHG (ref 75–100)
PO2: 70.6 MMHG (ref 75–100)
PO2: 91.3 MMHG (ref 75–100)
POTASSIUM SERPL-SCNC: 4.5 MMOL/L (ref 3.5–5)
POTASSIUM SERPL-SCNC: 4.7 MMOL/L (ref 3.5–5)
RBC # BLD: 2.5 E12/L (ref 3.5–5.5)
RI(T): 3.29
RI(T): 3.67
RI(T): 3.83
RR MECHANICAL: 24 B/MIN
SMEAR, RESPIRATORY: ABNORMAL
SODIUM BLD-SCNC: 140 MMOL/L (ref 132–146)
SODIUM BLD-SCNC: 141 MMOL/L (ref 132–146)
SOURCE, BLOOD GAS: ABNORMAL
THB: 8.4 G/DL (ref 11.5–16.5)
THB: 8.6 G/DL (ref 11.5–16.5)
THB: 9 G/DL (ref 11.5–16.5)
TIME ANALYZED: 1253
TIME ANALYZED: 2152
TIME ANALYZED: 432
URINE CULTURE, ROUTINE: NORMAL
VT MECHANICAL: 300 ML
WBC # BLD: 13.1 E9/L (ref 4.5–11.5)

## 2022-01-31 PROCEDURE — 2500000003 HC RX 250 WO HCPCS: Performed by: INTERNAL MEDICINE

## 2022-01-31 PROCEDURE — 83735 ASSAY OF MAGNESIUM: CPT

## 2022-01-31 PROCEDURE — 6370000000 HC RX 637 (ALT 250 FOR IP): Performed by: INTERNAL MEDICINE

## 2022-01-31 PROCEDURE — 94003 VENT MGMT INPAT SUBQ DAY: CPT

## 2022-01-31 PROCEDURE — 6360000002 HC RX W HCPCS: Performed by: INTERNAL MEDICINE

## 2022-01-31 PROCEDURE — 80048 BASIC METABOLIC PNL TOTAL CA: CPT

## 2022-01-31 PROCEDURE — 6370000000 HC RX 637 (ALT 250 FOR IP): Performed by: FAMILY MEDICINE

## 2022-01-31 PROCEDURE — 6360000002 HC RX W HCPCS: Performed by: STUDENT IN AN ORGANIZED HEALTH CARE EDUCATION/TRAINING PROGRAM

## 2022-01-31 PROCEDURE — 6370000000 HC RX 637 (ALT 250 FOR IP)

## 2022-01-31 PROCEDURE — 2580000003 HC RX 258: Performed by: INTERNAL MEDICINE

## 2022-01-31 PROCEDURE — 2580000003 HC RX 258: Performed by: STUDENT IN AN ORGANIZED HEALTH CARE EDUCATION/TRAINING PROGRAM

## 2022-01-31 PROCEDURE — 2000000000 HC ICU R&B

## 2022-01-31 PROCEDURE — 94640 AIRWAY INHALATION TREATMENT: CPT

## 2022-01-31 PROCEDURE — 82805 BLOOD GASES W/O2 SATURATION: CPT

## 2022-01-31 PROCEDURE — 82962 GLUCOSE BLOOD TEST: CPT

## 2022-01-31 PROCEDURE — 99233 SBSQ HOSP IP/OBS HIGH 50: CPT | Performed by: INTERNAL MEDICINE

## 2022-01-31 PROCEDURE — 84100 ASSAY OF PHOSPHORUS: CPT

## 2022-01-31 PROCEDURE — 2580000003 HC RX 258: Performed by: FAMILY MEDICINE

## 2022-01-31 PROCEDURE — 6370000000 HC RX 637 (ALT 250 FOR IP): Performed by: NURSE PRACTITIONER

## 2022-01-31 PROCEDURE — C9113 INJ PANTOPRAZOLE SODIUM, VIA: HCPCS | Performed by: INTERNAL MEDICINE

## 2022-01-31 PROCEDURE — 37799 UNLISTED PX VASCULAR SURGERY: CPT

## 2022-01-31 PROCEDURE — 82330 ASSAY OF CALCIUM: CPT

## 2022-01-31 PROCEDURE — 82728 ASSAY OF FERRITIN: CPT

## 2022-01-31 PROCEDURE — 85027 COMPLETE CBC AUTOMATED: CPT

## 2022-01-31 PROCEDURE — 71045 X-RAY EXAM CHEST 1 VIEW: CPT

## 2022-01-31 RX ORDER — FUROSEMIDE 10 MG/ML
40 INJECTION INTRAMUSCULAR; INTRAVENOUS ONCE
Status: COMPLETED | OUTPATIENT
Start: 2022-01-31 | End: 2022-01-31

## 2022-01-31 RX ORDER — POLYETHYLENE GLYCOL 3350 17 G/17G
17 POWDER, FOR SOLUTION ORAL 2 TIMES DAILY
Status: DISCONTINUED | OUTPATIENT
Start: 2022-01-31 | End: 2022-02-05

## 2022-01-31 RX ADMIN — ZINC SULFATE 220 MG (50 MG) CAPSULE 50 MG: CAPSULE at 08:06

## 2022-01-31 RX ADMIN — MINERAL OIL AND WHITE PETROLATUM: 150; 830 OINTMENT OPHTHALMIC at 16:15

## 2022-01-31 RX ADMIN — OXYCODONE HYDROCHLORIDE AND ACETAMINOPHEN 500 MG: 500 TABLET ORAL at 08:08

## 2022-01-31 RX ADMIN — Medication 2000 UNITS: at 08:09

## 2022-01-31 RX ADMIN — DOCUSATE SODIUM: 50 LIQUID ORAL at 08:06

## 2022-01-31 RX ADMIN — HYDROCORTISONE SODIUM SUCCINATE 50 MG: 100 INJECTION, POWDER, FOR SOLUTION INTRAMUSCULAR; INTRAVENOUS at 08:07

## 2022-01-31 RX ADMIN — IPRATROPIUM BROMIDE AND ALBUTEROL SULFATE 1 AMPULE: .5; 2.5 SOLUTION RESPIRATORY (INHALATION) at 08:31

## 2022-01-31 RX ADMIN — HYDROCORTISONE SODIUM SUCCINATE 50 MG: 100 INJECTION, POWDER, FOR SOLUTION INTRAMUSCULAR; INTRAVENOUS at 16:15

## 2022-01-31 RX ADMIN — Medication 200 MCG/HR: at 04:15

## 2022-01-31 RX ADMIN — MINERAL OIL AND WHITE PETROLATUM: 150; 830 OINTMENT OPHTHALMIC at 19:51

## 2022-01-31 RX ADMIN — METOCLOPRAMIDE HYDROCHLORIDE 5 MG: 5 INJECTION INTRAMUSCULAR; INTRAVENOUS at 23:16

## 2022-01-31 RX ADMIN — ENOXAPARIN SODIUM 30 MG: 100 INJECTION SUBCUTANEOUS at 08:06

## 2022-01-31 RX ADMIN — FUROSEMIDE 40 MG: 10 INJECTION, SOLUTION INTRAMUSCULAR; INTRAVENOUS at 18:00

## 2022-01-31 RX ADMIN — IPRATROPIUM BROMIDE AND ALBUTEROL SULFATE 1 AMPULE: .5; 2.5 SOLUTION RESPIRATORY (INHALATION) at 22:33

## 2022-01-31 RX ADMIN — Medication 10 ML: at 08:08

## 2022-01-31 RX ADMIN — MINERAL OIL AND WHITE PETROLATUM: 150; 830 OINTMENT OPHTHALMIC at 03:38

## 2022-01-31 RX ADMIN — CISATRACURIUM BESYLATE 3.5 MCG/KG/MIN: 200 INJECTION INTRAVENOUS at 01:56

## 2022-01-31 RX ADMIN — MIDAZOLAM 13 MG/HR: 5 INJECTION INTRAMUSCULAR; INTRAVENOUS at 19:48

## 2022-01-31 RX ADMIN — PANTOPRAZOLE SODIUM 40 MG: 40 INJECTION, POWDER, FOR SOLUTION INTRAVENOUS at 20:23

## 2022-01-31 RX ADMIN — Medication 10 ML: at 20:23

## 2022-01-31 RX ADMIN — IPRATROPIUM BROMIDE AND ALBUTEROL SULFATE 1 AMPULE: .5; 2.5 SOLUTION RESPIRATORY (INHALATION) at 12:13

## 2022-01-31 RX ADMIN — MIDODRINE HYDROCHLORIDE 15 MG: 5 TABLET ORAL at 12:25

## 2022-01-31 RX ADMIN — Medication 200 MCG/HR: at 11:13

## 2022-01-31 RX ADMIN — INSULIN LISPRO 1 UNITS: 100 INJECTION, SOLUTION INTRAVENOUS; SUBCUTANEOUS at 20:54

## 2022-01-31 RX ADMIN — ENOXAPARIN SODIUM 30 MG: 100 INJECTION SUBCUTANEOUS at 20:24

## 2022-01-31 RX ADMIN — CISATRACURIUM BESYLATE 3.84 MCG/KG/MIN: 200 INJECTION INTRAVENOUS at 13:55

## 2022-01-31 RX ADMIN — METOCLOPRAMIDE HYDROCHLORIDE 5 MG: 5 INJECTION INTRAMUSCULAR; INTRAVENOUS at 12:24

## 2022-01-31 RX ADMIN — Medication: at 08:08

## 2022-01-31 RX ADMIN — IPRATROPIUM BROMIDE AND ALBUTEROL SULFATE 1 AMPULE: .5; 2.5 SOLUTION RESPIRATORY (INHALATION) at 17:41

## 2022-01-31 RX ADMIN — Medication 200 MCG/HR: at 18:41

## 2022-01-31 RX ADMIN — METOCLOPRAMIDE HYDROCHLORIDE 5 MG: 5 INJECTION INTRAMUSCULAR; INTRAVENOUS at 05:04

## 2022-01-31 RX ADMIN — INSULIN LISPRO 1 UNITS: 100 INJECTION, SOLUTION INTRAVENOUS; SUBCUTANEOUS at 02:03

## 2022-01-31 RX ADMIN — MIDODRINE HYDROCHLORIDE 15 MG: 5 TABLET ORAL at 16:15

## 2022-01-31 RX ADMIN — POTASSIUM CHLORIDE 40 MEQ: 400 INJECTION, SOLUTION INTRAVENOUS at 04:15

## 2022-01-31 RX ADMIN — METOCLOPRAMIDE HYDROCHLORIDE 5 MG: 5 INJECTION INTRAMUSCULAR; INTRAVENOUS at 16:15

## 2022-01-31 RX ADMIN — Medication: at 20:23

## 2022-01-31 RX ADMIN — POLYETHYLENE GLYCOL 3350 17 G: 17 POWDER, FOR SOLUTION ORAL at 08:06

## 2022-01-31 RX ADMIN — PANTOPRAZOLE SODIUM 40 MG: 40 INJECTION, POWDER, FOR SOLUTION INTRAVENOUS at 08:07

## 2022-01-31 RX ADMIN — CHLORHEXIDINE GLUCONATE 0.12% ORAL RINSE 15 ML: 1.2 LIQUID ORAL at 08:06

## 2022-01-31 RX ADMIN — MIDAZOLAM 13 MG/HR: 5 INJECTION INTRAMUSCULAR; INTRAVENOUS at 04:08

## 2022-01-31 RX ADMIN — POTASSIUM CHLORIDE 40 MEQ: 400 INJECTION, SOLUTION INTRAVENOUS at 00:09

## 2022-01-31 RX ADMIN — SODIUM CHLORIDE, PRESERVATIVE FREE 10 ML: 5 INJECTION INTRAVENOUS at 08:07

## 2022-01-31 RX ADMIN — MIDAZOLAM 13 MG/HR: 5 INJECTION INTRAMUSCULAR; INTRAVENOUS at 11:58

## 2022-01-31 RX ADMIN — CHLORHEXIDINE GLUCONATE 0.12% ORAL RINSE 15 ML: 1.2 LIQUID ORAL at 20:23

## 2022-01-31 RX ADMIN — MINERAL OIL AND WHITE PETROLATUM: 150; 830 OINTMENT OPHTHALMIC at 06:02

## 2022-01-31 RX ADMIN — MINERAL OIL AND WHITE PETROLATUM: 150; 830 OINTMENT OPHTHALMIC at 23:16

## 2022-01-31 RX ADMIN — HYDROCORTISONE SODIUM SUCCINATE 50 MG: 100 INJECTION, POWDER, FOR SOLUTION INTRAMUSCULAR; INTRAVENOUS at 02:04

## 2022-01-31 RX ADMIN — MINERAL OIL AND WHITE PETROLATUM: 150; 830 OINTMENT OPHTHALMIC at 12:24

## 2022-01-31 RX ADMIN — MIDODRINE HYDROCHLORIDE 15 MG: 5 TABLET ORAL at 08:06

## 2022-01-31 ASSESSMENT — PULMONARY FUNCTION TESTS
PIF_VALUE: 39
PIF_VALUE: 32
PIF_VALUE: 37
PIF_VALUE: 39
PIF_VALUE: 40
PIF_VALUE: 39
PIF_VALUE: 39
PIF_VALUE: 37
PIF_VALUE: 38
PIF_VALUE: 39
PIF_VALUE: 39
PIF_VALUE: 38
PIF_VALUE: 36
PIF_VALUE: 37
PIF_VALUE: 34

## 2022-01-31 ASSESSMENT — PAIN SCALES - GENERAL
PAINLEVEL_OUTOF10: 0

## 2022-01-31 NOTE — PROGRESS NOTES
Hospitalist Progress Note      SYNOPSIS:     Ms. Florencia Romero is a 78y.o. year old female who has a PMH of asthma.     She presented to the ER on 22 with complaints of SOB and diarrhea x 1 week. She was not vaccinated against COVID-19. Reportedly her daughter found her curled into the fetal position in respiratory distress at home and called 911. Vitals on arrival were significant for temperature 103.2, heart rate 110, blood pressure 122/70 and oxygen saturation 72% on room air. She was placed on 15 L via nonrebreather mask and her oxygen saturation improved to 95%. Labs were significant for bicarbonate 19, BUN 38, creatinine 1.9, anion gap 19, procalcitonin 1.57, CRP 12.8, , mildly elevated LFTs, D-dimer 404, ferritin 5098 and a positive COVID PCR. Chest x-ray showed bilateral airspace disease. She was given 1 L normal saline bolus as well Lovenox and Decadron prior to being admitted. RRT was called on  for respiratory distress. She was transferred to ICU for intubation. She has been intermittently proned. SUBJECTIVE:    Patient seen and examined. Remains intubated and sedated, chemically paralyzed. Records reviewed. Temp (24hrs), Av.6 °F (36.4 °C), Min:97.2 °F (36.2 °C), Max:98.2 °F (36.8 °C)    DIET: Diet NPO  ADULT TUBE FEEDING; Nasogastric; Standard with Fiber; Continuous; 10; No; 30; Q 3 hours  CODE: Full Code    Intake/Output Summary (Last 24 hours) at 2022 0937  Last data filed at 2022 3604  Gross per 24 hour   Intake 2422.69 ml   Output 2426 ml   Net -3.31 ml       Review of Systems  RAGHAV- intubated and sedated      OBJECTIVE:    BP (!) 128/59   Pulse 61   Temp 97.9 °F (36.6 °C)   Resp 27   Ht 5' (1.524 m)   Wt 124 lb 8 oz (56.5 kg)   SpO2 100%   BMI 24.31 kg/m²     General appearance: Intubated and sedated  HEENT: Normal cephalic, facial edema noted  Neck: Supple, with full range of motion. No jugular venous distention.  Trachea midline. Respiratory: ETT to vent, FiO2 55%, PEEP 12  Cardiovascular: RRR, no murmur noted  Abdomen: Soft, nontender, nondistended, flat. NGT with trickle feeds, FW.  Musculoskeletal: No clubbing, cyanosis, + edema generalized  Skin: Normal skin color.  scatted bruises  Neurologic:  Sedated  Psychiatric:  Sedated    Medications:  REVIEWED DAILY    Infusion Medications    cisatracurium (NIMBEX) infusion 3.5 mcg/kg/min (01/31/22 0156)    fentaNYL 5 mcg/ml in D5W 250 ml infusion 200 mcg/hr (01/31/22 0415)    norepinephrine Stopped (01/31/22 0908)    sodium chloride      midazolam 13 mg/hr (01/31/22 0408)    sodium chloride      dextrose       Scheduled Medications    metoclopramide  5 mg IntraVENous Q6H    midodrine  15 mg Oral TID WC    hydrocortisone sodium succinate PF  50 mg IntraVENous Q8H    insulin lispro  0-6 Units SubCUTAneous Q4H    pantoprazole  40 mg IntraVENous BID    And    sodium chloride (PF)  10 mL IntraVENous Daily    enoxaparin  30 mg SubCUTAneous BID    docusate sodium  100 mg Oral BID    sennosides  5 mL Oral Nightly    miconazole nitrate   Topical BID    chlorhexidine  15 mL Mouth/Throat BID    artificial tears   Both Eyes Q4H    polyethylene glycol  17 g Oral Daily    ipratropium-albuterol  1 ampule Inhalation Q4H WA    sodium chloride flush  5-40 mL IntraVENous 2 times per day    ascorbic acid  500 mg Oral Daily    zinc sulfate  50 mg Oral Daily    vitamin D  2,000 Units Oral Daily     PRN Meds: sodium chloride, sodium chloride flush, sodium chloride, acetaminophen **OR** acetaminophen, glucose, dextrose, glucagon (rDNA), dextrose    Labs:     Recent Labs     01/29/22  0435 01/30/22  0433 01/31/22  0403   WBC 16.7* 14.5* 13.1*   HGB 8.9* 8.0* 7.5*   HCT 27.8* 25.4* 24.4*    232 210       Recent Labs     01/30/22  0433 01/30/22  1847 01/31/22  0403    140 140   K 4.2 3.2* 4.5   CL 98 96* 99   CO2 34* 38* 32*   BUN 27* 25* 23   CREATININE 0.6 0.6 0.5   CALCIUM 8.9 8.7 8.5*   PHOS 4.0 3.1 2.6       Recent Labs     01/29/22  1757   PROT 5.6*   ALKPHOS 97   ALT 16   AST 16   BILITOT 0.2       No results for input(s): INR in the last 72 hours. No results for input(s): Zenobia Comment in the last 72 hours. Chronic labs:    Lab Results   Component Value Date    CHOL 145 06/29/2020    TRIG 124 01/25/2022    HDL 73 06/29/2020    LDLCALC 57 06/29/2020    TSH 3.600 06/29/2020    INR 1.0 01/14/2022    LABA1C 6.0 (H) 01/19/2022       Radiology: REVIEWED DAILY      ASSESSMENT and PLAN:    COVID-19 pneumonia-unvaccinated, symptom onset approximately 1 week prior to presentation. To continue with vitamin C, vitamin D, zinc.  Lovenox bid. · S/p decadron 10 mg PO bid x 8 days, baricitinib discontinued on 1/19. Currently on solucortef. Possible superimposed bacterial pneumonia-procalcitonin 1.57 on admission. Blood and sputum cultures with NGTD. Urine antigens negative. Vancomycin and zosyn completed. Acute hypoxic respiratory failure-2/2 above. S/p intubation 1/19. · Currently requiring FiO2 55%, PEEP 12. Hyperglycemia-likely steroid-induced, started on SSI and lantus    Normocytic anemia- likely 2/2 phlebotomy; s/p 1U PRBC on 1/26. Continue to monitor H&H    Hx asthma- states that she has never seen a pulmonologist and that it is controlled with PRN albuterol. DISPOSITION: Continued inpatient care    +++++++++++++++++++++++++++++++++++++++++++++++++  MAUREEN Jones - CNP   [facility] Physician - Hospitalist  [address], [place]  +++++++++++++++++++++++++++++++++++++++++++++++++  NOTE: This report was transcribed using voice recognition software. Every effort was made to ensure accuracy; however, inadvertent computerized transcription errors may be present.

## 2022-01-31 NOTE — PROGRESS NOTES
Department of Internal Medicine  Nephrology  Progress Note      Events Reviewed. Episode of hypotension patient had to be restarted briefly on Levophed    SUBJECTIVE:  We are following Ms. Gloria Rodriguez for SAMUEL. Patient is intubated and supine. PHYSICAL EXAM:    Vitals:    VITALS:  BP (!) 128/59   Pulse 61   Temp 97.9 °F (36.6 °C)   Resp 27   Ht 5' (1.524 m)   Wt 124 lb 8 oz (56.5 kg)   SpO2 100%   BMI 24.31 kg/m²   24HR INTAKE/OUTPUT:      Intake/Output Summary (Last 24 hours) at 1/31/2022 0835  Last data filed at 1/31/2022 0800  Gross per 24 hour   Intake 2322.69 ml   Output 2426 ml   Net -103.31 ml     General appearance: Patient is intubated, sedated  HEENT: Normal cephalic, atraumatic without obvious deformity. Pupils equal, round, and reactive to light. Endotracheal tube in place  Neck: Supple, with full range of motion. No jugular venous distention. Trachea midline. Respiratory: Diminished bilaterally, intubated. Cardiovascular: RRR, no murmur noted  Abdomen: Soft, nontender, nondistended, flat  Musculoskeletal: No clubbing or cyanosis. No edema of bilateral lower extremities. Brisk capillary refill. Skin: Normal skin color.  No rashes. Scattered ecchymosis.   Neurologic: Patient sedated        Scheduled Meds:   metoclopramide  5 mg IntraVENous Q6H    midodrine  15 mg Oral TID WC    hydrocortisone sodium succinate PF  50 mg IntraVENous Q8H    insulin lispro  0-6 Units SubCUTAneous Q4H    pantoprazole  40 mg IntraVENous BID    And    sodium chloride (PF)  10 mL IntraVENous Daily    enoxaparin  30 mg SubCUTAneous BID    docusate sodium  100 mg Oral BID    sennosides  5 mL Oral Nightly    miconazole nitrate   Topical BID    chlorhexidine  15 mL Mouth/Throat BID    artificial tears   Both Eyes Q4H    polyethylene glycol  17 g Oral Daily    ipratropium-albuterol  1 ampule Inhalation Q4H WA    sodium chloride flush  5-40 mL IntraVENous 2 times per day    ascorbic acid  500 mg Oral Daily    zinc sulfate  50 mg Oral Daily    vitamin D  2,000 Units Oral Daily     Continuous Infusions:   cisatracurium (NIMBEX) infusion 3.5 mcg/kg/min (01/31/22 0156)    fentaNYL 5 mcg/ml in D5W 250 ml infusion 200 mcg/hr (01/31/22 0415)    norepinephrine Stopped (01/30/22 8438)    sodium chloride      midazolam 13 mg/hr (01/31/22 2948)    sodium chloride      dextrose       PRN Meds:.sodium chloride, sodium chloride flush, sodium chloride, acetaminophen **OR** acetaminophen, glucose, dextrose, glucagon (rDNA), dextrose    DATA:    CBC with Differential:    Lab Results   Component Value Date    WBC 13.1 01/31/2022    RBC 2.50 01/31/2022    HGB 7.5 01/31/2022    HCT 24.4 01/31/2022     01/31/2022    MCV 97.6 01/31/2022    MCH 30.0 01/31/2022    MCHC 30.7 01/31/2022    RDW 14.2 01/31/2022    METASPCT 0.9 01/18/2022    LYMPHOPCT 2.7 01/18/2022    MONOPCT 11.6 01/18/2022    MYELOPCT 0.9 01/14/2022    BASOPCT 0.1 01/18/2022    MONOSABS 1.38 01/18/2022    LYMPHSABS 0.35 01/18/2022    EOSABS 0.00 01/18/2022    BASOSABS 0.00 01/18/2022     CMP:    Lab Results   Component Value Date     01/31/2022    K 4.5 01/31/2022    K 3.8 01/18/2022    CL 99 01/31/2022    CO2 32 01/31/2022    BUN 23 01/31/2022    CREATININE 0.5 01/31/2022    GFRAA >60 01/31/2022    LABGLOM >60 01/31/2022    GLUCOSE 108 01/31/2022    PROT 5.6 01/29/2022    LABALBU 2.4 01/29/2022    CALCIUM 8.5 01/31/2022    BILITOT 0.2 01/29/2022    ALKPHOS 97 01/29/2022    AST 16 01/29/2022    ALT 16 01/29/2022     Magnesium:    Lab Results   Component Value Date    MG 2.0 01/31/2022     Phosphorus:    Lab Results   Component Value Date    PHOS 2.6 01/31/2022          Radiology Review:    Chest X-ray January 30, 2022   Stable to slightly improved aeration of the lungs bilaterally with persistent   patchy bilateral airspace opacities, in keeping with the patient's known   diagnosis of COVID pneumonia.                       No significant interval change, when compared to the previous study performed   1 day earlier.           BRIEF SUMMARY OF INITIAL CONSULT:     Briefly, Ms. Crescencio Santana is a 78year old female with a past medical history of Asthma who presented to the ER on January 14 th, 2022 with complaints of SOB and diarrhea for one week. She is not vaccinated against COVID. Reportedly her daughter found her curled into a fetal position in respiratory distress at home and EMS was summoned. She was found to be COVID 19 positive. Labs were significant for bicarbonate 19, BUN 38, creatinine 1.9, anion gap 19, procalcitonin 1.57, CRP 12.8, , mildly elevated LFTs, D-dimer 404, ferritin 5098. Chest x-ray showed bilateral airspace disease. Renal is consulted for SAMUEL. Problems Resolved:     · SAMUEL, likely pre-renal volume responsive SAMUEL secondary to poor oral intake and GI losses (diarrhea). Creatinine 1.9mg/dL on admission. Renal function has improved to 0.6 mg/dL with IVF administration. · HAGMA with low bicarbonate levels, secondary to uremia. To continue bicarbonate drip  · Hypokalemia, 2/2 poor oral intake, potassium levels improved. · hypernatremia, with water deficit, dehydration due to poor intake. Sodium levels quite improved. Resolved. · Hypophosphatemia, 2/2 poor intake, to replace  · Hypokalemia likely 2/2 diuresis renal losses. IMPRESSION/RECOMMENDATIONS:        1. Hypocalcemia, vitamin D 25 level 39, calcium levels 8.4- improved  2. Metabolic alkalosis 4.572, pCO2 51.1, bicarb 32.6 (contraction alkalosis from diuretic use)  3. ----------------------------------------  4. COVID +, on hydrocortisone   5. Acute hypoxic respiratory failure, secondary to COVID pneumonia. Status post intubation  6. Hypotension, on solucortef 50 mg tid, levophed to be restarted as BP   7. Normocytic anemia, hemoglobin 7.5. monitoring. 8. Nutrition, receiving tube feeds at 10 mL an hour with free water flush 30 cc every 3 hours  9.  Low grade temp--> pancultures sent. Resolved    Plan:    · Hold diuretics today d/t hypotension  · Continue midodrine to 10mg TID.    · Monitor potassium levels  · Continue solucortef to 50 mg iv tid for BP support, wean as she becomes more hemodynamically stable  · Continue to monitor phos and magnesium levels  · Obtain ionized calcium daily      Electronically signed by MAUREEN Lara CNP on 1/31/2022 at 8:35 AM  Agree as annotated  Earnestine Celaya MD

## 2022-01-31 NOTE — PROGRESS NOTES
[address] [facility]   Department of Internal Medicine   Internal Medicine Residency  MICU Progress Note    Patient:  Jackelin Abdalla 78 y.o. female   MRN: [id number]       Date of Service: 2022    Allergy: Patient has no known allergies. Subjective     Patient was seen and examined this morning at bedside in no acute distress. Patient is sedated, intubated and paralyzed. PF ratio improved, and CXR improved today. Patient is getting diuresis with lasix. Patient's Hb has been stable. PF ratio was not improved. Map was 50s this morning, he improved after receiving midodrine    Objective     TEMPERATURE:  Current - Temp: 97.7 °F (36.5 °C); Max - Temp  Av.6 °F (36.4 °C)  Min: 97.2 °F (36.2 °C)  Max: 98.2 °F (36.8 °C)  RESPIRATIONS RANGE: Resp  Av.5  Min: 17  Max: 27  PULSE RANGE: Pulse  Av.3  Min: 54  Max: 90  BLOOD PRESSURE RANGE:  Systolic (70WHW), WRV:807 , Min:102 , BBP:641   ; Diastolic (49CLJ), DNO:18, Min:45, Max:59    PULSE OXIMETRY RANGE: SpO2  Av.3 %  Min: 95 %  Max: 100 %    I & O - 24hr:    Intake/Output Summary (Last 24 hours) at 2022 0734  Last data filed at 2022 0600  Gross per 24 hour   Intake 2322.69 ml   Output 2550 ml   Net -227.31 ml     I/O last 3 completed shifts: In: 3479.7 [I.V.:2674.7; NG/GT:805]  Out: 0361 [Urine:3275] No intake/output data recorded. Weight change: -9 lb 9.6 oz (-4.355 kg)    Physical Exam  Constitutional:       Comments: Sedated and intubated   HENT:      Mouth/Throat:      Mouth: Mucous membranes are moist.   Cardiovascular:      Rate and Rhythm: Normal rate and regular rhythm. Pulses: Normal pulses. Heart sounds: Normal heart sounds. No murmur heard. No friction rub. No gallop. Pulmonary:      Effort: Pulmonary effort is normal. No respiratory distress. Breath sounds: Normal breath sounds. No stridor. No wheezing, rhonchi or rales. Chest:      Chest wall: No tenderness.    Abdominal:      General: Abdomen is flat. Bowel sounds are normal.      Palpations: Abdomen is soft. Musculoskeletal:         General: No swelling. Skin:     General: Skin is warm. Capillary Refill: Capillary refill takes less than 2 seconds.           Diet:   Diet NPO  ADULT TUBE FEEDING; Nasogastric; Standard with Fiber; Continuous; 10; No; 30; Q 3 hours    FLOW([id number])@      Additional Respiratory  Assessments  Pulse: 64  Resp: 24  SpO2: 100 %  Position: Semi-Ocampo's  Humidification Source: Heated wire  Humidification Temp: 37  Circuit Condensation: Drained  Oral Care: Mouth swabbed,Mouth moisturizer,Mouth suctioned  Subglottic Suction Done?: No  Airway Type: ET  Airway Size: 8  Cuff Pressure (cm H2O):  (mlt)       [REMOVED] Urethral Catheter Double-lumen 16 fr-Output (mL): 130 mL  Urethral Catheter Double-lumen 16 fr-Output (mL): 50 mL  [REMOVED] External Urinary Catheter-Output (mL): 200 mL      Oxygen:     Vent Information  $Ventilation: $Subsequent Day  Skin Assessment: Clean, dry, & intact  Suction Catheter Diameter:  (16)  Equipment ID: [id number]  Equipment Changed: Humidification  Vent Type: 980  Vent Mode: AC/VC  Vt Ordered: 300 mL  Rate Set: 24 bmp  Peak Flow: 65 L/min  Pressure Support: 0 cmH20  FiO2 : 65 %  SpO2: 100 %  SpO2/FiO2 ratio: 153.85  PaO2/FiO2 ratio: 196  Sensitivity: 3  PEEP/CPAP: 14  I Time/ I Time %: 0 s  Humidification Source: Heated wire  Humidification Temp: 37  Humidification Temp Measured: 37  Circuit Condensation: Drained  Mask Type: Full face mask  Mask Size: Medium  Additional Respiratory  Assessments  Pulse: 64  Resp: 24  SpO2: 100 %  Position: Semi-Ocampo's  Humidification Source: Heated wire  Humidification Temp: 37  Circuit Condensation: Drained  Oral Care: Mouth swabbed,Mouth moisturizer,Mouth suctioned  Subglottic Suction Done?: No  Airway Type: ET  Airway Size: 8  Cuff Pressure (cm H2O):  (mlt)       [REMOVED] Urethral Catheter Double-lumen 16 fr-Output (mL): 130 mL  Urethral Catheter Double-lumen 16 fr-Output (mL): 50 mL  [REMOVED] External Urinary Catheter-Output (mL): 200 mL    Medications     Continuous Infusions:   cisatracurium (NIMBEX) infusion 3.5 mcg/kg/min (01/31/22 0156)    fentaNYL 5 mcg/ml in D5W 250 ml infusion 200 mcg/hr (01/31/22 0415)    norepinephrine Stopped (01/30/22 9098)    sodium chloride      midazolam 13 mg/hr (01/31/22 0408)    sodium chloride      dextrose       Scheduled Meds:   metoclopramide  5 mg IntraVENous Q6H    midodrine  15 mg Oral TID WC    hydrocortisone sodium succinate PF  50 mg IntraVENous Q8H    insulin lispro  0-6 Units SubCUTAneous Q4H    pantoprazole  40 mg IntraVENous BID    And    sodium chloride (PF)  10 mL IntraVENous Daily    enoxaparin  30 mg SubCUTAneous BID    docusate sodium  100 mg Oral BID    sennosides  5 mL Oral Nightly    miconazole nitrate   Topical BID    chlorhexidine  15 mL Mouth/Throat BID    artificial tears   Both Eyes Q4H    polyethylene glycol  17 g Oral Daily    ipratropium-albuterol  1 ampule Inhalation Q4H WA    sodium chloride flush  5-40 mL IntraVENous 2 times per day    ascorbic acid  500 mg Oral Daily    zinc sulfate  50 mg Oral Daily    vitamin D  2,000 Units Oral Daily     PRN Meds: sodium chloride, sodium chloride flush, sodium chloride, acetaminophen **OR** acetaminophen, glucose, dextrose, glucagon (rDNA), dextrose  Nutrition:   NG/OG tube TF type: Pulmocare/Nephro/Glucerna/Jevity        At rate: 10 ml/h    Labs and Imaging Studies     ve  CBC:   Recent Labs     01/29/22  0435 01/30/22  0433 01/31/22  0403   WBC 16.7* 14.5* 13.1*   HGB 8.9* 8.0* 7.5*   HCT 27.8* 25.4* 24.4*   MCV 95.5 97.7 97.6    232 210       BMP:    Recent Labs     01/30/22  0433 01/30/22  1847 01/31/22  0403    140 140   K 4.2 3.2* 4.5   CL 98 96* 99   CO2 34* 38* 32*   BUN 27* 25* 23   CREATININE 0.6 0.6 0.5   GLUCOSE 145* 117* 108*       LIVER PROFILE:   Recent Labs     01/29/22  1757   AST 16   ALT 16 BILIDIR <0.2   BILITOT 0.2   ALKPHOS 97       PT/INR:   No results for input(s): PROTIME, INR in the last 72 hours. APTT:   No results for input(s): APTT in the last 72 hours. Fasting Lipid Panel:    Lab Results   Component Value Date    CHOL 145 06/29/2020    TRIG 124 01/25/2022    HDL 73 06/29/2020       Cardiac Enzymes:    No results found for: CKTOTAL, CKMB, CKMBINDEX, TROPONINI    ABGs:   Lab Results   Component Value Date    PO2ART 138.4 01/25/2022    DHO3XOP 54.3 01/25/2022       Imaging Studies:     XR ABDOMEN (KUB) (SINGLE AP VIEW)    Result Date: 1/27/2022  EXAMINATION: ONE SUPINE XRAY VIEW(S) OF THE ABDOMEN 1/27/2022 9:07 am COMPARISON: 19 January 2022 HISTORY: ORDERING SYSTEM PROVIDED HISTORY: r/o obstruction or ileus TECHNOLOGIST PROVIDED HISTORY: Reason for exam:->r/o obstruction or ileus What reading provider will be dictating this exam?->CRC FINDINGS: NG tube tip is in the gastric lumen. There is no free air, bowel wall pneumatosis or dilated bowel. No abnormal calcifications are present. .     No active process. NG tube in the gastric lumen as before. XR CHEST PORTABLE    Result Date: 1/27/2022  EXAMINATION: ONE XRAY VIEW OF THE CHEST 1/27/2022 7:53 am COMPARISON: Chest x-ray 01/26/2022 HISTORY: ORDERING SYSTEM PROVIDED HISTORY: COVID ICU TECHNOLOGIST PROVIDED HISTORY: Reason for exam:->COVID ICU What reading provider will be dictating this exam?->CRC FINDINGS: Cardiac size enlarged. Bilateral pulmonary opacifications right greater than left have increased in the interim midlung involvement of worsening airspace disease. No pneumothorax or pleural effusion     Progression of bilateral airspace disease remaining midlung predominant in the periphery of airspace disease bronchopneumonia with increased from prior     XR CHEST PORTABLE    Result Date: 1/26/2022  EXAMINATION: ONE XRAY VIEW OF THE CHEST 1/26/2022 8:56 am COMPARISON: The previous study performed 01/25/2022.  HISTORY: ORDERING SYSTEM PROVIDED HISTORY: COVID ICU TECHNOLOGIST PROVIDED HISTORY: Reason for exam:->COVID ICU What reading provider will be dictating this exam?->CRC FINDINGS: There has been a mild decrease in the diffuse left lung infiltrates. The right lung infiltrates are without significant interval change. No active pleural disease is seen. The cardiac silhouette and mediastinal structures are without significant interval change. An endotracheal tube is again noted, with the tip 3.0 cm above the alberta. An NG tube is again noted entering the stomach. The tip is cut off. A left-sided IJ line is again present, with tip in the SVC. Mild decrease in the diffuse left lung infiltrates, when compared to the previous study performed 1 day earlier. Diffuse right lung infiltrates without significant interval change. XR CHEST PORTABLE    Result Date: 1/25/2022  EXAMINATION: ONE XRAY VIEW OF THE CHEST 1/25/2022 4:58 pm COMPARISON: 7:55 a.m. HISTORY: ORDERING SYSTEM PROVIDED HISTORY: L IJ CVC placement TECHNOLOGIST PROVIDED HISTORY: Reason for exam:->L IJ CVC placement What reading provider will be dictating this exam?->CRC FINDINGS: Stable bilateral pulmonary infiltrates. There is no effusion or pneumothorax. The cardiomediastinal silhouette is without acute process. The osseous structures are without acute process. Left internal jugular line tip in the distal SVC. The remainder of the lines and tubes are stable in position. Left internal jugular line tip in the distal SVC. No pneumothorax. The remainder of the exam is essentially stable. .     XR CHEST PORTABLE    Result Date: 1/25/2022  EXAMINATION: ONE XRAY VIEW OF THE CHEST 1/25/2022 7:41 am COMPARISON: 24 January 2022 HISTORY: ORDERING SYSTEM PROVIDED HISTORY: COVID ICU TECHNOLOGIST PROVIDED HISTORY: Reason for exam:->COVID ICU What reading provider will be dictating this exam?->CRC FINDINGS: Bilateral airspace disease is not appreciably changed allowing for some differences in positioning. Stable support lines. No new findings. Stable airspace disease. See above. XR CHEST PORTABLE    Result Date: 1/24/2022  EXAMINATION: ONE XRAY VIEW OF THE CHEST 1/24/2022 7:11 am COMPARISON: 23 January 2022 HISTORY: ORDERING SYSTEM PROVIDED HISTORY: COVID ICU TECHNOLOGIST PROVIDED HISTORY: Reason for exam:->COVID ICU What reading provider will be dictating this exam?->CRC FINDINGS: Chest is notably rotated to the left. Support lines are stable. Bilateral airspace disease appears mildly progressive. Bilateral airspace disease which appears mildly progressive. XR CHEST PORTABLE    Result Date: 1/23/2022  EXAMINATION: ONE XRAY VIEW OF THE CHEST 1/23/2022 8:17 am COMPARISON: Multiple prior studies, the most recent dated January 22, 2022 HISTORY: [address]: COVID ICU TECHNOLOGIST PROVIDED HISTORY: Reason for exam:->COVID ICU What reading provider will be dictating this exam?->CRC FINDINGS: Frontal view of the chest.  Lines and tubes are unchanged in position. Stable cardiomediastinal silhouette. Diffuse bilateral opacities are not significantly changed given differences in patient positioning. No pneumothorax seen. There are degenerative changes in the spine. 1.  Lines and tubes are unchanged in position. 2.  Diffuse bilateral opacities are not significantly changed. Differential includes infection, pulmonary edema, atelectasis, ARDS, and/or layering pleural effusions. XR CHEST PORTABLE    Result Date: 1/22/2022  EXAMINATION: ONE XRAY VIEW OF THE CHEST 1/22/2022 8:28 am COMPARISON: 01/21/2022 HISTORY: ORDERING SYSTEM PROVIDED HISTORY: COVID ICU TECHNOLOGIST PROVIDED HISTORY: Reason for exam:->COVID ICU What reading provider will be dictating this exam?->CRC FINDINGS: Heart is normal in size. Moderate bilateral interstitial and alveolar opacities with central air bronchograms have not significantly improved. No pneumothorax detected. The gastric catheter passes into the stomach. Endotracheal tube tip is 4 cm above the alberta. Right IJ central venous catheter in good position. Osseous structures are stable. 1.  No significant change in bilateral multifocal pneumonia. Support tubes and catheters are stable. XR CHEST PORTABLE    Result Date: 1/21/2022  EXAMINATION: ONE XRAY VIEW OF THE CHEST 1/21/2022 8:33 am COMPARISON: None. HISTORY: ORDERING SYSTEM PROVIDED HISTORY: COVID ICU TECHNOLOGIST PROVIDED HISTORY: Reason for exam:->COVID ICU What reading provider will be dictating this exam?->CRC FINDINGS: There is stable position of support lines and tubes. There is no pneumothorax or pneumomediastinum. Extensive multifocal bilateral pneumonia is noted unchanged when compared to the prior study. 1. There is no interval change in extensive multifocal bilateral pneumonia. XR CHEST PORTABLE    Result Date: 1/20/2022  EXAMINATION: ONE XRAY VIEW OF THE CHEST 1/20/2022 8:06 am COMPARISON: January 19, 2022 HISTORY: ORDERING SYSTEM PROVIDED HISTORY: COVID ICU TECHNOLOGIST PROVIDED HISTORY: Reason for exam:->COVID ICU What reading provider will be dictating this exam?->CRC FINDINGS: There is no evidence of pneumothorax or effusion. There is continued bilateral diffuse pulmonary infiltrates with mildly increased opacity in the bilateral bases when compared to previous. ET tube is in good position with tip above the alberta. NG tube courses below the diaphragm. Stable appearance of right IJ central venous catheter projecting over the SVC. Visualized bony thorax shows no acute abnormality. Slightly worsened bilateral pulmonary infiltrates with increasing opacity in the bilateral bases when compared to previous. Stable lines and tubes.      XR CHEST PORTABLE    Result Date: 1/19/2022  EXAMINATION: ONE XRAY VIEW OF THE CHEST PORTABLE UPRIGHT 1/19/2022 12:38 pm COMPARISON: 0238 hours HISTORY: ORDERING SYSTEM PROVIDED HISTORY: central line placement TECHNOLOGIST PROVIDED HISTORY: Reason for exam:->central line placement What reading provider will be dictating this exam?->CRC FINDINGS: Medical devices: Right IJ central venous line tip at the cavoatrial junction. No pneumothorax. The endotracheal and enteric tubes remain in satisfactory position. Redemonstration of bilateral widespread pulmonary infiltrates. Right lung infiltrate shows no significant change. Left perihilar and left upper lobe infiltrate shows mild progression and is more confluent. Normal heart size. No significant pleural effusion. Atherosclerotic thoracic aorta. 1.  Good position right IJ central venous line. Negative for pneumothorax. 2.  Bilateral widespread pulmonary infiltrates with interval mild worsening on the left and compatible with bilateral pneumonia. XR CHEST PORTABLE    Result Date: 1/19/2022  EXAMINATION: ONE XRAY VIEW OF THE CHEST 1/19/2022 3:01 am COMPARISON: 01/16/2022 HISTORY: ORDERING SYSTEM PROVIDED HISTORY: ETT placement TECHNOLOGIST PROVIDED HISTORY: Reason for exam:->ETT placement What reading provider will be dictating this exam?->CRC FINDINGS: Endotracheal tube tip is 3.3 cm above alberta. Nasogastric tube extends into the stomach. There are diffuse infiltrates which are more notable in the lower lungs. When accounting for technical differences, these infiltrates have not changed significantly. There is no pleural effusion or pneumothorax seen. There is no cardiomegaly. Endotracheal tube tip is 3.3 cm above the alberta. Diffuse pulmonary infiltrates are unchanged. XR CHEST PORTABLE    Result Date: 1/16/2022  EXAMINATION: ONE XRAY VIEW OF THE CHEST 1/16/2022 10:59 am COMPARISON: 01/14/2022 HISTORY: ORDERING SYSTEM PROVIDED HISTORY: hypoxia TECHNOLOGIST PROVIDED HISTORY: Reason for exam:->hypoxia What reading provider will be dictating this exam?->CRC FINDINGS: The cardiac silhouette is within normals.  Extensive multifocal bilateral history of Asthma who presented to the ER on January 14 th, 2022 with complaints of SOB and diarrhea for one week    She is currently being  managed for       Acute hypoxic respiratory failure 2/2 Covid PNA  · Patient is supine  · Day 13 of intubation  · Sedated with fentanyl and Versed, on paralysis  · Completed Zosyn for 7 days  · On hydrocortisone  · Leukocytosis, afebrile  · ABG showed metabolic alkalosis, patient receiving Lasix 40 mg twice daily  · Patient received Diamox yesterday  · Chest x-ray showed bilateral airspace disease  · Patient is 7.4 L positive since admission  · Continue Solu-Cortef for today  · Multiple episodes of ventricular dyssynchrony  Plan  · Continue respiratory status monitoring  · Patient got prone today, follow ABG  · We will continue paralyzes. · Wean off ventilation as per patient tolerates  · Decrease Solu-Cortef 50 mg daily, plan to wean off tomorrow.   · Continue vitamin cocktail  · Continue diuresis with Lasix  · Continue Lovenox for DVT prophylaxis  · Keep supine and follow ABG      New onset of fever, tachycardia 2/2 infection (resolved)  · Pancultures sent, infectious work-up sent  · White count elevated 14.5  · D-dimer was elevated 3000  · Duplex ultrasound of bilateral upper showed cephalic thrombus and lower extremity  negative  Plan   · Started lovenox 30       Septic shock 2/2 COVID-19 PNA with superimposed bacterial PNA  · For COVID-19 management as above  · Patient was requiring Levophed, currently off Levophed  · Blood pressure has been stable  Plan  · Monitor blood pressure  · Tapering hydrocortisone dose  · Monitor blood pressure, start pressors if necessary  · On midodrine      History of asthma  · Currently intubated and on breathing treatment      Hyperglycemia 2/2 steroids (resolved)  · Blood glucose has been low side, 154 this AM  · Resume tube feed yesterday, possible concern for refeeding syndrome  · Changed to low-dose sliding scale and Lantus of 5  Plan  · Monitor blood glucose level every 4 hours  · And adjust insulin dose according to blood glucose level      Acute normocytic normochromic anemia 2/2 ACD versus sepsis  · Hemoglobin was 6.9 received 1 unit of PRBC (3 days ago)  · Hemoglobin this morning was 7.5 (stable since 3 days)  Plan  · Monitor CBC daily  · Transfuse PRBC if hemoglobin is less than 7      Constipation 2/2 fentanyl  · Patient is on bowel regiment  · X-ray KUB negative for acute obstruction  Plan  · Monitor bowel movement and continue bowel regimen  · Will receive naloxone today      Refeeding syndrome (resumed)  · Patient had hypokalemia, hypomagnesemia, hypophosphatemia  Plan  · Decrease tube feed to 10 mL/h  · Monitor electrolytes and replace accordingly      Resolved issue  SAMUEL        Code Status: Full Code  PT/OT evaluation:  Disposition: home +/- home health / Chavez Zhu / Aurelio Blanchard / Elgin Edwards MD, PGY-1  Attending physician: Dr. Harshad Hook  Department of Pulmonary, Critical Care and Sleep Medicine  [address] [facility]  Department of Internal Medicine      During multidisciplinary team rounds Luis Eduardo Doran is a 78 y.o. female was seen, examined and discussed. This is confirmation that I have personally seen and examined the patient and that the key elements of the encounter were performed by me (> 85 % time). The medications & laboratory data was discussed and adjusted where necessary. The radiographic images were reviewed or with radiologist or consultant if felt dis-concordant with the exam or history. The above findings were corroborated, plans confirmed and changes made if needed. Family is updated at the bedside as available. Key issues of the case were discussed among consultants. Critical Care time is documented if appropriate.       Jose Rafael Dotson, DO, FACP, FCCP, [facility],

## 2022-01-31 NOTE — PROGRESS NOTES
Pulmonary Subsequent Hospital F/U note    Patient is being followed for: acute hypoxic respiratory failure, severe covid-19 pneumonia     Interval HPI:  Remains intubated, sedated, and paralyzed  Volume control 55%, 24, 300, 12  P/F 126    ROS:  Unable to obtain      Exam:  BP (!) 128/59   Pulse (!) 46   Temp 97.7 °F (36.5 °C)   Resp 24   Ht 5' (1.524 m)   Wt 124 lb 8 oz (56.5 kg)   SpO2 100%   BMI 24.31 kg/m²    Due to the current efforts to prevent transmission of COVID-19 and also the need to preserve PPE for other caregivers, a face-to-face encounter with the patient was not performed. That being said, all relevant records and diagnostic tests were reviewed, including laboratory results and imaging. Please reference any relevant documentation elsewhere. Care will be coordinated with the primary service. Data:    Oximetry:  SpO2 Readings from Last 1 Encounters:   01/31/22 100%       Imaging personally reviewed by myself:  CXR     Impression   1. There is no interval change in the multifocal bilateral pneumonia. 2. There is no pneumothorax or pneumomediastinum. Pertinent labs reviewed and noted:  Lab Results   Component Value Date    WBC 13.1 01/31/2022    HGB 7.5 01/31/2022    HCT 24.4 01/31/2022    MCV 97.6 01/31/2022    MCH 30.0 01/31/2022    MCHC 30.7 01/31/2022    RDW 14.2 01/31/2022     01/31/2022    MPV 11.9 01/31/2022     Lab Results   Component Value Date     01/31/2022    K 4.5 01/31/2022    K 3.8 01/18/2022    CL 99 01/31/2022    CO2 32 01/31/2022    BUN 23 01/31/2022    CREATININE 0.5 01/31/2022    LABALBU 2.4 01/29/2022    CALCIUM 8.5 01/31/2022    GFRAA >60 01/31/2022    LABGLOM >60 01/31/2022     Lab Results   Component Value Date    PROTIME 11.3 01/14/2022    INR 1.0 01/14/2022       Assessment:  1. Acute hypoxic respiratory failure  2. Severe covid-19 pneumonia  3. Possible superimposed bacterial pneumonia   4. Septic shock, resolved  5.  Asthma without exacerbation  6. Anemia   7. SAMUEL, resolved      Plan:  1. Continue full vent support low tidal volume ventilation Pplat <30  2. Remains deeply sedated on neuromuscular blockade - could consider holding the paralytic  3. Completed empiric antibiotics  4. Wean solucortef  5. Bronchodilators  6.  Will likely need trache/PEG       Electronically signed by Aiyana Bales MD on 1/31/2022 at 12:59 PM

## 2022-01-31 NOTE — PLAN OF CARE
Problem: Gas Exchange - Impaired  Goal: Absence of hypoxia  Outcome: Met This Shift     Problem:  Body Temperature -  Risk of, Imbalanced  Goal: Ability to maintain a body temperature within defined limits  Outcome: Met This Shift     Problem: Risk for Fluid Volume Deficit  Goal: Maintain normal heart rhythm  1/30/2022 2211 by Mango Trinh RN  Outcome: Met This Shift     Problem: Falls - Risk of:  Goal: Will remain free from falls  Description: Will remain free from falls  1/30/2022 2211 by Mango Trinh RN  Outcome: Met This Shift     Problem: Falls - Risk of:  Goal: Absence of physical injury  Description: Absence of physical injury  1/30/2022 2211 by Mango Trinh RN  Outcome: Met This Shift     Problem: Skin Integrity:  Goal: Will show no infection signs and symptoms  Description: Will show no infection signs and symptoms  1/30/2022 2211 by Mango Trinh RN  Outcome: Met This Shift     Problem: Skin Integrity:  Goal: Absence of new skin breakdown  Description: Absence of new skin breakdown  1/30/2022 2211 by Mango Trinh RN  Outcome: Met This Shift

## 2022-01-31 NOTE — PROGRESS NOTES
Patients TOF 4/4 @ 30 which is baseline. This RN spoke to resident, advised not to increase paralytic due to patient compliance with vent and absence of reflexes. If patient begins to breath over vent or desaturate then okay to titrate paralytic.

## 2022-01-31 NOTE — PLAN OF CARE
by Ayush Prabhakar RN  Outcome: Met This Shift  Goal: Absence of new skin breakdown  Description: Absence of new skin breakdown  1/30/2022 2211 by Ayush Prabhakar RN  Outcome: Met This Shift

## 2022-02-01 ENCOUNTER — APPOINTMENT (OUTPATIENT)
Dept: GENERAL RADIOLOGY | Age: 67
DRG: 207 | End: 2022-02-01
Payer: MEDICARE

## 2022-02-01 LAB
AADO2: 180.6 MMHG
AADO2: 187.4 MMHG
AADO2: 216.3 MMHG
ANION GAP SERPL CALCULATED.3IONS-SCNC: 5 MMOL/L (ref 7–16)
ANION GAP SERPL CALCULATED.3IONS-SCNC: 8 MMOL/L (ref 7–16)
B.E.: 8.7 MMOL/L (ref -3–3)
B.E.: 8.9 MMOL/L (ref -3–3)
B.E.: 9.4 MMOL/L (ref -3–3)
BUN BLDV-MCNC: 24 MG/DL (ref 6–23)
BUN BLDV-MCNC: 28 MG/DL (ref 6–23)
C-REACTIVE PROTEIN: 4.5 MG/DL (ref 0–0.4)
CALCIUM IONIZED: 1.27 MMOL/L (ref 1.15–1.33)
CALCIUM SERPL-MCNC: 8.8 MG/DL (ref 8.6–10.2)
CALCIUM SERPL-MCNC: 9.1 MG/DL (ref 8.6–10.2)
CHLORIDE BLD-SCNC: 93 MMOL/L (ref 98–107)
CHLORIDE BLD-SCNC: 94 MMOL/L (ref 98–107)
CO2: 35 MMOL/L (ref 22–29)
CO2: 38 MMOL/L (ref 22–29)
COHB: 0.1 % (ref 0–1.5)
COHB: 0.4 % (ref 0–1.5)
COHB: 1.1 % (ref 0–1.5)
CREAT SERPL-MCNC: 0.5 MG/DL (ref 0.5–1)
CREAT SERPL-MCNC: 0.5 MG/DL (ref 0.5–1)
CRITICAL: ABNORMAL
D DIMER: 672 NG/ML DDU
DATE ANALYZED: ABNORMAL
DATE OF COLLECTION: ABNORMAL
FIO2: 50 %
FIO2: 50 %
FIO2: 55 %
GFR AFRICAN AMERICAN: >60
GFR AFRICAN AMERICAN: >60
GFR NON-AFRICAN AMERICAN: >60 ML/MIN/1.73
GFR NON-AFRICAN AMERICAN: >60 ML/MIN/1.73
GLUCOSE BLD-MCNC: 113 MG/DL (ref 74–99)
GLUCOSE BLD-MCNC: 115 MG/DL (ref 74–99)
HCO3: 35.1 MMOL/L (ref 22–26)
HCO3: 35.8 MMOL/L (ref 22–26)
HCO3: 35.9 MMOL/L (ref 22–26)
HCT VFR BLD CALC: 25.7 % (ref 34–48)
HEMOGLOBIN: 7.9 G/DL (ref 11.5–15.5)
HHB: 2.7 % (ref 0–5)
HHB: 3.1 % (ref 0–5)
HHB: 4.5 % (ref 0–5)
LAB: ABNORMAL
MAGNESIUM: 2 MG/DL (ref 1.6–2.6)
MAGNESIUM: 2.1 MG/DL (ref 1.6–2.6)
MCH RBC QN AUTO: 29.9 PG (ref 26–35)
MCHC RBC AUTO-ENTMCNC: 30.7 % (ref 32–34.5)
MCV RBC AUTO: 97.3 FL (ref 80–99.9)
METER GLUCOSE: 103 MG/DL (ref 74–99)
METER GLUCOSE: 104 MG/DL (ref 74–99)
METER GLUCOSE: 110 MG/DL (ref 74–99)
METER GLUCOSE: 123 MG/DL (ref 74–99)
METER GLUCOSE: 124 MG/DL (ref 74–99)
METER GLUCOSE: 134 MG/DL (ref 74–99)
METHB: 0.3 % (ref 0–1.5)
METHB: 0.3 % (ref 0–1.5)
METHB: 0.5 % (ref 0–1.5)
MODE: AC
O2 CONTENT: 12.1 ML/DL
O2 CONTENT: 12.5 ML/DL
O2 CONTENT: 13.2 ML/DL
O2 SATURATION: 95.5 % (ref 92–98.5)
O2 SATURATION: 96.9 % (ref 92–98.5)
O2 SATURATION: 97.3 % (ref 92–98.5)
O2HB: 94.9 % (ref 94–97)
O2HB: 95.5 % (ref 94–97)
O2HB: 96.6 % (ref 94–97)
OPERATOR ID: 1632
OPERATOR ID: 1768
OPERATOR ID: ABNORMAL
PATIENT TEMP: 37 C
PCO2: 58.9 MMHG (ref 35–45)
PCO2: 61.5 MMHG (ref 35–45)
PCO2: 65.8 MMHG (ref 35–45)
PDW BLD-RTO: 14 FL (ref 11.5–15)
PEEP/CPAP: 12 CMH2O
PFO2: 1.65 MMHG/%
PFO2: 1.7 MMHG/%
PFO2: 1.94 MMHG/%
PH BLOOD GAS: 7.35 (ref 7.35–7.45)
PH BLOOD GAS: 7.38 (ref 7.35–7.45)
PH BLOOD GAS: 7.39 (ref 7.35–7.45)
PHOSPHORUS: 3.2 MG/DL (ref 2.5–4.5)
PHOSPHORUS: 3.3 MG/DL (ref 2.5–4.5)
PLATELET # BLD: 229 E9/L (ref 130–450)
PMV BLD AUTO: 11.7 FL (ref 7–12)
PO2: 82.6 MMHG (ref 75–100)
PO2: 93.7 MMHG (ref 75–100)
PO2: 97.1 MMHG (ref 75–100)
POTASSIUM SERPL-SCNC: 3.3 MMOL/L (ref 3.5–5)
POTASSIUM SERPL-SCNC: 3.5 MMOL/L (ref 3.5–5)
RBC # BLD: 2.64 E12/L (ref 3.5–5.5)
RI(T): 1.86
RI(T): 2.27
RI(T): 2.31
RR MECHANICAL: 18 B/MIN
RR MECHANICAL: 20 B/MIN
RR MECHANICAL: 28 B/MIN
SODIUM BLD-SCNC: 136 MMOL/L (ref 132–146)
SODIUM BLD-SCNC: 137 MMOL/L (ref 132–146)
SOURCE, BLOOD GAS: ABNORMAL
THB: 8.9 G/DL (ref 11.5–16.5)
THB: 9.1 G/DL (ref 11.5–16.5)
THB: 9.8 G/DL (ref 11.5–16.5)
TIME ANALYZED: 1902
TIME ANALYZED: 2152
TIME ANALYZED: 445
VT MECHANICAL: 300 ML
WBC # BLD: 13.7 E9/L (ref 4.5–11.5)

## 2022-02-01 PROCEDURE — 6370000000 HC RX 637 (ALT 250 FOR IP): Performed by: INTERNAL MEDICINE

## 2022-02-01 PROCEDURE — C9113 INJ PANTOPRAZOLE SODIUM, VIA: HCPCS | Performed by: INTERNAL MEDICINE

## 2022-02-01 PROCEDURE — 2580000003 HC RX 258: Performed by: INTERNAL MEDICINE

## 2022-02-01 PROCEDURE — A4216 STERILE WATER/SALINE, 10 ML: HCPCS | Performed by: INTERNAL MEDICINE

## 2022-02-01 PROCEDURE — 94640 AIRWAY INHALATION TREATMENT: CPT

## 2022-02-01 PROCEDURE — 6360000002 HC RX W HCPCS: Performed by: INTERNAL MEDICINE

## 2022-02-01 PROCEDURE — 2000000000 HC ICU R&B

## 2022-02-01 PROCEDURE — 82962 GLUCOSE BLOOD TEST: CPT

## 2022-02-01 PROCEDURE — 82805 BLOOD GASES W/O2 SATURATION: CPT

## 2022-02-01 PROCEDURE — 83735 ASSAY OF MAGNESIUM: CPT

## 2022-02-01 PROCEDURE — 6370000000 HC RX 637 (ALT 250 FOR IP): Performed by: NURSE PRACTITIONER

## 2022-02-01 PROCEDURE — 85027 COMPLETE CBC AUTOMATED: CPT

## 2022-02-01 PROCEDURE — 71045 X-RAY EXAM CHEST 1 VIEW: CPT

## 2022-02-01 PROCEDURE — 80048 BASIC METABOLIC PNL TOTAL CA: CPT

## 2022-02-01 PROCEDURE — 85378 FIBRIN DEGRADE SEMIQUANT: CPT

## 2022-02-01 PROCEDURE — 2500000003 HC RX 250 WO HCPCS: Performed by: INTERNAL MEDICINE

## 2022-02-01 PROCEDURE — 6370000000 HC RX 637 (ALT 250 FOR IP): Performed by: FAMILY MEDICINE

## 2022-02-01 PROCEDURE — 2580000003 HC RX 258: Performed by: FAMILY MEDICINE

## 2022-02-01 PROCEDURE — 36415 COLL VENOUS BLD VENIPUNCTURE: CPT

## 2022-02-01 PROCEDURE — 94003 VENT MGMT INPAT SUBQ DAY: CPT

## 2022-02-01 PROCEDURE — 2580000003 HC RX 258: Performed by: STUDENT IN AN ORGANIZED HEALTH CARE EDUCATION/TRAINING PROGRAM

## 2022-02-01 PROCEDURE — 86140 C-REACTIVE PROTEIN: CPT

## 2022-02-01 PROCEDURE — 6360000002 HC RX W HCPCS: Performed by: STUDENT IN AN ORGANIZED HEALTH CARE EDUCATION/TRAINING PROGRAM

## 2022-02-01 PROCEDURE — 74018 RADEX ABDOMEN 1 VIEW: CPT

## 2022-02-01 PROCEDURE — 6370000000 HC RX 637 (ALT 250 FOR IP)

## 2022-02-01 PROCEDURE — 82330 ASSAY OF CALCIUM: CPT

## 2022-02-01 PROCEDURE — 99233 SBSQ HOSP IP/OBS HIGH 50: CPT | Performed by: INTERNAL MEDICINE

## 2022-02-01 PROCEDURE — 84100 ASSAY OF PHOSPHORUS: CPT

## 2022-02-01 RX ORDER — ACETAZOLAMIDE 500 MG/5ML
250 INJECTION, POWDER, LYOPHILIZED, FOR SOLUTION INTRAVENOUS ONCE
Status: COMPLETED | OUTPATIENT
Start: 2022-02-01 | End: 2022-02-01

## 2022-02-01 RX ORDER — POTASSIUM CHLORIDE 29.8 MG/ML
40 INJECTION INTRAVENOUS ONCE
Status: COMPLETED | OUTPATIENT
Start: 2022-02-01 | End: 2022-02-01

## 2022-02-01 RX ADMIN — MIDAZOLAM 13 MG/HR: 5 INJECTION INTRAMUSCULAR; INTRAVENOUS at 12:57

## 2022-02-01 RX ADMIN — SENNOSIDES A AND B 5 ML: 415.36 LIQUID ORAL at 21:22

## 2022-02-01 RX ADMIN — IPRATROPIUM BROMIDE AND ALBUTEROL SULFATE 1 AMPULE: .5; 2.5 SOLUTION RESPIRATORY (INHALATION) at 14:00

## 2022-02-01 RX ADMIN — MINERAL OIL AND WHITE PETROLATUM: 150; 830 OINTMENT OPHTHALMIC at 11:41

## 2022-02-01 RX ADMIN — Medication 30 ML: at 08:05

## 2022-02-01 RX ADMIN — CHLORHEXIDINE GLUCONATE 0.12% ORAL RINSE 15 ML: 1.2 LIQUID ORAL at 21:22

## 2022-02-01 RX ADMIN — ACETAZOLAMIDE 250 MG: 500 INJECTION, POWDER, LYOPHILIZED, FOR SOLUTION INTRAVENOUS at 11:36

## 2022-02-01 RX ADMIN — MINERAL OIL AND WHITE PETROLATUM: 150; 830 OINTMENT OPHTHALMIC at 19:18

## 2022-02-01 RX ADMIN — ENOXAPARIN SODIUM 30 MG: 100 INJECTION SUBCUTANEOUS at 21:22

## 2022-02-01 RX ADMIN — SODIUM CHLORIDE, PRESERVATIVE FREE 10 ML: 5 INJECTION INTRAVENOUS at 08:03

## 2022-02-01 RX ADMIN — HYDROCORTISONE SODIUM SUCCINATE 50 MG: 100 INJECTION, POWDER, FOR SOLUTION INTRAMUSCULAR; INTRAVENOUS at 07:59

## 2022-02-01 RX ADMIN — HYDROCORTISONE SODIUM SUCCINATE 50 MG: 100 INJECTION, POWDER, FOR SOLUTION INTRAMUSCULAR; INTRAVENOUS at 22:03

## 2022-02-01 RX ADMIN — PANTOPRAZOLE SODIUM 40 MG: 40 INJECTION, POWDER, FOR SOLUTION INTRAVENOUS at 21:22

## 2022-02-01 RX ADMIN — METOCLOPRAMIDE HYDROCHLORIDE 5 MG: 5 INJECTION INTRAMUSCULAR; INTRAVENOUS at 04:51

## 2022-02-01 RX ADMIN — Medication 175 MCG/HR: at 21:58

## 2022-02-01 RX ADMIN — Medication 200 MCG/HR: at 06:52

## 2022-02-01 RX ADMIN — METOCLOPRAMIDE HYDROCHLORIDE 5 MG: 5 INJECTION INTRAMUSCULAR; INTRAVENOUS at 17:30

## 2022-02-01 RX ADMIN — Medication 2000 UNITS: at 07:59

## 2022-02-01 RX ADMIN — ENOXAPARIN SODIUM 30 MG: 100 INJECTION SUBCUTANEOUS at 07:59

## 2022-02-01 RX ADMIN — CISATRACURIUM BESYLATE 3.5 MCG/KG/MIN: 200 INJECTION INTRAVENOUS at 21:58

## 2022-02-01 RX ADMIN — IPRATROPIUM BROMIDE AND ALBUTEROL SULFATE 1 AMPULE: .5; 2.5 SOLUTION RESPIRATORY (INHALATION) at 17:31

## 2022-02-01 RX ADMIN — Medication 200 MCG/HR: at 00:47

## 2022-02-01 RX ADMIN — METOCLOPRAMIDE HYDROCHLORIDE 5 MG: 5 INJECTION INTRAMUSCULAR; INTRAVENOUS at 23:20

## 2022-02-01 RX ADMIN — MINERAL OIL AND WHITE PETROLATUM: 150; 830 OINTMENT OPHTHALMIC at 15:55

## 2022-02-01 RX ADMIN — Medication 200 MCG/HR: at 14:14

## 2022-02-01 RX ADMIN — CHLORHEXIDINE GLUCONATE 0.12% ORAL RINSE 15 ML: 1.2 LIQUID ORAL at 08:00

## 2022-02-01 RX ADMIN — IPRATROPIUM BROMIDE AND ALBUTEROL SULFATE 1 AMPULE: .5; 2.5 SOLUTION RESPIRATORY (INHALATION) at 21:18

## 2022-02-01 RX ADMIN — DOCUSATE SODIUM 100 MG: 50 LIQUID ORAL at 21:22

## 2022-02-01 RX ADMIN — MINERAL OIL AND WHITE PETROLATUM: 150; 830 OINTMENT OPHTHALMIC at 21:23

## 2022-02-01 RX ADMIN — MIDAZOLAM 10 MG/HR: 5 INJECTION INTRAMUSCULAR; INTRAVENOUS at 21:58

## 2022-02-01 RX ADMIN — POTASSIUM CHLORIDE 40 MEQ: 29.8 INJECTION, SOLUTION INTRAVENOUS at 08:00

## 2022-02-01 RX ADMIN — POLYETHYLENE GLYCOL 3350 17 G: 17 POWDER, FOR SOLUTION ORAL at 21:22

## 2022-02-01 RX ADMIN — MIDAZOLAM 13 MG/HR: 5 INJECTION INTRAMUSCULAR; INTRAVENOUS at 04:48

## 2022-02-01 RX ADMIN — METOCLOPRAMIDE HYDROCHLORIDE 5 MG: 5 INJECTION INTRAMUSCULAR; INTRAVENOUS at 11:40

## 2022-02-01 RX ADMIN — Medication: at 21:22

## 2022-02-01 RX ADMIN — MINERAL OIL AND WHITE PETROLATUM: 150; 830 OINTMENT OPHTHALMIC at 03:32

## 2022-02-01 RX ADMIN — ZINC SULFATE 220 MG (50 MG) CAPSULE 50 MG: CAPSULE at 07:58

## 2022-02-01 RX ADMIN — OXYCODONE HYDROCHLORIDE AND ACETAMINOPHEN 500 MG: 500 TABLET ORAL at 09:06

## 2022-02-01 RX ADMIN — HYDROCORTISONE SODIUM SUCCINATE 50 MG: 100 INJECTION, POWDER, FOR SOLUTION INTRAMUSCULAR; INTRAVENOUS at 00:48

## 2022-02-01 RX ADMIN — Medication 10 ML: at 21:22

## 2022-02-01 RX ADMIN — Medication: at 08:06

## 2022-02-01 RX ADMIN — CISATRACURIUM BESYLATE 3.5 MCG/KG/MIN: 200 INJECTION INTRAVENOUS at 08:10

## 2022-02-01 RX ADMIN — IPRATROPIUM BROMIDE AND ALBUTEROL SULFATE 1 AMPULE: .5; 2.5 SOLUTION RESPIRATORY (INHALATION) at 09:56

## 2022-02-01 RX ADMIN — NALOXEGOL OXALATE 12.5 MG: 12.5 TABLET, FILM COATED ORAL at 09:06

## 2022-02-01 RX ADMIN — MINERAL OIL AND WHITE PETROLATUM: 150; 830 OINTMENT OPHTHALMIC at 05:52

## 2022-02-01 RX ADMIN — MIDODRINE HYDROCHLORIDE 15 MG: 5 TABLET ORAL at 09:15

## 2022-02-01 RX ADMIN — PANTOPRAZOLE SODIUM 40 MG: 40 INJECTION, POWDER, FOR SOLUTION INTRAVENOUS at 09:16

## 2022-02-01 ASSESSMENT — PULMONARY FUNCTION TESTS
PIF_VALUE: 33
PIF_VALUE: 33
PIF_VALUE: 36
PIF_VALUE: 36
PIF_VALUE: 34
PIF_VALUE: 35
PIF_VALUE: 33
PIF_VALUE: 37
PIF_VALUE: 31
PIF_VALUE: 35
PIF_VALUE: 34
PIF_VALUE: 35
PIF_VALUE: 34
PIF_VALUE: 38
PIF_VALUE: 34
PIF_VALUE: 33
PIF_VALUE: 34
PIF_VALUE: 35
PIF_VALUE: 36
PIF_VALUE: 33
PIF_VALUE: 34

## 2022-02-01 ASSESSMENT — PAIN SCALES - GENERAL
PAINLEVEL_OUTOF10: 0

## 2022-02-01 NOTE — PROGRESS NOTES
Department of Internal Medicine  Nephrology  Progress Note      Events Reviewed. Intubated, prone and on pressors. SUBJECTIVE:  We are following Ms. Gloria Rodriguez for SAMUEL. Patient is intubated and prone. PHYSICAL EXAM:    Vitals:    VITALS:  BP (!) 112/46   Pulse 80   Temp 97.3 °F (36.3 °C) (Esophageal)   Resp 19   Ht 5' (1.524 m)   Wt 125 lb 14.4 oz (57.1 kg)   SpO2 97%   BMI 24.59 kg/m²   24HR INTAKE/OUTPUT:      Intake/Output Summary (Last 24 hours) at 2/1/2022 1130  Last data filed at 2/1/2022 0654  Gross per 24 hour   Intake 1983 ml   Output 3500 ml   Net -1517 ml     General appearance: Patient is intubated, sedated  HEENT: Normal cephalic, atraumatic without obvious deformity. Pupils equal, round, and reactive to light. Endotracheal tube in place  Neck: Supple, with full range of motion. No jugular venous distention. Trachea midline. Respiratory: Diminished bilaterally, intubated. Cardiovascular: RRR, no murmur noted  Abdomen: Soft, nontender, nondistended, flat  Musculoskeletal: No clubbing or cyanosis. No edema of bilateral lower extremities. Brisk capillary refill. Skin: Normal skin color.  No rashes. Scattered ecchymosis.   Neurologic: Patient sedated        Scheduled Meds:   potassium chloride  40 mEq IntraVENous Once    hydrocortisone sodium succinate PF  50 mg IntraVENous Q12H    acetaZOLAMIDE  250 mg IntraVENous Once    naloxegol  12.5 mg Oral QAM    polyethylene glycol  17 g Oral BID    sennosides  5 mL Oral BID    metoclopramide  5 mg IntraVENous Q6H    midodrine  15 mg Oral TID WC    insulin lispro  0-6 Units SubCUTAneous Q4H    pantoprazole  40 mg IntraVENous BID    And    sodium chloride (PF)  10 mL IntraVENous Daily    enoxaparin  30 mg SubCUTAneous BID    docusate sodium  100 mg Oral BID    miconazole nitrate   Topical BID    chlorhexidine  15 mL Mouth/Throat BID    artificial tears   Both Eyes Q4H    ipratropium-albuterol  1 ampule Inhalation Q4H WA    sodium chloride flush  5-40 mL IntraVENous 2 times per day    ascorbic acid  500 mg Oral Daily    zinc sulfate  50 mg Oral Daily    vitamin D  2,000 Units Oral Daily     Continuous Infusions:   cisatracurium (NIMBEX) infusion 3.5 mcg/kg/min (02/01/22 0810)    fentaNYL 5 mcg/ml in D5W 250 ml infusion 200 mcg/hr (02/01/22 0652)    norepinephrine Stopped (01/31/22 0908)    sodium chloride      midazolam 13 mg/hr (02/01/22 0448)    sodium chloride      dextrose       PRN Meds:.sodium chloride, sodium chloride flush, sodium chloride, acetaminophen **OR** acetaminophen, glucose, dextrose, glucagon (rDNA), dextrose    DATA:    CBC with Differential:    Lab Results   Component Value Date    WBC 13.7 02/01/2022    RBC 2.64 02/01/2022    HGB 7.9 02/01/2022    HCT 25.7 02/01/2022     02/01/2022    MCV 97.3 02/01/2022    MCH 29.9 02/01/2022    MCHC 30.7 02/01/2022    RDW 14.0 02/01/2022    METASPCT 0.9 01/18/2022    LYMPHOPCT 2.7 01/18/2022    MONOPCT 11.6 01/18/2022    MYELOPCT 0.9 01/14/2022    BASOPCT 0.1 01/18/2022    MONOSABS 1.38 01/18/2022    LYMPHSABS 0.35 01/18/2022    EOSABS 0.00 01/18/2022    BASOSABS 0.00 01/18/2022     CMP:    Lab Results   Component Value Date     02/01/2022    K 3.3 02/01/2022    K 3.8 01/18/2022    CL 93 02/01/2022    CO2 38 02/01/2022    BUN 24 02/01/2022    CREATININE 0.5 02/01/2022    GFRAA >60 02/01/2022    LABGLOM >60 02/01/2022    GLUCOSE 113 02/01/2022    PROT 5.6 01/29/2022    LABALBU 2.4 01/29/2022    CALCIUM 8.8 02/01/2022    BILITOT 0.2 01/29/2022    ALKPHOS 97 01/29/2022    AST 16 01/29/2022    ALT 16 01/29/2022     Magnesium:    Lab Results   Component Value Date    MG 2.0 02/01/2022     Phosphorus:    Lab Results   Component Value Date    PHOS 3.3 02/01/2022          Radiology Review:    Chest X-ray January 30, 2022   Stable to slightly improved aeration of the lungs bilaterally with persistent   patchy bilateral airspace opacities, in keeping with the patient's known   diagnosis of COVID pneumonia.             CXR 1/31/2022   1. There is no interval change in the multifocal bilateral pneumonia. 2. There is no pneumothorax or pneumomediastinum.                   CXR 2/1/2022   1. Extensive bilateral multifocal pneumonia.  No significant change. 2. Vertical linear shadow over the lateral aspect of the right chest, most   likely skin fold.             BRIEF SUMMARY OF INITIAL CONSULT:     Briefly, Ms. Kaleigh Benedict is a 78year old female with a past medical history of Asthma who presented to the ER on January 14 th, 2022 with complaints of SOB and diarrhea for one week. She is not vaccinated against COVID. Reportedly her daughter found her curled into a fetal position in respiratory distress at home and EMS was summoned. She was found to be COVID 19 positive. Labs were significant for bicarbonate 19, BUN 38, creatinine 1.9, anion gap 19, procalcitonin 1.57, CRP 12.8, , mildly elevated LFTs, D-dimer 404, ferritin 5098. Chest x-ray showed bilateral airspace disease. Renal is consulted for SAMUEL. Problems Resolved:     · SAMUEL, likely pre-renal volume responsive SAMUEL secondary to poor oral intake and GI losses (diarrhea). Creatinine 1.9mg/dL on admission. Renal function has improved to 0.6 mg/dL with IVF administration. · HAGMA with low bicarbonate levels, secondary to uremia. To continue bicarbonate drip  · hypernatremia, with water deficit, dehydration due to poor intake. Sodium levels quite improved. Resolved. · Hypophosphatemia, 2/2 poor intake, to replace  · Hypocalcemia, vitamin D 25 level 39, calcium levels 8.4- improved    IMPRESSION/RECOMMENDATIONS:        1. Respiratory acidosis 7.384, pCO2 61.5, bicarb 35.9 (contraction alkalosis from diuretic use)  2. Hypokalemia 2/2 renal wasting with diuretics  3. ----------------------------------------  4. COVID +, on hydrocortisone   5. Acute hypoxic respiratory failure, secondary to COVID pneumonia. Status post intubation  6. Hypotension, on solucortef 50 mg tid, levophed to be restarted as BP   7. Normocytic anemia, hemoglobin 7.5. monitoring. 8. Nutrition, receiving tube feeds at 10 mL an hour with free water flush 30 cc every 3 hours  9. Low grade temp--> pancultures sent. Resolved    Plan:    · Lasix 40 mg daily   · Continue midodrine to 10mg TID.    · Replace potassium with 40 mEq  · Continue solucortef to 50 mg iv BID for BP support, wean as she becomes more hemodynamically stable  · Continue to monitor phos and magnesium levels  · Obtain ionized calcium daily      Electronically signed by Fili Maharaj on 2/1/2022 at 11:30 AM  Agree as annotated   Discussed with MICU staff  Jefry Sena MD

## 2022-02-01 NOTE — PROGRESS NOTES
HOSPITALIST PROGRESS NOTE  Date: 2/1/2022   Name: Stanley Found   MRN: [id number]   YOB: 1955        Hospital Course:   Richar Mohr is U 15 y.o. year old female who has a PMH of asthma.   She presented to the ER on 1/14/22 with complaints of SOB and diarrhea x 1 week.  She was not vaccinated against COVID-19.  Reportedly her daughter found her curled into the fetal position in respiratory distress at home and called 911.     Subjective/Interval Hx:   Patient remains lethargic minimally side of bed awoken continue to monitor    Objective:   Physical Exam:   BP (!) 112/46   Pulse 82   Temp 97.3 °F (36.3 °C) (Esophageal)   Resp 21   Ht 5' (1.524 m)   Wt 125 lb 14.4 oz (57.1 kg)   SpO2 99%   BMI 24.59 kg/m²   General: no acute distress, well nourished and well hydrated  HEENT: NCAT  Heart: S1S2 RRR  Lungs: Clear to ascultation bilaterally, respiratory effort normal  Abdomen: soft, NT/ND, positive bowel sounds  Extremities: no pitting edema, nontender   Neuro: patient is awake, alert and orientated times 3, no gross deficits  Skin: no rashes or ecchymosis        Meds:   Meds:    hydrocortisone sodium succinate PF  50 mg IntraVENous Q12H    naloxegol  12.5 mg Oral QAM    polyethylene glycol  17 g Oral BID    sennosides  5 mL Oral BID    metoclopramide  5 mg IntraVENous Q6H    midodrine  15 mg Oral TID WC    insulin lispro  0-6 Units SubCUTAneous Q4H    pantoprazole  40 mg IntraVENous BID    And    sodium chloride (PF)  10 mL IntraVENous Daily    enoxaparin  30 mg SubCUTAneous BID    docusate sodium  100 mg Oral BID    miconazole nitrate   Topical BID    chlorhexidine  15 mL Mouth/Throat BID    artificial tears   Both Eyes Q4H    ipratropium-albuterol  1 ampule Inhalation Q4H WA    sodium chloride flush  5-40 mL IntraVENous 2 times per day    ascorbic acid  500 mg Oral Daily    zinc sulfate  50 mg Oral Daily    vitamin D  2,000 Units Oral Daily      Infusions:    cisatracurium (NIMBEX) infusion 3.5 mcg/kg/min (02/01/22 0810)    fentaNYL 5 mcg/ml in D5W 250 ml infusion 200 mcg/hr (02/01/22 4124)    norepinephrine Stopped (01/31/22 0908)    sodium chloride      midazolam 13 mg/hr (02/01/22 0448)    sodium chloride      dextrose       PRN Meds: sodium chloride, , PRN  sodium chloride flush, 5-40 mL, PRN  sodium chloride, 25 mL, PRN  acetaminophen, 650 mg, Q6H PRN   Or  acetaminophen, 650 mg, Q6H PRN  glucose, 15 g, PRN  dextrose, 12.5 g, PRN  glucagon (rDNA), 1 mg, PRN  dextrose, 100 mL/hr, PRN        Data/Labs:     Recent Labs     01/30/22  0433 01/31/22  0403 02/01/22  0427   WBC 14.5* 13.1* 13.7*   HGB 8.0* 7.5* 7.9*   HCT 25.4* 24.4* 25.7*    210 229      Recent Labs     01/31/22  0403 01/31/22  1800 02/01/22  0427    141 136   K 4.5 4.7 3.3*   CL 99 100 93*   CO2 32* 36* 38*   PHOS 2.6 2.5 3.3   BUN 23 25* 24*   CREATININE 0.5 0.5 0.5     Recent Labs     01/29/22  1757   AST 16   ALT 16   BILIDIR <0.2   BILITOT 0.2   ALKPHOS 97     No results for input(s): INR in the last 72 hours. No results for input(s): CKTOTAL, CKMB, CKMBINDEX, TROPONINT in the last 72 hours. I/O last 3 completed shifts: In: 3582.7 [I.V.:2715.7; NG/GT:867]  Out: 7373 [Urine:4261]    Intake/Output Summary (Last 24 hours) at 2/1/2022 1207  Last data filed at 2/1/2022 0654  Gross per 24 hour   Intake 1983 ml   Output 3500 ml   Net -1517 ml        Assessment/Plan:   1. Acute respiratory hypoxic failure due to Covid viral pneumonia-patient is unvaccinated with symptomatic x1 week prior to presentation, steroids, vitamins, pulmonary is following  2. Non-insulin-dependent diabetes mellitus-sliding scale, monitor blood glucose, diabetic diet  3. GERD-Protonix, Reglan  4. COPD-DuoNeb, monitor pulmonary toiletry      DVT Prophylaxis: Eliquis  Diet: Diet NPO  ADULT TUBE FEEDING; Nasogastric; Standard with Fiber; Continuous; 10; No; 30; Q 3 hours  Code Status: Full Code    Dispo:  When stable    Electronically signed by Andres Reyes MD on 2/1/2022 at 12:07 PM  [facility]

## 2022-02-01 NOTE — PROGRESS NOTES
Nichol Mauricio M.D.,[facility]  Indio Bashir D.O., F.A.C.OKelsyI., Fang Fountain M.D. Barbie Spivey M.D. Bernadette Barry D.O. Daily Pulmonary Progress Note    Patient:  Lilly Olivares 78 y.o. female MRN: [id number]     Date of Service: 2/1/2022      Synopsis     We are following patient for acute respiratory failure with hypoxia, severe COVID-19 pneumonia    \"CC\" altered mental state, shortness of breath    Code status: Full      Subjective      Patient was seen through window Case discussed with staff. Currently remains paralyzed, sedated, full vent support and prone positioning. FiO2 50% SPO2 99%, PF ratio 198. Low tidal volume 300 mL lung protective ventilation. Rate 20 PEEP +12.       Review of Systems:  Unable to obtain    24-hour events:  None    Objective   Vitals: BP (!) 112/46   Pulse 82   Temp 97.3 °F (36.3 °C) (Esophageal)   Resp 20   Ht 5' (1.524 m)   Wt 125 lb 14.4 oz (57.1 kg)   SpO2 99%   BMI 24.59 kg/m²     I/O:    Intake/Output Summary (Last 24 hours) at 2/1/2022 1408  Last data filed at 2/1/2022 0654  Gross per 24 hour   Intake 1308 ml   Output 3500 ml   Net -2192 ml       Vent Information  $Ventilation: $Subsequent Day  Skin Assessment: Clean, dry, & intact  Suction Catheter Diameter:  (16)  Equipment ID: [id number]  Equipment Changed: (S) Airway securing device  Vent Type: [id number]  Vent Mode: AC/VC  Vt Ordered: 300 mL  Rate Set: 20 bmp  Peak Flow: 65 L/min  Pressure Support: 0 cmH20  FiO2 : 50 %  SpO2: 99 %  SpO2/FiO2 ratio: 198  PaO2/FiO2 ratio: 196  Sensitivity: 3  PEEP/CPAP: 12  I Time/ I Time %: 0 s  Humidification Source: Heated wire  Humidification Temp: 37  Humidification Temp Measured: 37  Circuit Condensation: Drained  Mask Type: Full face mask  Mask Size: Medium       IPAP: 14 cmH20  CPAP/EPAP: 8 cmH2O     CURRENT MEDS :  Scheduled Meds:   hydrocortisone sodium succinate PF  50 mg IntraVENous Q12H    naloxegol  12.5 mg Oral QAM    polyethylene glycol  17 g Oral BID    sennosides  5 mL Oral BID    metoclopramide  5 mg IntraVENous Q6H    midodrine  15 mg Oral TID WC    insulin lispro  0-6 Units SubCUTAneous Q4H    pantoprazole  40 mg IntraVENous BID    And    sodium chloride (PF)  10 mL IntraVENous Daily    enoxaparin  30 mg SubCUTAneous BID    docusate sodium  100 mg Oral BID    miconazole nitrate   Topical BID    chlorhexidine  15 mL Mouth/Throat BID    artificial tears   Both Eyes Q4H    ipratropium-albuterol  1 ampule Inhalation Q4H WA    sodium chloride flush  5-40 mL IntraVENous 2 times per day    ascorbic acid  500 mg Oral Daily    zinc sulfate  50 mg Oral Daily    vitamin D  2,000 Units Oral Daily       Physical Exam:  Due to the current efforts to prevent transmission of COVID-19 and also the need to preserve PPE for other caregivers, a face-to-face encounter with the patient was not performed. That being said, all relevant records and diagnostic tests were reviewed, including laboratory results and imaging. Please reference any relevant documentation elsewhere. Care will be coordinated with the primary service. Pertinent/ New Labs and Imaging Studies     Imaging Personally Reviewed:  Chest x-ray 2/1/2022      FINDINGS:   ETT, NG tube and left internal jugular catheter again noted, not   significantly changed in position.  Extensive bilateral airspace opacity   again seen, consistent with pneumonia.  There is a vertical linear shadow   over the lateral aspect of the right hemithorax, most likely a skin fold. The heart and mediastinum are not significantly changed.           Impression   1. Extensive bilateral multifocal pneumonia.  No significant change.    2. Vertical linear shadow over the lateral aspect of the right chest, most   likely skin fold         ECHO  None    Labs:  Lab Results   Component Value Date    WBC 13.7 02/01/2022    HGB 7.9 02/01/2022    HCT 25.7 02/01/2022    MCV 97.3 02/01/2022    MCH 29.9 02/01/2022    MCHC 30.7 02/01/2022 RDW 14.0 02/01/2022     02/01/2022    MPV 11.7 02/01/2022     Lab Results   Component Value Date     02/01/2022    K 3.3 02/01/2022    K 3.8 01/18/2022    CL 93 02/01/2022    CO2 38 02/01/2022    BUN 24 02/01/2022    CREATININE 0.5 02/01/2022    LABALBU 2.4 01/29/2022    CALCIUM 8.8 02/01/2022    GFRAA >60 02/01/2022    LABGLOM >60 02/01/2022     Lab Results   Component Value Date    PROTIME 11.3 01/14/2022    INR 1.0 01/14/2022     No results for input(s): PROBNP in the last 72 hours. No results for input(s): PROCAL in the last 72 hours. This SmartLink has not been configured with any valid records. Micro:  No results for input(s): CULTRESP in the last 72 hours. No results for input(s): LABGRAM in the last 72 hours. No results for input(s): LEGUR in the last 72 hours. No results for input(s): STREPNEUMAGU in the last 72 hours. No results for input(s): LP1UAG in the last 72 hours. Assessment:    1. Acute respiratory failure with hypoxia  2. Severe COVID-19 pneumonia  3. Superimposed bacterial pneumonia  4. Septic shock, resolved  5. Asthma without acute exacerbation  6. Anemia   7. SAMUEL, resolved      Plan:   1. Full vent support with low tidal volume ventilation. Will likely need trach and PEG. Attempt supine as tolerated FiO2 weaned to 50% improving PF ratio 195, Pplat 31  2. Follow daily chest x-ray, ABGs  3. Taper Solu-Cortef per ICU team 50 mg IV every 12 hours  4. Diuresis with Lasix, nephrology following  5. Scheduled bronchodilators-DuoNebs every 4 hours while awake  6. Completed empiric antibiotics  7. DVT, GI, VAP prophylaxis  Remainder of care per ICU team appreciated  This plan of care was reviewed in collaboration with Dr. Betzy Jacobs  Electronically signed by MAUREEN Patel CNP on 2/1/2022 at 2:08 PM    I personally saw, examined, and cared for the patient. Labs, medications, radiographs reviewed. I agree with history exam and plans detailed in NP note.     Susanne Leah Srivastava MD

## 2022-02-01 NOTE — PROGRESS NOTES
[address] [facility]   Department of Internal Medicine   Internal Medicine Residency  MICU Progress Note    Patient:  Toyin Vargas 78 y.o. female   MRN: [id number]       Date of Service: 2022    Allergy: Patient has no known allergies. Subjective     Patient was seen and examined this morning at bedside in no acute distress. Patient is sedated, intubated and paralyzed. PF ratio improved, and CXR improved today. Patient is getting diuresis with lasix. Patient got supined today. Objective     TEMPERATURE:  Current - Temp: 96.3 °F (35.7 °C); Max - Temp  Av.5 °F (35.8 °C)  Min: 96.1 °F (35.6 °C)  Max: 97.3 °F (36.3 °C)  RESPIRATIONS RANGE: Resp  Av.2  Min: 18  Max: 25  PULSE RANGE: Pulse  Av.1  Min: 49  Max: 91  BLOOD PRESSURE RANGE:  Systolic (93NEG), TAT:123 , Min:112 , DMX:330   ; Diastolic (79MUX), OHX:53, Min:46, Max:60    PULSE OXIMETRY RANGE: SpO2  Av.9 %  Min: 93 %  Max: 99 %    I & O - 24hr:    Intake/Output Summary (Last 24 hours) at 2022  Last data filed at 2022 211  Gross per 24 hour   Intake 758 ml   Output 2315 ml   Net -1557 ml     I/O last 3 completed shifts: In:  [I.V.:1571; NG/GT:512]  Out: 428 [Urine:4286] I/O this shift:  In: -   Out: 500 [Urine:500]   Weight change: 1 lb 6.4 oz (0.635 kg)    Physical Exam  Constitutional:       Comments: Sedated and intubated   HENT:      Mouth/Throat:      Mouth: Mucous membranes are moist.   Cardiovascular:      Rate and Rhythm: Normal rate and regular rhythm. Pulses: Normal pulses. Heart sounds: Normal heart sounds. No murmur heard. No friction rub. No gallop. Pulmonary:      Effort: Pulmonary effort is normal. No respiratory distress. Breath sounds: Normal breath sounds. No stridor. No wheezing, rhonchi or rales. Chest:      Chest wall: No tenderness. Abdominal:      General: Abdomen is flat. Bowel sounds are normal.      Palpations: Abdomen is soft.    Musculoskeletal: General: No swelling. Skin:     General: Skin is warm. Capillary Refill: Capillary refill takes less than 2 seconds.           Diet:   Diet NPO  ADULT TUBE FEEDING; Nasogastric; Standard with Fiber; Continuous; 10; No; 30; Q 3 hours    FLOW([id number])@      Additional Respiratory  Assessments  Pulse: 83  Resp: 20  SpO2: 98 %  Position: Prone  Humidification Source: Heated wire  Humidification Temp: 37  Circuit Condensation: Drained  Oral Care: Mouthwash with chlorhexidine,Mouth swabbed,Mouth suctioned  Subglottic Suction Done?: No  Airway Type: ET  Airway Size: 8  Cuff Pressure (cm H2O): 29 cm H2O       [REMOVED] Urethral Catheter Double-lumen 16 fr-Output (mL): 130 mL  Urethral Catheter Double-lumen 16 fr-Output (mL): 90 mL  [REMOVED] External Urinary Catheter-Output (mL): 200 mL      Oxygen:     Vent Information  $Ventilation: $Subsequent Day  Skin Assessment: Clean, dry, & intact  Suction Catheter Diameter:  (16)  Equipment ID: [id number]  Equipment Changed: (S) Airway securing device  Vent Type: 980  Vent Mode: AC/VC  Vt Ordered: 300 mL  Rate Set: 20 bmp  Peak Flow: 65 L/min  Pressure Support: 0 cmH20  FiO2 : 50 %  SpO2: 98 %  SpO2/FiO2 ratio: 196  PaO2/FiO2 ratio: 196  Sensitivity: 3  PEEP/CPAP: 12  I Time/ I Time %: 0 s  Humidification Source: Heated wire  Humidification Temp: 37  Humidification Temp Measured: 37  Circuit Condensation: Drained  Mask Type: Full face mask  Mask Size: Medium  Additional Respiratory  Assessments  Pulse: 83  Resp: 20  SpO2: 98 %  Position: Prone  Humidification Source: Heated wire  Humidification Temp: 37  Circuit Condensation: Drained  Oral Care: Mouthwash with chlorhexidine,Mouth swabbed,Mouth suctioned  Subglottic Suction Done?: No  Airway Type: ET  Airway Size: 8  Cuff Pressure (cm H2O): 29 cm H2O       [REMOVED] Urethral Catheter Double-lumen 16 fr-Output (mL): 130 mL  Urethral Catheter Double-lumen 16 fr-Output (mL): 90 mL  [REMOVED] External Urinary Catheter-Output (mL): 200 mL    Medications     Continuous Infusions:   cisatracurium (NIMBEX) infusion 3.5 mcg/kg/min (02/01/22 0810)    fentaNYL 5 mcg/ml in D5W 250 ml infusion 175 mcg/hr (02/01/22 1539)    midazolam 10 mg/hr (02/01/22 1540)    sodium chloride      dextrose       Scheduled Meds:   hydrocortisone sodium succinate PF  50 mg IntraVENous Q12H    naloxegol  12.5 mg Oral QAM    polyethylene glycol  17 g Oral BID    sennosides  5 mL Oral BID    metoclopramide  5 mg IntraVENous Q6H    midodrine  15 mg Oral TID WC    insulin lispro  0-6 Units SubCUTAneous Q4H    pantoprazole  40 mg IntraVENous BID    And    sodium chloride (PF)  10 mL IntraVENous Daily    enoxaparin  30 mg SubCUTAneous BID    docusate sodium  100 mg Oral BID    miconazole nitrate   Topical BID    chlorhexidine  15 mL Mouth/Throat BID    artificial tears   Both Eyes Q4H    ipratropium-albuterol  1 ampule Inhalation Q4H WA    sodium chloride flush  5-40 mL IntraVENous 2 times per day    ascorbic acid  500 mg Oral Daily    zinc sulfate  50 mg Oral Daily    vitamin D  2,000 Units Oral Daily     PRN Meds: sodium chloride flush, sodium chloride, acetaminophen **OR** acetaminophen, glucose, dextrose, glucagon (rDNA), dextrose  Nutrition:   NG/OG tube TF type: Pulmocare/Nephro/Glucerna/Jevity        At rate: 10 ml/h    Labs and Imaging Studies     ve  CBC:   Recent Labs     01/30/22  0433 01/31/22  0403 02/01/22  0427   WBC 14.5* 13.1* 13.7*   HGB 8.0* 7.5* 7.9*   HCT 25.4* 24.4* 25.7*   MCV 97.7 97.6 97.3    210 229       BMP:    Recent Labs     01/31/22  1800 02/01/22  0427 02/01/22  1855    136 137   K 4.7 3.3* 3.5    93* 94*   CO2 36* 38* 35*   BUN 25* 24* 28*   CREATININE 0.5 0.5 0.5   GLUCOSE 137* 113* 115*       LIVER PROFILE:   No results for input(s): AST, ALT, BILIDIR, BILITOT, ALKPHOS in the last 72 hours. Invalid input(s):  ALB      PT/INR:   No results for input(s): PROTIME, INR in the last 72 hours.     APTT:   No results for input(s): APTT in the last 72 hours. Fasting Lipid Panel:    Lab Results   Component Value Date    CHOL 145 06/29/2020    TRIG 124 01/25/2022    HDL 73 06/29/2020       Cardiac Enzymes:    No results found for: CKTOTAL, CKMB, CKMBINDEX, TROPONINI    ABGs:   Lab Results   Component Value Date    PO2ART 138.4 01/25/2022    KVR2QXB 54.3 01/25/2022       Imaging Studies:     XR ABDOMEN (KUB) (SINGLE AP VIEW)    Result Date: 1/27/2022  EXAMINATION: ONE SUPINE XRAY VIEW(S) OF THE ABDOMEN 1/27/2022 9:07 am COMPARISON: 19 January 2022 HISTORY: ORDERING SYSTEM PROVIDED HISTORY: r/o obstruction or ileus TECHNOLOGIST PROVIDED HISTORY: Reason for exam:->r/o obstruction or ileus What reading provider will be dictating this exam?->CRC FINDINGS: NG tube tip is in the gastric lumen. There is no free air, bowel wall pneumatosis or dilated bowel. No abnormal calcifications are present. .     No active process. NG tube in the gastric lumen as before. XR CHEST PORTABLE    Result Date: 1/27/2022  EXAMINATION: ONE XRAY VIEW OF THE CHEST 1/27/2022 7:53 am COMPARISON: Chest x-ray 01/26/2022 HISTORY: ORDERING SYSTEM PROVIDED HISTORY: COVID ICU TECHNOLOGIST PROVIDED HISTORY: Reason for exam:->COVID ICU What reading provider will be dictating this exam?->CRC FINDINGS: Cardiac size enlarged. Bilateral pulmonary opacifications right greater than left have increased in the interim midlung involvement of worsening airspace disease. No pneumothorax or pleural effusion     Progression of bilateral airspace disease remaining midlung predominant in the periphery of airspace disease bronchopneumonia with increased from prior     XR CHEST PORTABLE    Result Date: 1/26/2022  EXAMINATION: ONE XRAY VIEW OF THE CHEST 1/26/2022 8:56 am COMPARISON: The previous study performed 01/25/2022.  HISTORY: ORDERING SYSTEM PROVIDED HISTORY: COVID ICU TECHNOLOGIST PROVIDED HISTORY: Reason for exam:->COVID ICU What reading provider will be dictating this exam?->CRC FINDINGS: There has been a mild decrease in the diffuse left lung infiltrates. The right lung infiltrates are without significant interval change. No active pleural disease is seen. The cardiac silhouette and mediastinal structures are without significant interval change. An endotracheal tube is again noted, with the tip 3.0 cm above the alberta. An NG tube is again noted entering the stomach. The tip is cut off. A left-sided IJ line is again present, with tip in the SVC. Mild decrease in the diffuse left lung infiltrates, when compared to the previous study performed 1 day earlier. Diffuse right lung infiltrates without significant interval change. XR CHEST PORTABLE    Result Date: 1/25/2022  EXAMINATION: ONE XRAY VIEW OF THE CHEST 1/25/2022 4:58 pm COMPARISON: 7:55 a.m. HISTORY: ORDERING SYSTEM PROVIDED HISTORY: L IJ CVC placement TECHNOLOGIST PROVIDED HISTORY: Reason for exam:->L IJ CVC placement What reading provider will be dictating this exam?->CRC FINDINGS: Stable bilateral pulmonary infiltrates. There is no effusion or pneumothorax. The cardiomediastinal silhouette is without acute process. The osseous structures are without acute process. Left internal jugular line tip in the distal SVC. The remainder of the lines and tubes are stable in position. Left internal jugular line tip in the distal SVC. No pneumothorax. The remainder of the exam is essentially stable. .     XR CHEST PORTABLE    Result Date: 1/25/2022  EXAMINATION: ONE XRAY VIEW OF THE CHEST 1/25/2022 7:41 am COMPARISON: 24 January 2022 HISTORY: ORDERING SYSTEM PROVIDED HISTORY: COVID ICU TECHNOLOGIST PROVIDED HISTORY: Reason for exam:->COVID ICU What reading provider will be dictating this exam?->CRC FINDINGS: Bilateral airspace disease is not appreciably changed allowing for some differences in positioning. Stable support lines. No new findings. Stable airspace disease. See above.      XR CHEST PORTABLE    Result Date: 1/24/2022  EXAMINATION: ONE XRAY VIEW OF THE CHEST 1/24/2022 7:11 am COMPARISON: 23 January 2022 HISTORY: ORDERING SYSTEM PROVIDED HISTORY: COVID ICU TECHNOLOGIST PROVIDED HISTORY: Reason for exam:->COVID ICU What reading provider will be dictating this exam?->CRC FINDINGS: Chest is notably rotated to the left. Support lines are stable. Bilateral airspace disease appears mildly progressive. Bilateral airspace disease which appears mildly progressive. XR CHEST PORTABLE    Result Date: 1/23/2022  EXAMINATION: ONE XRAY VIEW OF THE CHEST 1/23/2022 8:17 am COMPARISON: Multiple prior studies, the most recent dated January 22, 2022 HISTORY: [address] [facility] [address]: COVID ICU TECHNOLOGIST PROVIDED HISTORY: Reason for exam:->COVID ICU What reading provider will be dictating this exam?->CRC FINDINGS: Frontal view of the chest.  Lines and tubes are unchanged in position. Stable cardiomediastinal silhouette. Diffuse bilateral opacities are not significantly changed given differences in patient positioning. No pneumothorax seen. There are degenerative changes in the spine. 1.  Lines and tubes are unchanged in position. 2.  Diffuse bilateral opacities are not significantly changed. Differential includes infection, pulmonary edema, atelectasis, ARDS, and/or layering pleural effusions. XR CHEST PORTABLE    Result Date: 1/22/2022  EXAMINATION: ONE XRAY VIEW OF THE CHEST 1/22/2022 8:28 am COMPARISON: 01/21/2022 HISTORY: ORDERING SYSTEM PROVIDED HISTORY: COVID ICU TECHNOLOGIST PROVIDED HISTORY: Reason for exam:->COVID ICU What reading provider will be dictating this exam?->CRC FINDINGS: Heart is normal in size. Moderate bilateral interstitial and alveolar opacities with central air bronchograms have not significantly improved. No pneumothorax detected. The gastric catheter passes into the stomach. Endotracheal tube tip is 4 cm above the alberta.   Right IJ central venous catheter in good position. Osseous structures are stable. 1.  No significant change in bilateral multifocal pneumonia. Support tubes and catheters are stable. XR CHEST PORTABLE    Result Date: 1/21/2022  EXAMINATION: ONE XRAY VIEW OF THE CHEST 1/21/2022 8:33 am COMPARISON: None. HISTORY: ORDERING SYSTEM PROVIDED HISTORY: COVID ICU TECHNOLOGIST PROVIDED HISTORY: Reason for exam:->COVID ICU What reading provider will be dictating this exam?->CRC FINDINGS: There is stable position of support lines and tubes. There is no pneumothorax or pneumomediastinum. Extensive multifocal bilateral pneumonia is noted unchanged when compared to the prior study. 1. There is no interval change in extensive multifocal bilateral pneumonia. XR CHEST PORTABLE    Result Date: 1/20/2022  EXAMINATION: ONE XRAY VIEW OF THE CHEST 1/20/2022 8:06 am COMPARISON: January 19, 2022 HISTORY: ORDERING SYSTEM PROVIDED HISTORY: COVID ICU TECHNOLOGIST PROVIDED HISTORY: Reason for exam:->COVID ICU What reading provider will be dictating this exam?->CRC FINDINGS: There is no evidence of pneumothorax or effusion. There is continued bilateral diffuse pulmonary infiltrates with mildly increased opacity in the bilateral bases when compared to previous. ET tube is in good position with tip above the alberta. NG tube courses below the diaphragm. Stable appearance of right IJ central venous catheter projecting over the SVC. Visualized bony thorax shows no acute abnormality. Slightly worsened bilateral pulmonary infiltrates with increasing opacity in the bilateral bases when compared to previous. Stable lines and tubes.      XR CHEST PORTABLE    Result Date: 1/19/2022  EXAMINATION: ONE XRAY VIEW OF THE CHEST PORTABLE UPRIGHT 1/19/2022 12:38 pm COMPARISON: 0238 hours HISTORY: ORDERING SYSTEM PROVIDED HISTORY: central line placement TECHNOLOGIST PROVIDED HISTORY: Reason for exam:->central line placement What reading provider will be dictating this exam?->CRC FINDINGS: Medical devices: Right IJ central venous line tip at the cavoatrial junction. No pneumothorax. The endotracheal and enteric tubes remain in satisfactory position. Redemonstration of bilateral widespread pulmonary infiltrates. Right lung infiltrate shows no significant change. Left perihilar and left upper lobe infiltrate shows mild progression and is more confluent. Normal heart size. No significant pleural effusion. Atherosclerotic thoracic aorta. 1.  Good position right IJ central venous line. Negative for pneumothorax. 2.  Bilateral widespread pulmonary infiltrates with interval mild worsening on the left and compatible with bilateral pneumonia. XR CHEST PORTABLE    Result Date: 1/19/2022  EXAMINATION: ONE XRAY VIEW OF THE CHEST 1/19/2022 3:01 am COMPARISON: 01/16/2022 HISTORY: ORDERING SYSTEM PROVIDED HISTORY: ETT placement TECHNOLOGIST PROVIDED HISTORY: Reason for exam:->ETT placement What reading provider will be dictating this exam?->CRC FINDINGS: Endotracheal tube tip is 3.3 cm above alberta. Nasogastric tube extends into the stomach. There are diffuse infiltrates which are more notable in the lower lungs. When accounting for technical differences, these infiltrates have not changed significantly. There is no pleural effusion or pneumothorax seen. There is no cardiomegaly. Endotracheal tube tip is 3.3 cm above the alberta. Diffuse pulmonary infiltrates are unchanged. XR CHEST PORTABLE    Result Date: 1/16/2022  EXAMINATION: ONE XRAY VIEW OF THE CHEST 1/16/2022 10:59 am COMPARISON: 01/14/2022 HISTORY: ORDERING SYSTEM PROVIDED HISTORY: hypoxia TECHNOLOGIST PROVIDED HISTORY: Reason for exam:->hypoxia What reading provider will be dictating this exam?->CRC FINDINGS: The cardiac silhouette is within normals. Extensive multifocal bilateral pneumonia is noted unchanged when compared to prior study. There is no pneumothorax or pneumomediastinum.  There is no pleural effusion. 1. Multifocal bilateral pneumonia unchanged when compared with the prior study. XR CHEST PORTABLE    Result Date: 1/14/2022  EXAMINATION: ONE XRAY VIEW OF THE CHEST 1/14/2022 4:37 pm COMPARISON: None. HISTORY: ORDERING SYSTEM PROVIDED HISTORY: hypoxia TECHNOLOGIST PROVIDED HISTORY: Reason for exam:->hypoxia What reading provider will be dictating this exam?->CRC FINDINGS: There is extensive bilateral airspace disease bilaterally. The findings are most compatible with pneumonia. Heart size is borderline. Pulmonary vascularity is not clearly congested. Bilateral airspace disease likely related to pneumonia. XR ABDOMEN FOR NG/OG/NE TUBE PLACEMENT    Result Date: 1/19/2022  EXAMINATION: ONE SUPINE XRAY VIEW(S) OF THE ABDOMEN 1/19/2022 3:01 am COMPARISON: None. HISTORY: ORDERING SYSTEM PROVIDED HISTORY: Confirmation of course of NG/OG/NE tube and location of tip of tube TECHNOLOGIST PROVIDED HISTORY: Reason for exam:->Confirmation of course of NG/OG/NE tube and location of tip of tube Portable? ->Yes What reading provider will be dictating this exam?->CRC FINDINGS: The nasogastric tube terminates in the stomach. There are infiltrates in visualized lungs. Visualized gas pattern is nonspecific demonstrating a paucity of bowel gas. Nasogastric tube terminates in the stomach.        Notable Cultures:      Blood cultures   Blood Culture, Routine   Date Value Ref Range Status   01/29/2022 24 Hours no growth  Preliminary       Respiratory cultures No results found for: RESPCULTURE No results found for: LABGRAM  Legionella No results found for: LABLEGI    Urine cultures   Urine Culture, Routine   Date Value Ref Range Status   01/29/2022 Growth not present  Final         Resident's Assessment and Plan       78year old female with a past medical history of Asthma who presented to the ER on January 14 th, 2022 with complaints of SOB and diarrhea for one week    She is currently being  managed for Acute hypoxic respiratory failure 2/2 Covid PNA  · Patient is supine  · Day 13 of intubation  · Sedated with fentanyl and Versed, on paralysis  · Completed Zosyn for 7 days  · On hydrocortisone  · Leukocytosis, afebrile  · ABG showed metabolic alkalosis, patient receiving Lasix 40 mg twice daily  · Patient received Diamox yesterday  · Chest x-ray showed bilateral airspace disease  · Patient is 7.4 L positive since admission  · Continue Solu-Cortef for today  · Multiple episodes of ventricular dyssynchrony  · Elevated bicarb, continue bicarb. Plan  · Continue respiratory status monitoring  · Patient got supined today, follow ABG  · Wean off paralytic  · Wean off ventilation as per patient tolerates  · Decrease Solu-Cortef 50 mg daily, plan to wean off tomorrow.   · Continue vitamin cocktail  · Continue diuresis with Lasix  · Continue Lovenox for DVT prophylaxis  · Keep supine and follow ABG      New onset of fever, tachycardia 2/2 infection (resolved)  · Infectious work-up negative  · White count elevated 14.5  · D-dimer was elevated 3000  · Duplex ultrasound of bilateral upper showed cephalic thrombus and lower extremity  negative  Plan   · Started lovenox 30       Septic shock 2/2 COVID-19 PNA with superimposed bacterial PNA  · For COVID-19 management as above  · Patient was requiring Levophed, currently off Levophed  · Blood pressure has been stable  Plan  · Monitor blood pressure  · Monitor blood pressure, start pressors if necessary  · On midodrine      History of asthma  · Currently intubated and on breathing treatment      Hyperglycemia 2/2 steroids (resolved)  · Blood glucose has been low side, 154 this AM  · Resume tube feed yesterday, possible concern for refeeding syndrome  · Changed to low-dose sliding scale and Lantus of 5  Plan  · Monitor blood glucose level every 4 hours  · And adjust insulin dose according to blood glucose level      Acute normocytic normochromic anemia 2/2 ACD versus sepsis  · Hemoglobin was 6.9 received 1 unit of PRBC (3 days ago)  · Hemoglobin this morning was 7.5 (stable since 3 days)  Plan  · Monitor CBC daily  · Transfuse PRBC if hemoglobin is less than 7      Constipation 2/2 fentanyl (resolved)  · Patient is on bowel regiment  · X-ray KUB negative for acute obstruction  Plan  · Monitor bowel movement and continue bowel regimen  · Will receive naloxone today      Refeeding syndrome (resumed)  · Patient had hypokalemia, hypomagnesemia, hypophosphatemia  Plan  · Decrease tube feed to 10 mL/h  · Monitor electrolytes and replace accordingly      Resolved issue  SAMUEL        Code Status: DNR-CCA  PT/OT evaluation:  Disposition: home +/- home health / Linzie Backers / Evan Ramírez / Teresa Silva MD, PGY-1  Attending physician: Dr. Linder [facility]  Department of Pulmonary, Critical Care and Sleep Medicine  [address] [facility]  Department of Internal Medicine      During multidisciplinary team rounds Alaina Hoffman is a 78 y.o. female was seen, examined and discussed. This is confirmation that I have personally seen and examined the patient and that the key elements of the encounter were performed by me (> 85 % time). The medications & laboratory data was discussed and adjusted where necessary. The radiographic images were reviewed or with radiologist or consultant if felt dis-concordant with the exam or history. The above findings were corroborated, plans confirmed and changes made if needed. Family is updated at the bedside as available. Key issues of the case were discussed among consultants. Critical Care time is documented if appropriate.       Namita Sharma, DO, FACP, FCCP, Zamzam batista,

## 2022-02-02 ENCOUNTER — APPOINTMENT (OUTPATIENT)
Dept: GENERAL RADIOLOGY | Age: 67
DRG: 207 | End: 2022-02-02
Payer: MEDICARE

## 2022-02-02 LAB
AADO2: 167.2 MMHG
AADO2: 235.1 MMHG
ANION GAP SERPL CALCULATED.3IONS-SCNC: 7 MMOL/L (ref 7–16)
ANION GAP SERPL CALCULATED.3IONS-SCNC: 7 MMOL/L (ref 7–16)
ANION GAP SERPL CALCULATED.3IONS-SCNC: 9 MMOL/L (ref 7–16)
B.E.: 7.8 MMOL/L (ref -3–3)
B.E.: 9.1 MMOL/L (ref -3–3)
BUN BLDV-MCNC: 23 MG/DL (ref 6–23)
BUN BLDV-MCNC: 23 MG/DL (ref 6–23)
BUN BLDV-MCNC: 24 MG/DL (ref 6–23)
CALCIUM IONIZED: 1.25 MMOL/L (ref 1.15–1.33)
CALCIUM SERPL-MCNC: 8.7 MG/DL (ref 8.6–10.2)
CALCIUM SERPL-MCNC: 8.8 MG/DL (ref 8.6–10.2)
CALCIUM SERPL-MCNC: 8.9 MG/DL (ref 8.6–10.2)
CHLORIDE BLD-SCNC: 101 MMOL/L (ref 98–107)
CHLORIDE BLD-SCNC: 96 MMOL/L (ref 98–107)
CHLORIDE BLD-SCNC: 99 MMOL/L (ref 98–107)
CO2: 34 MMOL/L (ref 22–29)
CO2: 35 MMOL/L (ref 22–29)
CO2: 37 MMOL/L (ref 22–29)
COHB: 0 % (ref 0–1.5)
COHB: 0.5 % (ref 0–1.5)
CREAT SERPL-MCNC: 0.5 MG/DL (ref 0.5–1)
CRITICAL: ABNORMAL
CRITICAL: ABNORMAL
DATE ANALYZED: ABNORMAL
DATE ANALYZED: ABNORMAL
DATE OF COLLECTION: ABNORMAL
DATE OF COLLECTION: ABNORMAL
FERRITIN: 559 NG/ML
FIO2: 45 %
FIO2: 55 %
GFR AFRICAN AMERICAN: >60
GFR NON-AFRICAN AMERICAN: >60 ML/MIN/1.73
GLUCOSE BLD-MCNC: 115 MG/DL (ref 74–99)
GLUCOSE BLD-MCNC: 126 MG/DL (ref 74–99)
GLUCOSE BLD-MCNC: 137 MG/DL (ref 74–99)
HCO3: 33.6 MMOL/L (ref 22–26)
HCO3: 33.9 MMOL/L (ref 22–26)
HCT VFR BLD CALC: 26 % (ref 34–48)
HEMOGLOBIN: 8.1 G/DL (ref 11.5–15.5)
HHB: 3 % (ref 0–5)
HHB: 5 % (ref 0–5)
LAB: ABNORMAL
MAGNESIUM: 2 MG/DL (ref 1.6–2.6)
MAGNESIUM: 2.1 MG/DL (ref 1.6–2.6)
MCH RBC QN AUTO: 30.5 PG (ref 26–35)
MCHC RBC AUTO-ENTMCNC: 31.2 % (ref 32–34.5)
MCV RBC AUTO: 97.7 FL (ref 80–99.9)
METER GLUCOSE: 102 MG/DL (ref 74–99)
METER GLUCOSE: 104 MG/DL (ref 74–99)
METER GLUCOSE: 116 MG/DL (ref 74–99)
METER GLUCOSE: 120 MG/DL (ref 74–99)
METER GLUCOSE: 123 MG/DL (ref 74–99)
METER GLUCOSE: 135 MG/DL (ref 74–99)
METHB: 0.4 % (ref 0–1.5)
METHB: 0.5 % (ref 0–1.5)
MODE: AC
MODE: AC
O2 CONTENT: 11.8 ML/DL
O2 CONTENT: 12.7 ML/DL
O2 SATURATION: 95 % (ref 92–98.5)
O2 SATURATION: 97 % (ref 92–98.5)
O2HB: 94.6 % (ref 94–97)
O2HB: 96 % (ref 94–97)
OPERATOR ID: 1893
OPERATOR ID: ABNORMAL
PATIENT TEMP: 37 C
PATIENT TEMP: 37 C
PCO2: 46.7 MMHG (ref 35–45)
PCO2: 57.1 MMHG (ref 35–45)
PDW BLD-RTO: 13.8 FL (ref 11.5–15)
PEEP/CPAP: 10 CMH2O
PEEP/CPAP: 12 CMH2O
PFO2: 1.66 MMHG/%
PFO2: 1.72 MMHG/%
PH BLOOD GAS: 7.39 (ref 7.35–7.45)
PH BLOOD GAS: 7.47 (ref 7.35–7.45)
PHOSPHORUS: 2.4 MG/DL (ref 2.5–4.5)
PHOSPHORUS: 2.8 MG/DL (ref 2.5–4.5)
PLATELET # BLD: 239 E9/L (ref 130–450)
PMV BLD AUTO: 11.9 FL (ref 7–12)
PO2: 77.5 MMHG (ref 75–100)
PO2: 91.4 MMHG (ref 75–100)
POTASSIUM SERPL-SCNC: 2.8 MMOL/L (ref 3.5–5)
POTASSIUM SERPL-SCNC: 3 MMOL/L (ref 3.5–5)
POTASSIUM SERPL-SCNC: 3.6 MMOL/L (ref 3.5–5)
RBC # BLD: 2.66 E12/L (ref 3.5–5.5)
RI(T): 2.16
RI(T): 2.57
RR MECHANICAL: 20 B/MIN
RR MECHANICAL: 20 B/MIN
SODIUM BLD-SCNC: 137 MMOL/L (ref 132–146)
SODIUM BLD-SCNC: 143 MMOL/L (ref 132–146)
SODIUM BLD-SCNC: 145 MMOL/L (ref 132–146)
SOURCE, BLOOD GAS: ABNORMAL
SOURCE, BLOOD GAS: ABNORMAL
THB: 8.6 G/DL (ref 11.5–16.5)
THB: 9.5 G/DL (ref 11.5–16.5)
TIME ANALYZED: 1754
TIME ANALYZED: 529
VT MECHANICAL: 300 ML
VT MECHANICAL: 300 ML
WBC # BLD: 13.6 E9/L (ref 4.5–11.5)

## 2022-02-02 PROCEDURE — 6360000002 HC RX W HCPCS: Performed by: INTERNAL MEDICINE

## 2022-02-02 PROCEDURE — 2580000003 HC RX 258: Performed by: INTERNAL MEDICINE

## 2022-02-02 PROCEDURE — 83735 ASSAY OF MAGNESIUM: CPT

## 2022-02-02 PROCEDURE — 6370000000 HC RX 637 (ALT 250 FOR IP): Performed by: FAMILY MEDICINE

## 2022-02-02 PROCEDURE — 6370000000 HC RX 637 (ALT 250 FOR IP)

## 2022-02-02 PROCEDURE — 6370000000 HC RX 637 (ALT 250 FOR IP): Performed by: INTERNAL MEDICINE

## 2022-02-02 PROCEDURE — 82805 BLOOD GASES W/O2 SATURATION: CPT

## 2022-02-02 PROCEDURE — A4216 STERILE WATER/SALINE, 10 ML: HCPCS | Performed by: INTERNAL MEDICINE

## 2022-02-02 PROCEDURE — 2500000003 HC RX 250 WO HCPCS: Performed by: INTERNAL MEDICINE

## 2022-02-02 PROCEDURE — 36415 COLL VENOUS BLD VENIPUNCTURE: CPT

## 2022-02-02 PROCEDURE — 80048 BASIC METABOLIC PNL TOTAL CA: CPT

## 2022-02-02 PROCEDURE — 94003 VENT MGMT INPAT SUBQ DAY: CPT

## 2022-02-02 PROCEDURE — 05HM33Z INSERTION OF INFUSION DEVICE INTO RIGHT INTERNAL JUGULAR VEIN, PERCUTANEOUS APPROACH: ICD-10-PCS | Performed by: INTERNAL MEDICINE

## 2022-02-02 PROCEDURE — 85027 COMPLETE CBC AUTOMATED: CPT

## 2022-02-02 PROCEDURE — C9113 INJ PANTOPRAZOLE SODIUM, VIA: HCPCS | Performed by: INTERNAL MEDICINE

## 2022-02-02 PROCEDURE — 82962 GLUCOSE BLOOD TEST: CPT

## 2022-02-02 PROCEDURE — 82330 ASSAY OF CALCIUM: CPT

## 2022-02-02 PROCEDURE — 6360000002 HC RX W HCPCS: Performed by: STUDENT IN AN ORGANIZED HEALTH CARE EDUCATION/TRAINING PROGRAM

## 2022-02-02 PROCEDURE — 2000000000 HC ICU R&B

## 2022-02-02 PROCEDURE — 6370000000 HC RX 637 (ALT 250 FOR IP): Performed by: NURSE PRACTITIONER

## 2022-02-02 PROCEDURE — 36556 INSERT NON-TUNNEL CV CATH: CPT

## 2022-02-02 PROCEDURE — 71045 X-RAY EXAM CHEST 1 VIEW: CPT

## 2022-02-02 PROCEDURE — 82728 ASSAY OF FERRITIN: CPT

## 2022-02-02 PROCEDURE — 2580000003 HC RX 258: Performed by: FAMILY MEDICINE

## 2022-02-02 PROCEDURE — 36556 INSERT NON-TUNNEL CV CATH: CPT | Performed by: INTERNAL MEDICINE

## 2022-02-02 PROCEDURE — 76937 US GUIDE VASCULAR ACCESS: CPT | Performed by: INTERNAL MEDICINE

## 2022-02-02 PROCEDURE — 84100 ASSAY OF PHOSPHORUS: CPT

## 2022-02-02 PROCEDURE — 94640 AIRWAY INHALATION TREATMENT: CPT

## 2022-02-02 RX ORDER — LANSOPRAZOLE
30 KIT
Status: DISCONTINUED | OUTPATIENT
Start: 2022-02-03 | End: 2022-02-11 | Stop reason: HOSPADM

## 2022-02-02 RX ORDER — ACETAZOLAMIDE 500 MG/5ML
250 INJECTION, POWDER, LYOPHILIZED, FOR SOLUTION INTRAVENOUS ONCE
Status: COMPLETED | OUTPATIENT
Start: 2022-02-02 | End: 2022-02-02

## 2022-02-02 RX ORDER — FUROSEMIDE 10 MG/ML
40 INJECTION INTRAMUSCULAR; INTRAVENOUS 2 TIMES DAILY
Status: COMPLETED | OUTPATIENT
Start: 2022-02-02 | End: 2022-02-02

## 2022-02-02 RX ORDER — MIDODRINE HYDROCHLORIDE 5 MG/1
5 TABLET ORAL
Status: DISCONTINUED | OUTPATIENT
Start: 2022-02-02 | End: 2022-02-06

## 2022-02-02 RX ORDER — MIDAZOLAM HYDROCHLORIDE 2 MG/2ML
2 INJECTION, SOLUTION INTRAMUSCULAR; INTRAVENOUS ONCE
Status: COMPLETED | OUTPATIENT
Start: 2022-02-02 | End: 2022-02-02

## 2022-02-02 RX ORDER — POTASSIUM CHLORIDE 29.8 MG/ML
20 INJECTION INTRAVENOUS
Status: DISPENSED | OUTPATIENT
Start: 2022-02-02 | End: 2022-02-02

## 2022-02-02 RX ORDER — POTASSIUM CHLORIDE 29.8 MG/ML
40 INJECTION INTRAVENOUS EVERY 4 HOURS
Status: COMPLETED | OUTPATIENT
Start: 2022-02-02 | End: 2022-02-03

## 2022-02-02 RX ORDER — FENTANYL CITRATE 50 UG/ML
50 INJECTION, SOLUTION INTRAMUSCULAR; INTRAVENOUS
Status: DISCONTINUED | OUTPATIENT
Start: 2022-02-02 | End: 2022-02-11

## 2022-02-02 RX ORDER — MIDAZOLAM HYDROCHLORIDE 2 MG/2ML
2 INJECTION, SOLUTION INTRAMUSCULAR; INTRAVENOUS
Status: DISCONTINUED | OUTPATIENT
Start: 2022-02-02 | End: 2022-02-05

## 2022-02-02 RX ADMIN — IPRATROPIUM BROMIDE AND ALBUTEROL SULFATE 1 AMPULE: .5; 2.5 SOLUTION RESPIRATORY (INHALATION) at 20:21

## 2022-02-02 RX ADMIN — IPRATROPIUM BROMIDE AND ALBUTEROL SULFATE 1 AMPULE: .5; 2.5 SOLUTION RESPIRATORY (INHALATION) at 10:38

## 2022-02-02 RX ADMIN — MINERAL OIL AND WHITE PETROLATUM: 150; 830 OINTMENT OPHTHALMIC at 22:30

## 2022-02-02 RX ADMIN — MIDODRINE HYDROCHLORIDE 5 MG: 5 TABLET ORAL at 17:44

## 2022-02-02 RX ADMIN — ACETAZOLAMIDE 250 MG: 500 INJECTION, POWDER, LYOPHILIZED, FOR SOLUTION INTRAVENOUS at 13:31

## 2022-02-02 RX ADMIN — METOCLOPRAMIDE HYDROCHLORIDE 5 MG: 5 INJECTION INTRAMUSCULAR; INTRAVENOUS at 09:25

## 2022-02-02 RX ADMIN — POLYETHYLENE GLYCOL 3350 17 G: 17 POWDER, FOR SOLUTION ORAL at 09:24

## 2022-02-02 RX ADMIN — MINERAL OIL AND WHITE PETROLATUM: 150; 830 OINTMENT OPHTHALMIC at 18:40

## 2022-02-02 RX ADMIN — POLYETHYLENE GLYCOL 3350 17 G: 17 POWDER, FOR SOLUTION ORAL at 20:17

## 2022-02-02 RX ADMIN — ENOXAPARIN SODIUM 30 MG: 100 INJECTION SUBCUTANEOUS at 20:17

## 2022-02-02 RX ADMIN — CHLORHEXIDINE GLUCONATE 0.12% ORAL RINSE 15 ML: 1.2 LIQUID ORAL at 20:17

## 2022-02-02 RX ADMIN — POTASSIUM CHLORIDE 20 MEQ: 29.8 INJECTION, SOLUTION INTRAVENOUS at 09:17

## 2022-02-02 RX ADMIN — CHLORHEXIDINE GLUCONATE 0.12% ORAL RINSE 15 ML: 1.2 LIQUID ORAL at 09:20

## 2022-02-02 RX ADMIN — METOCLOPRAMIDE HYDROCHLORIDE 5 MG: 5 INJECTION INTRAMUSCULAR; INTRAVENOUS at 22:30

## 2022-02-02 RX ADMIN — METOCLOPRAMIDE HYDROCHLORIDE 5 MG: 5 INJECTION INTRAMUSCULAR; INTRAVENOUS at 06:27

## 2022-02-02 RX ADMIN — FUROSEMIDE 40 MG: 10 INJECTION, SOLUTION INTRAMUSCULAR; INTRAVENOUS at 18:30

## 2022-02-02 RX ADMIN — DOCUSATE SODIUM 100 MG: 50 LIQUID ORAL at 20:17

## 2022-02-02 RX ADMIN — SENNOSIDES A AND B 5 ML: 415.36 LIQUID ORAL at 20:18

## 2022-02-02 RX ADMIN — FUROSEMIDE 40 MG: 10 INJECTION, SOLUTION INTRAMUSCULAR; INTRAVENOUS at 13:31

## 2022-02-02 RX ADMIN — ENOXAPARIN SODIUM 30 MG: 100 INJECTION SUBCUTANEOUS at 09:24

## 2022-02-02 RX ADMIN — HYDROCORTISONE SODIUM SUCCINATE 50 MG: 100 INJECTION, POWDER, FOR SOLUTION INTRAMUSCULAR; INTRAVENOUS at 09:25

## 2022-02-02 RX ADMIN — METOCLOPRAMIDE HYDROCHLORIDE 5 MG: 5 INJECTION INTRAMUSCULAR; INTRAVENOUS at 17:44

## 2022-02-02 RX ADMIN — IPRATROPIUM BROMIDE AND ALBUTEROL SULFATE 1 AMPULE: .5; 2.5 SOLUTION RESPIRATORY (INHALATION) at 17:02

## 2022-02-02 RX ADMIN — OXYCODONE HYDROCHLORIDE AND ACETAMINOPHEN 500 MG: 500 TABLET ORAL at 09:20

## 2022-02-02 RX ADMIN — MIDODRINE HYDROCHLORIDE 15 MG: 5 TABLET ORAL at 13:29

## 2022-02-02 RX ADMIN — NALOXEGOL OXALATE 12.5 MG: 12.5 TABLET, FILM COATED ORAL at 13:30

## 2022-02-02 RX ADMIN — POTASSIUM CHLORIDE 40 MEQ: 29.8 INJECTION, SOLUTION INTRAVENOUS at 20:17

## 2022-02-02 RX ADMIN — MIDAZOLAM 2 MG: 1 INJECTION INTRAMUSCULAR; INTRAVENOUS at 20:15

## 2022-02-02 RX ADMIN — MIDAZOLAM 2 MG: 1 INJECTION INTRAMUSCULAR; INTRAVENOUS at 20:33

## 2022-02-02 RX ADMIN — Medication 2000 UNITS: at 09:21

## 2022-02-02 RX ADMIN — Medication 175 MCG/HR: at 21:40

## 2022-02-02 RX ADMIN — MINERAL OIL AND WHITE PETROLATUM: 150; 830 OINTMENT OPHTHALMIC at 09:26

## 2022-02-02 RX ADMIN — MINERAL OIL AND WHITE PETROLATUM: 150; 830 OINTMENT OPHTHALMIC at 06:28

## 2022-02-02 RX ADMIN — Medication 10 ML: at 20:17

## 2022-02-02 RX ADMIN — PANTOPRAZOLE SODIUM 40 MG: 40 INJECTION, POWDER, FOR SOLUTION INTRAVENOUS at 09:25

## 2022-02-02 RX ADMIN — ACETAMINOPHEN 650 MG: 325 TABLET ORAL at 22:11

## 2022-02-02 RX ADMIN — MINERAL OIL AND WHITE PETROLATUM: 150; 830 OINTMENT OPHTHALMIC at 03:13

## 2022-02-02 RX ADMIN — DOCUSATE SODIUM 100 MG: 50 LIQUID ORAL at 09:24

## 2022-02-02 RX ADMIN — FENTANYL CITRATE 50 MCG: 0.05 INJECTION, SOLUTION INTRAMUSCULAR; INTRAVENOUS at 20:41

## 2022-02-02 RX ADMIN — Medication: at 09:45

## 2022-02-02 RX ADMIN — Medication 30 ML: at 09:40

## 2022-02-02 RX ADMIN — MIDAZOLAM 2 MG: 1 INJECTION INTRAMUSCULAR; INTRAVENOUS at 22:15

## 2022-02-02 RX ADMIN — Medication 175 MCG/HR: at 05:27

## 2022-02-02 RX ADMIN — Medication 175 MCG/HR: at 14:00

## 2022-02-02 RX ADMIN — SENNOSIDES A AND B 5 ML: 415.36 LIQUID ORAL at 09:40

## 2022-02-02 RX ADMIN — MIDAZOLAM 10 MG/HR: 5 INJECTION INTRAMUSCULAR; INTRAVENOUS at 14:00

## 2022-02-02 RX ADMIN — MIDAZOLAM 10 MG/HR: 5 INJECTION INTRAMUSCULAR; INTRAVENOUS at 05:34

## 2022-02-02 RX ADMIN — SODIUM CHLORIDE, PRESERVATIVE FREE 10 ML: 5 INJECTION INTRAVENOUS at 09:39

## 2022-02-02 RX ADMIN — IPRATROPIUM BROMIDE AND ALBUTEROL SULFATE 1 AMPULE: .5; 2.5 SOLUTION RESPIRATORY (INHALATION) at 08:57

## 2022-02-02 RX ADMIN — Medication: at 20:17

## 2022-02-02 RX ADMIN — MINERAL OIL AND WHITE PETROLATUM: 150; 830 OINTMENT OPHTHALMIC at 13:59

## 2022-02-02 RX ADMIN — ZINC SULFATE 220 MG (50 MG) CAPSULE 50 MG: CAPSULE at 09:20

## 2022-02-02 ASSESSMENT — PULMONARY FUNCTION TESTS
PIF_VALUE: 26
PIF_VALUE: 31
PIF_VALUE: 31
PIF_VALUE: 26
PIF_VALUE: 32
PIF_VALUE: 32
PIF_VALUE: 27
PIF_VALUE: 31
PIF_VALUE: 32
PIF_VALUE: 32
PIF_VALUE: 22
PIF_VALUE: 30
PIF_VALUE: 30
PIF_VALUE: 25
PIF_VALUE: 19
PIF_VALUE: 27
PIF_VALUE: 27
PIF_VALUE: 32
PIF_VALUE: 30
PIF_VALUE: 31
PIF_VALUE: 31
PIF_VALUE: 23
PIF_VALUE: 25
PIF_VALUE: 22
PIF_VALUE: 23

## 2022-02-02 ASSESSMENT — PAIN SCALES - GENERAL
PAINLEVEL_OUTOF10: 0
PAINLEVEL_OUTOF10: 1
PAINLEVEL_OUTOF10: 0
PAINLEVEL_OUTOF10: 0
PAINLEVEL_OUTOF10: 1
PAINLEVEL_OUTOF10: 0

## 2022-02-02 NOTE — PROGRESS NOTES
HOSPITALIST PROGRESS NOTE  Date: 2/2/2022   Name: Florencia Romero   MRN: [id number]   YOB: 1955        Hospital Course:   Gloria Norton is M 61 y.o. year old female who has a PMH of asthma.   She presented to the ER on 1/14/22 with complaints of SOB and diarrhea x 1 week.  She was not vaccinated against COVID-19.  Reportedly her daughter found her curled into the fetal position in respiratory distress at home and called 911.     Subjective/Interval Hx:   Patient remains intubated sedated in intensive care unit  Objective:   Physical Exam:   BP (!) 161/74   Pulse 90   Temp 97.2 °F (36.2 °C) (Esophageal)   Resp 21   Ht 5' (1.524 m)   Wt 127 lb 14.4 oz (58 kg)   SpO2 95%   BMI 24.98 kg/m²   General: no acute distress, well nourished and well hydrated  HEENT: NCAT  Heart: S1S2 RRR  Lungs: Clear to ascultation bilaterally, respiratory effort normal  Abdomen: soft, NT/ND, positive bowel sounds  Extremities: no pitting edema, nontender   Neuro: patient is awake, alert and orientated times 3, no gross deficits  Skin: no rashes or ecchymosis        Meds:   Meds:    furosemide  40 mg IntraVENous BID    naloxegol  12.5 mg Oral QAM    polyethylene glycol  17 g Oral BID    sennosides  5 mL Oral BID    metoclopramide  5 mg IntraVENous Q6H    midodrine  15 mg Oral TID WC    insulin lispro  0-6 Units SubCUTAneous Q4H    pantoprazole  40 mg IntraVENous BID    And    sodium chloride (PF)  10 mL IntraVENous Daily    enoxaparin  30 mg SubCUTAneous BID    docusate sodium  100 mg Oral BID    miconazole nitrate   Topical BID    chlorhexidine  15 mL Mouth/Throat BID    artificial tears   Both Eyes Q4H    ipratropium-albuterol  1 ampule Inhalation Q4H WA    sodium chloride flush  5-40 mL IntraVENous 2 times per day    ascorbic acid  500 mg Oral Daily    zinc sulfate  50 mg Oral Daily    vitamin D  2,000 Units Oral Daily      Infusions:    [Held by provider] cisatracurium (NIMBEX) infusion 3.5 mcg/kg/min (02/01/22 2158)    fentaNYL 5 mcg/ml in D5W 250 ml infusion 175 mcg/hr (02/02/22 0527)    midazolam 10 mg/hr (02/02/22 0534)    sodium chloride      dextrose       PRN Meds: fentanNYL, 50 mcg, Q2H PRN  midazolam, 2 mg, Q2H PRN  sodium chloride flush, 5-40 mL, PRN  sodium chloride, 25 mL, PRN  acetaminophen, 650 mg, Q6H PRN   Or  acetaminophen, 650 mg, Q6H PRN  glucose, 15 g, PRN  dextrose, 12.5 g, PRN  glucagon (rDNA), 1 mg, PRN  dextrose, 100 mL/hr, PRN        Data/Labs:     Recent Labs     01/31/22  0403 02/01/22 0427 02/02/22  0500   WBC 13.1* 13.7* 13.6*   HGB 7.5* 7.9* 8.1*   HCT 24.4* 25.7* 26.0*    229 239      Recent Labs     02/01/22  0427 02/01/22  0427 02/01/22  1855 02/02/22  0500 02/02/22  1343      < > 137 137 143   K 3.3*   < > 3.5 3.0* 3.6   CL 93*   < > 94* 96* 101   CO2 38*   < > 35* 34* 35*   PHOS 3.3  --  3.2 2.4*  --    BUN 24*   < > 28* 24* 23   CREATININE 0.5   < > 0.5 0.5 0.5    < > = values in this interval not displayed. No results for input(s): AST, ALT, ALB, BILIDIR, BILITOT, ALKPHOS in the last 72 hours. No results for input(s): INR in the last 72 hours. No results for input(s): CKTOTAL, CKMB, CKMBINDEX, TROPONINT in the last 72 hours. I/O last 3 completed shifts: In: 1270 [I.V.:2684; NG/GT:715]  Out: 5345 [Urine:5345]    Intake/Output Summary (Last 24 hours) at 2/2/2022 1503  Last data filed at 2/2/2022 1200  Gross per 24 hour   Intake 2091 ml   Output 2370 ml   Net -279 ml        Assessment/Plan:   1. Acute respiratory hypoxic failure due to Covid viral pneumonia-patient is unvaccinated with symptomatic x1 week prior to presentation, steroids, vitamins, pulmonary is following  02/02/2020-patient remains intubated sedated in the ICU we will continue to monitor with critical care team  2. Non-insulin-dependent diabetes mellitus-sliding scale, monitor blood glucose, diabetic diet  3. GERD-Protonix, Reglan  4.  COPD-DuoNeb, monitor pulmonary toiletry      DVT Prophylaxis: Eliquis  Diet: Diet NPO  ADULT TUBE FEEDING; Nasogastric; Standard with Fiber; Continuous; 10; No; 30; Q 3 hours  Code Status: DNR-CCA    Dispo:  When stable    Electronically signed by Nadeen Castaneda MD on 2/2/2022 at 3:03 PM  [facility]

## 2022-02-02 NOTE — CARE COORDINATION
2/2: Update CM Note: Pt was transferred to MICU on 1/19 for respiratory distress & intubated. Pt remains intubated, sedated on Versed, & fentanyl & Spo2 96% & Fio2 55%. Covid +1/14. Pt is on Iv Lasix & Zoysn. PT/OT pending eval once pt medically stable. Pt's original d/c plan with home. Needs unclear. Per Note: Resident discussed trach & peg with daughter Ashley Rothman at [phone], & decided to change pt's code status on 2/1 to a [facility]. [facility] following.  SW/CM will continue to follow for d/c planning.  Electronically signed by Cari Mckeon RN on 2/2/2022 at 3:06 PM

## 2022-02-02 NOTE — PROGRESS NOTES
[address] [facility]   Department of Internal Medicine   Internal Medicine Residency  MICU Progress Note    Patient:  Kb Ramos 78 y.o. female   MRN: [id number]       Date of Service: 2022    Allergy: Patient has no known allergies. Subjective     Patient was seen and examined this morning at bedside in no acute distress. Patient is sedated, intubated and paralyzed. PF ratio improved, and CXR improved today. Patient is getting diuresis with lasix. Objective     TEMPERATURE:  Current - Temp: 97.2 °F (36.2 °C); Max - Temp  Av.8 °F (36 °C)  Min: 96.3 °F (35.7 °C)  Max: 97.2 °F (36.2 °C)  RESPIRATIONS RANGE: Resp  Av.7  Min: 18  Max: 32  PULSE RANGE: Pulse  Av.7  Min: 79  Max: 96  BLOOD PRESSURE RANGE:  Systolic (44FET), ZCP:430 , Min:146 , TNI:564     ; Diastolic (89CWL), ATF:11, Min:60, Max:65      PULSE OXIMETRY RANGE: SpO2  Av.1 %  Min: 93 %  Max: 99 %    I & O - 24hr:    Intake/Output Summary (Last 24 hours) at 2022 0732  Last data filed at 2022 5083  Gross per 24 hour   Intake 2091 ml   Output 2245 ml   Net -154 ml     I/O last 3 completed shifts: In: 3362 [I.V.:2684; NG/GT:715]  Out: 5345 [Urine:5345] No intake/output data recorded. Weight change: 2 lb (0.907 kg)    Physical Exam  Constitutional:       Comments: Sedated and intubated   HENT:      Mouth/Throat:      Mouth: Mucous membranes are moist.   Cardiovascular:      Rate and Rhythm: Normal rate and regular rhythm. Pulses: Normal pulses. Heart sounds: Normal heart sounds. No murmur heard. No friction rub. No gallop. Pulmonary:      Effort: Pulmonary effort is normal. No respiratory distress. Breath sounds: Normal breath sounds. No stridor. No wheezing, rhonchi or rales. Chest:      Chest wall: No tenderness. Abdominal:      General: Abdomen is flat. Bowel sounds are normal.      Palpations: Abdomen is soft. Musculoskeletal:         General: No swelling.    Skin:     General: Skin is warm.      Capillary Refill: Capillary refill takes less than 2 seconds.           Diet:   Diet NPO  ADULT TUBE FEEDING; Nasogastric; Standard with Fiber; Continuous; 10; No; 30; Q 3 hours    FLOW([id number])@      Additional Respiratory  Assessments  Pulse: 96  Resp: 20  SpO2: 94 %  Position: Semi-Ocampo's  Humidification Source: Heated wire  Humidification Temp: 37  Circuit Condensation: Drained  Oral Care: Mouth swabbed,Mouthwash with chlorhexidine,Mouth suctioned  Subglottic Suction Done?: No  Airway Type: ET  Airway Size: 8  Cuff Pressure (cm H2O): 29 cm H2O       [REMOVED] Urethral Catheter Double-lumen 16 fr-Output (mL): 130 mL  Urethral Catheter Double-lumen 16 fr-Output (mL): 70 mL  [REMOVED] External Urinary Catheter-Output (mL): 200 mL      Oxygen:     Vent Information  $Ventilation: $Subsequent Day  Skin Assessment: Clean, dry, & intact  Suction Catheter Diameter:  (16)  Equipment ID: [id number]  Equipment Changed: (S) Airway securing device  Vent Type: 980  Vent Mode: AC/VC  Vt Ordered: 300 mL  Rate Set: 20 bmp  Peak Flow: 65 L/min  Pressure Support: 0 cmH20  FiO2 : 45 %  SpO2: 94 %  SpO2/FiO2 ratio: 208.89  PaO2/FiO2 ratio: 196  Sensitivity: 3  PEEP/CPAP: 12  I Time/ I Time %: 0 s  Humidification Source: Heated wire  Humidification Temp: 37  Humidification Temp Measured: 37  Circuit Condensation: Drained  Mask Type: Full face mask  Mask Size: Medium  Additional Respiratory  Assessments  Pulse: 96  Resp: 20  SpO2: 94 %  Position: Semi-Ocampo's  Humidification Source: Heated wire  Humidification Temp: 37  Circuit Condensation: Drained  Oral Care: Mouth swabbed,Mouthwash with chlorhexidine,Mouth suctioned  Subglottic Suction Done?: No  Airway Type: ET  Airway Size: 8  Cuff Pressure (cm H2O): 29 cm H2O       [REMOVED] Urethral Catheter Double-lumen 16 fr-Output (mL): 130 mL  Urethral Catheter Double-lumen 16 fr-Output (mL): 70 mL  [REMOVED] External Urinary Catheter-Output (mL): 200 mL    Medications     Continuous Infusions:   cisatracurium (NIMBEX) infusion 3.5 mcg/kg/min (02/01/22 2158)    fentaNYL 5 mcg/ml in D5W 250 ml infusion 175 mcg/hr (02/02/22 0527)    midazolam 10 mg/hr (02/02/22 0534)    sodium chloride      dextrose       Scheduled Meds:   potassium chloride  20 mEq IntraVENous Q1H    hydrocortisone sodium succinate PF  50 mg IntraVENous Q12H    naloxegol  12.5 mg Oral QAM    polyethylene glycol  17 g Oral BID    sennosides  5 mL Oral BID    metoclopramide  5 mg IntraVENous Q6H    midodrine  15 mg Oral TID WC    insulin lispro  0-6 Units SubCUTAneous Q4H    pantoprazole  40 mg IntraVENous BID    And    sodium chloride (PF)  10 mL IntraVENous Daily    enoxaparin  30 mg SubCUTAneous BID    docusate sodium  100 mg Oral BID    miconazole nitrate   Topical BID    chlorhexidine  15 mL Mouth/Throat BID    artificial tears   Both Eyes Q4H    ipratropium-albuterol  1 ampule Inhalation Q4H WA    sodium chloride flush  5-40 mL IntraVENous 2 times per day    ascorbic acid  500 mg Oral Daily    zinc sulfate  50 mg Oral Daily    vitamin D  2,000 Units Oral Daily     PRN Meds: sodium chloride flush, sodium chloride, acetaminophen **OR** acetaminophen, glucose, dextrose, glucagon (rDNA), dextrose  Nutrition:   NG/OG tube TF type: Pulmocare/Nephro/Glucerna/Jevity        At rate: 10 ml/h    Labs and Imaging Studies     ve  CBC:   Recent Labs     01/31/22  0403 02/01/22  0427 02/02/22  0500   WBC 13.1* 13.7* 13.6*   HGB 7.5* 7.9* 8.1*   HCT 24.4* 25.7* 26.0*   MCV 97.6 97.3 97.7    229 239       BMP:    Recent Labs     02/01/22  0427 02/01/22  1855 02/02/22  0500    137 137   K 3.3* 3.5 3.0*   CL 93* 94* 96*   CO2 38* 35* 34*   BUN 24* 28* 24*   CREATININE 0.5 0.5 0.5   GLUCOSE 113* 115* 115*       LIVER PROFILE:   No results for input(s): AST, ALT, BILIDIR, BILITOT, ALKPHOS in the last 72 hours.     Invalid input(s):  ALB      PT/INR:   No results for input(s): PROTIME, INR in the last 72 hours.    APTT:   No results for input(s): APTT in the last 72 hours. Fasting Lipid Panel:    Lab Results   Component Value Date    CHOL 145 06/29/2020    TRIG 124 01/25/2022    HDL 73 06/29/2020       Cardiac Enzymes:    No results found for: CKTOTAL, CKMB, CKMBINDEX, TROPONINI    ABGs:   Lab Results   Component Value Date    PO2ART 138.4 01/25/2022    FWX8JCP 54.3 01/25/2022       Imaging Studies:     XR ABDOMEN (KUB) (SINGLE AP VIEW)    Result Date: 1/27/2022  EXAMINATION: ONE SUPINE XRAY VIEW(S) OF THE ABDOMEN 1/27/2022 9:07 am COMPARISON: 19 January 2022 HISTORY: ORDERING SYSTEM PROVIDED HISTORY: r/o obstruction or ileus TECHNOLOGIST PROVIDED HISTORY: Reason for exam:->r/o obstruction or ileus What reading provider will be dictating this exam?->CRC FINDINGS: NG tube tip is in the gastric lumen. There is no free air, bowel wall pneumatosis or dilated bowel. No abnormal calcifications are present. .     No active process. NG tube in the gastric lumen as before. XR CHEST PORTABLE    Result Date: 1/27/2022  EXAMINATION: ONE XRAY VIEW OF THE CHEST 1/27/2022 7:53 am COMPARISON: Chest x-ray 01/26/2022 HISTORY: ORDERING SYSTEM PROVIDED HISTORY: COVID ICU TECHNOLOGIST PROVIDED HISTORY: Reason for exam:->COVID ICU What reading provider will be dictating this exam?->CRC FINDINGS: Cardiac size enlarged. Bilateral pulmonary opacifications right greater than left have increased in the interim midlung involvement of worsening airspace disease. No pneumothorax or pleural effusion     Progression of bilateral airspace disease remaining midlung predominant in the periphery of airspace disease bronchopneumonia with increased from prior     XR CHEST PORTABLE    Result Date: 1/26/2022  EXAMINATION: ONE XRAY VIEW OF THE CHEST 1/26/2022 8:56 am COMPARISON: The previous study performed 01/25/2022.  HISTORY: ORDERING SYSTEM PROVIDED HISTORY: COVID ICU TECHNOLOGIST PROVIDED HISTORY: Reason for exam:->COVID ICU What reading provider will be dictating this exam?->CRC FINDINGS: There has been a mild decrease in the diffuse left lung infiltrates. The right lung infiltrates are without significant interval change. No active pleural disease is seen. The cardiac silhouette and mediastinal structures are without significant interval change. An endotracheal tube is again noted, with the tip 3.0 cm above the alberta. An NG tube is again noted entering the stomach. The tip is cut off. A left-sided IJ line is again present, with tip in the SVC. Mild decrease in the diffuse left lung infiltrates, when compared to the previous study performed 1 day earlier. Diffuse right lung infiltrates without significant interval change. XR CHEST PORTABLE    Result Date: 1/25/2022  EXAMINATION: ONE XRAY VIEW OF THE CHEST 1/25/2022 4:58 pm COMPARISON: 7:55 a.m. HISTORY: ORDERING SYSTEM PROVIDED HISTORY: L IJ CVC placement TECHNOLOGIST PROVIDED HISTORY: Reason for exam:->L IJ CVC placement What reading provider will be dictating this exam?->CRC FINDINGS: Stable bilateral pulmonary infiltrates. There is no effusion or pneumothorax. The cardiomediastinal silhouette is without acute process. The osseous structures are without acute process. Left internal jugular line tip in the distal SVC. The remainder of the lines and tubes are stable in position. Left internal jugular line tip in the distal SVC. No pneumothorax. The remainder of the exam is essentially stable. .     XR CHEST PORTABLE    Result Date: 1/25/2022  EXAMINATION: ONE XRAY VIEW OF THE CHEST 1/25/2022 7:41 am COMPARISON: 24 January 2022 HISTORY: ORDERING SYSTEM PROVIDED HISTORY: COVID ICU TECHNOLOGIST PROVIDED HISTORY: Reason for exam:->COVID ICU What reading provider will be dictating this exam?->CRC FINDINGS: Bilateral airspace disease is not appreciably changed allowing for some differences in positioning. Stable support lines. No new findings. Stable airspace disease.   See above.     XR CHEST PORTABLE    Result Date: 1/24/2022  EXAMINATION: ONE XRAY VIEW OF THE CHEST 1/24/2022 7:11 am COMPARISON: 23 January 2022 HISTORY: ORDERING SYSTEM PROVIDED HISTORY: COVID ICU TECHNOLOGIST PROVIDED HISTORY: Reason for exam:->COVID ICU What reading provider will be dictating this exam?->CRC FINDINGS: Chest is notably rotated to the left. Support lines are stable. Bilateral airspace disease appears mildly progressive. Bilateral airspace disease which appears mildly progressive. XR CHEST PORTABLE    Result Date: 1/23/2022  EXAMINATION: ONE XRAY VIEW OF THE CHEST 1/23/2022 8:17 am COMPARISON: Multiple prior studies, the most recent dated January 22, 2022 HISTORY: [address] [facility] [address]: COVID ICU TECHNOLOGIST PROVIDED HISTORY: Reason for exam:->COVID ICU What reading provider will be dictating this exam?->CRC FINDINGS: Frontal view of the chest.  Lines and tubes are unchanged in position. Stable cardiomediastinal silhouette. Diffuse bilateral opacities are not significantly changed given differences in patient positioning. No pneumothorax seen. There are degenerative changes in the spine. 1.  Lines and tubes are unchanged in position. 2.  Diffuse bilateral opacities are not significantly changed. Differential includes infection, pulmonary edema, atelectasis, ARDS, and/or layering pleural effusions. XR CHEST PORTABLE    Result Date: 1/22/2022  EXAMINATION: ONE XRAY VIEW OF THE CHEST 1/22/2022 8:28 am COMPARISON: 01/21/2022 HISTORY: ORDERING SYSTEM PROVIDED HISTORY: COVID ICU TECHNOLOGIST PROVIDED HISTORY: Reason for exam:->COVID ICU What reading provider will be dictating this exam?->CRC FINDINGS: Heart is normal in size. Moderate bilateral interstitial and alveolar opacities with central air bronchograms have not significantly improved. No pneumothorax detected. The gastric catheter passes into the stomach. Endotracheal tube tip is 4 cm above the alberta.   Right IJ central venous catheter in good position. Osseous structures are stable. 1.  No significant change in bilateral multifocal pneumonia. Support tubes and catheters are stable. XR CHEST PORTABLE    Result Date: 1/21/2022  EXAMINATION: ONE XRAY VIEW OF THE CHEST 1/21/2022 8:33 am COMPARISON: None. HISTORY: ORDERING SYSTEM PROVIDED HISTORY: COVID ICU TECHNOLOGIST PROVIDED HISTORY: Reason for exam:->COVID ICU What reading provider will be dictating this exam?->CRC FINDINGS: There is stable position of support lines and tubes. There is no pneumothorax or pneumomediastinum. Extensive multifocal bilateral pneumonia is noted unchanged when compared to the prior study. 1. There is no interval change in extensive multifocal bilateral pneumonia. XR CHEST PORTABLE    Result Date: 1/20/2022  EXAMINATION: ONE XRAY VIEW OF THE CHEST 1/20/2022 8:06 am COMPARISON: January 19, 2022 HISTORY: ORDERING SYSTEM PROVIDED HISTORY: COVID ICU TECHNOLOGIST PROVIDED HISTORY: Reason for exam:->COVID ICU What reading provider will be dictating this exam?->CRC FINDINGS: There is no evidence of pneumothorax or effusion. There is continued bilateral diffuse pulmonary infiltrates with mildly increased opacity in the bilateral bases when compared to previous. ET tube is in good position with tip above the alberta. NG tube courses below the diaphragm. Stable appearance of right IJ central venous catheter projecting over the SVC. Visualized bony thorax shows no acute abnormality. Slightly worsened bilateral pulmonary infiltrates with increasing opacity in the bilateral bases when compared to previous. Stable lines and tubes.      XR CHEST PORTABLE    Result Date: 1/19/2022  EXAMINATION: ONE XRAY VIEW OF THE CHEST PORTABLE UPRIGHT 1/19/2022 12:38 pm COMPARISON: 0238 hours HISTORY: ORDERING SYSTEM PROVIDED HISTORY: central line placement TECHNOLOGIST PROVIDED HISTORY: Reason for exam:->central line placement What reading provider will be dictating this exam?->CRC FINDINGS: Medical devices: Right IJ central venous line tip at the cavoatrial junction. No pneumothorax. The endotracheal and enteric tubes remain in satisfactory position. Redemonstration of bilateral widespread pulmonary infiltrates. Right lung infiltrate shows no significant change. Left perihilar and left upper lobe infiltrate shows mild progression and is more confluent. Normal heart size. No significant pleural effusion. Atherosclerotic thoracic aorta. 1.  Good position right IJ central venous line. Negative for pneumothorax. 2.  Bilateral widespread pulmonary infiltrates with interval mild worsening on the left and compatible with bilateral pneumonia. XR CHEST PORTABLE    Result Date: 1/19/2022  EXAMINATION: ONE XRAY VIEW OF THE CHEST 1/19/2022 3:01 am COMPARISON: 01/16/2022 HISTORY: ORDERING SYSTEM PROVIDED HISTORY: ETT placement TECHNOLOGIST PROVIDED HISTORY: Reason for exam:->ETT placement What reading provider will be dictating this exam?->CRC FINDINGS: Endotracheal tube tip is 3.3 cm above alberta. Nasogastric tube extends into the stomach. There are diffuse infiltrates which are more notable in the lower lungs. When accounting for technical differences, these infiltrates have not changed significantly. There is no pleural effusion or pneumothorax seen. There is no cardiomegaly. Endotracheal tube tip is 3.3 cm above the alberta. Diffuse pulmonary infiltrates are unchanged. XR CHEST PORTABLE    Result Date: 1/16/2022  EXAMINATION: ONE XRAY VIEW OF THE CHEST 1/16/2022 10:59 am COMPARISON: 01/14/2022 HISTORY: ORDERING SYSTEM PROVIDED HISTORY: hypoxia TECHNOLOGIST PROVIDED HISTORY: Reason for exam:->hypoxia What reading provider will be dictating this exam?->CRC FINDINGS: The cardiac silhouette is within normals. Extensive multifocal bilateral pneumonia is noted unchanged when compared to prior study. There is no pneumothorax or pneumomediastinum. There is no pleural effusion. 1. Multifocal bilateral pneumonia unchanged when compared with the prior study. XR CHEST PORTABLE    Result Date: 1/14/2022  EXAMINATION: ONE XRAY VIEW OF THE CHEST 1/14/2022 4:37 pm COMPARISON: None. HISTORY: ORDERING SYSTEM PROVIDED HISTORY: hypoxia TECHNOLOGIST PROVIDED HISTORY: Reason for exam:->hypoxia What reading provider will be dictating this exam?->CRC FINDINGS: There is extensive bilateral airspace disease bilaterally. The findings are most compatible with pneumonia. Heart size is borderline. Pulmonary vascularity is not clearly congested. Bilateral airspace disease likely related to pneumonia. XR ABDOMEN FOR NG/OG/NE TUBE PLACEMENT    Result Date: 1/19/2022  EXAMINATION: ONE SUPINE XRAY VIEW(S) OF THE ABDOMEN 1/19/2022 3:01 am COMPARISON: None. HISTORY: ORDERING SYSTEM PROVIDED HISTORY: Confirmation of course of NG/OG/NE tube and location of tip of tube TECHNOLOGIST PROVIDED HISTORY: Reason for exam:->Confirmation of course of NG/OG/NE tube and location of tip of tube Portable? ->Yes What reading provider will be dictating this exam?->CRC FINDINGS: The nasogastric tube terminates in the stomach. There are infiltrates in visualized lungs. Visualized gas pattern is nonspecific demonstrating a paucity of bowel gas. Nasogastric tube terminates in the stomach.        Notable Cultures:      Blood cultures   Blood Culture, Routine   Date Value Ref Range Status   01/29/2022 24 Hours no growth  Preliminary       Respiratory cultures No results found for: RESPCULTURE No results found for: LABGRAM  Legionella No results found for: LABLEGI    Urine cultures   Urine Culture, Routine   Date Value Ref Range Status   01/29/2022 Growth not present  Final         Resident's Assessment and Plan       78year old female with a past medical history of Asthma who presented to the ER on January 14 th, 2022 with complaints of SOB and diarrhea for one week    She is currently normochromic anemia 2/2 ACD versus sepsis  · Hemoglobin was 6.9 received 1 unit of PRBC (3 days ago)  · Hemoglobin this morning was 7.5 (stable since 3 days)  Plan  · Monitor CBC daily  · Transfuse PRBC if hemoglobin is less than 7      Constipation 2/2 fentanyl (resolved)  · Patient is on bowel regiment  · X-ray KUB negative for acute obstruction  Plan  · Monitor bowel movement and continue bowel regimen  · Will receive naloxone today      Refeeding syndrome (resumed)  · Patient had hypokalemia, hypomagnesemia, hypophosphatemia  Plan  · Decrease tube feed to 10 mL/h  · Monitor electrolytes and replace accordingly      Resolved issue  SAMUEL        Code Status: DNR-CCA  PT/OT evaluation:  Disposition: home +/- home health / [facility] / [place] [address] / Verenice Bhakta MD, PGY-1  Attending physician: Dr. Mckenzie De Leon  Department of Pulmonary, Critical Care and Sleep Medicine  [address] [facility]  Department of Internal Medicine      During multidisciplinary team rounds Alma Narayanan is a 78 y.o. female was seen, examined and discussed. This is confirmation that I have personally seen and examined the patient and that the key elements of the encounter were performed by me (> 85 % time). The medications & laboratory data was discussed and adjusted where necessary. The radiographic images were reviewed or with radiologist or consultant if felt dis-concordant with the exam or history. The above findings were corroborated, plans confirmed and changes made if needed. Family is updated at the bedside as available. Key issues of the case were discussed among consultants. Critical Care time is documented if appropriate.       Pankaj De La Rosa, DO, FACP, FCCP, [place],

## 2022-02-02 NOTE — PROGRESS NOTES
Department of Internal Medicine  Nephrology  Progress Note      Events Reviewed. Intubated, supine. SUBJECTIVE:  We are following Ms. Gloria Rodriguez for SAMUEL. Patient is intubated and supine. PHYSICAL EXAM:    Vitals:    VITALS:  BP (!) 157/65   Pulse 98   Temp 97.2 °F (36.2 °C) (Esophageal)   Resp 20   Ht 5' (1.524 m)   Wt 127 lb 14.4 oz (58 kg)   SpO2 94%   BMI 24.98 kg/m²   24HR INTAKE/OUTPUT:      Intake/Output Summary (Last 24 hours) at 2/2/2022 0947  Last data filed at 2/2/2022 7889  Gross per 24 hour   Intake 2091 ml   Output 2245 ml   Net -154 ml     General appearance: Patient is intubated, sedated  HEENT: Normal cephalic, atraumatic without obvious deformity. Pupils equal, round, and reactive to light. Endotracheal tube in place  Neck: Supple, with full range of motion. No jugular venous distention. Trachea midline. Respiratory: Diminished bilaterally, intubated. Cardiovascular: RRR, no murmur noted  Abdomen: Soft, nontender, nondistended, flat  Musculoskeletal: No clubbing or cyanosis. No edema of bilateral lower extremities. Brisk capillary refill. Skin: Normal skin color.  No rashes. Scattered ecchymosis.   Neurologic: Patient sedated        Scheduled Meds:   potassium chloride  20 mEq IntraVENous Q1H    furosemide  40 mg IntraVENous BID    naloxegol  12.5 mg Oral QAM    polyethylene glycol  17 g Oral BID    sennosides  5 mL Oral BID    metoclopramide  5 mg IntraVENous Q6H    midodrine  15 mg Oral TID WC    insulin lispro  0-6 Units SubCUTAneous Q4H    pantoprazole  40 mg IntraVENous BID    And    sodium chloride (PF)  10 mL IntraVENous Daily    enoxaparin  30 mg SubCUTAneous BID    docusate sodium  100 mg Oral BID    miconazole nitrate   Topical BID    chlorhexidine  15 mL Mouth/Throat BID    artificial tears   Both Eyes Q4H    ipratropium-albuterol  1 ampule Inhalation Q4H WA    sodium chloride flush  5-40 mL IntraVENous 2 times per day    ascorbic acid  500 mg Oral Daily    zinc sulfate  50 mg Oral Daily    vitamin D  2,000 Units Oral Daily     Continuous Infusions:   [Held by provider] cisatracurium (NIMBEX) infusion 3.5 mcg/kg/min (02/01/22 2158)    fentaNYL 5 mcg/ml in D5W 250 ml infusion 175 mcg/hr (02/02/22 0527)    midazolam 10 mg/hr (02/02/22 0534)    sodium chloride      dextrose       PRN Meds:.fentanNYL, midazolam, sodium chloride flush, sodium chloride, acetaminophen **OR** acetaminophen, glucose, dextrose, glucagon (rDNA), dextrose    DATA:    CBC with Differential:    Lab Results   Component Value Date    WBC 13.6 02/02/2022    RBC 2.66 02/02/2022    HGB 8.1 02/02/2022    HCT 26.0 02/02/2022     02/02/2022    MCV 97.7 02/02/2022    MCH 30.5 02/02/2022    MCHC 31.2 02/02/2022    RDW 13.8 02/02/2022    METASPCT 0.9 01/18/2022    LYMPHOPCT 2.7 01/18/2022    MONOPCT 11.6 01/18/2022    MYELOPCT 0.9 01/14/2022    BASOPCT 0.1 01/18/2022    MONOSABS 1.38 01/18/2022    LYMPHSABS 0.35 01/18/2022    EOSABS 0.00 01/18/2022    BASOSABS 0.00 01/18/2022     CMP:    Lab Results   Component Value Date     02/02/2022    K 3.0 02/02/2022    K 3.8 01/18/2022    CL 96 02/02/2022    CO2 34 02/02/2022    BUN 24 02/02/2022    CREATININE 0.5 02/02/2022    GFRAA >60 02/02/2022    LABGLOM >60 02/02/2022    GLUCOSE 115 02/02/2022    PROT 5.6 01/29/2022    LABALBU 2.4 01/29/2022    CALCIUM 8.8 02/02/2022    BILITOT 0.2 01/29/2022    ALKPHOS 97 01/29/2022    AST 16 01/29/2022    ALT 16 01/29/2022     Magnesium:    Lab Results   Component Value Date    MG 2.0 02/02/2022     Phosphorus:    Lab Results   Component Value Date    PHOS 2.4 02/02/2022          Radiology Review:    Chest X-ray January 30, 2022   Stable to slightly improved aeration of the lungs bilaterally with persistent   patchy bilateral airspace opacities, in keeping with the patient's known   diagnosis of COVID pneumonia.             CXR 1/31/2022   1.  There is no interval change in the multifocal bilateral pneumonia. 2. There is no pneumothorax or pneumomediastinum.           CXR 2/1/2022   1. Extensive bilateral multifocal pneumonia.  No significant change. 2. Vertical linear shadow over the lateral aspect of the right chest, most   likely skin fold.         CXR 2/2/2022   No pneumothorax following line placement.       Right greater than left parenchymal infiltrates similar to subtly improved. Continued follow-up recommended.             BRIEF SUMMARY OF INITIAL CONSULT:     Briefly, Ms. Crescencio Santana is a 78year old female with a past medical history of Asthma who presented to the ER on January 14 th, 2022 with complaints of SOB and diarrhea for one week. She is not vaccinated against COVID. Reportedly her daughter found her curled into a fetal position in respiratory distress at home and EMS was summoned. She was found to be COVID 19 positive. Labs were significant for bicarbonate 19, BUN 38, creatinine 1.9, anion gap 19, procalcitonin 1.57, CRP 12.8, , mildly elevated LFTs, D-dimer 404, ferritin 5098. Chest x-ray showed bilateral airspace disease. Renal is consulted for SAMUEL. Problems Resolved:     · SAMUEL, likely pre-renal volume responsive SAMUEL secondary to poor oral intake and GI losses (diarrhea). Creatinine 1.9mg/dL on admission. Renal function has improved to 0.6 mg/dL with IVF administration. · HAGMA with low bicarbonate levels, secondary to uremia. To continue bicarbonate drip  · hypernatremia, with water deficit, dehydration due to poor intake. Sodium levels quite improved. Resolved. · Hypophosphatemia, 2/2 poor intake, to replace  · Hypocalcemia, vitamin D 25 level 39, calcium levels 8.4- improved  · Low grade temp--> pancultures sent. Resolved    IMPRESSION/RECOMMENDATIONS:        1. Respiratory acidosis 7.392, pCO2 57.1, bicarb 33.9 (contraction alkalosis from diuretic use)- improved from yesterday.    2. Hypokalemia 2/2 renal wasting with diuretics  3. ----------------------------------------  4. COVID +, on hydrocortisone   5. Acute hypoxic respiratory failure, secondary to COVID pneumonia. Status post intubation  6. Hypotension, on solucortef 50 mg daily  7. Normocytic anemia, hemoglobin 8.1. stable. 8. Nutrition, receiving tube feeds at 10 mL an hour with free water flush 30 cc every 3 hours      Plan:    · Lasix 40 mg BID  · Continue midodrine 15 mg TID.    · Ordered Diamox 250mg   · Replace potassium with 80 mEq  · Continue solucortef to 50 mg iv daily for BP support, wean as she becomes more hemodynamically stable  · Continue to monitor phos and magnesium levels  · Obtain ionized calcium daily      Electronically signed by Sanaz Adhikari on 2/2/2022 at 9:47 AM  Agree as annotated   Discussed with MICU staff and RN  Alejandro Thompson MD

## 2022-02-02 NOTE — CODE DOCUMENTATION
Change of Code Status Documentation    Discussed with patient's family and patient's Charlet Favors, regarding patient's code status in detail.  At this time, it has been agreed upon that code status will be changed to the following:    Code Status: DNR-CCA     Brisa Wray MD PGY-1  2/1/2022  8:12 PM

## 2022-02-02 NOTE — PLAN OF CARE
Talked to patient's daughter, Pennie Buerger ([phone]), and informed her about patient's condition. She mentioned patient's wish was not having trach and PEG and going to a facility. So did not got concent for trach PEG. She mentioned, regarding patient's code status she wants to change DNR- CCA. Pennie Buerger gave consent for changing arterial line and central line.     Electronically signed by Lady Estephania MD on 2/1/2022 at 8:10 PM

## 2022-02-02 NOTE — PROGRESS NOTES
Delonte Shannon M.D.,[facility]  Leonora Stafford D.O., F.A.C.O.I., Oscar Spear M.D. Kyle Cadena M.D. John Wright D.O. Daily Pulmonary Progress Note    Patient:  Prashant Mcleod 78 y.o. female MRN: [id number]     Date of Service: 2/2/2022      Synopsis     We are following patient for acute respiratory failure with hypoxia, severe COVID-19 pneumonia    \"CC\" altered mental state, shortness of breath    Code status: Full      Subjective      Patient was seen through window Case discussed with staff. Currently remains paralyzed, sedated, full vent support and now supine. FiO2 55% SPO2 99%, PF ratio 174. Low tidal volume 300 mL lung protective ventilation. Rate 20 PEEP +10.       Review of Systems:  Unable to obtain    24-hour events:  None    Objective   Vitals: /65   Pulse 77   Temp 97.2 °F (36.2 °C) (Esophageal)   Resp 19   Ht 5' (1.524 m)   Wt 127 lb 14.4 oz (58 kg)   SpO2 96%   BMI 24.98 kg/m²     I/O:    Intake/Output Summary (Last 24 hours) at 2/2/2022 1557  Last data filed at 2/2/2022 1500  Gross per 24 hour   Intake 2091 ml   Output 4120 ml   Net -2029 ml       Vent Information  $Ventilation: $Subsequent Day  Skin Assessment: Clean, dry, & intact  Suction Catheter Diameter:  (16)  Equipment ID: [id number]  Equipment Changed: (S) Airway securing device  Vent Type: 980  Vent Mode: AC/VC  Vt Ordered: 300 mL  Rate Set: 20 bmp  Peak Flow: 65 L/min  Pressure Support: 0 cmH20  FiO2 : 55 %  SpO2: 96 %  SpO2/FiO2 ratio: 174.55  PaO2/FiO2 ratio: 196  Sensitivity: 3  PEEP/CPAP: 10  I Time/ I Time %: 0 s  Humidification Source: Heated wire  Humidification Temp: 37  Humidification Temp Measured: 37  Circuit Condensation: Drained  Mask Type: Full face mask  Mask Size: Medium       IPAP: 14 cmH20  CPAP/EPAP: 8 cmH2O     CURRENT MEDS :  Scheduled Meds:   furosemide  40 mg IntraVENous BID    [START ON 2/3/2022] lansoprazole  30 mg Per NG tube QAM AC    midodrine  5 mg Per NG tube TID WC    02/02/2022     Lab Results   Component Value Date     02/02/2022    K 3.6 02/02/2022    K 3.8 01/18/2022     02/02/2022    CO2 35 02/02/2022    BUN 23 02/02/2022    CREATININE 0.5 02/02/2022    LABALBU 2.4 01/29/2022    CALCIUM 8.9 02/02/2022    GFRAA >60 02/02/2022    LABGLOM >60 02/02/2022     Lab Results   Component Value Date    PROTIME 11.3 01/14/2022    INR 1.0 01/14/2022     No results for input(s): PROBNP in the last 72 hours. No results for input(s): PROCAL in the last 72 hours. This SmartLink has not been configured with any valid records. Micro:  No results for input(s): CULTRESP in the last 72 hours. No results for input(s): LABGRAM in the last 72 hours. No results for input(s): LEGUR in the last 72 hours. No results for input(s): STREPNEUMAGU in the last 72 hours. No results for input(s): LP1UAG in the last 72 hours. Assessment:    1. Acute respiratory failure with hypoxia  2. Severe COVID-19 pneumonia  3. Superimposed bacterial pneumonia  4. Septic shock, resolved  5. Asthma without acute exacerbation  6. Anemia   7. SAMUEL, resolved      Plan:   1. Full vent support with low tidal volume ventilation. Will likely need trach and PEG. Remains supine as tolerated FiO2  55% improving PF ratio 174, Pplat 25  2. Follow daily chest x-ray, ABGs  3. Taper Solu-Cortef per ICU team 50 mg IV daily  4. Diuresis with Lasix, repeated diamox today nephrology following  5. Scheduled bronchodilators-DuoNebs every 4 hours while awake  6. Completed empiric antibiotics  7. DVT, GI, VAP prophylaxis  Remainder of care per ICU team appreciated  This plan of care was reviewed in collaboration with Dr. Ugo Cox  Electronically signed by MAUREEN Cosby CNP on 2/2/2022 at 3:57 PM       I personally saw, examined, and cared for the patient. Labs, medications, radiographs reviewed.  I agree with history exam and plans detailed in NP note with the following additions:    Vent weaning as able  Consider stopping the paralytic at this point  icu care    Mena Alonzo MD

## 2022-02-02 NOTE — PROCEDURES
Central Line Insertion     Procedure: right internal jugular vein triple lumen catheter placement. Indications: vascular access    Consent: Unable to be obtained due to the emergent nature of this procedure. Number of sticks: [id number]  Department of Pulmonary, Critical Care and Sleep Medicine  [address] [facility]  Department of Internal Medicine  Attending Procedural Note Addendum Statement    This procedure was supervised. The critical elements of this procedure was monitored and, if needed, I was available for the needs of the procedure. Pito yLles DO, FCCP, FACOI, FACP        Number of Kits used: 1    Procedure: Time Out: Immediately prior to the procedure a \"timeout\" was called to verify the correct patient and procedure. The patient was place in the trendelenburg position and the skin over the right internal jugular vein was prepped with betadine and draped in a sterile fashion. Local anesthesia was obtained by infiltration using 1% Lidocaine without epinephrine. With ultrasound guidance a large bore needle was used to identify the vein, dark non pulsatile blood returned. The guide wire was then inserted through the needle with minimal resistance. 2 mm nick was made in the skin beside the guidewire. Then a dilator was inserted and removed. A triple lumen catheter was then inserted into the vessel over the guide wire using the Seldinger technique to the 15 cm thomas. All ports showed good, free flowing blood return and were flushed with saline solution. The catheter was then securely fastened to the skin with sutures and covered with a bio patch and sterile dressing. A post procedure X-ray was ordered and showed no evidence of pneumothorax. Complications: None   The patient tolerated the procedure well. Estimated blood loss: 2 ml.     Jv Crews MD PGY-1  2/2/2022  4:58 AM

## 2022-02-02 NOTE — PROGRESS NOTES
Physician Progress Note      Emeka Haq  CSN #:                  [id number]  :                       1955  ADMIT DATE:       2022 5:11 PM  [address] [place] DATE:  RESPONDING  PROVIDER #:        Adeline Ortiz MD          QUERY TEXT:    Pt admitted with Covid 19 pna. Pt noted to have UTI, and patient stated PCP   was treating. If possible, please document in progress notes and discharge   summary the present on admission status of UTI:    The medical record reflects the following:  Risk Factors: UTI  Clinical Indicators:  consult note \"states her primary care physician   treated her with ERIC CONTRERAS for possible UTI but did not receive any other   antibiotics or steroids for her COVID symptoms. \" Urine Cx  Candida   albicans >100,000. Admit Temp 100.8 admit, , RR 22  Treatment: Rocephin and azithromycin, and Diflucan Zosyn upon Cx results    Thank You,  Silas CRUZN, CRCR, RN, CDS  CDI  [email or web address]  Options provided:  -- Yes, UTI was present at the time of the order to admit to the hospital  -- No, UTI was not present on admission and developed during the inpatient   stay  -- Other - I will add my own diagnosis  -- Disagree - Not applicable / Not valid  -- Disagree - Clinically unable to determine / Unknown  -- Refer to Clinical Documentation Reviewer    PROVIDER RESPONSE TEXT:    Provider is clinically unable to determine a response to this query.     Query created by: Traci Greenberg on 2022 7:28 AM      Electronically signed by:  Adeline Ortiz MD 2022 2:12 PM

## 2022-02-02 NOTE — PROGRESS NOTES
Comprehensive Nutrition Assessment    Type and Reason for Visit:  Reassess    Nutrition Recommendations/Plan: Continue NPO. Trickle feeds d/t re-feeding syndrome. Goal Rec if/ when needed:    Standard with fiber (Jevity 1.5) @ 30 ml/hr + 1 protein modular  Will provide: 720 ml tv, 1080 kcals, 46 gm pro (1180 kcals & 72 gm pro w/ mod), 547 ml free water    *Current hypokalemia/ hypophosphatemia, replace prn     Nutrition Assessment:  Pt remains at nutritional risk d/t ongoing intubation 2/2 +COVID-19 PNA/ intermittent prone. Noted SAMUEL resolved. Minimal nutrition x ~7 days, EN support continued at trickle rate. Family declined trach/PEG. Will provide goal rec if needed. Malnutrition Assessment:  Malnutrition Status:   At risk for malnutrition  Context:  Acute Illness     Findings of the 6 clinical characteristics of malnutrition:  Energy Intake:  50% or less of estimated energy requirements for 5 or more days  Weight Loss:  Unable to assess (no wt hx on file)     Body Fat Loss:  Unable to assess (d/t covid iso)     Muscle Mass Loss:  Unable to assess (d/t covid iso)    Fluid Accumulation:  No significant fluid accumulation     Strength:  Not Performed    Estimated Daily Nutrient Needs:  Energy (kcal):  PS3B 946; ~ 1000; Weight Used for Energy Requirements:  Admission     Protein (g):  70-90; Weight Used for Protein Requirements:  Admission (1.5-1.8 g/kg)        Fluid (ml/day):  per critical care    Nutrition Related Findings:  Pt remains intubated/sedated, intermittent paralytic/ prone, MAP WNL, fluid bal WNL, +1 edema, active BS, diarrhea, NGT w/ trickle feeds      Wounds:  None       Current Nutrition Therapies:    Current Tube Feeding (TF) Orders:  · Feeding Route: Nasogastric  · Formula: Standard with Fiber  · Schedule: Continuous (10 ml/hr, running per doc flow)  · Water Flushes: 30 ml q 3 hr    Anthropometric Measures:  · Height: 5' (152.4 cm)  · Current Body Weight: 109 lb (49.4 kg) (1/14 admit wt as CBW elevated)   · Admission Body Weight: 109 lb (49.4 kg) (1/14 first measured)    · Usual Body Weight: 131 lb (59.4 kg) (measured @ [facility] 10/2020, no recent wt hx on file)     · Ideal Body Weight: 100 lbs; % Ideal Body Weight 109 %   · BMI: 21.3 BMI Categories: Underweight (BMI less than 22) age over 72       Nutrition Diagnosis:   · Inadequate oral intake related to impaired respiratory function as evidenced by NPO or clear liquid status due to medical condition,intubation,nutrition support - enteral nutrition    Nutrition Interventions:   Nutrition Education/Counseling:  Education not appropriate   Coordination of Nutrition Care:  Continue to monitor while inpatient    Goals:  Advance TF to goal/ tolerate       Nutrition Monitoring and Evaluation:   Food/Nutrient Intake Outcomes:  Diet Advancement/Tolerance,Enteral Nutrition Intake/Tolerance  Physical Signs/Symptoms Outcomes:  Biochemical Data,Nutrition Focused Physical Findings,Skin,Weight,Diarrhea,Nausea or Vomiting,Fluid Status or Edema,Hemodynamic Status     Discharge Planning:     Too soon to determine     Electronically signed by Braeden Moreno RD, LD on 2/2/22 at 9:50 AM EST    Contact: Ext [phone]

## 2022-02-03 ENCOUNTER — APPOINTMENT (OUTPATIENT)
Dept: GENERAL RADIOLOGY | Age: 67
DRG: 207 | End: 2022-02-03
Payer: MEDICARE

## 2022-02-03 ENCOUNTER — APPOINTMENT (OUTPATIENT)
Dept: ULTRASOUND IMAGING | Age: 67
DRG: 207 | End: 2022-02-03
Payer: MEDICARE

## 2022-02-03 LAB
AADO2: 333.8 MMHG
AADO2: 334.1 MMHG
ANION GAP SERPL CALCULATED.3IONS-SCNC: 11 MMOL/L (ref 7–16)
ANION GAP SERPL CALCULATED.3IONS-SCNC: 9 MMOL/L (ref 7–16)
ANION GAP SERPL CALCULATED.3IONS-SCNC: 9 MMOL/L (ref 7–16)
B.E.: 9.2 MMOL/L (ref -3–3)
B.E.: 9.9 MMOL/L (ref -3–3)
BACTERIA: ABNORMAL /HPF
BILIRUBIN URINE: NEGATIVE
BLOOD CULTURE, ROUTINE: NORMAL
BLOOD, URINE: ABNORMAL
BUN BLDV-MCNC: 22 MG/DL (ref 6–23)
BUN BLDV-MCNC: 23 MG/DL (ref 6–23)
BUN BLDV-MCNC: 23 MG/DL (ref 6–23)
C-REACTIVE PROTEIN: 9.9 MG/DL (ref 0–0.4)
CALCIUM IONIZED: 1.28 MMOL/L (ref 1.15–1.33)
CALCIUM SERPL-MCNC: 9.2 MG/DL (ref 8.6–10.2)
CHLORIDE BLD-SCNC: 96 MMOL/L (ref 98–107)
CHLORIDE BLD-SCNC: 96 MMOL/L (ref 98–107)
CHLORIDE BLD-SCNC: 97 MMOL/L (ref 98–107)
CLARITY: CLEAR
CO2: 32 MMOL/L (ref 22–29)
CO2: 34 MMOL/L (ref 22–29)
CO2: 35 MMOL/L (ref 22–29)
COHB: 0.4 % (ref 0–1.5)
COHB: 0.6 % (ref 0–1.5)
COLOR: YELLOW
CREAT SERPL-MCNC: 0.5 MG/DL (ref 0.5–1)
CRITICAL: ABNORMAL
CRITICAL: ABNORMAL
CULTURE, BLOOD 2: NORMAL
D DIMER: 866 NG/ML DDU
DATE ANALYZED: ABNORMAL
DATE ANALYZED: ABNORMAL
DATE OF COLLECTION: ABNORMAL
DATE OF COLLECTION: ABNORMAL
FIO2: 70 %
FIO2: 70 %
GFR AFRICAN AMERICAN: >60
GFR NON-AFRICAN AMERICAN: >60 ML/MIN/1.73
GLUCOSE BLD-MCNC: 117 MG/DL (ref 74–99)
GLUCOSE BLD-MCNC: 120 MG/DL (ref 74–99)
GLUCOSE BLD-MCNC: 130 MG/DL (ref 74–99)
GLUCOSE URINE: NEGATIVE MG/DL
HCO3: 34.7 MMOL/L (ref 22–26)
HCO3: 35.7 MMOL/L (ref 22–26)
HCT VFR BLD CALC: 30.2 % (ref 34–48)
HEMOGLOBIN: 9.4 G/DL (ref 11.5–15.5)
HHB: 2.6 % (ref 0–5)
HHB: 4.2 % (ref 0–5)
KETONES, URINE: NEGATIVE MG/DL
LAB: ABNORMAL
LAB: ABNORMAL
LEUKOCYTE ESTERASE, URINE: ABNORMAL
Lab: ABNORMAL
Lab: ABNORMAL
MAGNESIUM: 2.1 MG/DL (ref 1.6–2.6)
MCH RBC QN AUTO: 30.4 PG (ref 26–35)
MCHC RBC AUTO-ENTMCNC: 31.1 % (ref 32–34.5)
MCV RBC AUTO: 97.7 FL (ref 80–99.9)
METER GLUCOSE: 120 MG/DL (ref 74–99)
METER GLUCOSE: 120 MG/DL (ref 74–99)
METER GLUCOSE: 121 MG/DL (ref 74–99)
METER GLUCOSE: 131 MG/DL (ref 74–99)
METER GLUCOSE: 141 MG/DL (ref 74–99)
METER GLUCOSE: 146 MG/DL (ref 74–99)
METHB: 0.3 % (ref 0–1.5)
METHB: 0.4 % (ref 0–1.5)
MODE: AC
MODE: AC
NITRITE, URINE: NEGATIVE
O2 CONTENT: 13.6 ML/DL
O2 CONTENT: 14.5 ML/DL
O2 SATURATION: 95.8 % (ref 92–98.5)
O2 SATURATION: 97.4 % (ref 92–98.5)
O2HB: 94.8 % (ref 94–97)
O2HB: 96.7 % (ref 94–97)
OPERATOR ID: 2021
OPERATOR ID: 2593
PATIENT TEMP: 37 C
PATIENT TEMP: 37 C
PCO2: 48.2 MMHG (ref 35–45)
PCO2: 59.4 MMHG (ref 35–45)
PDW BLD-RTO: 14 FL (ref 11.5–15)
PEEP/CPAP: 10 CMH2O
PEEP/CPAP: 12 CMH2O
PFO2: 1.2 MMHG/%
PFO2: 1.37 MMHG/%
PH BLOOD GAS: 7.4 (ref 7.35–7.45)
PH BLOOD GAS: 7.47 (ref 7.35–7.45)
PH UA: 7.5 (ref 5–9)
PHOSPHORUS: 2.8 MG/DL (ref 2.5–4.5)
PLATELET # BLD: 301 E9/L (ref 130–450)
PMV BLD AUTO: 11.8 FL (ref 7–12)
PO2: 83.7 MMHG (ref 75–100)
PO2: 96 MMHG (ref 75–100)
POTASSIUM SERPL-SCNC: 3.5 MMOL/L (ref 3.5–5)
POTASSIUM SERPL-SCNC: 4.2 MMOL/L (ref 3.5–5)
POTASSIUM SERPL-SCNC: 4.9 MMOL/L (ref 3.5–5)
PROCALCITONIN: 0.57 NG/ML (ref 0–0.08)
PROTEIN UA: 30 MG/DL
RBC # BLD: 3.09 E12/L (ref 3.5–5.5)
RBC UA: ABNORMAL /HPF (ref 0–2)
RI(T): 3.48
RI(T): 3.99
RR MECHANICAL: 20 B/MIN
RR MECHANICAL: 20 B/MIN
SODIUM BLD-SCNC: 138 MMOL/L (ref 132–146)
SODIUM BLD-SCNC: 140 MMOL/L (ref 132–146)
SODIUM BLD-SCNC: 141 MMOL/L (ref 132–146)
SOURCE, BLOOD GAS: ABNORMAL
SOURCE, BLOOD GAS: ABNORMAL
SPECIFIC GRAVITY UA: 1.01 (ref 1–1.03)
THB: 10.8 G/DL (ref 11.5–16.5)
THB: 9.9 G/DL (ref 11.5–16.5)
TIME ANALYZED: 1522
TIME ANALYZED: 437
UROBILINOGEN, URINE: 4 E.U./DL
VT MECHANICAL: 300 ML
VT MECHANICAL: 300 ML
WBC # BLD: 16.9 E9/L (ref 4.5–11.5)
WBC UA: ABNORMAL /HPF (ref 0–5)

## 2022-02-03 PROCEDURE — 87205 SMEAR GRAM STAIN: CPT

## 2022-02-03 PROCEDURE — 94003 VENT MGMT INPAT SUBQ DAY: CPT

## 2022-02-03 PROCEDURE — 36592 COLLECT BLOOD FROM PICC: CPT

## 2022-02-03 PROCEDURE — 36415 COLL VENOUS BLD VENIPUNCTURE: CPT

## 2022-02-03 PROCEDURE — 2580000003 HC RX 258: Performed by: INTERNAL MEDICINE

## 2022-02-03 PROCEDURE — 80048 BASIC METABOLIC PNL TOTAL CA: CPT

## 2022-02-03 PROCEDURE — 87106 FUNGI IDENTIFICATION YEAST: CPT

## 2022-02-03 PROCEDURE — 2580000003 HC RX 258: Performed by: STUDENT IN AN ORGANIZED HEALTH CARE EDUCATION/TRAINING PROGRAM

## 2022-02-03 PROCEDURE — 82962 GLUCOSE BLOOD TEST: CPT

## 2022-02-03 PROCEDURE — 2000000000 HC ICU R&B

## 2022-02-03 PROCEDURE — 6370000000 HC RX 637 (ALT 250 FOR IP): Performed by: INTERNAL MEDICINE

## 2022-02-03 PROCEDURE — 86140 C-REACTIVE PROTEIN: CPT

## 2022-02-03 PROCEDURE — 6360000002 HC RX W HCPCS: Performed by: INTERNAL MEDICINE

## 2022-02-03 PROCEDURE — 87070 CULTURE OTHR SPECIMN AEROBIC: CPT

## 2022-02-03 PROCEDURE — 2500000003 HC RX 250 WO HCPCS: Performed by: INTERNAL MEDICINE

## 2022-02-03 PROCEDURE — 71045 X-RAY EXAM CHEST 1 VIEW: CPT

## 2022-02-03 PROCEDURE — 2580000003 HC RX 258: Performed by: FAMILY MEDICINE

## 2022-02-03 PROCEDURE — 74018 RADEX ABDOMEN 1 VIEW: CPT

## 2022-02-03 PROCEDURE — 6360000002 HC RX W HCPCS: Performed by: STUDENT IN AN ORGANIZED HEALTH CARE EDUCATION/TRAINING PROGRAM

## 2022-02-03 PROCEDURE — 84145 PROCALCITONIN (PCT): CPT

## 2022-02-03 PROCEDURE — 87081 CULTURE SCREEN ONLY: CPT

## 2022-02-03 PROCEDURE — 85378 FIBRIN DEGRADE SEMIQUANT: CPT

## 2022-02-03 PROCEDURE — 6370000000 HC RX 637 (ALT 250 FOR IP): Performed by: FAMILY MEDICINE

## 2022-02-03 PROCEDURE — 6370000000 HC RX 637 (ALT 250 FOR IP)

## 2022-02-03 PROCEDURE — 93970 EXTREMITY STUDY: CPT

## 2022-02-03 PROCEDURE — 82330 ASSAY OF CALCIUM: CPT

## 2022-02-03 PROCEDURE — 87449 NOS EACH ORGANISM AG IA: CPT

## 2022-02-03 PROCEDURE — 6360000002 HC RX W HCPCS: Performed by: NURSE PRACTITIONER

## 2022-02-03 PROCEDURE — 84100 ASSAY OF PHOSPHORUS: CPT

## 2022-02-03 PROCEDURE — 83735 ASSAY OF MAGNESIUM: CPT

## 2022-02-03 PROCEDURE — 81001 URINALYSIS AUTO W/SCOPE: CPT

## 2022-02-03 PROCEDURE — 6370000000 HC RX 637 (ALT 250 FOR IP): Performed by: NURSE PRACTITIONER

## 2022-02-03 PROCEDURE — 94640 AIRWAY INHALATION TREATMENT: CPT

## 2022-02-03 PROCEDURE — 82805 BLOOD GASES W/O2 SATURATION: CPT

## 2022-02-03 PROCEDURE — 87102 FUNGUS ISOLATION CULTURE: CPT

## 2022-02-03 PROCEDURE — 93971 EXTREMITY STUDY: CPT | Performed by: RADIOLOGY

## 2022-02-03 PROCEDURE — 99233 SBSQ HOSP IP/OBS HIGH 50: CPT | Performed by: INTERNAL MEDICINE

## 2022-02-03 PROCEDURE — 85027 COMPLETE CBC AUTOMATED: CPT

## 2022-02-03 PROCEDURE — 87040 BLOOD CULTURE FOR BACTERIA: CPT

## 2022-02-03 PROCEDURE — 87206 SMEAR FLUORESCENT/ACID STAI: CPT

## 2022-02-03 RX ORDER — POTASSIUM CHLORIDE 29.8 MG/ML
40 INJECTION INTRAVENOUS ONCE
Status: COMPLETED | OUTPATIENT
Start: 2022-02-03 | End: 2022-02-03

## 2022-02-03 RX ORDER — LEVOFLOXACIN 5 MG/ML
750 INJECTION, SOLUTION INTRAVENOUS EVERY 24 HOURS
Status: COMPLETED | OUTPATIENT
Start: 2022-02-03 | End: 2022-02-07

## 2022-02-03 RX ORDER — ACETAZOLAMIDE 500 MG/5ML
250 INJECTION, POWDER, LYOPHILIZED, FOR SOLUTION INTRAVENOUS ONCE
Status: COMPLETED | OUTPATIENT
Start: 2022-02-03 | End: 2022-02-03

## 2022-02-03 RX ORDER — FUROSEMIDE 10 MG/ML
40 INJECTION INTRAMUSCULAR; INTRAVENOUS 2 TIMES DAILY
Status: COMPLETED | OUTPATIENT
Start: 2022-02-03 | End: 2022-02-04

## 2022-02-03 RX ADMIN — MINERAL OIL AND WHITE PETROLATUM: 150; 830 OINTMENT OPHTHALMIC at 03:10

## 2022-02-03 RX ADMIN — OXYCODONE HYDROCHLORIDE AND ACETAMINOPHEN 500 MG: 500 TABLET ORAL at 09:56

## 2022-02-03 RX ADMIN — MIDODRINE HYDROCHLORIDE 5 MG: 5 TABLET ORAL at 14:28

## 2022-02-03 RX ADMIN — IPRATROPIUM BROMIDE AND ALBUTEROL SULFATE 1 AMPULE: .5; 2.5 SOLUTION RESPIRATORY (INHALATION) at 16:25

## 2022-02-03 RX ADMIN — ACETAZOLAMIDE 250 MG: 500 INJECTION, POWDER, LYOPHILIZED, FOR SOLUTION INTRAVENOUS at 02:14

## 2022-02-03 RX ADMIN — POTASSIUM CHLORIDE 40 MEQ: 29.8 INJECTION, SOLUTION INTRAVENOUS at 14:57

## 2022-02-03 RX ADMIN — MIDAZOLAM 10 MG/HR: 5 INJECTION INTRAMUSCULAR; INTRAVENOUS at 00:42

## 2022-02-03 RX ADMIN — INSULIN LISPRO 1 UNITS: 100 INJECTION, SOLUTION INTRAVENOUS; SUBCUTANEOUS at 21:12

## 2022-02-03 RX ADMIN — LEVOFLOXACIN 750 MG: 5 INJECTION, SOLUTION INTRAVENOUS at 14:27

## 2022-02-03 RX ADMIN — Medication 175 MCG/HR: at 12:10

## 2022-02-03 RX ADMIN — Medication 30 ML: at 10:00

## 2022-02-03 RX ADMIN — MINERAL OIL AND WHITE PETROLATUM: 150; 830 OINTMENT OPHTHALMIC at 22:55

## 2022-02-03 RX ADMIN — MINERAL OIL AND WHITE PETROLATUM: 150; 830 OINTMENT OPHTHALMIC at 14:27

## 2022-02-03 RX ADMIN — Medication 10 ML: at 21:05

## 2022-02-03 RX ADMIN — ZINC SULFATE 220 MG (50 MG) CAPSULE 50 MG: CAPSULE at 09:56

## 2022-02-03 RX ADMIN — Medication 2000 UNITS: at 10:01

## 2022-02-03 RX ADMIN — METOCLOPRAMIDE HYDROCHLORIDE 5 MG: 5 INJECTION INTRAMUSCULAR; INTRAVENOUS at 17:48

## 2022-02-03 RX ADMIN — SENNOSIDES A AND B 5 ML: 415.36 LIQUID ORAL at 21:06

## 2022-02-03 RX ADMIN — MINERAL OIL AND WHITE PETROLATUM: 150; 830 OINTMENT OPHTHALMIC at 18:00

## 2022-02-03 RX ADMIN — NALOXEGOL OXALATE 12.5 MG: 12.5 TABLET, FILM COATED ORAL at 10:24

## 2022-02-03 RX ADMIN — POTASSIUM CHLORIDE 40 MEQ: 29.8 INJECTION, SOLUTION INTRAVENOUS at 00:00

## 2022-02-03 RX ADMIN — METOCLOPRAMIDE HYDROCHLORIDE 5 MG: 5 INJECTION INTRAMUSCULAR; INTRAVENOUS at 05:27

## 2022-02-03 RX ADMIN — MINERAL OIL AND WHITE PETROLATUM: 150; 830 OINTMENT OPHTHALMIC at 06:30

## 2022-02-03 RX ADMIN — POLYETHYLENE GLYCOL 3350 17 G: 17 POWDER, FOR SOLUTION ORAL at 09:56

## 2022-02-03 RX ADMIN — METOCLOPRAMIDE HYDROCHLORIDE 5 MG: 5 INJECTION INTRAMUSCULAR; INTRAVENOUS at 09:58

## 2022-02-03 RX ADMIN — MIDODRINE HYDROCHLORIDE 5 MG: 5 TABLET ORAL at 17:49

## 2022-02-03 RX ADMIN — ACETAMINOPHEN 650 MG: 650 SUPPOSITORY RECTAL at 12:41

## 2022-02-03 RX ADMIN — POLYETHYLENE GLYCOL 3350 17 G: 17 POWDER, FOR SOLUTION ORAL at 21:05

## 2022-02-03 RX ADMIN — Medication: at 21:06

## 2022-02-03 RX ADMIN — MIDODRINE HYDROCHLORIDE 5 MG: 5 TABLET ORAL at 09:55

## 2022-02-03 RX ADMIN — CEFEPIME HYDROCHLORIDE 2000 MG: 2 INJECTION, POWDER, FOR SOLUTION INTRAVENOUS at 15:58

## 2022-02-03 RX ADMIN — IPRATROPIUM BROMIDE AND ALBUTEROL SULFATE 1 AMPULE: .5; 2.5 SOLUTION RESPIRATORY (INHALATION) at 07:47

## 2022-02-03 RX ADMIN — MIDAZOLAM 10 MG/HR: 5 INJECTION INTRAMUSCULAR; INTRAVENOUS at 10:42

## 2022-02-03 RX ADMIN — CHLORHEXIDINE GLUCONATE 0.12% ORAL RINSE 15 ML: 1.2 LIQUID ORAL at 21:05

## 2022-02-03 RX ADMIN — LANSOPRAZOLE 30 MG: KIT at 05:39

## 2022-02-03 RX ADMIN — Medication 175 MCG/HR: at 05:26

## 2022-02-03 RX ADMIN — CHLORHEXIDINE GLUCONATE 0.12% ORAL RINSE 15 ML: 1.2 LIQUID ORAL at 09:58

## 2022-02-03 RX ADMIN — Medication: at 10:00

## 2022-02-03 RX ADMIN — DOCUSATE SODIUM 100 MG: 50 LIQUID ORAL at 21:05

## 2022-02-03 RX ADMIN — SENNOSIDES A AND B 5 ML: 415.36 LIQUID ORAL at 10:00

## 2022-02-03 RX ADMIN — Medication 150 MCG/HR: at 19:39

## 2022-02-03 RX ADMIN — MIDAZOLAM 8 MG/HR: 5 INJECTION INTRAMUSCULAR; INTRAVENOUS at 19:39

## 2022-02-03 RX ADMIN — ENOXAPARIN SODIUM 30 MG: 100 INJECTION SUBCUTANEOUS at 09:57

## 2022-02-03 RX ADMIN — METOCLOPRAMIDE HYDROCHLORIDE 5 MG: 5 INJECTION INTRAMUSCULAR; INTRAVENOUS at 22:55

## 2022-02-03 RX ADMIN — IPRATROPIUM BROMIDE AND ALBUTEROL SULFATE 1 AMPULE: .5; 2.5 SOLUTION RESPIRATORY (INHALATION) at 20:10

## 2022-02-03 RX ADMIN — ENOXAPARIN SODIUM 30 MG: 100 INJECTION SUBCUTANEOUS at 21:05

## 2022-02-03 RX ADMIN — VANCOMYCIN HYDROCHLORIDE 1000 MG: 1 INJECTION, POWDER, LYOPHILIZED, FOR SOLUTION INTRAVENOUS at 14:34

## 2022-02-03 RX ADMIN — ACETAMINOPHEN 650 MG: 325 TABLET ORAL at 21:04

## 2022-02-03 RX ADMIN — IPRATROPIUM BROMIDE AND ALBUTEROL SULFATE 1 AMPULE: .5; 2.5 SOLUTION RESPIRATORY (INHALATION) at 12:23

## 2022-02-03 RX ADMIN — MINERAL OIL AND WHITE PETROLATUM: 150; 830 OINTMENT OPHTHALMIC at 09:59

## 2022-02-03 RX ADMIN — FUROSEMIDE 40 MG: 10 INJECTION, SOLUTION INTRAMUSCULAR; INTRAVENOUS at 17:49

## 2022-02-03 RX ADMIN — DOCUSATE SODIUM 100 MG: 50 LIQUID ORAL at 09:56

## 2022-02-03 ASSESSMENT — PULMONARY FUNCTION TESTS
PIF_VALUE: 32
PIF_VALUE: 27
PIF_VALUE: 31
PIF_VALUE: 31
PIF_VALUE: 25
PIF_VALUE: 30
PIF_VALUE: 34
PIF_VALUE: 32
PIF_VALUE: 21
PIF_VALUE: 28
PIF_VALUE: 26
PIF_VALUE: 30
PIF_VALUE: 31
PIF_VALUE: 29
PIF_VALUE: 29
PIF_VALUE: 28
PIF_VALUE: 27
PIF_VALUE: 24
PIF_VALUE: 29
PIF_VALUE: 31
PIF_VALUE: 33
PIF_VALUE: 32
PIF_VALUE: 29

## 2022-02-03 ASSESSMENT — PAIN SCALES - GENERAL
PAINLEVEL_OUTOF10: 0

## 2022-02-03 NOTE — PROGRESS NOTES
Informed resident that pt's heart rate has been increasing since this morning at 0700. Pt HR was in the low 100's and has progressed into the 130's and 140's. Pt is febrile and appears dry with dried mucous membranes and skin tenting of the legs and arms. Received orders for abx, cultures, and abg.

## 2022-02-03 NOTE — PROGRESS NOTES
Wound care: Post prone assessment. No pressure injuries to face, ears, heels noted at this time. Heel medix boots in place with lift pad. Will need continued preventative care.  Justyn Warren RN

## 2022-02-03 NOTE — PROGRESS NOTES
Marleni Morejon M.D.,[facility]  Chloe Head, D.O., CALOSOTERENCE, Wilmar Jauregui M.D. Enriqueta Royal M.D. Beverly Ordaz D.O. Daily Pulmonary Progress Note    Patient:  Cintia Dickey 78 y.o. female MRN: [id number]     Date of Service: 2/3/2022      Synopsis     We are following patient for acute respiratory failure with hypoxia, severe COVID-19 pneumonia    \"CC\" altered mental state, shortness of breath    Code status: Full      Subjective      Patient was seen through window Case discussed with staff. Off paralytics remain supine. PF ratio 155. Intubated 16 days. Would consider trach and PEG placement soon. FiO2 60%. Low-grade temps with elevated WBCs today.       Review of Systems:  Unable to obtain    24-hour events:  None    Objective   Vitals: /63   Pulse 118   Temp 100 °F (37.8 °C) (Esophageal)   Resp 22   Ht 5' (1.524 m)   Wt 124 lb 11.2 oz (56.6 kg)   SpO2 93%   BMI 24.35 kg/m²     I/O:    Intake/Output Summary (Last 24 hours) at 2/3/2022 1311  Last data filed at 2/3/2022 1200  Gross per 24 hour   Intake 1758 ml   Output 4435 ml   Net -2677 ml       Vent Information  $Ventilation: $Subsequent Day  Skin Assessment: Clean, dry, & intact  Suction Catheter Diameter:  (16)  Equipment ID: [id number]  Equipment Changed: (S) Airway securing device  Vent Type: [id number]  Vent Mode: AC/VC+  Vt Ordered: 300 mL  Rate Set: 20 bmp  Peak Flow: 0 L/min  Pressure Support: 0 cmH20  FiO2 : 60 %  SpO2: 93 %  SpO2/FiO2 ratio: 155  PaO2/FiO2 ratio: 196  Sensitivity: 3  PEEP/CPAP: 12  I Time/ I Time %: 1 s  Humidification Source: Heated wire  Humidification Temp: 37  Humidification Temp Measured: 37.2  Circuit Condensation: Drained  Mask Type: Full face mask  Mask Size: Medium       IPAP: 14 cmH20  CPAP/EPAP: 8 cmH2O     CURRENT MEDS :  Scheduled Meds:   lansoprazole  30 mg Per NG tube QAM AC    midodrine  5 mg Per NG tube TID WC    naloxegol  12.5 mg Oral QAM    polyethylene glycol  17 g Oral BID    sennosides  5 mL Oral BID    metoclopramide  5 mg IntraVENous Q6H    insulin lispro  0-6 Units SubCUTAneous Q4H    enoxaparin  30 mg SubCUTAneous BID    docusate sodium  100 mg Oral BID    miconazole nitrate   Topical BID    chlorhexidine  15 mL Mouth/Throat BID    artificial tears   Both Eyes Q4H    ipratropium-albuterol  1 ampule Inhalation Q4H WA    sodium chloride flush  5-40 mL IntraVENous 2 times per day    ascorbic acid  500 mg Oral Daily    zinc sulfate  50 mg Oral Daily    vitamin D  2,000 Units Oral Daily       Physical Exam:  Due to the current efforts to prevent transmission of COVID-19 and also the need to preserve PPE for other caregivers, a face-to-face encounter with the patient was not performed. That being said, all relevant records and diagnostic tests were reviewed, including laboratory results and imaging. Please reference any relevant documentation elsewhere. Care will be coordinated with the primary service. Pertinent/ New Labs and Imaging Studies     Imaging Personally Reviewed:  Chest x-ray 2/3/2022  FINDINGS:   Endotracheal tube in good position at the level of the arch of the aorta. The   NG tube in the stomach.  The right internal jugular central venous catheter   tip in the SVC right atrial junction.       Heart has upper borderline size.       Extensive bilateral infiltrates are seen throughout both lungs more prominent   on the right-side extending from the superior to inferior aspect.       There is no pleural effusions.       There is no subcutaneous emphysema, pneumothorax or pneumomediastinum.           Impression   No significant changes since February 2nd.  Persistent bilateral infiltrates.    Endotracheal tube in good position.             ECHO  None    Labs:  Lab Results   Component Value Date    WBC 16.9 02/03/2022    HGB 9.4 02/03/2022    HCT 30.2 02/03/2022    MCV 97.7 02/03/2022    MCH 30.4 02/03/2022    MCHC 31.1 02/03/2022    RDW 14.0 02/03/2022    PLT 301 02/03/2022    MPV 11.8 02/03/2022     Lab Results   Component Value Date     02/03/2022    K 3.5 02/03/2022    K 3.8 01/18/2022    CL 96 02/03/2022    CO2 35 02/03/2022    BUN 23 02/03/2022    CREATININE 0.5 02/03/2022    LABALBU 2.4 01/29/2022    CALCIUM 9.2 02/03/2022    GFRAA >60 02/03/2022    LABGLOM >60 02/03/2022     Lab Results   Component Value Date    PROTIME 11.3 01/14/2022    INR 1.0 01/14/2022     No results for input(s): PROBNP in the last 72 hours. Recent Labs     02/03/22  0406   PROCAL 0.57*     This SmartLink has not been configured with any valid records. Micro:  No results for input(s): CULTRESP in the last 72 hours. No results for input(s): LABGRAM in the last 72 hours. No results for input(s): LEGUR in the last 72 hours. No results for input(s): STREPNEUMAGU in the last 72 hours. No results for input(s): LP1UAG in the last 72 hours. Assessment:    1. Acute respiratory failure with hypoxia  2. Severe COVID-19 pneumonia  3. Superimposed bacterial pneumonia  4. Septic shock, resolved  5. Asthma without acute exacerbation  6. Anemia   7. SAMUEL, resolved  8. Leukocytosis    Plan:   1. Full vent support with lung protective low tidal volume ventilation 300 ml. Remains supine as tolerated FiO2  60% PF ratio 155, Pplat 30  2. Follow daily chest x-ray, ABGs  3. Diuresis with Lasix, repeated diamox today nephrology following  4. Scheduled bronchodilators-DuoNebs every 4 hours while awake  5. Monitor for signs of infection worsening fevers, worsening WBCs 16.9. Blood cultures pending. Fungitell neg. 1/29/2022  6. Completed empiric antibiotics. Procalcitonin today 0.57  7. Intubated 16 days, remains supine off paralytics. consider surgery eval for trache and peg tube.   8. DVT, GI, VAP prophylaxis  Remainder of care per ICU team appreciated  This plan of care was reviewed in collaboration with Dr. Darci Harper  Electronically signed by MAUREEN Corea CNP on 2/3/2022 at 1:11 PM     I personally saw, examined, and cared for the patient. Labs, medications, radiographs reviewed.  I agree with history exam and plans detailed in NP note with the following additions:    Off paralytic  FiO2 60%  Remains sedated  Over 2 weeks intubated  Will need trache/PEG    Omar Moses MD

## 2022-02-03 NOTE — PROGRESS NOTES
Department of Internal Medicine  Nephrology  Progress Note      Events Reviewed. Intubated, supine. SUBJECTIVE:  We are following Ms. Gloria Rodriguez for SAMUEL. Patient is intubated and supine. Received Diamox 250 mg last night. PHYSICAL EXAM:    Vitals:    VITALS:  /66   Pulse 115   Temp 100 °F (37.8 °C) (Esophageal)   Resp 19   Ht 5' (1.524 m)   Wt 124 lb 11.2 oz (56.6 kg)   SpO2 96%   BMI 24.35 kg/m²   24HR INTAKE/OUTPUT:      Intake/Output Summary (Last 24 hours) at 2/3/2022 1039  Last data filed at 2/3/2022 0526  Gross per 24 hour   Intake 1758 ml   Output 4760 ml   Net -3002 ml     General appearance: Patient is intubated, sedated  HEENT: Normal cephalic, atraumatic without obvious deformity. Pupils equal, round, and reactive to light. Endotracheal tube in place  Neck: Supple, with full range of motion. No jugular venous distention. Trachea midline. Respiratory: Diminished bilaterally, intubated. Cardiovascular: RRR, no murmur noted  Abdomen: Soft, nontender, nondistended, flat  Musculoskeletal: No clubbing or cyanosis. No edema of bilateral lower extremities. Brisk capillary refill. Skin: Normal skin color.  No rashes. Scattered ecchymosis.   Neurologic: Patient sedated        Scheduled Meds:   lansoprazole  30 mg Per NG tube QAM AC    midodrine  5 mg Per NG tube TID WC    naloxegol  12.5 mg Oral QAM    polyethylene glycol  17 g Oral BID    sennosides  5 mL Oral BID    metoclopramide  5 mg IntraVENous Q6H    insulin lispro  0-6 Units SubCUTAneous Q4H    enoxaparin  30 mg SubCUTAneous BID    docusate sodium  100 mg Oral BID    miconazole nitrate   Topical BID    chlorhexidine  15 mL Mouth/Throat BID    artificial tears   Both Eyes Q4H    ipratropium-albuterol  1 ampule Inhalation Q4H WA    sodium chloride flush  5-40 mL IntraVENous 2 times per day    ascorbic acid  500 mg Oral Daily    zinc sulfate  50 mg Oral Daily    vitamin D  2,000 Units Oral Daily     Continuous Infusions:   [Held by provider] cisatracurium (NIMBEX) infusion 3.5 mcg/kg/min (02/01/22 2158)    fentaNYL 5 mcg/ml in D5W 250 ml infusion 175 mcg/hr (02/03/22 0526)    midazolam 10 mg/hr (02/03/22 0042)    sodium chloride      dextrose       PRN Meds:.fentanNYL, midazolam, sodium chloride flush, sodium chloride, acetaminophen **OR** acetaminophen, glucose, dextrose, glucagon (rDNA), dextrose    DATA:    CBC with Differential:    Lab Results   Component Value Date    WBC 16.9 02/03/2022    RBC 3.09 02/03/2022    HGB 9.4 02/03/2022    HCT 30.2 02/03/2022     02/03/2022    MCV 97.7 02/03/2022    MCH 30.4 02/03/2022    MCHC 31.1 02/03/2022    RDW 14.0 02/03/2022    METASPCT 0.9 01/18/2022    LYMPHOPCT 2.7 01/18/2022    MONOPCT 11.6 01/18/2022    MYELOPCT 0.9 01/14/2022    BASOPCT 0.1 01/18/2022    MONOSABS 1.38 01/18/2022    LYMPHSABS 0.35 01/18/2022    EOSABS 0.00 01/18/2022    BASOSABS 0.00 01/18/2022     CMP:    Lab Results   Component Value Date     02/03/2022    K 4.2 02/03/2022    K 3.8 01/18/2022    CL 96 02/03/2022    CO2 34 02/03/2022    BUN 23 02/03/2022    CREATININE 0.5 02/03/2022    GFRAA >60 02/03/2022    LABGLOM >60 02/03/2022    GLUCOSE 130 02/03/2022    PROT 5.6 01/29/2022    LABALBU 2.4 01/29/2022    CALCIUM 9.2 02/03/2022    BILITOT 0.2 01/29/2022    ALKPHOS 97 01/29/2022    AST 16 01/29/2022    ALT 16 01/29/2022     Magnesium:    Lab Results   Component Value Date    MG 2.1 02/03/2022     Phosphorus:    Lab Results   Component Value Date    PHOS 2.8 02/03/2022          Radiology Review:    Chest X-ray January 30, 2022   Stable to slightly improved aeration of the lungs bilaterally with persistent   patchy bilateral airspace opacities, in keeping with the patient's known   diagnosis of COVID pneumonia.             CXR 1/31/2022   1. There is no interval change in the multifocal bilateral pneumonia. 2. There is no pneumothorax or pneumomediastinum.           CXR 2/1/2022   1. Extensive bilateral multifocal pneumonia.  No significant change. 2. Vertical linear shadow over the lateral aspect of the right chest, most   likely skin fold.         CXR 2/2/2022   No pneumothorax following line placement.       Right greater than left parenchymal infiltrates similar to subtly improved. Continued follow-up recommended.         CXR 2/3/2022   No significant changes since February 2nd.  Persistent bilateral infiltrates. Endotracheal tube in good position.             BRIEF SUMMARY OF INITIAL CONSULT:     Briefly, Ms. Kaleigh Benedict is a 78year old female with a past medical history of Asthma who presented to the ER on January 14 th, 2022 with complaints of SOB and diarrhea for one week. She is not vaccinated against COVID. Reportedly her daughter found her curled into a fetal position in respiratory distress at home and EMS was summoned. She was found to be COVID 19 positive. Labs were significant for bicarbonate 19, BUN 38, creatinine 1.9, anion gap 19, procalcitonin 1.57, CRP 12.8, , mildly elevated LFTs, D-dimer 404, ferritin 5098. Chest x-ray showed bilateral airspace disease. Renal is consulted for SAMUEL. Problems Resolved:     · SAMUEL, likely pre-renal volume responsive SAMUEL secondary to poor oral intake and GI losses (diarrhea). Creatinine 1.9mg/dL on admission. Renal function has improved to 0.6 mg/dL with IVF administration. · HAGMA with low bicarbonate levels, secondary to uremia. To continue bicarbonate drip  · hypernatremia, with water deficit, dehydration due to poor intake. Sodium levels quite improved. Resolved. · Hypophosphatemia, 2/2 poor intake, to replace  · Hypocalcemia, vitamin D 25 level 39, calcium levels 8.4- improved  · Low grade temp--> pancultures sent. Resolved    IMPRESSION/RECOMMENDATIONS:        1. Respiratory acidosis 7.397, pCO2 59.4, bicarb 35.7 (contraction alkalosis from diuretic use)  2.  Hypokalemia 2/2 renal wasting with diuretics  3. ----------------------------------------  4. COVID +, on hydrocortisone   5. Acute hypoxic respiratory failure, secondary to COVID pneumonia. Status post intubation  6. Hypotension, midodrine 5 mg TID. 7. Normocytic anemia, hemoglobin 9.4. stable. 8. Nutrition, receiving tube feeds at 10 mL an hour with free water flush 30 cc every 3 hours      Plan:    · 40 mg Lasix BID today  · Reduce midodrine 5 mg TID.    · Replace potassium with 80 mEq  · Continue to monitor phos and magnesium levels  · Obtain ionized calcium daily      Electronically signed by Tara Wang on 2/3/2022 at 10:39 AM  Agree as annotated   Discussed with MICU staff and LEXI Carrillo MD

## 2022-02-03 NOTE — PROGRESS NOTES
HOSPITALIST PROGRESS NOTE  Date: 2/3/2022   Name: Suleiman Le   MRN: [id number] endocrine:  YOB: 1955        Hospital Course:   Nehal Perez is B 48 y.o. year old female who has a PMH of asthma.   She presented to the ER on 1/14/22 with complaints of SOB and diarrhea x 1 week.  She was not vaccinated against COVID-19.  Reportedly her daughter found her curled into the fetal position in respiratory distress at home and called 911.     Subjective/Interval Hx:   Patient remains intubated sedated in intensive care unit  Objective:   Physical Exam:   /63   Pulse 118   Temp 100 °F (37.8 °C) (Esophageal)   Resp 22   Ht 5' (1.524 m)   Wt 124 lb 11.2 oz (56.6 kg)   SpO2 93%   BMI 24.35 kg/m²   General: no acute distress, well nourished and well hydrated  HEENT: NCAT  Heart: S1S2 RRR  Lungs: Clear to ascultation bilaterally, respiratory effort normal  Abdomen: soft, NT/ND, positive bowel sounds  Extremities: no pitting edema, nontender   Neuro: patient is awake, alert and orientated times 3, no gross deficits  Skin: no rashes or ecchymosis        Meds:   Meds:    cefepime  2,000 mg IntraVENous Q12H    levofloxacin  750 mg IntraVENous Q24H    vancomycin  1,000 mg IntraVENous Q12H    potassium chloride  40 mEq IntraVENous Once    lansoprazole  30 mg Per NG tube QAM AC    midodrine  5 mg Per NG tube TID WC    naloxegol  12.5 mg Oral QAM    polyethylene glycol  17 g Oral BID    sennosides  5 mL Oral BID    metoclopramide  5 mg IntraVENous Q6H    insulin lispro  0-6 Units SubCUTAneous Q4H    enoxaparin  30 mg SubCUTAneous BID    docusate sodium  100 mg Oral BID    miconazole nitrate   Topical BID    chlorhexidine  15 mL Mouth/Throat BID    artificial tears   Both Eyes Q4H    ipratropium-albuterol  1 ampule Inhalation Q4H WA    sodium chloride flush  5-40 mL IntraVENous 2 times per day    ascorbic acid  500 mg Oral Daily    zinc sulfate  50 mg Oral Daily    vitamin D  2,000 Units Oral Daily      Infusions:    [Held by provider] cisatracurium (NIMBEX) infusion 3.5 mcg/kg/min (02/01/22 2158)    fentaNYL 5 mcg/ml in D5W 250 ml infusion 175 mcg/hr (02/03/22 1210)    midazolam 10 mg/hr (02/03/22 1042)    sodium chloride      dextrose       PRN Meds: fentanNYL, 50 mcg, Q2H PRN  midazolam, 2 mg, Q2H PRN  sodium chloride flush, 5-40 mL, PRN  sodium chloride, 25 mL, PRN  acetaminophen, 650 mg, Q6H PRN   Or  acetaminophen, 650 mg, Q6H PRN  glucose, 15 g, PRN  dextrose, 12.5 g, PRN  glucagon (rDNA), 1 mg, PRN  dextrose, 100 mL/hr, PRN        Data/Labs:     Recent Labs     02/01/22  0427 02/02/22  0500 02/03/22  0406   WBC 13.7* 13.6* 16.9*   HGB 7.9* 8.1* 9.4*   HCT 25.7* 26.0* 30.2*    239 301      Recent Labs     02/02/22  0500 02/02/22  1343 02/02/22  1754 02/03/22  0200 02/03/22  0406 02/03/22  0950      < > 145 141  --  140   K 3.0*   < > 2.8* 4.2  --  3.5   CL 96*   < > 99 96*  --  96*   CO2 34*   < > 37* 34*  --  35*   PHOS 2.4*  --  2.8  --  2.8  --    BUN 24*   < > 23 23  --  23   CREATININE 0.5   < > 0.5 0.5  --  0.5    < > = values in this interval not displayed. No results for input(s): AST, ALT, ALB, BILIDIR, BILITOT, ALKPHOS in the last 72 hours. No results for input(s): INR in the last 72 hours. No results for input(s): CKTOTAL, CKMB, CKMBINDEX, TROPONINT in the last 72 hours. I/O last 3 completed shifts: In: 9021 [I.V.:2891; NG/GT:758; IV Piggyback:200]  Out: 6280 [Urine:6280]    Intake/Output Summary (Last 24 hours) at 2/3/2022 1415  Last data filed at 2/3/2022 1200  Gross per 24 hour   Intake 1758 ml   Output 4435 ml   Net -2677 ml        Assessment/Plan:   1.  Acute respiratory hypoxic failure due to Covid viral pneumonia-patient is unvaccinated with symptomatic x1 week prior to presentation, steroids, vitamins, pulmonary is following  02/02/2020-patient remains intubated sedated in the ICU we will continue to monitor with critical care team  2. Non-insulin-dependent diabetes mellitus-sliding scale, monitor blood glucose, diabetic diet  3. GERD-Protonix, Reglan  4. COPD-DuoNeb, monitor pulmonary toiletry      DVT Prophylaxis: Eliquis  Diet: Diet NPO  ADULT TUBE FEEDING; Nasogastric; Standard with Fiber; Continuous; 10; No; 30; Q 3 hours  Code Status: DNR-CCA    Dispo:  When stable    Electronically signed by Alma Palacio MD on 2/3/2022 at 2:15 PM  [facility]

## 2022-02-03 NOTE — PROGRESS NOTES
[address] [facility]   Department of Internal Medicine   Internal Medicine Residency  MICU Progress Note    Patient:  Kaleigh Benedict 78 y.o. female   MRN: [id number]       Date of Service: 2/3/2022    Allergy: Patient has no known allergies. Subjective     Patient was seen and examined this morning at bedside in no acute distress. Overnight, patient was breathing at 35 breaths per minute, so versed 2mg push given x once. This did not resolve the tachypnea, so versed drip was increased to 10. ABG showed bicarb of 33.6 so diamox 250 given x once. P/f ratio improved to 166 after vent changes, so patient kept supine overnight. This morning, patient is sedated and supine, intubated. Objective     TEMPERATURE:  Current - Temp: 100 °F (37.8 °C); Max - Temp  Av.1 °F (37.8 °C)  Min: 99.2 °F (37.3 °C)  Max: 101.3 °F (38.5 °C)  RESPIRATIONS RANGE: Resp  Av.5  Min: 18  Max: 36  PULSE RANGE: Pulse  Av.8  Min: 77  Max: 118  BLOOD PRESSURE RANGE:  Systolic (11NZQ), LACY:587 , Min:101 , TBA:367   ; Diastolic (90GGD), YDN:65, Min:52, Max:88    PULSE OXIMETRY RANGE: SpO2  Av.4 %  Min: 90 %  Max: 97 %    I & O - 24hr:    Intake/Output Summary (Last 24 hours) at 2/3/2022 0812  Last data filed at 2/3/2022 0526  Gross per 24 hour   Intake 1758 ml   Output 4760 ml   Net -3002 ml     I/O last 3 completed shifts: In: 9687 [I.V.:2891; NG/GT:758; IV Piggyback:200]  Out: 6280 [Urine:6280] No intake/output data recorded. Weight change: -3 lb 3.2 oz (-1.452 kg)    Physical Exam:   General Appearance:    Supine, intubated, sedated. HEENT:    NC/AT, mucous membranes are moist   Neck:   Supple, no jugular venous distention. Resp:     CTAB, No wheezes, No rhonchi, no use of accessory muscles   Heart:    RRR, S1 and S2 normal, no murmur, rub or gallop.     Abdomen:     Soft, non-tender, non-distended with normal bowel sounds   Extremities:   Atraumatic, no cyanosis or edema   Pulses:  Radial and pedal pulses are intact bilaterally   Neurologic: Unconscious. Does not arouse to sternal rub. Medications     Continuous Infusions:   [Held by provider] cisatracurium (NIMBEX) infusion 3.5 mcg/kg/min (02/01/22 2158)    fentaNYL 5 mcg/ml in D5W 250 ml infusion 175 mcg/hr (02/03/22 0526)    midazolam 10 mg/hr (02/03/22 0042)    sodium chloride      dextrose       Scheduled Meds:   lansoprazole  30 mg Per NG tube QAM AC    midodrine  5 mg Per NG tube TID WC    naloxegol  12.5 mg Oral QAM    polyethylene glycol  17 g Oral BID    sennosides  5 mL Oral BID    metoclopramide  5 mg IntraVENous Q6H    insulin lispro  0-6 Units SubCUTAneous Q4H    enoxaparin  30 mg SubCUTAneous BID    docusate sodium  100 mg Oral BID    miconazole nitrate   Topical BID    chlorhexidine  15 mL Mouth/Throat BID    artificial tears   Both Eyes Q4H    ipratropium-albuterol  1 ampule Inhalation Q4H WA    sodium chloride flush  5-40 mL IntraVENous 2 times per day    ascorbic acid  500 mg Oral Daily    zinc sulfate  50 mg Oral Daily    vitamin D  2,000 Units Oral Daily     PRN Meds: fentanNYL, midazolam, sodium chloride flush, sodium chloride, acetaminophen **OR** acetaminophen, glucose, dextrose, glucagon (rDNA), dextrose  Nutrition:   Diet NPO  ADULT TUBE FEEDING; Nasogastric; Standard with Fiber; Continuous; 10; No; 30; Q 3 hours    Labs and Imaging Studies     CBC:   Recent Labs     02/01/22  0427 02/02/22  0500 02/03/22  0406   WBC 13.7* 13.6* 16.9*   HGB 7.9* 8.1* 9.4*   HCT 25.7* 26.0* 30.2*   MCV 97.3 97.7 97.7    239 301       BMP:    Recent Labs     02/02/22  1343 02/02/22  1754 02/03/22  0200    145 141   K 3.6 2.8* 4.2    99 96*   CO2 35* 37* 34*   BUN 23 23 23   CREATININE 0.5 0.5 0.5   GLUCOSE 137* 126* 130*       LIVER PROFILE:   No results for input(s): AST, ALT, LIPASE, BILIDIR, BILITOT, ALKPHOS in the last 72 hours. Invalid input(s):   AMYLASE,  ALB    PT/INR:   No results for input(s): PROTIME, INR in the last 72 hours. APTT:   No results for input(s): APTT in the last 72 hours. Fasting Lipid Panel:    Lab Results   Component Value Date    CHOL 145 06/29/2020    TRIG 124 01/25/2022    HDL 73 06/29/2020       Cardiac Enzymes:    No results found for: CKTOTAL, CKMB, CKMBINDEX, TROPONINI    Notable Cultures:      Blood cultures   Blood Culture, Routine   Date Value Ref Range Status   01/29/2022 24 Hours no growth  Preliminary     Respiratory cultures No results found for: RESPCULTURE No results found for: LABGRAM  Urine   Urine Culture, Routine   Date Value Ref Range Status   01/29/2022 Growth not present  Final     Legionella No results found for: LABLEGI  C Diff PCR No results found for: CDIFPCR  Wound culture/abscess: No results for input(s): WNDABS in the last 72 hours. Tip culture:No results for input(s): CXCATHTIP in the last 72 hours.       Oxygen:     Vent Information  $Ventilation: $Subsequent Day  Skin Assessment: Clean, dry, & intact  Suction Catheter Diameter:  (16)  Equipment ID: [id number]  Equipment Changed: (S) Airway securing device  Vent Type: 980  Vent Mode: AC/VC+  Vt Ordered: 300 mL  Rate Set: 20 bmp  Peak Flow: 0 L/min  Pressure Support: 0 cmH20  FiO2 : 70 %  SpO2: 95 %  SpO2/FiO2 ratio: 135.71  PaO2/FiO2 ratio: 196  Sensitivity: 3  PEEP/CPAP: 10  I Time/ I Time %: 1 s  Humidification Source: Heated wire  Humidification Temp: 37  Humidification Temp Measured: 37.2  Circuit Condensation: Drained  Mask Type: Full face mask  Mask Size: Medium  Additional Respiratory  Assessments  Pulse: 106  Resp: 18  SpO2: 95 %  Position: Semi-Ocampo's  Humidification Source: Heated wire  Humidification Temp: 37  Circuit Condensation: Drained  Oral Care: Mouth swabbed,Mouth suctioned,Mouth moisturizer  Subglottic Suction Done?: Yes  Airway Type: ET  Airway Size: 8  Cuff Pressure (cm H2O): 29 cm H2O       [REMOVED] Urethral Catheter Double-lumen 16 fr-Output (mL): 130 mL  Urethral Catheter Double-lumen 16 fr-Output (mL): 80 mL  [REMOVED] External Urinary Catheter-Output (mL): 200 mL    Imaging Studies:  XR CHEST PORTABLE   Final Result   No pneumothorax following line placement. Right greater than left parenchymal infiltrates similar to subtly improved. Continued follow-up recommended. XR ABDOMEN (KUB) (SINGLE AP VIEW)   Final Result   No significant change. Satisfactory position of the enteric tube within the   stomach. No bowel obstruction or acute abdominal disease identified. XR CHEST PORTABLE   Final Result   1. Extensive bilateral multifocal pneumonia. No significant change. 2. Vertical linear shadow over the lateral aspect of the right chest, most   likely skin fold. XR CHEST PORTABLE   Final Result   1. There is no interval change in the multifocal bilateral pneumonia. 2. There is no pneumothorax or pneumomediastinum. XR CHEST PORTABLE   Final Result   Stable to slightly improved aeration of the lungs bilaterally with persistent   patchy bilateral airspace opacities, in keeping with the patient's known   diagnosis of COVID pneumonia. US DUP LOWER EXTREMITIES BILATERAL VENOUS   Final Result   No evidence of DVT in either lower extremity. US DUP UPPER EXTREMITIES BILATERAL VENOUS   Final Result   Occlusive thrombus in the left distal cephalic vein. No evidence of DVT. Critical results were called by Dr. Adriana Ramirez to Dr. Naveed Perry On 1/29/2022   at 21:09. Please make a note of the limited nature of the study due to the above   reasons. RECOMMENDATIONS:         XR CHEST PORTABLE   Final Result   No significant interval change, when compared to the previous study performed   1 day earlier. XR CHEST PORTABLE   Final Result   Bilateral airspace disease without appreciable change. XR ABDOMEN (KUB) (SINGLE AP VIEW)   Final Result   No active process. NG tube in the gastric lumen as before.          XR CHEST PORTABLE   Final Result   Progression of bilateral airspace disease remaining midlung predominant in   the periphery of airspace disease bronchopneumonia with increased from prior         XR CHEST PORTABLE   Final Result   Mild decrease in the diffuse left lung infiltrates, when compared to the   previous study performed 1 day earlier. Diffuse right lung infiltrates   without significant interval change. XR CHEST PORTABLE   Final Result   Left internal jugular line tip in the distal SVC. No pneumothorax. The remainder of the exam is essentially stable. .         XR CHEST PORTABLE   Final Result   Stable airspace disease. See above. XR CHEST PORTABLE   Final Result   Bilateral airspace disease which appears mildly progressive. XR CHEST PORTABLE   Final Result   1. Lines and tubes are unchanged in position. 2.  Diffuse bilateral opacities are not significantly changed. Differential   includes infection, pulmonary edema, atelectasis, ARDS, and/or layering   pleural effusions. XR CHEST PORTABLE   Final Result   1. No significant change in bilateral multifocal pneumonia. Support tubes   and catheters are stable. XR CHEST PORTABLE   Final Result   1. There is no interval change in extensive multifocal bilateral pneumonia. XR CHEST PORTABLE   Final Result   Slightly worsened bilateral pulmonary infiltrates with increasing opacity in   the bilateral bases when compared to previous. Stable lines and tubes. XR CHEST PORTABLE   Final Result   1. Good position right IJ central venous line. Negative for pneumothorax. 2.  Bilateral widespread pulmonary infiltrates with interval mild worsening   on the left and compatible with bilateral pneumonia. XR CHEST PORTABLE   Final Result   Endotracheal tube tip is 3.3 cm above the alberta. Diffuse pulmonary infiltrates are unchanged.          XR ABDOMEN FOR NG/OG/NE TUBE PLACEMENT   Final Result   Nasogastric tube terminates in the stomach. XR CHEST PORTABLE   Final Result   1. Multifocal bilateral pneumonia unchanged when compared with the prior   study. XR CHEST PORTABLE   Final Result   Bilateral airspace disease likely related to pneumonia.          XR CHEST PORTABLE    (Results Pending)   CTA PULMONARY W CONTRAST    (Results Pending)   XR CHEST PORTABLE    (Results Pending)   XR CHEST PORTABLE    (Results Pending)   XR CHEST PORTABLE    (Results Pending)        Resident's Assessment and Plan     Assessment and Plan:    Pulmonary  # Acute hypoxic respiratory failure 2/2 ARDS 2/2 COVID-19 PNA  - CXR showed BL ground glass infiltrates  Most recent ABG: Recent Labs     02/03/22  1522   PH 7.475*   PCO2 48.2*   PO2 96.0   HCO3 34.7*   - Current vent settings: AC/VC+, , RR 20, PEEP 12, FiO2 70%, PL 24  - P/f 1.2  Plan  - Versed max decreased to 8  - Avoid propofol as BP very sensitive to it  - Use pushes for sedation  - Wean vent as tolerated  - Goal is net negative fluid balance  - 40mg BID lasix today per nephrology     # Hx of asthma     Infectious disease  # Febrile with increasing leukocytosis   Plan  - Follow repeat pan clx  - Empirically on levofloxacin and cefepime for double coverage of anti-pseudomonals for possible VAP  - DVT US LE to r/o DVT  - Change NGT to OGT     Hematology/Oncology  # Acute normocytic anemia 2/2 anemia of chronic disease vs sepsis  - S/p 1u pRBC since admission   - Hb 9.4 today  - Iron studies negative  Plan  - CTM with CBC daily   - Transfuse if Hb <7      Last BM charted: 2/3/22  Antibiotics: none  Cultures: Blood and resp clx negative   Lines: RIJ CVC (2/2/22)  Intubated: 1/19/22  Diet: TF        # Peptic ulcer prophylaxis: lansoprazole   # DVT Prophylaxis: lovenox 30mg BID  # Disposition: Cont current care     Cody Arriola MD, PGY-1    Attending Physician: Dr. Jesus Dave  Department of Pulmonary, Critical Care and Sleep Medicine  [address] [facility]  Department of Internal Medicine      During multidisciplinary team rounds Stanley Barney is a 78 y.o. female was seen, examined and discussed. This is confirmation that I have personally seen and examined the patient and that the key elements of the encounter were performed by me (> 85 % time). The medications & laboratory data was discussed and adjusted where necessary. The radiographic images were reviewed or with radiologist or consultant if felt dis-concordant with the exam or history. The above findings were corroborated, plans confirmed and changes made if needed. Family is updated at the bedside as available. Key issues of the case were discussed among consultants. Critical Care time is documented if appropriate.       Fallon Arias DO, FACP, FCCP, Zamzam batista,

## 2022-02-03 NOTE — PLAN OF CARE
Problem: Airway Clearance - Ineffective  Goal: Achieve or maintain patent airway  Outcome: Met This Shift     Problem: Gas Exchange - Impaired  Goal: Absence of hypoxia  Outcome: Met This Shift  Goal: Promote optimal lung function  Outcome: Met This Shift     Problem: Breathing Pattern - Ineffective  Goal: Ability to achieve and maintain a regular respiratory rate  Outcome: Met This Shift     Problem:  Body Temperature -  Risk of, Imbalanced  Goal: Ability to maintain a body temperature within defined limits  Outcome: Met This Shift  Goal: Will regain or maintain usual level of consciousness  Outcome: Met This Shift  Goal: Complications related to the disease process, condition or treatment will be avoided or minimized  Outcome: Met This Shift     Problem: Isolation Precautions - Risk of Spread of Infection  Goal: Prevent transmission of infection  Outcome: Met This Shift     Problem: Nutrition Deficits  Goal: Optimize nutritional status  Outcome: Met This Shift     Problem: Risk for Fluid Volume Deficit  Goal: Maintain normal heart rhythm  Outcome: Met This Shift  Goal: Maintain absence of muscle cramping  Outcome: Met This Shift  Goal: Maintain normal serum potassium, sodium, calcium, phosphorus, and pH  Outcome: Met This Shift     Problem: Loneliness or Risk for Loneliness  Goal: Demonstrate positive use of time alone when socialization is not possible  Outcome: Met This Shift     Problem: Fatigue  Goal: Verbalize increase energy and improved vitality  Outcome: Met This Shift     Problem: Patient Education: Go to Patient Education Activity  Goal: Patient/Family Education  Outcome: Met This Shift     Problem: Falls - Risk of:  Goal: Will remain free from falls  Description: Will remain free from falls  Outcome: Met This Shift  Goal: Absence of physical injury  Description: Absence of physical injury  Outcome: Met This Shift     Problem: Skin Integrity:  Goal: Will show no infection signs and symptoms  Description: Will show no infection signs and symptoms  Outcome: Met This Shift  Goal: Absence of new skin breakdown  Description: Absence of new skin breakdown  Outcome: Met This Shift

## 2022-02-04 ENCOUNTER — APPOINTMENT (OUTPATIENT)
Dept: GENERAL RADIOLOGY | Age: 67
DRG: 207 | End: 2022-02-04
Payer: MEDICARE

## 2022-02-04 LAB
AADO2: 282 MMHG
AADO2: 293.2 MMHG
AADO2: 318 MMHG
ANION GAP SERPL CALCULATED.3IONS-SCNC: 12 MMOL/L (ref 7–16)
ANION GAP SERPL CALCULATED.3IONS-SCNC: 12 MMOL/L (ref 7–16)
ANION GAP SERPL CALCULATED.3IONS-SCNC: 13 MMOL/L (ref 7–16)
B.E.: 10.9 MMOL/L (ref -3–3)
B.E.: 8 MMOL/L (ref -3–3)
B.E.: 9.4 MMOL/L (ref -3–3)
BUN BLDV-MCNC: 21 MG/DL (ref 6–23)
BUN BLDV-MCNC: 22 MG/DL (ref 6–23)
BUN BLDV-MCNC: 22 MG/DL (ref 6–23)
CALCIUM IONIZED: 1.25 MMOL/L (ref 1.15–1.33)
CALCIUM SERPL-MCNC: 8.8 MG/DL (ref 8.6–10.2)
CALCIUM SERPL-MCNC: 9.1 MG/DL (ref 8.6–10.2)
CALCIUM SERPL-MCNC: 9.5 MG/DL (ref 8.6–10.2)
CHLORIDE BLD-SCNC: 92 MMOL/L (ref 98–107)
CO2: 27 MMOL/L (ref 22–29)
CO2: 31 MMOL/L (ref 22–29)
CO2: 32 MMOL/L (ref 22–29)
COHB: 0.6 % (ref 0–1.5)
COHB: 0.6 % (ref 0–1.5)
COHB: 0.7 % (ref 0–1.5)
CREAT SERPL-MCNC: 0.5 MG/DL (ref 0.5–1)
CRITICAL: ABNORMAL
DATE ANALYZED: ABNORMAL
DATE OF COLLECTION: ABNORMAL
FERRITIN: 1257 NG/ML
FIO2: 60 %
GFR AFRICAN AMERICAN: >60
GFR NON-AFRICAN AMERICAN: >60 ML/MIN/1.73
GLUCOSE BLD-MCNC: 131 MG/DL (ref 74–99)
GLUCOSE BLD-MCNC: 139 MG/DL (ref 74–99)
GLUCOSE BLD-MCNC: 179 MG/DL (ref 74–99)
HCO3: 31.9 MMOL/L (ref 22–26)
HCO3: 33.8 MMOL/L (ref 22–26)
HCO3: 34.1 MMOL/L (ref 22–26)
HCT VFR BLD CALC: 27 % (ref 34–48)
HEMOGLOBIN: 8.6 G/DL (ref 11.5–15.5)
HHB: 17.9 % (ref 0–5)
HHB: 3 % (ref 0–5)
HHB: 4.8 % (ref 0–5)
LAB: ABNORMAL
MCH RBC QN AUTO: 30.2 PG (ref 26–35)
MCHC RBC AUTO-ENTMCNC: 31.9 % (ref 32–34.5)
MCV RBC AUTO: 94.7 FL (ref 80–99.9)
METER GLUCOSE: 106 MG/DL (ref 74–99)
METER GLUCOSE: 108 MG/DL (ref 74–99)
METER GLUCOSE: 118 MG/DL (ref 74–99)
METER GLUCOSE: 129 MG/DL (ref 74–99)
METER GLUCOSE: 134 MG/DL (ref 74–99)
METER GLUCOSE: 171 MG/DL (ref 74–99)
METHB: 0.3 % (ref 0–1.5)
METHB: 0.4 % (ref 0–1.5)
METHB: 0.4 % (ref 0–1.5)
MODE: ABNORMAL
MODE: AC
MODE: AC
O2 CONTENT: 12.3 ML/DL
O2 CONTENT: 13.2 ML/DL
O2 CONTENT: 13.3 ML/DL
O2 SATURATION: 81.9 % (ref 92–98.5)
O2 SATURATION: 95.2 % (ref 92–98.5)
O2 SATURATION: 97 % (ref 92–98.5)
O2HB: 81.2 % (ref 94–97)
O2HB: 94.2 % (ref 94–97)
O2HB: 95.9 % (ref 94–97)
OPERATOR ID: 1721
OPERATOR ID: 1821
OPERATOR ID: 421
PATIENT TEMP: 37 C
PCO2: 39.3 MMHG (ref 35–45)
PCO2: 41.4 MMHG (ref 35–45)
PCO2: 45.1 MMHG (ref 35–45)
PDW BLD-RTO: 14.1 FL (ref 11.5–15)
PEEP/CPAP: 12 CMH2O
PEEP/CPAP: 13 CMH2O
PEEP/CPAP: 13 CMH2O
PFO2: 0.75 MMHG/%
PFO2: 1.24 MMHG/%
PFO2: 1.46 MMHG/%
PH BLOOD GAS: 7.49 (ref 7.35–7.45)
PH BLOOD GAS: 7.5 (ref 7.35–7.45)
PH BLOOD GAS: 7.56 (ref 7.35–7.45)
PLATELET # BLD: 293 E9/L (ref 130–450)
PMV BLD AUTO: 11.5 FL (ref 7–12)
PO2: 45.2 MMHG (ref 75–100)
PO2: 74.1 MMHG (ref 75–100)
PO2: 87.6 MMHG (ref 75–100)
POTASSIUM SERPL-SCNC: 2.8 MMOL/L (ref 3.5–5)
POTASSIUM SERPL-SCNC: 3.6 MMOL/L (ref 3.5–5)
POTASSIUM SERPL-SCNC: 5 MMOL/L (ref 3.5–5)
RBC # BLD: 2.85 E12/L (ref 3.5–5.5)
RI(T): 3.22
RI(T): 3.96
RI(T): 7.04
RR MECHANICAL: 20 B/MIN
SODIUM BLD-SCNC: 132 MMOL/L (ref 132–146)
SODIUM BLD-SCNC: 135 MMOL/L (ref 132–146)
SODIUM BLD-SCNC: 136 MMOL/L (ref 132–146)
SOURCE, BLOOD GAS: ABNORMAL
THB: 10.8 G/DL (ref 11.5–16.5)
THB: 9.8 G/DL (ref 11.5–16.5)
THB: 9.9 G/DL (ref 11.5–16.5)
TIME ANALYZED: 1018
TIME ANALYZED: 1614
TIME ANALYZED: 529
VT MECHANICAL: 300 ML
WBC # BLD: 20.7 E9/L (ref 4.5–11.5)

## 2022-02-04 PROCEDURE — 2000000000 HC ICU R&B

## 2022-02-04 PROCEDURE — 6370000000 HC RX 637 (ALT 250 FOR IP): Performed by: INTERNAL MEDICINE

## 2022-02-04 PROCEDURE — 82330 ASSAY OF CALCIUM: CPT

## 2022-02-04 PROCEDURE — 6370000000 HC RX 637 (ALT 250 FOR IP)

## 2022-02-04 PROCEDURE — 82805 BLOOD GASES W/O2 SATURATION: CPT

## 2022-02-04 PROCEDURE — 6360000002 HC RX W HCPCS: Performed by: INTERNAL MEDICINE

## 2022-02-04 PROCEDURE — 2580000003 HC RX 258: Performed by: STUDENT IN AN ORGANIZED HEALTH CARE EDUCATION/TRAINING PROGRAM

## 2022-02-04 PROCEDURE — 99233 SBSQ HOSP IP/OBS HIGH 50: CPT | Performed by: INTERNAL MEDICINE

## 2022-02-04 PROCEDURE — 2500000003 HC RX 250 WO HCPCS: Performed by: INTERNAL MEDICINE

## 2022-02-04 PROCEDURE — 2580000003 HC RX 258: Performed by: INTERNAL MEDICINE

## 2022-02-04 PROCEDURE — 6360000002 HC RX W HCPCS: Performed by: NURSE PRACTITIONER

## 2022-02-04 PROCEDURE — 6370000000 HC RX 637 (ALT 250 FOR IP): Performed by: NURSE PRACTITIONER

## 2022-02-04 PROCEDURE — 6360000002 HC RX W HCPCS: Performed by: STUDENT IN AN ORGANIZED HEALTH CARE EDUCATION/TRAINING PROGRAM

## 2022-02-04 PROCEDURE — 71045 X-RAY EXAM CHEST 1 VIEW: CPT

## 2022-02-04 PROCEDURE — 36415 COLL VENOUS BLD VENIPUNCTURE: CPT

## 2022-02-04 PROCEDURE — 2580000003 HC RX 258: Performed by: FAMILY MEDICINE

## 2022-02-04 PROCEDURE — 94640 AIRWAY INHALATION TREATMENT: CPT

## 2022-02-04 PROCEDURE — 6370000000 HC RX 637 (ALT 250 FOR IP): Performed by: FAMILY MEDICINE

## 2022-02-04 PROCEDURE — 80048 BASIC METABOLIC PNL TOTAL CA: CPT

## 2022-02-04 PROCEDURE — 94003 VENT MGMT INPAT SUBQ DAY: CPT

## 2022-02-04 PROCEDURE — 82728 ASSAY OF FERRITIN: CPT

## 2022-02-04 PROCEDURE — 36592 COLLECT BLOOD FROM PICC: CPT

## 2022-02-04 PROCEDURE — 82962 GLUCOSE BLOOD TEST: CPT

## 2022-02-04 PROCEDURE — 85027 COMPLETE CBC AUTOMATED: CPT

## 2022-02-04 PROCEDURE — 2500000003 HC RX 250 WO HCPCS

## 2022-02-04 RX ORDER — MIDAZOLAM HYDROCHLORIDE 2 MG/2ML
2 INJECTION, SOLUTION INTRAMUSCULAR; INTRAVENOUS
Status: DISCONTINUED | OUTPATIENT
Start: 2022-02-04 | End: 2022-02-05

## 2022-02-04 RX ORDER — POTASSIUM CHLORIDE 29.8 MG/ML
40 INJECTION INTRAVENOUS ONCE
Status: DISCONTINUED | OUTPATIENT
Start: 2022-02-04 | End: 2022-02-04

## 2022-02-04 RX ORDER — ACETAZOLAMIDE 500 MG/5ML
250 INJECTION, POWDER, LYOPHILIZED, FOR SOLUTION INTRAVENOUS ONCE
Status: COMPLETED | OUTPATIENT
Start: 2022-02-04 | End: 2022-02-04

## 2022-02-04 RX ORDER — FUROSEMIDE 10 MG/ML
40 INJECTION INTRAMUSCULAR; INTRAVENOUS DAILY
Status: DISCONTINUED | OUTPATIENT
Start: 2022-02-05 | End: 2022-02-11 | Stop reason: HOSPADM

## 2022-02-04 RX ORDER — POTASSIUM CHLORIDE 29.8 MG/ML
40 INJECTION INTRAVENOUS EVERY 4 HOURS
Status: COMPLETED | OUTPATIENT
Start: 2022-02-04 | End: 2022-02-04

## 2022-02-04 RX ADMIN — Medication 10 ML: at 20:32

## 2022-02-04 RX ADMIN — MIDODRINE HYDROCHLORIDE 5 MG: 5 TABLET ORAL at 17:48

## 2022-02-04 RX ADMIN — INSULIN LISPRO 1 UNITS: 100 INJECTION, SOLUTION INTRAVENOUS; SUBCUTANEOUS at 20:34

## 2022-02-04 RX ADMIN — DOCUSATE SODIUM 100 MG: 50 LIQUID ORAL at 09:24

## 2022-02-04 RX ADMIN — METOCLOPRAMIDE HYDROCHLORIDE 5 MG: 5 INJECTION INTRAMUSCULAR; INTRAVENOUS at 04:39

## 2022-02-04 RX ADMIN — IPRATROPIUM BROMIDE AND ALBUTEROL SULFATE 1 AMPULE: .5; 2.5 SOLUTION RESPIRATORY (INHALATION) at 11:59

## 2022-02-04 RX ADMIN — CHLORHEXIDINE GLUCONATE 0.12% ORAL RINSE 15 ML: 1.2 LIQUID ORAL at 20:31

## 2022-02-04 RX ADMIN — Medication 2000 UNITS: at 09:23

## 2022-02-04 RX ADMIN — OXYCODONE HYDROCHLORIDE AND ACETAMINOPHEN 500 MG: 500 TABLET ORAL at 09:23

## 2022-02-04 RX ADMIN — MINERAL OIL AND WHITE PETROLATUM: 150; 830 OINTMENT OPHTHALMIC at 14:34

## 2022-02-04 RX ADMIN — MIDAZOLAM 8 MG/HR: 5 INJECTION INTRAMUSCULAR; INTRAVENOUS at 09:59

## 2022-02-04 RX ADMIN — FUROSEMIDE 40 MG: 10 INJECTION, SOLUTION INTRAMUSCULAR; INTRAVENOUS at 09:23

## 2022-02-04 RX ADMIN — MINERAL OIL AND WHITE PETROLATUM: 150; 830 OINTMENT OPHTHALMIC at 10:31

## 2022-02-04 RX ADMIN — LANSOPRAZOLE 30 MG: KIT at 09:24

## 2022-02-04 RX ADMIN — Medication 150 MCG/HR: at 13:33

## 2022-02-04 RX ADMIN — ACETAZOLAMIDE 250 MG: 500 INJECTION, POWDER, LYOPHILIZED, FOR SOLUTION INTRAVENOUS at 10:31

## 2022-02-04 RX ADMIN — Medication: at 20:41

## 2022-02-04 RX ADMIN — MINERAL OIL AND WHITE PETROLATUM: 150; 830 OINTMENT OPHTHALMIC at 18:13

## 2022-02-04 RX ADMIN — MINERAL OIL AND WHITE PETROLATUM: 150; 830 OINTMENT OPHTHALMIC at 20:40

## 2022-02-04 RX ADMIN — MIDAZOLAM 2 MG: 1 INJECTION INTRAMUSCULAR; INTRAVENOUS at 22:27

## 2022-02-04 RX ADMIN — ENOXAPARIN SODIUM 30 MG: 100 INJECTION SUBCUTANEOUS at 20:31

## 2022-02-04 RX ADMIN — IPRATROPIUM BROMIDE AND ALBUTEROL SULFATE 1 AMPULE: .5; 2.5 SOLUTION RESPIRATORY (INHALATION) at 08:44

## 2022-02-04 RX ADMIN — ENOXAPARIN SODIUM 30 MG: 100 INJECTION SUBCUTANEOUS at 09:23

## 2022-02-04 RX ADMIN — MIDODRINE HYDROCHLORIDE 5 MG: 5 TABLET ORAL at 13:25

## 2022-02-04 RX ADMIN — Medication 10 ML: at 09:26

## 2022-02-04 RX ADMIN — IPRATROPIUM BROMIDE AND ALBUTEROL SULFATE 1 AMPULE: .5; 2.5 SOLUTION RESPIRATORY (INHALATION) at 16:04

## 2022-02-04 RX ADMIN — CHLORHEXIDINE GLUCONATE 0.12% ORAL RINSE 15 ML: 1.2 LIQUID ORAL at 09:23

## 2022-02-04 RX ADMIN — VANCOMYCIN HYDROCHLORIDE 1000 MG: 1 INJECTION, POWDER, LYOPHILIZED, FOR SOLUTION INTRAVENOUS at 13:30

## 2022-02-04 RX ADMIN — Medication 150 MCG/HR: at 04:39

## 2022-02-04 RX ADMIN — Medication: at 09:37

## 2022-02-04 RX ADMIN — POTASSIUM CHLORIDE 40 MEQ: 29.8 INJECTION, SOLUTION INTRAVENOUS at 14:30

## 2022-02-04 RX ADMIN — POTASSIUM CHLORIDE 40 MEQ: 29.8 INJECTION, SOLUTION INTRAVENOUS at 17:49

## 2022-02-04 RX ADMIN — LEVOFLOXACIN 750 MG: 5 INJECTION, SOLUTION INTRAVENOUS at 13:30

## 2022-02-04 RX ADMIN — NALOXEGOL OXALATE 12.5 MG: 12.5 TABLET, FILM COATED ORAL at 09:23

## 2022-02-04 RX ADMIN — ZINC SULFATE 220 MG (50 MG) CAPSULE 50 MG: CAPSULE at 09:23

## 2022-02-04 RX ADMIN — MINERAL OIL AND WHITE PETROLATUM: 150; 830 OINTMENT OPHTHALMIC at 01:31

## 2022-02-04 RX ADMIN — CEFEPIME HYDROCHLORIDE 2000 MG: 2 INJECTION, POWDER, FOR SOLUTION INTRAVENOUS at 01:30

## 2022-02-04 RX ADMIN — CEFEPIME HYDROCHLORIDE 2000 MG: 2 INJECTION, POWDER, FOR SOLUTION INTRAVENOUS at 13:28

## 2022-02-04 RX ADMIN — ACETAMINOPHEN 650 MG: 325 TABLET ORAL at 09:48

## 2022-02-04 RX ADMIN — MIDODRINE HYDROCHLORIDE 5 MG: 5 TABLET ORAL at 09:24

## 2022-02-04 RX ADMIN — ACETAMINOPHEN 650 MG: 325 TABLET ORAL at 20:31

## 2022-02-04 RX ADMIN — VANCOMYCIN HYDROCHLORIDE 1000 MG: 1 INJECTION, POWDER, LYOPHILIZED, FOR SOLUTION INTRAVENOUS at 01:30

## 2022-02-04 RX ADMIN — IPRATROPIUM BROMIDE AND ALBUTEROL SULFATE 1 AMPULE: .5; 2.5 SOLUTION RESPIRATORY (INHALATION) at 21:08

## 2022-02-04 ASSESSMENT — PULMONARY FUNCTION TESTS
PIF_VALUE: 33
PIF_VALUE: 36
PIF_VALUE: 27
PIF_VALUE: 32
PIF_VALUE: 26
PIF_VALUE: 28
PIF_VALUE: 27
PIF_VALUE: 26
PIF_VALUE: 29
PIF_VALUE: 33
PIF_VALUE: 34
PIF_VALUE: 29
PIF_VALUE: 28
PIF_VALUE: 26
PIF_VALUE: 27
PIF_VALUE: 32
PIF_VALUE: 28
PIF_VALUE: 32
PIF_VALUE: 29
PIF_VALUE: 31
PIF_VALUE: 27
PIF_VALUE: 30
PIF_VALUE: 32
PIF_VALUE: 30
PIF_VALUE: 31

## 2022-02-04 ASSESSMENT — PAIN SCALES - GENERAL
PAINLEVEL_OUTOF10: 0

## 2022-02-04 NOTE — PROGRESS NOTES
Department of Internal Medicine  Nephrology  Progress Note      Events Reviewed. Intubated, supine. SUBJECTIVE:  We are following Ms. Gloria Rodriguez for SAMUEL. Patient is intubated and supine. PHYSICAL EXAM:    Vitals:    VITALS:  /77   Pulse 124   Temp 101.5 °F (38.6 °C) (Esophageal)   Resp 28   Ht 5' (1.524 m)   Wt 122 lb 2 oz (55.4 kg)   SpO2 (!) 75%   BMI 23.85 kg/m²   24HR INTAKE/OUTPUT:      Intake/Output Summary (Last 24 hours) at 2/4/2022 1013  Last data filed at 2/4/2022 0943  Gross per 24 hour   Intake 1246 ml   Output 1125 ml   Net 121 ml     General appearance: Patient is intubated, sedated  HEENT: Normal cephalic, atraumatic without obvious deformity. Pupils equal, round, and reactive to light. Endotracheal tube in place  Neck: Supple, with full range of motion. No jugular venous distention. Trachea midline. Respiratory: Diminished bilaterally, intubated. Cardiovascular: RRR, no murmur noted  Abdomen: Soft, nontender, nondistended, flat  Musculoskeletal: No clubbing or cyanosis. No edema of bilateral lower extremities. Brisk capillary refill. Skin: Normal skin color.  No rashes. Scattered ecchymosis.   Neurologic: Patient sedated        Scheduled Meds:   [START ON 2/5/2022] furosemide  40 mg IntraVENous Daily    acetaZOLAMIDE  250 mg IntraVENous Once    cefepime  2,000 mg IntraVENous Q12H    levofloxacin  750 mg IntraVENous Q24H    vancomycin  1,000 mg IntraVENous Q12H    lansoprazole  30 mg Per NG tube QAM AC    midodrine  5 mg Per NG tube TID WC    naloxegol  12.5 mg Oral QAM    polyethylene glycol  17 g Oral BID    sennosides  5 mL Oral BID    metoclopramide  5 mg IntraVENous Q6H    insulin lispro  0-6 Units SubCUTAneous Q4H    enoxaparin  30 mg SubCUTAneous BID    docusate sodium  100 mg Oral BID    miconazole nitrate   Topical BID    chlorhexidine  15 mL Mouth/Throat BID    artificial tears   Both Eyes Q4H    ipratropium-albuterol  1 ampule Inhalation Q4H WA    sodium chloride flush  5-40 mL IntraVENous 2 times per day    ascorbic acid  500 mg Oral Daily    zinc sulfate  50 mg Oral Daily    vitamin D  2,000 Units Oral Daily     Continuous Infusions:   [Held by provider] cisatracurium (NIMBEX) infusion 3.5 mcg/kg/min (02/01/22 2158)    fentaNYL 5 mcg/ml in D5W 250 ml infusion 150 mcg/hr (02/04/22 0600)    midazolam 8 mg/hr (02/04/22 0959)    sodium chloride      dextrose       PRN Meds:.fentanNYL, midazolam, sodium chloride flush, sodium chloride, acetaminophen **OR** acetaminophen, glucose, dextrose, glucagon (rDNA), dextrose    DATA:    CBC with Differential:    Lab Results   Component Value Date    WBC 20.7 02/04/2022    RBC 2.85 02/04/2022    HGB 8.6 02/04/2022    HCT 27.0 02/04/2022     02/04/2022    MCV 94.7 02/04/2022    MCH 30.2 02/04/2022    MCHC 31.9 02/04/2022    RDW 14.1 02/04/2022    METASPCT 0.9 01/18/2022    LYMPHOPCT 2.7 01/18/2022    MONOPCT 11.6 01/18/2022    MYELOPCT 0.9 01/14/2022    BASOPCT 0.1 01/18/2022    MONOSABS 1.38 01/18/2022    LYMPHSABS 0.35 01/18/2022    EOSABS 0.00 01/18/2022    BASOSABS 0.00 01/18/2022     CMP:    Lab Results   Component Value Date     02/04/2022    K 3.6 02/04/2022    K 3.8 01/18/2022    CL 92 02/04/2022    CO2 31 02/04/2022    BUN 22 02/04/2022    CREATININE 0.5 02/04/2022    GFRAA >60 02/04/2022    LABGLOM >60 02/04/2022    GLUCOSE 131 02/04/2022    PROT 5.6 01/29/2022    LABALBU 2.4 01/29/2022    CALCIUM 9.5 02/04/2022    BILITOT 0.2 01/29/2022    ALKPHOS 97 01/29/2022    AST 16 01/29/2022    ALT 16 01/29/2022     Magnesium:    Lab Results   Component Value Date    MG 2.1 02/03/2022     Phosphorus:    Lab Results   Component Value Date    PHOS 2.8 02/03/2022          Radiology Review:    Chest X-ray January 30, 2022   Stable to slightly improved aeration of the lungs bilaterally with persistent   patchy bilateral airspace opacities, in keeping with the patient's known   diagnosis of COVID pneumonia.             CXR 1/31/2022   1. There is no interval change in the multifocal bilateral pneumonia. 2. There is no pneumothorax or pneumomediastinum.           CXR 2/1/2022   1. Extensive bilateral multifocal pneumonia.  No significant change. 2. Vertical linear shadow over the lateral aspect of the right chest, most   likely skin fold.         CXR 2/2/2022   No pneumothorax following line placement.       Right greater than left parenchymal infiltrates similar to subtly improved. Continued follow-up recommended.         CXR 2/3/2022   No significant changes since February 2nd.  Persistent bilateral infiltrates. Endotracheal tube in good position.         CXR 2/4/2022   No interval change                 BRIEF SUMMARY OF INITIAL CONSULT:     Briefly, Ms. Cintia Dickey is a 78year old female with a past medical history of Asthma who presented to the ER on January 14 th, 2022 with complaints of SOB and diarrhea for one week. She is not vaccinated against COVID. Reportedly her daughter found her curled into a fetal position in respiratory distress at home and EMS was summoned. She was found to be COVID 19 positive. Labs were significant for bicarbonate 19, BUN 38, creatinine 1.9, anion gap 19, procalcitonin 1.57, CRP 12.8, , mildly elevated LFTs, D-dimer 404, ferritin 5098. Chest x-ray showed bilateral airspace disease. Renal is consulted for SAMUEL. Problems Resolved:     · SAMUEL, likely pre-renal volume responsive SAMUEL secondary to poor oral intake and GI losses (diarrhea). Creatinine 1.9mg/dL on admission. Renal function has improved to 0.6 mg/dL with IVF administration. · HAGMA with low bicarbonate levels, secondary to uremia. To continue bicarbonate drip  · hypernatremia, with water deficit, dehydration due to poor intake. Sodium levels quite improved. Resolved.   · Hypophosphatemia, 2/2 poor intake, to replace  · Hypocalcemia, vitamin D 25 level 39, calcium levels 8.4- improved  · Low grade temp--> pancultures sent. Resolved    IMPRESSION/RECOMMENDATIONS:        1. Metabolic alkalosis 0.026, pCO2 39.3, bicarb 34.1 (contraction alkalosis from diuretic use)  2. Hypokalemia 2/2 renal wasting with diuretics  3. ----------------------------------------  4. COVID +, on hydrocortisone   5. Acute hypoxic respiratory failure, secondary to COVID pneumonia. Status post intubation  6. Hypotension, midodrine 5 mg TID. 7. Normocytic anemia, hemoglobin 8.6. stable. 8. Nutrition, receiving tube feeds at 10 mL an hour with free water flush 30 cc every 3 hours      Plan:    · 40 mg Lasix BID today, decrease to 40 daily tomorrow  · Continue midodrine 5 mg TID.    · Continue to monitor phos and magnesium levels  · Diamox 250 mg iv today      Electronically signed by Sanaz Adhikari on 2/4/2022 at 10:13 AM  Agree as annotated   Discussed with MICU staff and RN  Alejandro Thompson MD

## 2022-02-04 NOTE — CARE COORDINATION
2/4 Care Coordination: Pt remains in [facility], COVID, Now DNR-CCA 2/1. Remains on Vent. fio2 60%, peep 13, AC 20. Cont IV Sedation. Will plan on Trach. LTAC's following. CM/SW will continue to follow for discharge planning.    Kevin CRUZN,RN--BC  [phone]

## 2022-02-04 NOTE — PLAN OF CARE
Problem: Airway Clearance - Ineffective  Goal: Achieve or maintain patent airway  2/3/2022 2026 by Imani Bonner RN  Outcome: Met This Shift  2/3/2022 2026 by Imani Bonner RN  Outcome: Met This Shift     Problem: Gas Exchange - Impaired  Goal: Absence of hypoxia  2/3/2022 2026 by Imani Bonner RN  Outcome: Met This Shift  2/3/2022 2026 by Imani Bonner RN  Outcome: Met This Shift  Goal: Promote optimal lung function  2/3/2022 2026 by Imani Bonner RN  Outcome: Met This Shift  2/3/2022 2026 by Imani Bonner RN  Outcome: Met This Shift     Problem: Breathing Pattern - Ineffective  Goal: Ability to achieve and maintain a regular respiratory rate  2/3/2022 2026 by Imani Bonner RN  Outcome: Met This Shift  2/3/2022 2026 by Imani Bonner RN  Outcome: Met This Shift     Problem:  Body Temperature -  Risk of, Imbalanced  Goal: Ability to maintain a body temperature within defined limits  2/3/2022 2026 by Imani Bonner RN  Outcome: Met This Shift  2/3/2022 2026 by Imani Bonner RN  Outcome: Met This Shift  Goal: Will regain or maintain usual level of consciousness  2/3/2022 2026 by Imani Bonner RN  Outcome: Met This Shift  2/3/2022 2026 by Imani Bonner RN  Outcome: Met This Shift  Goal: Complications related to the disease process, condition or treatment will be avoided or minimized  2/3/2022 2026 by Imani Bonner RN  Outcome: Met This Shift  2/3/2022 2026 by Imani Bonner RN  Outcome: Met This Shift

## 2022-02-04 NOTE — PROGRESS NOTES
HOSPITALIST PROGRESS NOTE  Date: 2/4/2022   Name: Alma Narayanan   MRN: [id number] endocrine:  YOB: 1955        Hospital Course:   Ana María Sanford is B 40 y.o. year old female who has a PMH of asthma.   She presented to the ER on 1/14/22 with complaints of SOB and diarrhea x 1 week.  She was not vaccinated against COVID-19.  Reportedly her daughter found her curled into the fetal position in respiratory distress at home and called 911.     Subjective/Interval Hx:   Patient remains intubated sedated in intensive care unit  Objective:   Physical Exam:   /61   Pulse 135   Temp 102.4 °F (39.1 °C) (Esophageal)   Resp 28   Ht 5' (1.524 m)   Wt 122 lb 2 oz (55.4 kg)   SpO2 96%   BMI 23.85 kg/m²   General: no acute distress, well nourished and well hydrated  HEENT: NCAT  Heart: S1S2 RRR  Lungs: Clear to ascultation bilaterally, respiratory effort normal  Abdomen: soft, NT/ND, positive bowel sounds  Extremities: no pitting edema, nontender   Neuro: patient is awake, alert and orientated times 3, no gross deficits  Skin: no rashes or ecchymosis        Meds:   Meds:    [START ON 2/5/2022] furosemide  40 mg IntraVENous Daily    cefepime  2,000 mg IntraVENous Q12H    levofloxacin  750 mg IntraVENous Q24H    vancomycin  1,000 mg IntraVENous Q12H    lansoprazole  30 mg Per NG tube QAM AC    midodrine  5 mg Per NG tube TID WC    naloxegol  12.5 mg Oral QAM    polyethylene glycol  17 g Oral BID    sennosides  5 mL Oral BID    metoclopramide  5 mg IntraVENous Q6H    insulin lispro  0-6 Units SubCUTAneous Q4H    enoxaparin  30 mg SubCUTAneous BID    docusate sodium  100 mg Oral BID    miconazole nitrate   Topical BID    chlorhexidine  15 mL Mouth/Throat BID    artificial tears   Both Eyes Q4H    ipratropium-albuterol  1 ampule Inhalation Q4H WA    sodium chloride flush  5-40 mL IntraVENous 2 times per day    ascorbic acid  500 mg Oral Daily    zinc sulfate  50 mg Oral Daily    vitamin D 2,000 Units Oral Daily      Infusions:    [Held by provider] cisatracurium (NIMBEX) infusion 3.5 mcg/kg/min (02/01/22 2158)    fentaNYL 5 mcg/ml in D5W 250 ml infusion 150 mcg/hr (02/04/22 0600)    midazolam 8 mg/hr (02/04/22 0959)    sodium chloride      dextrose       PRN Meds: fentanNYL, 50 mcg, Q2H PRN  midazolam, 2 mg, Q2H PRN  sodium chloride flush, 5-40 mL, PRN  sodium chloride, 25 mL, PRN  acetaminophen, 650 mg, Q6H PRN   Or  acetaminophen, 650 mg, Q6H PRN  glucose, 15 g, PRN  dextrose, 12.5 g, PRN  glucagon (rDNA), 1 mg, PRN  dextrose, 100 mL/hr, PRN        Data/Labs:     Recent Labs     02/02/22  0500 02/03/22  0406 02/04/22  0440   WBC 13.6* 16.9* 20.7*   HGB 8.1* 9.4* 8.6*   HCT 26.0* 30.2* 27.0*    301 293      Recent Labs     02/02/22  0500 02/02/22  1343 02/02/22  1754 02/03/22  0200 02/03/22  0406 02/03/22  0950 02/03/22  1731 02/04/22  0130      < > 145   < >  --  140 138 135   K 3.0*   < > 2.8*   < >  --  3.5 4.9 3.6   CL 96*   < > 99   < >  --  96* 97* 92*   CO2 34*   < > 37*   < >  --  35* 32* 31*   PHOS 2.4*  --  2.8  --  2.8  --   --   --    BUN 24*   < > 23   < >  --  23 22 22   CREATININE 0.5   < > 0.5   < >  --  0.5 0.5 0.5    < > = values in this interval not displayed. No results for input(s): AST, ALT, ALB, BILIDIR, BILITOT, ALKPHOS in the last 72 hours. No results for input(s): INR in the last 72 hours. No results for input(s): CKTOTAL, CKMB, CKMBINDEX, TROPONINT in the last 72 hours. I/O last 3 completed shifts: In: 8677 [I.V.:1004; NG/GT:755; IV Piggyback:500]  Out: 3225 [Urine:3225]    Intake/Output Summary (Last 24 hours) at 2/4/2022 1149  Last data filed at 2/4/2022 1030  Gross per 24 hour   Intake 1246 ml   Output 1390 ml   Net -144 ml        Assessment/Plan:   1.  Acute respiratory hypoxic failure due to Covid viral pneumonia-patient is unvaccinated with symptomatic x1 week prior to presentation, steroids, vitamins, pulmonary is following  02/02/2020-patient remains intubated sedated in the ICU we will continue to monitor with critical care team  02/0/2022-patient remains unchanged intubated sedated in ICU  2. Non-insulin-dependent diabetes mellitus-sliding scale, monitor blood glucose, diabetic diet  3. GERD-Protonix, Reglan  4. COPD-DuoNeb, monitor pulmonary toiletry      DVT Prophylaxis: Eliquis  Diet: Diet NPO  ADULT TUBE FEEDING; Nasogastric; Standard with Fiber; Continuous; 10; Yes; 10; Q 4 hours; 40; 30; Q 3 hours  Code Status: DNR-CCA    Dispo:  When stable    Electronically signed by Andres Reyes MD on 2/4/2022 at 11:49 AM  [facility]

## 2022-02-04 NOTE — PROGRESS NOTES
[address] [facility]   Department of Internal Medicine   Internal Medicine Residency  MICU Progress Note    Patient:  Crescencio Santana 78 y.o. female   MRN: [id number]       Date of Service: 2022    Allergy: Patient has no known allergies. Subjective     Patient was seen and examined this morning at bedside in no acute distress. Overnight, US LES was negative for DVT. NGT removed, OGT placed. This morning, patient is sedated and supine, intubated. Objective     TEMPERATURE:  Current - Temp: 99.7 °F (37.6 °C); Max - Temp  Av.9 °F (37.7 °C)  Min: 99.5 °F (37.5 °C)  Max: 100.4 °F (38 °C)  RESPIRATIONS RANGE: Resp  Av.8  Min: 19  Max: 31  PULSE RANGE: Pulse  Av.4  Min: 112  Max: 142  BLOOD PRESSURE RANGE:  Systolic (31RCV), PPF:721 , Min:81 , BJV:846   ; Diastolic (88GWO), WNV:62, Min:44, Max:75    PULSE OXIMETRY RANGE: SpO2  Av.7 %  Min: 91 %  Max: 100 %    I & O - 24hr:    Intake/Output Summary (Last 24 hours) at 2022 0824  Last data filed at 2022 0600  Gross per 24 hour   Intake 1246 ml   Output 910 ml   Net 336 ml     I/O last 3 completed shifts: In: 8921 [I.V.:1004; NG/GT:755; IV Piggyback:500]  Out: 3225 [Urine:3225] No intake/output data recorded. Weight change: -2 lb 9.2 oz (-1.168 kg)    Physical Exam:  General Appearance:    Supine, intubated, sedated. HEENT:    NC/AT, mucous membranes are moist   Neck:   Supple, no jugular venous distention. Resp:     CTAB, No wheezes, No rhonchi, no use of accessory muscles   Heart:    RRR, S1 and S2 normal, no murmur, rub or gallop. Abdomen:     Soft, non-tender, non-distended with normal bowel sounds   Extremities:   Atraumatic, no cyanosis or edema   Pulses:  Radial and pedal pulses are intact bilaterally   Neurologic: Unconscious. Does not arouse to sternal rub.         Medications     Continuous Infusions:   [Held by provider] cisatracurium (NIMBEX) infusion 3.5 mcg/kg/min (22 4216)    fentaNYL 5 mcg/ml in D5W 250 ml infusion 150 mcg/hr (02/04/22 0600)    midazolam 8 mg/hr (02/04/22 0600)    sodium chloride      dextrose       Scheduled Meds:   cefepime  2,000 mg IntraVENous Q12H    levofloxacin  750 mg IntraVENous Q24H    vancomycin  1,000 mg IntraVENous Q12H    furosemide  40 mg IntraVENous BID    lansoprazole  30 mg Per NG tube QAM AC    midodrine  5 mg Per NG tube TID WC    naloxegol  12.5 mg Oral QAM    polyethylene glycol  17 g Oral BID    sennosides  5 mL Oral BID    metoclopramide  5 mg IntraVENous Q6H    insulin lispro  0-6 Units SubCUTAneous Q4H    enoxaparin  30 mg SubCUTAneous BID    docusate sodium  100 mg Oral BID    miconazole nitrate   Topical BID    chlorhexidine  15 mL Mouth/Throat BID    artificial tears   Both Eyes Q4H    ipratropium-albuterol  1 ampule Inhalation Q4H WA    sodium chloride flush  5-40 mL IntraVENous 2 times per day    ascorbic acid  500 mg Oral Daily    zinc sulfate  50 mg Oral Daily    vitamin D  2,000 Units Oral Daily     PRN Meds: fentanNYL, midazolam, sodium chloride flush, sodium chloride, acetaminophen **OR** acetaminophen, glucose, dextrose, glucagon (rDNA), dextrose  Nutrition:   Diet NPO  ADULT TUBE FEEDING; Nasogastric; Standard with Fiber; Continuous; 10; No; 30; Q 3 hours    Labs and Imaging Studies     CBC:   Recent Labs     02/02/22  0500 02/03/22  0406 02/04/22  0440   WBC 13.6* 16.9* 20.7*   HGB 8.1* 9.4* 8.6*   HCT 26.0* 30.2* 27.0*   MCV 97.7 97.7 94.7    301 293       BMP:    Recent Labs     02/03/22  0950 02/03/22  1731 02/04/22  0130    138 135   K 3.5 4.9 3.6   CL 96* 97* 92*   CO2 35* 32* 31*   BUN 23 22 22   CREATININE 0.5 0.5 0.5   GLUCOSE 117* 120* 131*       LIVER PROFILE:   No results for input(s): AST, ALT, LIPASE, BILIDIR, BILITOT, ALKPHOS in the last 72 hours. Invalid input(s): AMYLASE,  ALB    PT/INR:   No results for input(s): PROTIME, INR in the last 72 hours. APTT:   No results for input(s):  APTT in the last 72 hours. Fasting Lipid Panel:    Lab Results   Component Value Date    CHOL 145 06/29/2020    TRIG 124 01/25/2022    HDL 73 06/29/2020       Cardiac Enzymes:    No results found for: CKTOTAL, CKMB, CKMBINDEX, TROPONINI    Notable Cultures:      Blood cultures   Blood Culture, Routine   Date Value Ref Range Status   01/29/2022 5 Days no growth  Final     Respiratory cultures No results found for: RESPCULTURE No results found for: LABGRAM  Urine   Urine Culture, Routine   Date Value Ref Range Status   01/29/2022 Growth not present  Final     Legionella No results found for: LABLEGI  C Diff PCR No results found for: CDIFPCR  Wound culture/abscess: No results for input(s): WNDABS in the last 72 hours. Tip culture:No results for input(s): CXCATHTIP in the last 72 hours.       Oxygen:     Vent Information  $Ventilation: $Subsequent Day  Skin Assessment: Clean, dry, & intact  Suction Catheter Diameter:  (16)  Equipment ID: [id number]  Equipment Changed: (S) Airway securing device  Vent Type: 980  Vent Mode: AC/VC+  Vt Ordered: 300 mL  Rate Set: 20 bmp  Peak Flow: 0 L/min  Pressure Support: 0 cmH20  FiO2 : 60 %  SpO2: 95 %  SpO2/FiO2 ratio: 158.33  PaO2/FiO2 ratio: 196  Sensitivity: 3  PEEP/CPAP: 12  I Time/ I Time %: 0.9 s  Humidification Source: Heated wire  Humidification Temp: 37  Humidification Temp Measured: 37  Circuit Condensation: Drained  Mask Type: Full face mask  Mask Size: Medium  Additional Respiratory  Assessments  Pulse: 119  Resp: 23  SpO2: 95 %  Position: Semi-Ocampo's  Humidification Source: Heated wire  Humidification Temp: 37  Circuit Condensation: Drained  Oral Care: Mouth swabbed,Mouth suctioned,Mouth moisturizer  Subglottic Suction Done?: No  Airway Type: ET  Airway Size: 8  Cuff Pressure (cm H2O): 29 cm H2O       [REMOVED] Urethral Catheter Double-lumen 16 fr-Output (mL): 130 mL  Urethral Catheter Double-lumen 16 fr-Output (mL): 25 mL  [REMOVED] External Urinary Catheter-Output (mL): 200 mL    Imaging Studies:  XR ABDOMEN FOR NG/OG/NE TUBE PLACEMENT   Final Result   Satisfactory position of the enteric tube. US DUP LOWER EXTREMITIES BILATERAL VENOUS   Final Result   Within the visualized vessels there is no evidence for deep venous   thrombosis               XR CHEST PORTABLE   Final Result   No significant changes since February 2nd. Persistent bilateral infiltrates. Endotracheal tube in good position. XR CHEST PORTABLE   Final Result   No pneumothorax following line placement. Right greater than left parenchymal infiltrates similar to subtly improved. Continued follow-up recommended. XR ABDOMEN (KUB) (SINGLE AP VIEW)   Final Result   No significant change. Satisfactory position of the enteric tube within the   stomach. No bowel obstruction or acute abdominal disease identified. XR CHEST PORTABLE   Final Result   1. Extensive bilateral multifocal pneumonia. No significant change. 2. Vertical linear shadow over the lateral aspect of the right chest, most   likely skin fold. XR CHEST PORTABLE   Final Result   1. There is no interval change in the multifocal bilateral pneumonia. 2. There is no pneumothorax or pneumomediastinum. XR CHEST PORTABLE   Final Result   Stable to slightly improved aeration of the lungs bilaterally with persistent   patchy bilateral airspace opacities, in keeping with the patient's known   diagnosis of COVID pneumonia. US DUP LOWER EXTREMITIES BILATERAL VENOUS   Final Result   No evidence of DVT in either lower extremity. US DUP UPPER EXTREMITIES BILATERAL VENOUS   Final Result   Occlusive thrombus in the left distal cephalic vein. No evidence of DVT. Critical results were called by Dr. Gaston People to Dr. Mariel Marks On 1/29/2022   at 21:09. Please make a note of the limited nature of the study due to the above   reasons.       RECOMMENDATIONS:         XR CHEST PORTABLE   Final Result No significant interval change, when compared to the previous study performed   1 day earlier. XR CHEST PORTABLE   Final Result   Bilateral airspace disease without appreciable change. XR ABDOMEN (KUB) (SINGLE AP VIEW)   Final Result   No active process. NG tube in the gastric lumen as before. XR CHEST PORTABLE   Final Result   Progression of bilateral airspace disease remaining midlung predominant in   the periphery of airspace disease bronchopneumonia with increased from prior         XR CHEST PORTABLE   Final Result   Mild decrease in the diffuse left lung infiltrates, when compared to the   previous study performed 1 day earlier. Diffuse right lung infiltrates   without significant interval change. XR CHEST PORTABLE   Final Result   Left internal jugular line tip in the distal SVC. No pneumothorax. The remainder of the exam is essentially stable. .         XR CHEST PORTABLE   Final Result   Stable airspace disease. See above. XR CHEST PORTABLE   Final Result   Bilateral airspace disease which appears mildly progressive. XR CHEST PORTABLE   Final Result   1. Lines and tubes are unchanged in position. 2.  Diffuse bilateral opacities are not significantly changed. Differential   includes infection, pulmonary edema, atelectasis, ARDS, and/or layering   pleural effusions. XR CHEST PORTABLE   Final Result   1. No significant change in bilateral multifocal pneumonia. Support tubes   and catheters are stable. XR CHEST PORTABLE   Final Result   1. There is no interval change in extensive multifocal bilateral pneumonia. XR CHEST PORTABLE   Final Result   Slightly worsened bilateral pulmonary infiltrates with increasing opacity in   the bilateral bases when compared to previous. Stable lines and tubes. XR CHEST PORTABLE   Final Result   1. Good position right IJ central venous line. Negative for pneumothorax.       2. Bilateral widespread pulmonary infiltrates with interval mild worsening   on the left and compatible with bilateral pneumonia. XR CHEST PORTABLE   Final Result   Endotracheal tube tip is 3.3 cm above the alberta. Diffuse pulmonary infiltrates are unchanged. XR ABDOMEN FOR NG/OG/NE TUBE PLACEMENT   Final Result   Nasogastric tube terminates in the stomach. XR CHEST PORTABLE   Final Result   1. Multifocal bilateral pneumonia unchanged when compared with the prior   study. XR CHEST PORTABLE   Final Result   Bilateral airspace disease likely related to pneumonia.          XR CHEST PORTABLE    (Results Pending)   CTA PULMONARY W CONTRAST    (Results Pending)   XR CHEST PORTABLE    (Results Pending)   XR CHEST PORTABLE    (Results Pending)   XR CHEST PORTABLE    (Results Pending)        Resident's Assessment and Plan     Assessment and Plan:    Pulmonary  # Acute hypoxic respiratory failure 2/2 ARDS 2/2 COVID-19 PNA  - CXR showed BL ground glass infiltrates  Most recent ABG:   Recent Labs     02/04/22  0529   PH 7.556*   PCO2 39.3   PO2 87.6   HCO3 34.1*   - Current vent settings: AC/VC+, , RR 20, PEEP 12, FiO2 60%, PL 16  - P/f 1.46  - Urine out 910, +143 net fluid balance  Plan  - HOLD versed, decrease fentanyl max to 50, assess mentation  - Avoid propofol as BP very sensitive to it  - Wean vent as tolerated  - Goal is net negative fluid balance  - Diamox 250mg x once, lasix 40mg qd ordered per nephro    # Hx of asthma     Infectious disease  # Febrile with increasing leukocytosis   - NGT removed, OGT inserted 2/3/22  - DVT US LE to r/o DVT, negative for DVT  - Tmax 100.4 last night  - resp clx growing gram positive cocci in pairs, MRSA pending, blood clx not drawn  Plan  - Follow repeat pan clx for sensitivity and ID  - Stop cefepime, vanc  - Continue levofloxacin    Hematology/Oncology  # Acute normocytic anemia 2/2 anemia of chronic disease vs sepsis  - S/p 1u pRBC since admission   - Hb 8.6 today  - Iron studies negative  Plan  - CTM with CBC daily   - Transfuse if Hb <7      Last BM charted: 2/3/22  Antibiotics: levofloxacin (2/3/22)  Cultures: blood clx not yet drawn, resp clx growing gram positive cocci in pairs  Lines: RIJ CVC (2/2/22)  Intubated: 1/19/22  Diet: TF        # Peptic ulcer prophylaxis: lansoprazole   # DVT Prophylaxis: lovenox 30mg BID  # Disposition: Cont current care     Kate Bernheim, MD, PGY-1    Attending Physician: Dr. Kristi Minaya  Department of Pulmonary, Critical Care and Sleep Medicine  [address] [facility]  Department of Internal Medicine      During multidisciplinary team rounds Yazan Cruz is a 78 y.o. female was seen, examined and discussed. This is confirmation that I have personally seen and examined the patient and that the key elements of the encounter were performed by me (> 85 % time). The medications & laboratory data was discussed and adjusted where necessary. The radiographic images were reviewed or with radiologist or consultant if felt dis-concordant with the exam or history. The above findings were corroborated, plans confirmed and changes made if needed. Family is updated at the bedside as available. Key issues of the case were discussed among consultants. Critical Care time is documented if appropriate.       Cristi Valencia DO, FACP, FCCP, [facility],

## 2022-02-04 NOTE — PROGRESS NOTES
Jaya Ruiz M.D.,[facility]  Mandie Tatum D.O., F.A.C.O.I., Frank Espinoza M.D. Yun Hernandez M.D. Luis Ramos D.O. Daily Pulmonary Progress Note    Patient:  Alena Cox 78 y.o. female MRN: [id number]     Date of Service: 2/4/2022      Synopsis     We are following patient for acute respiratory failure with hypoxia, severe COVID-19 pneumonia    \"CC\" altered mental state, shortness of breath    Code status: Full      Subjective      Patient was seen through window Case discussed with staff. Off paralytics remain supine. PPlat 16 Cs 23  Intubated 17 days. Would consider trach and PEG placement soon. FiO2 60%. Persistent fevers,  with elevated WBCs. Pf ratio 196.     Review of Systems:  Unable to obtain    24-hour events:  None    Objective   Vitals: /61   Pulse 135   Temp 102.4 °F (39.1 °C) (Esophageal)   Resp 28   Ht 5' (1.524 m)   Wt 122 lb 2 oz (55.4 kg)   SpO2 96%   BMI 23.85 kg/m²     I/O:    Intake/Output Summary (Last 24 hours) at 2/4/2022 1249  Last data filed at 2/4/2022 1030  Gross per 24 hour   Intake 1246 ml   Output 1115 ml   Net 131 ml       Vent Information  $Ventilation: $Subsequent Day  Skin Assessment: Clean, dry, & intact  Suction Catheter Diameter:  (16)  Equipment ID: [id number]  Equipment Changed: (S) Airway securing device  Vent Type: 980  Vent Mode: AC/VC+  Vt Ordered: 300 mL  Rate Set: 20 bmp  Peak Flow: 0 L/min  Pressure Support: 0 cmH20  FiO2 : 60 %  SpO2: 96 %  SpO2/FiO2 ratio: 160  PaO2/FiO2 ratio: 196  Sensitivity: 3  PEEP/CPAP: 13  I Time/ I Time %: 0.9 s  Humidification Source: Heated wire  Humidification Temp: 37  Humidification Temp Measured: 37  Circuit Condensation: Drained  Mask Type: Full face mask  Mask Size: Medium       IPAP: 14 cmH20  CPAP/EPAP: 8 cmH2O     CURRENT MEDS :  Scheduled Meds:   [START ON 2/5/2022] furosemide  40 mg IntraVENous Daily    cefepime  2,000 mg IntraVENous Q12H    levofloxacin  750 mg IntraVENous Q24H    vancomycin  1,000 mg IntraVENous Q12H    lansoprazole  30 mg Per NG tube QAM AC    midodrine  5 mg Per NG tube TID WC    naloxegol  12.5 mg Oral QAM    polyethylene glycol  17 g Oral BID    sennosides  5 mL Oral BID    metoclopramide  5 mg IntraVENous Q6H    insulin lispro  0-6 Units SubCUTAneous Q4H    enoxaparin  30 mg SubCUTAneous BID    docusate sodium  100 mg Oral BID    miconazole nitrate   Topical BID    chlorhexidine  15 mL Mouth/Throat BID    artificial tears   Both Eyes Q4H    ipratropium-albuterol  1 ampule Inhalation Q4H WA    sodium chloride flush  5-40 mL IntraVENous 2 times per day    ascorbic acid  500 mg Oral Daily    zinc sulfate  50 mg Oral Daily    vitamin D  2,000 Units Oral Daily       Physical Exam:  Due to the current efforts to prevent transmission of COVID-19 and also the need to preserve PPE for other caregivers, a face-to-face encounter with the patient was not performed. That being said, all relevant records and diagnostic tests were reviewed, including laboratory results and imaging. Please reference any relevant documentation elsewhere. Care will be coordinated with the primary service. Pertinent/ New Labs and Imaging Studies     Imaging Personally Reviewed:    cxr 2/4/22  FINDINGS:   Significant bilateral parenchymal and interstitial fibrosis and possible   superimposed infiltrates are unchanged.  This appears worse on the right. Lines/tubes are appropriate.  There are no effusions.           Impression   No interval change                 Chest x-ray 2/3/2022  FINDINGS:   Endotracheal tube in good position at the level of the arch of the aorta.  The   NG tube in the stomach.  The right internal jugular central venous catheter   tip in the SVC right atrial junction.       Heart has upper borderline size.       Extensive bilateral infiltrates are seen throughout both lungs more prominent   on the right-side extending from the superior to inferior aspect.     There is no pleural effusions.       There is no subcutaneous emphysema, pneumothorax or pneumomediastinum.           Impression   No significant changes since February 2nd.  Persistent bilateral infiltrates. Endotracheal tube in good position.             ECHO  None    Labs:  Lab Results   Component Value Date    WBC 20.7 02/04/2022    HGB 8.6 02/04/2022    HCT 27.0 02/04/2022    MCV 94.7 02/04/2022    MCH 30.2 02/04/2022    MCHC 31.9 02/04/2022    RDW 14.1 02/04/2022     02/04/2022    MPV 11.5 02/04/2022     Lab Results   Component Value Date     02/04/2022    K 2.8 02/04/2022    K 3.8 01/18/2022    CL 92 02/04/2022    CO2 32 02/04/2022    BUN 21 02/04/2022    CREATININE 0.5 02/04/2022    LABALBU 2.4 01/29/2022    CALCIUM 8.8 02/04/2022    GFRAA >60 02/04/2022    LABGLOM >60 02/04/2022     Lab Results   Component Value Date    PROTIME 11.3 01/14/2022    INR 1.0 01/14/2022     No results for input(s): PROBNP in the last 72 hours. Recent Labs     02/03/22  0406   PROCAL 0.57*     This SmartLink has not been configured with any valid records. Micro:  Recent Labs     02/03/22  1640   CULTRESP Oral Pharyngeal Sommer reduced     No results for input(s): LABGRAM in the last 72 hours. No results for input(s): LEGUR in the last 72 hours. No results for input(s): STREPNEUMAGU in the last 72 hours. No results for input(s): LP1UAG in the last 72 hours. Assessment:    1. Acute respiratory failure with hypoxia  2. Severe COVID-19 pneumonia  3. Superimposed bacterial pneumonia  4. Septic shock, resolved  5. Asthma without acute exacerbation  6. Anemia   7. SAMUEL, resolved  8. Leukocytosis    Plan:   1. Full vent support with lung protective low tidal volume ventilation 300 ml. Remains supine as tolerated FiO2  60% PF ratio 195, Pplat 16 peep +13  2. Follow daily chest x-ray, ABGs  3. Diuresis with Lasix, repeated diamox today nephrology following  4.  Scheduled bronchodilators-DuoNebs every 4 hours while awake  5. Monitor for signs of infection worsening fevers, worsening WBCs  20.7  Blood cultures pending. Fungitell neg. 1/29/2022  6. Completed empiric antibiotics. Procalcitonin  0.57  7. Intubated 17 days, remains supine off paralytics. consider surgery eval for trache and peg tube. 8. DVT, GI, VAP prophylaxis  9. DNR CCA  Remainder of care per ICU team appreciated  This plan of care was reviewed in collaboration with Dr. Heena Reardon  Electronically signed by MAUREEN Bocanegra CNP on 2/4/2022 at 12:49 PM      I personally saw, examined, and cared for the patient. Labs, medications, radiographs reviewed.  I agree with history exam and plans detailed in NP note with the following additions:    Off paralytic still  Plans to reduce sedatives  Still on 60% and PEEP 13  Day 17 intubation  Will need trache  ICU care    Alfonzo Thao MD

## 2022-02-05 ENCOUNTER — APPOINTMENT (OUTPATIENT)
Dept: GENERAL RADIOLOGY | Age: 67
DRG: 207 | End: 2022-02-05
Payer: MEDICARE

## 2022-02-05 LAB
AADO2: 324.7 MMHG
ANION GAP SERPL CALCULATED.3IONS-SCNC: 12 MMOL/L (ref 7–16)
B.E.: 4.1 MMOL/L (ref -3–3)
BUN BLDV-MCNC: 25 MG/DL (ref 6–23)
BUN BLDV-MCNC: 30 MG/DL (ref 6–23)
BUN BLDV-MCNC: 34 MG/DL (ref 6–23)
C-REACTIVE PROTEIN: 34.6 MG/DL (ref 0–0.4)
CALCIUM IONIZED: 1.27 MMOL/L (ref 1.15–1.33)
CALCIUM SERPL-MCNC: 9.3 MG/DL (ref 8.6–10.2)
CALCIUM SERPL-MCNC: 9.8 MG/DL (ref 8.6–10.2)
CALCIUM SERPL-MCNC: 9.8 MG/DL (ref 8.6–10.2)
CHLORIDE BLD-SCNC: 93 MMOL/L (ref 98–107)
CHLORIDE BLD-SCNC: 94 MMOL/L (ref 98–107)
CHLORIDE BLD-SCNC: 96 MMOL/L (ref 98–107)
CO2: 28 MMOL/L (ref 22–29)
CO2: 30 MMOL/L (ref 22–29)
CO2: 31 MMOL/L (ref 22–29)
COHB: 0.9 % (ref 0–1.5)
CREAT SERPL-MCNC: 0.5 MG/DL (ref 0.5–1)
CREAT SERPL-MCNC: 0.5 MG/DL (ref 0.5–1)
CREAT SERPL-MCNC: 0.6 MG/DL (ref 0.5–1)
CRITICAL: ABNORMAL
CULTURE, RESPIRATORY: ABNORMAL
CULTURE, RESPIRATORY: ABNORMAL
D DIMER: 709 NG/ML DDU
DATE ANALYZED: ABNORMAL
DATE OF COLLECTION: ABNORMAL
FIO2: 60 %
GFR AFRICAN AMERICAN: >60
GFR NON-AFRICAN AMERICAN: >60 ML/MIN/1.73
GLUCOSE BLD-MCNC: 157 MG/DL (ref 74–99)
GLUCOSE BLD-MCNC: 167 MG/DL (ref 74–99)
GLUCOSE BLD-MCNC: 188 MG/DL (ref 74–99)
HCO3: 28.5 MMOL/L (ref 22–26)
HCT VFR BLD CALC: 27.3 % (ref 34–48)
HEMOGLOBIN: 8.8 G/DL (ref 11.5–15.5)
HHB: 22.5 % (ref 0–5)
LAB: ABNORMAL
Lab: ABNORMAL
MCH RBC QN AUTO: 30.2 PG (ref 26–35)
MCHC RBC AUTO-ENTMCNC: 32.2 % (ref 32–34.5)
MCV RBC AUTO: 93.8 FL (ref 80–99.9)
METER GLUCOSE: 107 MG/DL (ref 74–99)
METER GLUCOSE: 122 MG/DL (ref 74–99)
METER GLUCOSE: 132 MG/DL (ref 74–99)
METER GLUCOSE: 147 MG/DL (ref 74–99)
METER GLUCOSE: 163 MG/DL (ref 74–99)
METER GLUCOSE: 194 MG/DL (ref 74–99)
METHB: 0.4 % (ref 0–1.5)
MODE: AC
MRSA CULTURE ONLY: NORMAL
O2 CONTENT: 10.3 ML/DL
O2 SATURATION: 77.2 % (ref 92–98.5)
O2HB: 76.2 % (ref 94–97)
OPERATOR ID: 7291
ORGANISM: ABNORMAL
PATIENT TEMP: 37 C
PCO2: 42.1 MMHG (ref 35–45)
PDW BLD-RTO: 14.1 FL (ref 11.5–15)
PEEP/CPAP: 13 CMH2O
PFO2: 0.7 MMHG/%
PH BLOOD GAS: 7.45 (ref 7.35–7.45)
PLATELET # BLD: 389 E9/L (ref 130–450)
PMV BLD AUTO: 11.9 FL (ref 7–12)
PO2: 41.8 MMHG (ref 75–100)
POTASSIUM SERPL-SCNC: 3.6 MMOL/L (ref 3.5–5)
POTASSIUM SERPL-SCNC: 3.9 MMOL/L (ref 3.5–5)
POTASSIUM SERPL-SCNC: 3.9 MMOL/L (ref 3.5–5)
RBC # BLD: 2.91 E12/L (ref 3.5–5.5)
RI(T): 7.77
RR MECHANICAL: 20 B/MIN
SMEAR, RESPIRATORY: ABNORMAL
SODIUM BLD-SCNC: 134 MMOL/L (ref 132–146)
SODIUM BLD-SCNC: 135 MMOL/L (ref 132–146)
SODIUM BLD-SCNC: 139 MMOL/L (ref 132–146)
SOURCE, BLOOD GAS: ABNORMAL
THB: 9.6 G/DL (ref 11.5–16.5)
TIME ANALYZED: 451
VT MECHANICAL: 300 ML
WBC # BLD: 23.4 E9/L (ref 4.5–11.5)

## 2022-02-05 PROCEDURE — 6370000000 HC RX 637 (ALT 250 FOR IP): Performed by: INTERNAL MEDICINE

## 2022-02-05 PROCEDURE — 6370000000 HC RX 637 (ALT 250 FOR IP)

## 2022-02-05 PROCEDURE — 2580000003 HC RX 258: Performed by: FAMILY MEDICINE

## 2022-02-05 PROCEDURE — 86140 C-REACTIVE PROTEIN: CPT

## 2022-02-05 PROCEDURE — 6360000002 HC RX W HCPCS: Performed by: INTERNAL MEDICINE

## 2022-02-05 PROCEDURE — 2500000003 HC RX 250 WO HCPCS: Performed by: INTERNAL MEDICINE

## 2022-02-05 PROCEDURE — 2580000003 HC RX 258: Performed by: INTERNAL MEDICINE

## 2022-02-05 PROCEDURE — 6360000002 HC RX W HCPCS: Performed by: NURSE PRACTITIONER

## 2022-02-05 PROCEDURE — 82962 GLUCOSE BLOOD TEST: CPT

## 2022-02-05 PROCEDURE — 94003 VENT MGMT INPAT SUBQ DAY: CPT

## 2022-02-05 PROCEDURE — 94640 AIRWAY INHALATION TREATMENT: CPT

## 2022-02-05 PROCEDURE — 99233 SBSQ HOSP IP/OBS HIGH 50: CPT | Performed by: INTERNAL MEDICINE

## 2022-02-05 PROCEDURE — 2000000000 HC ICU R&B

## 2022-02-05 PROCEDURE — 36415 COLL VENOUS BLD VENIPUNCTURE: CPT

## 2022-02-05 PROCEDURE — 82805 BLOOD GASES W/O2 SATURATION: CPT

## 2022-02-05 PROCEDURE — 85378 FIBRIN DEGRADE SEMIQUANT: CPT

## 2022-02-05 PROCEDURE — 6360000002 HC RX W HCPCS: Performed by: STUDENT IN AN ORGANIZED HEALTH CARE EDUCATION/TRAINING PROGRAM

## 2022-02-05 PROCEDURE — 85027 COMPLETE CBC AUTOMATED: CPT

## 2022-02-05 PROCEDURE — 6370000000 HC RX 637 (ALT 250 FOR IP): Performed by: FAMILY MEDICINE

## 2022-02-05 PROCEDURE — 2500000003 HC RX 250 WO HCPCS: Performed by: STUDENT IN AN ORGANIZED HEALTH CARE EDUCATION/TRAINING PROGRAM

## 2022-02-05 PROCEDURE — 36592 COLLECT BLOOD FROM PICC: CPT

## 2022-02-05 PROCEDURE — 71045 X-RAY EXAM CHEST 1 VIEW: CPT

## 2022-02-05 PROCEDURE — 82330 ASSAY OF CALCIUM: CPT

## 2022-02-05 PROCEDURE — 36620 INSERTION CATHETER ARTERY: CPT

## 2022-02-05 PROCEDURE — 80048 BASIC METABOLIC PNL TOTAL CA: CPT

## 2022-02-05 PROCEDURE — 6370000000 HC RX 637 (ALT 250 FOR IP): Performed by: NURSE PRACTITIONER

## 2022-02-05 RX ORDER — LIDOCAINE HYDROCHLORIDE 10 MG/ML
5 INJECTION, SOLUTION INFILTRATION; PERINEURAL ONCE
Status: COMPLETED | OUTPATIENT
Start: 2022-02-05 | End: 2022-02-05

## 2022-02-05 RX ORDER — POLYETHYLENE GLYCOL 3350 17 G/17G
17 POWDER, FOR SOLUTION ORAL DAILY PRN
Status: DISCONTINUED | OUTPATIENT
Start: 2022-02-05 | End: 2022-02-11 | Stop reason: HOSPADM

## 2022-02-05 RX ORDER — MIDAZOLAM HYDROCHLORIDE 2 MG/2ML
4 INJECTION, SOLUTION INTRAMUSCULAR; INTRAVENOUS
Status: DISCONTINUED | OUTPATIENT
Start: 2022-02-05 | End: 2022-02-05

## 2022-02-05 RX ORDER — MORPHINE SULFATE 2 MG/ML
2 INJECTION, SOLUTION INTRAMUSCULAR; INTRAVENOUS ONCE
Status: COMPLETED | OUTPATIENT
Start: 2022-02-05 | End: 2022-02-05

## 2022-02-05 RX ADMIN — CHLORHEXIDINE GLUCONATE 0.12% ORAL RINSE 15 ML: 1.2 LIQUID ORAL at 08:08

## 2022-02-05 RX ADMIN — ZINC SULFATE 220 MG (50 MG) CAPSULE 50 MG: CAPSULE at 08:09

## 2022-02-05 RX ADMIN — LANSOPRAZOLE 30 MG: KIT at 10:09

## 2022-02-05 RX ADMIN — Medication 2000 UNITS: at 08:08

## 2022-02-05 RX ADMIN — MIDODRINE HYDROCHLORIDE 5 MG: 5 TABLET ORAL at 12:44

## 2022-02-05 RX ADMIN — LIDOCAINE HYDROCHLORIDE 5 ML: 10 INJECTION, SOLUTION INFILTRATION; PERINEURAL at 17:11

## 2022-02-05 RX ADMIN — MIDAZOLAM 4 MG: 1 INJECTION INTRAMUSCULAR; INTRAVENOUS at 00:25

## 2022-02-05 RX ADMIN — CHLORHEXIDINE GLUCONATE 0.12% ORAL RINSE 15 ML: 1.2 LIQUID ORAL at 20:31

## 2022-02-05 RX ADMIN — MIDODRINE HYDROCHLORIDE 5 MG: 5 TABLET ORAL at 08:09

## 2022-02-05 RX ADMIN — Medication: at 08:09

## 2022-02-05 RX ADMIN — Medication: at 20:31

## 2022-02-05 RX ADMIN — FUROSEMIDE 40 MG: 10 INJECTION, SOLUTION INTRAMUSCULAR; INTRAVENOUS at 08:08

## 2022-02-05 RX ADMIN — Medication 10 ML: at 08:09

## 2022-02-05 RX ADMIN — MINERAL OIL AND WHITE PETROLATUM: 150; 830 OINTMENT OPHTHALMIC at 01:37

## 2022-02-05 RX ADMIN — LEVOFLOXACIN 750 MG: 5 INJECTION, SOLUTION INTRAVENOUS at 14:01

## 2022-02-05 RX ADMIN — ENOXAPARIN SODIUM 30 MG: 100 INJECTION SUBCUTANEOUS at 20:31

## 2022-02-05 RX ADMIN — INSULIN LISPRO 1 UNITS: 100 INJECTION, SOLUTION INTRAVENOUS; SUBCUTANEOUS at 12:58

## 2022-02-05 RX ADMIN — IPRATROPIUM BROMIDE AND ALBUTEROL SULFATE 1 AMPULE: .5; 2.5 SOLUTION RESPIRATORY (INHALATION) at 13:21

## 2022-02-05 RX ADMIN — MINERAL OIL AND WHITE PETROLATUM: 150; 830 OINTMENT OPHTHALMIC at 10:00

## 2022-02-05 RX ADMIN — Medication 50 MCG/HR: at 15:03

## 2022-02-05 RX ADMIN — IPRATROPIUM BROMIDE AND ALBUTEROL SULFATE 1 AMPULE: .5; 2.5 SOLUTION RESPIRATORY (INHALATION) at 17:19

## 2022-02-05 RX ADMIN — VANCOMYCIN HYDROCHLORIDE 1000 MG: 1 INJECTION, POWDER, LYOPHILIZED, FOR SOLUTION INTRAVENOUS at 01:36

## 2022-02-05 RX ADMIN — MINERAL OIL AND WHITE PETROLATUM: 150; 830 OINTMENT OPHTHALMIC at 20:32

## 2022-02-05 RX ADMIN — IPRATROPIUM BROMIDE AND ALBUTEROL SULFATE 1 AMPULE: .5; 2.5 SOLUTION RESPIRATORY (INHALATION) at 21:05

## 2022-02-05 RX ADMIN — INSULIN LISPRO 1 UNITS: 100 INJECTION, SOLUTION INTRAVENOUS; SUBCUTANEOUS at 04:26

## 2022-02-05 RX ADMIN — MINERAL OIL AND WHITE PETROLATUM: 150; 830 OINTMENT OPHTHALMIC at 14:05

## 2022-02-05 RX ADMIN — MORPHINE SULFATE 2 MG: 2 INJECTION, SOLUTION INTRAMUSCULAR; INTRAVENOUS at 21:38

## 2022-02-05 RX ADMIN — OXYCODONE HYDROCHLORIDE AND ACETAMINOPHEN 500 MG: 500 TABLET ORAL at 08:08

## 2022-02-05 RX ADMIN — MIDAZOLAM 4 MG: 1 INJECTION INTRAMUSCULAR; INTRAVENOUS at 04:21

## 2022-02-05 RX ADMIN — MINERAL OIL AND WHITE PETROLATUM: 150; 830 OINTMENT OPHTHALMIC at 05:33

## 2022-02-05 RX ADMIN — ACETAMINOPHEN 650 MG: 325 TABLET ORAL at 10:09

## 2022-02-05 RX ADMIN — INSULIN LISPRO 1 UNITS: 100 INJECTION, SOLUTION INTRAVENOUS; SUBCUTANEOUS at 17:30

## 2022-02-05 RX ADMIN — IPRATROPIUM BROMIDE AND ALBUTEROL SULFATE 1 AMPULE: .5; 2.5 SOLUTION RESPIRATORY (INHALATION) at 10:31

## 2022-02-05 RX ADMIN — ENOXAPARIN SODIUM 30 MG: 100 INJECTION SUBCUTANEOUS at 08:08

## 2022-02-05 RX ADMIN — Medication 10 ML: at 20:32

## 2022-02-05 RX ADMIN — MINERAL OIL AND WHITE PETROLATUM: 150; 830 OINTMENT OPHTHALMIC at 17:10

## 2022-02-05 ASSESSMENT — PULMONARY FUNCTION TESTS
PIF_VALUE: 33
PIF_VALUE: 33
PIF_VALUE: 28
PIF_VALUE: 33
PIF_VALUE: 30
PIF_VALUE: 30
PIF_VALUE: 31
PIF_VALUE: 37
PIF_VALUE: 31
PIF_VALUE: 32
PIF_VALUE: 33
PIF_VALUE: 31
PIF_VALUE: 31
PIF_VALUE: 28
PIF_VALUE: 31
PIF_VALUE: 28
PIF_VALUE: 28
PIF_VALUE: 35
PIF_VALUE: 31
PIF_VALUE: 28
PIF_VALUE: 28
PIF_VALUE: 37
PIF_VALUE: 37
PIF_VALUE: 32
PIF_VALUE: 51
PIF_VALUE: 33
PIF_VALUE: 33
PIF_VALUE: 28
PIF_VALUE: 34
PIF_VALUE: 31
PIF_VALUE: 28
PIF_VALUE: 28

## 2022-02-05 ASSESSMENT — PAIN SCALES - GENERAL
PAINLEVEL_OUTOF10: 0

## 2022-02-05 NOTE — PROGRESS NOTES
HOSPITALIST PROGRESS NOTE  Date: 2/5/2022   Name: Yazan Cruz   MRN: [id number] endocrine:  YOB: 1955        Hospital Course:   [facility] is [facility] 69 y.o. year old female who has a PMH of asthma.   She presented to the ER on 1/14/22 with complaints of SOB and diarrhea x 1 week.  She was not vaccinated against COVID-19.  Reportedly her daughter found her curled into the fetal position in respiratory distress at home and called 911.     Subjective/Interval Hx:   Patient remains intubated sedated in intensive care unit  Objective:   Physical Exam:   /82   Pulse 128   Temp 100.6 °F (38.1 °C) (Esophageal)   Resp (!) 36   Ht 5' (1.524 m)   Wt 124 lb 1 oz (56.3 kg)   SpO2 100%   BMI 24.23 kg/m²   General: no acute distress, well nourished and well hydrated  HEENT: NCAT  Heart: S1S2 RRR  Lungs: Clear to ascultation bilaterally, respiratory effort normal  Abdomen: soft, NT/ND, positive bowel sounds  Extremities: no pitting edema, nontender   Neuro: patient is awake, alert and orientated times 3, no gross deficits  Skin: no rashes or ecchymosis        Meds:   Meds:    furosemide  40 mg IntraVENous Daily    levofloxacin  750 mg IntraVENous Q24H    lansoprazole  30 mg Per NG tube QAM AC    midodrine  5 mg Per NG tube TID WC    insulin lispro  0-6 Units SubCUTAneous Q4H    enoxaparin  30 mg SubCUTAneous BID    miconazole nitrate   Topical BID    chlorhexidine  15 mL Mouth/Throat BID    artificial tears   Both Eyes Q4H    ipratropium-albuterol  1 ampule Inhalation Q4H WA    sodium chloride flush  5-40 mL IntraVENous 2 times per day    ascorbic acid  500 mg Oral Daily    zinc sulfate  50 mg Oral Daily    vitamin D  2,000 Units Oral Daily      Infusions:    fentaNYL 5 mcg/ml in 0.9%  ml infusion 50 mcg/hr (02/04/22 1910)    sodium chloride      dextrose       PRN Meds: midazolam, 4 mg, Q2H PRN  sennosides, 5 mL, Nightly PRN  polyethylene glycol, 17 g, Daily PRN  fentanNYL, 50 mcg, Q2H PRN  sodium chloride flush, 5-40 mL, PRN  sodium chloride, 25 mL, PRN  acetaminophen, 650 mg, Q6H PRN   Or  acetaminophen, 650 mg, Q6H PRN  glucose, 15 g, PRN  dextrose, 12.5 g, PRN  glucagon (rDNA), 1 mg, PRN  dextrose, 100 mL/hr, PRN        Data/Labs:     Recent Labs     02/03/22  0406 02/04/22  0440 02/05/22  0445   WBC 16.9* 20.7* 23.4*   HGB 9.4* 8.6* 8.8*   HCT 30.2* 27.0* 27.3*    293 389      Recent Labs     02/02/22  1754 02/03/22  0200 02/03/22  0406 02/03/22  0950 02/04/22  1149 02/04/22  1937 02/05/22  0402      < >  --    < > 136 132 134   K 2.8*   < >  --    < > 2.8* 5.0 3.9   CL 99   < >  --    < > 92* 92* 94*   CO2 37*   < >  --    < > 32* 27 28   PHOS 2.8  --  2.8  --   --   --   --    BUN 23   < >  --    < > 21 22 25*   CREATININE 0.5   < >  --    < > 0.5 0.5 0.5    < > = values in this interval not displayed. No results for input(s): AST, ALT, ALB, BILIDIR, BILITOT, ALKPHOS in the last 72 hours. No results for input(s): INR in the last 72 hours. No results for input(s): CKTOTAL, CKMB, CKMBINDEX, TROPONINT in the last 72 hours. I/O last 3 completed shifts: In: 2997 [I.V.:833; JU/IZ:0912; IV Piggyback:750]  Out: 2165 [Urine:2165]    Intake/Output Summary (Last 24 hours) at 2/5/2022 1052  Last data filed at 2/5/2022 1000  Gross per 24 hour   Intake 1751 ml   Output 1950 ml   Net -199 ml        Assessment/Plan:   1. Acute respiratory hypoxic failure due to Covid viral pneumonia-patient is unvaccinated with symptomatic x1 week prior to presentation, steroids, vitamins, pulmonary is following  02/02/2020-patient remains intubated sedated in the ICU we will continue to monitor with critical care team  02/0/2022-patient remains unchanged intubated sedated in ICU  02 05/2022-patient remains intubated sedated in intensive care unit CRP is high at 34.6  2. Non-insulin-dependent diabetes mellitus-sliding scale, monitor blood glucose, diabetic diet  3.  GERD-Protonix, Reglan  4. COPD-DuoNeb, monitor pulmonary toiletry      DVT Prophylaxis: Eliquis  Diet: Diet NPO  ADULT TUBE FEEDING; Nasogastric; Standard with Fiber; Continuous; 10; Yes; 10; Q 4 hours; 40; 30; Q 6 hours  Code Status: DNR-CCA    Dispo:  When stable    Electronically signed by Jatinder Mercer MD on 2/5/2022 at 10:52 AM  [facility]

## 2022-02-05 NOTE — PROGRESS NOTES
Department of Internal Medicine  Nephrology  Progress Note      Events Reviewed. Intubated, supine. SUBJECTIVE:  We are following Ms. Gloria Rodriguez for SAMUEL. Patient is intubated and supine. PHYSICAL EXAM:    Vitals:    VITALS:  /71   Pulse 127   Temp 100.2 °F (37.9 °C)   Resp (!) 31   Ht 5' (1.524 m)   Wt 124 lb 1 oz (56.3 kg)   SpO2 98%   BMI 24.23 kg/m²   24HR INTAKE/OUTPUT:      Intake/Output Summary (Last 24 hours) at 2/5/2022 0927  Last data filed at 2/5/2022 0844  Gross per 24 hour   Intake 1751 ml   Output 1930 ml   Net -179 ml     General appearance: Patient is intubated, sedated  HEENT: Normal cephalic, atraumatic without obvious deformity. Pupils equal, round, and reactive to light. Endotracheal tube in place  Neck: Supple, with full range of motion. No jugular venous distention. Trachea midline. Respiratory: Diminished bilaterally, intubated. Cardiovascular: RRR, no murmur noted  Abdomen: Soft, nontender, nondistended, flat  Musculoskeletal: No clubbing or cyanosis. No edema of bilateral lower extremities. Brisk capillary refill. Skin: Normal skin color.  No rashes. Scattered ecchymosis.   Neurologic: Patient sedated        Scheduled Meds:   furosemide  40 mg IntraVENous Daily    levofloxacin  750 mg IntraVENous Q24H    lansoprazole  30 mg Per NG tube QAM AC    midodrine  5 mg Per NG tube TID WC    insulin lispro  0-6 Units SubCUTAneous Q4H    enoxaparin  30 mg SubCUTAneous BID    miconazole nitrate   Topical BID    chlorhexidine  15 mL Mouth/Throat BID    artificial tears   Both Eyes Q4H    ipratropium-albuterol  1 ampule Inhalation Q4H WA    sodium chloride flush  5-40 mL IntraVENous 2 times per day    ascorbic acid  500 mg Oral Daily    zinc sulfate  50 mg Oral Daily    vitamin D  2,000 Units Oral Daily     Continuous Infusions:   fentaNYL 5 mcg/ml in 0.9%  ml infusion 50 mcg/hr (02/04/22 1910)    sodium chloride      dextrose       PRN Meds:.midazolam, sennosides, polyethylene glycol, fentanNYL, sodium chloride flush, sodium chloride, acetaminophen **OR** acetaminophen, glucose, dextrose, glucagon (rDNA), dextrose    DATA:    CBC with Differential:    Lab Results   Component Value Date    WBC 23.4 02/05/2022    RBC 2.91 02/05/2022    HGB 8.8 02/05/2022    HCT 27.3 02/05/2022     02/05/2022    MCV 93.8 02/05/2022    MCH 30.2 02/05/2022    MCHC 32.2 02/05/2022    RDW 14.1 02/05/2022    METASPCT 0.9 01/18/2022    LYMPHOPCT 2.7 01/18/2022    MONOPCT 11.6 01/18/2022    MYELOPCT 0.9 01/14/2022    BASOPCT 0.1 01/18/2022    MONOSABS 1.38 01/18/2022    LYMPHSABS 0.35 01/18/2022    EOSABS 0.00 01/18/2022    BASOSABS 0.00 01/18/2022     CMP:    Lab Results   Component Value Date     02/05/2022    K 3.9 02/05/2022    K 3.8 01/18/2022    CL 94 02/05/2022    CO2 28 02/05/2022    BUN 25 02/05/2022    CREATININE 0.5 02/05/2022    GFRAA >60 02/05/2022    LABGLOM >60 02/05/2022    GLUCOSE 188 02/05/2022    PROT 5.6 01/29/2022    LABALBU 2.4 01/29/2022    CALCIUM 9.3 02/05/2022    BILITOT 0.2 01/29/2022    ALKPHOS 97 01/29/2022    AST 16 01/29/2022    ALT 16 01/29/2022     Magnesium:    Lab Results   Component Value Date    MG 2.1 02/03/2022     Phosphorus:    Lab Results   Component Value Date    PHOS 2.8 02/03/2022          Radiology Review:        CXR February 5, 2022   1. Very minimal partial interval clearing of the right lung pneumonia when   compared with the study. 2. No interval change in the left lung pneumonia. 3. There is no pneumothorax.                 BRIEF SUMMARY OF INITIAL CONSULT:     Briefly, Ms. Alma Narayanan is a 78year old female with a past medical history of Asthma who presented to the ER on January 14 th, 2022 with complaints of SOB and diarrhea for one week. She is not vaccinated against COVID. Reportedly her daughter found her curled into a fetal position in respiratory distress at home and EMS was summoned.  She was found to be COVID 19 positive. Labs were significant for bicarbonate 19, BUN 38, creatinine 1.9, anion gap 19, procalcitonin 1.57, CRP 12.8, , mildly elevated LFTs, D-dimer 404, ferritin 5098. Chest x-ray showed bilateral airspace disease. Renal is consulted for SAMUEL. Problems Resolved:     · SAMUEL, likely pre-renal volume responsive SAMUEL secondary to poor oral intake and GI losses (diarrhea). Creatinine 1.9mg/dL on admission. Renal function has improved to 0.6 mg/dL with IVF administration. · HAGMA with low bicarbonate levels, secondary to uremia. To continue bicarbonate drip  · hypernatremia, with water deficit, dehydration due to poor intake. Sodium levels quite improved. Resolved. · Hypophosphatemia, 2/2 poor intake, to replace  · Hypocalcemia, vitamin D 25 level 39, calcium levels 8.4- improved  · Low grade temp--> pancultures sent. Resolved    IMPRESSION/RECOMMENDATIONS:        1. Metabolic alkalosis 2.147, pCO2 42.1, bicarb 28.5 (contraction alkalosis from diuretic use)  2. Hypokalemia 2/2 renal wasting with diuretics  3. ----------------------------------------  4. COVID +, on hydrocortisone   5. Acute hypoxic respiratory failure, secondary to COVID pneumonia. Status post intubation  6. Hypotension, midodrine 5 mg TID. 7. Normocytic anemia, hemoglobin 8.6. stable. 8. Nutrition, receiving tube feeds at 10 mL an hour with free water flush 30 cc every 3 hours      Plan:    · Lasix 40 mg IV daily  · Continue midodrine 5 mg TID.    · Continue to monitor phos and magnesium levels    Electronically signed by MAUREEN Berger CNP on 2/5/2022 at 9:27 AM

## 2022-02-05 NOTE — PROGRESS NOTES
[address] [facility]   Department of Internal Medicine   Internal Medicine Residency  MICU Progress Note    Patient:  Tommy Phillips 78 y.o. female   MRN: [id number]       Date of Service: 2022    Allergy: Patient has no known allergies. Subjective     Patient was seen and examined this morning at bedside in no acute distress. Overnight, patient's oxygen saturation dropped to 80% with RR in 40s, multiple versed pushes given for vent synchrony. P/f ratio dropped to 0.7 so FiO2 was increased from 60 to 80%. This morning, patient is sedated and supine, intubated. Objective     TEMPERATURE:  Current - Temp: 100 °F (37.8 °C); Max - Temp  Av.3 °F (37.9 °C)  Min: 99.5 °F (37.5 °C)  Max: 102.4 °F (39.1 °C)  RESPIRATIONS RANGE: Resp  Av.2  Min: 19  Max: 42  PULSE RANGE: Pulse  Av.3  Min: 109  Max: 135  BLOOD PRESSURE RANGE:  Systolic (88SYM), KJS:884 , Min:90 , LCD:664   ; Diastolic (44LEY), KCP:39, Min:51, Max:94    PULSE OXIMETRY RANGE: SpO2  Av %  Min: 75 %  Max: 99 %    I & O - 24hr:    Intake/Output Summary (Last 24 hours) at 2022 0813  Last data filed at 2022 0600  Gross per 24 hour   Intake 1751 ml   Output 1655 ml   Net 96 ml     I/O last 3 completed shifts: In: 2997 [I.V.:833; GJ/IJ:4500; IV Piggyback:750]  Out: 2165 [Urine:2165] No intake/output data recorded. Weight change: 1 lb 15 oz (0.879 kg)    Physical Exam:  General Appearance:    Supine, intubated, sedated. HEENT:    NC/AT, mucous membranes are moist   Neck:   Supple, no jugular venous distention. Resp:     CTAB, No wheezes, No rhonchi, no use of accessory muscles   Heart:    RRR, S1 and S2 normal, no murmur, rub or gallop. Abdomen:     Soft, non-tender, non-distended with normal bowel sounds   Extremities:   Atraumatic, no cyanosis or edema   Pulses:  Radial and pedal pulses are intact bilaterally   Neurologic: Unconscious. Does not arouse to sternal rub.         Medications     Continuous Infusions:   fentaNYL 5 mcg/ml in 0.9%  ml infusion 50 mcg/hr (02/04/22 1910)    sodium chloride      dextrose       Scheduled Meds:   furosemide  40 mg IntraVENous Daily    levofloxacin  750 mg IntraVENous Q24H    lansoprazole  30 mg Per NG tube QAM AC    midodrine  5 mg Per NG tube TID WC    insulin lispro  0-6 Units SubCUTAneous Q4H    enoxaparin  30 mg SubCUTAneous BID    miconazole nitrate   Topical BID    chlorhexidine  15 mL Mouth/Throat BID    artificial tears   Both Eyes Q4H    ipratropium-albuterol  1 ampule Inhalation Q4H WA    sodium chloride flush  5-40 mL IntraVENous 2 times per day    ascorbic acid  500 mg Oral Daily    zinc sulfate  50 mg Oral Daily    vitamin D  2,000 Units Oral Daily     PRN Meds: midazolam, sennosides, polyethylene glycol, fentanNYL, sodium chloride flush, sodium chloride, acetaminophen **OR** acetaminophen, glucose, dextrose, glucagon (rDNA), dextrose  Nutrition:   Diet NPO  ADULT TUBE FEEDING; Nasogastric; Standard with Fiber; Continuous; 10; Yes; 10; Q 4 hours; 40; 30; Q 6 hours    Labs and Imaging Studies     CBC:   Recent Labs     02/03/22  0406 02/04/22  0440 02/05/22  0445   WBC 16.9* 20.7* 23.4*   HGB 9.4* 8.6* 8.8*   HCT 30.2* 27.0* 27.3*   MCV 97.7 94.7 93.8    293 389       BMP:    Recent Labs     02/04/22  1149 02/04/22  1937 02/05/22  0402    132 134   K 2.8* 5.0 3.9   CL 92* 92* 94*   CO2 32* 27 28   BUN 21 22 25*   CREATININE 0.5 0.5 0.5   GLUCOSE 139* 179* 188*       LIVER PROFILE:   No results for input(s): AST, ALT, LIPASE, BILIDIR, BILITOT, ALKPHOS in the last 72 hours. Invalid input(s): AMYLASE,  ALB    PT/INR:   No results for input(s): PROTIME, INR in the last 72 hours. APTT:   No results for input(s): APTT in the last 72 hours.     Fasting Lipid Panel:    Lab Results   Component Value Date    CHOL 145 06/29/2020    TRIG 124 01/25/2022    HDL 73 06/29/2020       Cardiac Enzymes:    No results found for: CKTOTAL, CKMB, CKMBINDEX, TROPONINI    Notable Cultures:      Blood cultures   Blood Culture, Routine   Date Value Ref Range Status   02/03/2022 24 Hours no growth  Preliminary     Respiratory cultures No results found for: RESPCULTURE No results found for: LABGRAM  Urine   Urine Culture, Routine   Date Value Ref Range Status   01/29/2022 Growth not present  Final     Legionella No results found for: LABLEGI  C Diff PCR No results found for: CDIFPCR  Wound culture/abscess: No results for input(s): WNDABS in the last 72 hours. Tip culture:No results for input(s): CXCATHTIP in the last 72 hours. Oxygen:     Vent Information  $Ventilation: $Subsequent Day  Skin Assessment: Clean, dry, & intact  Suction Catheter Diameter:  (16)  Equipment ID: [id number]  Equipment Changed: (S) Airway securing device  Vent Type: 980  Vent Mode: AC/VC+  Vt Ordered: 300 mL  Rate Set: 20 bmp  Peak Flow: 0 L/min  Pressure Support: 0 cmH20  FiO2 : 80 %  SpO2: 99 %  SpO2/FiO2 ratio: 123.75  PaO2/FiO2 ratio: 196  Sensitivity: 3  PEEP/CPAP: 13  I Time/ I Time %: 0.9 s  Humidification Source: Heated wire  Humidification Temp: 37  Humidification Temp Measured: 37  Circuit Condensation: Drained  Mask Type: Full face mask  Mask Size: Medium  Additional Respiratory  Assessments  Pulse: 128  Resp: (!) 37  SpO2: 99 %  Position: Semi-Ocampo's  Humidification Source: Heated wire  Humidification Temp: 37  Circuit Condensation: Drained  Oral Care: Mouth swabbed,Mouth moisturizer,Mouth suctioned  Subglottic Suction Done?: No  Airway Type: ET  Airway Size: 8  Cuff Pressure (cm H2O): 29 cm H2O       [REMOVED] Urethral Catheter Double-lumen 16 fr-Output (mL): 130 mL  Urethral Catheter Double-lumen 16 fr-Output (mL): 50 mL  [REMOVED] External Urinary Catheter-Output (mL): 200 mL    Imaging Studies:  XR CHEST PORTABLE   Final Result   No interval change         XR ABDOMEN FOR NG/OG/NE TUBE PLACEMENT   Final Result   Satisfactory position of the enteric tube.          US DUP LOWER EXTREMITIES BILATERAL VENOUS   Final Result   Within the visualized vessels there is no evidence for deep venous   thrombosis               XR CHEST PORTABLE   Final Result   No significant changes since February 2nd. Persistent bilateral infiltrates. Endotracheal tube in good position. XR CHEST PORTABLE   Final Result   No pneumothorax following line placement. Right greater than left parenchymal infiltrates similar to subtly improved. Continued follow-up recommended. XR ABDOMEN (KUB) (SINGLE AP VIEW)   Final Result   No significant change. Satisfactory position of the enteric tube within the   stomach. No bowel obstruction or acute abdominal disease identified. XR CHEST PORTABLE   Final Result   1. Extensive bilateral multifocal pneumonia. No significant change. 2. Vertical linear shadow over the lateral aspect of the right chest, most   likely skin fold. XR CHEST PORTABLE   Final Result   1. There is no interval change in the multifocal bilateral pneumonia. 2. There is no pneumothorax or pneumomediastinum. XR CHEST PORTABLE   Final Result   Stable to slightly improved aeration of the lungs bilaterally with persistent   patchy bilateral airspace opacities, in keeping with the patient's known   diagnosis of COVID pneumonia. US DUP LOWER EXTREMITIES BILATERAL VENOUS   Final Result   No evidence of DVT in either lower extremity. US DUP UPPER EXTREMITIES BILATERAL VENOUS   Final Result   Occlusive thrombus in the left distal cephalic vein. No evidence of DVT. Critical results were called by Dr. Heladio Rivera to Dr. Dinesh Khan On 1/29/2022   at 21:09. Please make a note of the limited nature of the study due to the above   reasons. RECOMMENDATIONS:         XR CHEST PORTABLE   Final Result   No significant interval change, when compared to the previous study performed   1 day earlier.          XR CHEST PORTABLE   Final Result Bilateral airspace disease without appreciable change. XR ABDOMEN (KUB) (SINGLE AP VIEW)   Final Result   No active process. NG tube in the gastric lumen as before. XR CHEST PORTABLE   Final Result   Progression of bilateral airspace disease remaining midlung predominant in   the periphery of airspace disease bronchopneumonia with increased from prior         XR CHEST PORTABLE   Final Result   Mild decrease in the diffuse left lung infiltrates, when compared to the   previous study performed 1 day earlier. Diffuse right lung infiltrates   without significant interval change. XR CHEST PORTABLE   Final Result   Left internal jugular line tip in the distal SVC. No pneumothorax. The remainder of the exam is essentially stable. .         XR CHEST PORTABLE   Final Result   Stable airspace disease. See above. XR CHEST PORTABLE   Final Result   Bilateral airspace disease which appears mildly progressive. XR CHEST PORTABLE   Final Result   1. Lines and tubes are unchanged in position. 2.  Diffuse bilateral opacities are not significantly changed. Differential   includes infection, pulmonary edema, atelectasis, ARDS, and/or layering   pleural effusions. XR CHEST PORTABLE   Final Result   1. No significant change in bilateral multifocal pneumonia. Support tubes   and catheters are stable. XR CHEST PORTABLE   Final Result   1. There is no interval change in extensive multifocal bilateral pneumonia. XR CHEST PORTABLE   Final Result   Slightly worsened bilateral pulmonary infiltrates with increasing opacity in   the bilateral bases when compared to previous. Stable lines and tubes. XR CHEST PORTABLE   Final Result   1. Good position right IJ central venous line. Negative for pneumothorax. 2.  Bilateral widespread pulmonary infiltrates with interval mild worsening   on the left and compatible with bilateral pneumonia.          XR CHEST PORTABLE   Final Result   Endotracheal tube tip is 3.3 cm above the alberta. Diffuse pulmonary infiltrates are unchanged. XR ABDOMEN FOR NG/OG/NE TUBE PLACEMENT   Final Result   Nasogastric tube terminates in the stomach. XR CHEST PORTABLE   Final Result   1. Multifocal bilateral pneumonia unchanged when compared with the prior   study. XR CHEST PORTABLE   Final Result   Bilateral airspace disease likely related to pneumonia.          XR CHEST PORTABLE    (Results Pending)   CTA PULMONARY W CONTRAST    (Results Pending)   XR CHEST PORTABLE    (Results Pending)   XR CHEST PORTABLE    (Results Pending)   XR CHEST PORTABLE    (Results Pending)        Resident's Assessment and Plan     Assessment and Plan:    Pulmonary  # Acute hypoxic respiratory failure 2/2 ARDS 2/2 COVID-19 PNA  - CXR showed BL ground glass infiltrates  Most recent ABG:   Recent Labs     02/05/22  0451   PH 7.448   PCO2 42.1   PO2 41.8*   HCO3 28.5*   - Current vent settings: AC/VC+, , RR 20, PEEP 13, FiO2 80%  - P/f 0.70  - Urine out 1.7L, +239 net  Plan  - d/c versed PRN pushes  - assess mentation, if not improved off sedation, get CTH and EEG  - place on PS if tachypneic  - Avoid propofol as BP very sensitive to it  - Wean vent as tolerated  - Goal is net negative fluid balance    # Hx of asthma     Infectious disease  # Febrile with increasing leukocytosis   - NGT removed, OGT inserted 2/3/22  - DVT US LE to r/o DVT, negative for DVT  - Tmax 100.6 last night  - resp clx growing gram positive cocci in pairs, MRSA pending, blood clx not drawn  Plan  - Follow repeat pan clx for sensitivity and ID  - Continue levofloxacin    Hematology/Oncology  # Acute normocytic anemia 2/2 anemia of chronic disease vs sepsis  - S/p 1u pRBC since admission   - Hb 8.8 today  - Iron studies negative  Plan  - CTM with CBC daily   - Transfuse if Hb <7      Last BM charted: 2/4/22  Antibiotics: levofloxacin (2/3/22)  Cultures: blood clx not yet drawn, resp clx growing gram positive cocci in pairs  Lines: RIJ CVC (2/2/22)  Intubated: 1/19/22  Diet: TF        # Peptic ulcer prophylaxis: lansoprazole   # DVT Prophylaxis: lovenox 30mg BID  # Disposition: Cont current care     Jose Vázquez MD, PGY-1    Attending Physician: Dr. Valeria Larson  Department of Pulmonary, Critical Care and Sleep Medicine  [address] [facility]  Department of Internal Medicine      During multidisciplinary team rounds Kaleigh Benedict is a 78 y.o. female was seen, examined and discussed. This is confirmation that I have personally seen and examined the patient and that the key elements of the encounter were performed by me (> 85 % time). The medications & laboratory data was discussed and adjusted where necessary. The radiographic images were reviewed or with radiologist or consultant if felt dis-concordant with the exam or history. The above findings were corroborated, plans confirmed and changes made if needed. Family is updated at the bedside as available. Key issues of the case were discussed among consultants. Critical Care time is documented if appropriate.       Thi Torres DO, FACP, FCCP, Susy Dc,

## 2022-02-05 NOTE — PROGRESS NOTES
Pt breathing over the vent. Versed was turned off during the day and fentanyl running at 50 mcg. Resident were made aware. A 2 mg versed was ordered and given. Pt still breathing over the vent. 4 mg versed ordered and given. Versed drip was ordered not started yet will try the 4 mg and see how the pt does with that before starting the drip per resident orders. Art line set up was put in pt room per resident orders. Resident was going to put an art line.  Consent was obtained by resident

## 2022-02-06 ENCOUNTER — APPOINTMENT (OUTPATIENT)
Dept: GENERAL RADIOLOGY | Age: 67
DRG: 207 | End: 2022-02-06
Payer: MEDICARE

## 2022-02-06 LAB
AADO2: 213.2 MMHG
ANION GAP SERPL CALCULATED.3IONS-SCNC: 10 MMOL/L (ref 7–16)
ANION GAP SERPL CALCULATED.3IONS-SCNC: 11 MMOL/L (ref 7–16)
ANION GAP SERPL CALCULATED.3IONS-SCNC: 9 MMOL/L (ref 7–16)
B.E.: 8.3 MMOL/L (ref -3–3)
BUN BLDV-MCNC: 36 MG/DL (ref 6–23)
BUN BLDV-MCNC: 39 MG/DL (ref 6–23)
BUN BLDV-MCNC: 41 MG/DL (ref 6–23)
CALCIUM IONIZED: 1.35 MMOL/L (ref 1.15–1.33)
CALCIUM SERPL-MCNC: 9.2 MG/DL (ref 8.6–10.2)
CALCIUM SERPL-MCNC: 9.3 MG/DL (ref 8.6–10.2)
CALCIUM SERPL-MCNC: 9.8 MG/DL (ref 8.6–10.2)
CHLORIDE BLD-SCNC: 103 MMOL/L (ref 98–107)
CHLORIDE BLD-SCNC: 95 MMOL/L (ref 98–107)
CHLORIDE BLD-SCNC: 99 MMOL/L (ref 98–107)
CO2: 31 MMOL/L (ref 22–29)
CO2: 32 MMOL/L (ref 22–29)
CO2: 32 MMOL/L (ref 22–29)
COHB: 0.9 % (ref 0–1.5)
CREAT SERPL-MCNC: 0.5 MG/DL (ref 0.5–1)
CREAT SERPL-MCNC: 0.6 MG/DL (ref 0.5–1)
CREAT SERPL-MCNC: 0.6 MG/DL (ref 0.5–1)
CRITICAL: ABNORMAL
DATE ANALYZED: ABNORMAL
DATE OF COLLECTION: ABNORMAL
FERRITIN: 1145 NG/ML
FIO2: 50 %
GFR AFRICAN AMERICAN: >60
GFR NON-AFRICAN AMERICAN: >60 ML/MIN/1.73
GLUCOSE BLD-MCNC: 133 MG/DL (ref 74–99)
GLUCOSE BLD-MCNC: 137 MG/DL (ref 74–99)
GLUCOSE BLD-MCNC: 176 MG/DL (ref 74–99)
HCO3: 32.2 MMOL/L (ref 22–26)
HCT VFR BLD CALC: 25.6 % (ref 34–48)
HEMOGLOBIN: 8.1 G/DL (ref 11.5–15.5)
HHB: 3.3 % (ref 0–5)
LAB: ABNORMAL
Lab: ABNORMAL
MCH RBC QN AUTO: 29.8 PG (ref 26–35)
MCHC RBC AUTO-ENTMCNC: 31.6 % (ref 32–34.5)
MCV RBC AUTO: 94.1 FL (ref 80–99.9)
METER GLUCOSE: 101 MG/DL (ref 74–99)
METER GLUCOSE: 108 MG/DL (ref 74–99)
METER GLUCOSE: 119 MG/DL (ref 74–99)
METER GLUCOSE: 136 MG/DL (ref 74–99)
METER GLUCOSE: 137 MG/DL (ref 74–99)
METER GLUCOSE: 145 MG/DL (ref 74–99)
METER GLUCOSE: 155 MG/DL (ref 74–99)
METHB: 0.2 % (ref 0–1.5)
MODE: ABNORMAL
O2 CONTENT: 12.1 ML/DL
O2 SATURATION: 96.7 % (ref 92–98.5)
O2HB: 95.6 % (ref 94–97)
OPERATOR ID: ABNORMAL
PATIENT TEMP: 37 C
PCO2: 41.5 MMHG (ref 35–45)
PDW BLD-RTO: 14.2 FL (ref 11.5–15)
PEEP/CPAP: 13 CMH2O
PFO2: 1.68 MMHG/%
PH BLOOD GAS: 7.51 (ref 7.35–7.45)
PLATELET # BLD: 443 E9/L (ref 130–450)
PMV BLD AUTO: 11.3 FL (ref 7–12)
PO2: 84.1 MMHG (ref 75–100)
POTASSIUM SERPL-SCNC: 3.1 MMOL/L (ref 3.5–5)
POTASSIUM SERPL-SCNC: 3.9 MMOL/L (ref 3.5–5)
POTASSIUM SERPL-SCNC: 5.7 MMOL/L (ref 3.5–5)
PS: 12 CMH20
RBC # BLD: 2.72 E12/L (ref 3.5–5.5)
RI(T): 2.54
SODIUM BLD-SCNC: 137 MMOL/L (ref 132–146)
SODIUM BLD-SCNC: 142 MMOL/L (ref 132–146)
SODIUM BLD-SCNC: 143 MMOL/L (ref 132–146)
SOURCE, BLOOD GAS: ABNORMAL
THB: 8.9 G/DL (ref 11.5–16.5)
TIME ANALYZED: 455
WBC # BLD: 18.8 E9/L (ref 4.5–11.5)

## 2022-02-06 PROCEDURE — 2000000000 HC ICU R&B

## 2022-02-06 PROCEDURE — 71045 X-RAY EXAM CHEST 1 VIEW: CPT

## 2022-02-06 PROCEDURE — 6370000000 HC RX 637 (ALT 250 FOR IP): Performed by: NURSE PRACTITIONER

## 2022-02-06 PROCEDURE — 6370000000 HC RX 637 (ALT 250 FOR IP)

## 2022-02-06 PROCEDURE — 6360000002 HC RX W HCPCS: Performed by: NURSE PRACTITIONER

## 2022-02-06 PROCEDURE — 36415 COLL VENOUS BLD VENIPUNCTURE: CPT

## 2022-02-06 PROCEDURE — 0BH17EZ INSERTION OF ENDOTRACHEAL AIRWAY INTO TRACHEA, VIA NATURAL OR ARTIFICIAL OPENING: ICD-10-PCS | Performed by: INTERNAL MEDICINE

## 2022-02-06 PROCEDURE — 2500000003 HC RX 250 WO HCPCS: Performed by: STUDENT IN AN ORGANIZED HEALTH CARE EDUCATION/TRAINING PROGRAM

## 2022-02-06 PROCEDURE — 82330 ASSAY OF CALCIUM: CPT

## 2022-02-06 PROCEDURE — 36592 COLLECT BLOOD FROM PICC: CPT

## 2022-02-06 PROCEDURE — 2580000003 HC RX 258: Performed by: FAMILY MEDICINE

## 2022-02-06 PROCEDURE — 6360000002 HC RX W HCPCS: Performed by: INTERNAL MEDICINE

## 2022-02-06 PROCEDURE — 94003 VENT MGMT INPAT SUBQ DAY: CPT

## 2022-02-06 PROCEDURE — 82805 BLOOD GASES W/O2 SATURATION: CPT

## 2022-02-06 PROCEDURE — 6370000000 HC RX 637 (ALT 250 FOR IP): Performed by: INTERNAL MEDICINE

## 2022-02-06 PROCEDURE — 82728 ASSAY OF FERRITIN: CPT

## 2022-02-06 PROCEDURE — 94640 AIRWAY INHALATION TREATMENT: CPT

## 2022-02-06 PROCEDURE — 99233 SBSQ HOSP IP/OBS HIGH 50: CPT | Performed by: INTERNAL MEDICINE

## 2022-02-06 PROCEDURE — 5A1955Z RESPIRATORY VENTILATION, GREATER THAN 96 CONSECUTIVE HOURS: ICD-10-PCS | Performed by: INTERNAL MEDICINE

## 2022-02-06 PROCEDURE — 82962 GLUCOSE BLOOD TEST: CPT

## 2022-02-06 PROCEDURE — 85027 COMPLETE CBC AUTOMATED: CPT

## 2022-02-06 PROCEDURE — 6370000000 HC RX 637 (ALT 250 FOR IP): Performed by: FAMILY MEDICINE

## 2022-02-06 PROCEDURE — 80048 BASIC METABOLIC PNL TOTAL CA: CPT

## 2022-02-06 RX ORDER — POTASSIUM CHLORIDE 29.8 MG/ML
20 INJECTION INTRAVENOUS
Status: COMPLETED | OUTPATIENT
Start: 2022-02-06 | End: 2022-02-06

## 2022-02-06 RX ORDER — ACETAZOLAMIDE 500 MG/5ML
250 INJECTION, POWDER, LYOPHILIZED, FOR SOLUTION INTRAVENOUS ONCE
Status: COMPLETED | OUTPATIENT
Start: 2022-02-06 | End: 2022-02-06

## 2022-02-06 RX ADMIN — OXYCODONE HYDROCHLORIDE AND ACETAMINOPHEN 500 MG: 500 TABLET ORAL at 08:39

## 2022-02-06 RX ADMIN — POTASSIUM CHLORIDE 20 MEQ: 400 INJECTION, SOLUTION INTRAVENOUS at 17:17

## 2022-02-06 RX ADMIN — LANSOPRAZOLE 30 MG: KIT at 08:39

## 2022-02-06 RX ADMIN — CHLORHEXIDINE GLUCONATE 0.12% ORAL RINSE 15 ML: 1.2 LIQUID ORAL at 20:32

## 2022-02-06 RX ADMIN — IPRATROPIUM BROMIDE AND ALBUTEROL SULFATE 1 AMPULE: .5; 2.5 SOLUTION RESPIRATORY (INHALATION) at 20:13

## 2022-02-06 RX ADMIN — MINERAL OIL AND WHITE PETROLATUM: 150; 830 OINTMENT OPHTHALMIC at 01:25

## 2022-02-06 RX ADMIN — Medication 10 ML: at 20:33

## 2022-02-06 RX ADMIN — INSULIN LISPRO 1 UNITS: 100 INJECTION, SOLUTION INTRAVENOUS; SUBCUTANEOUS at 12:29

## 2022-02-06 RX ADMIN — Medication: at 20:33

## 2022-02-06 RX ADMIN — MINERAL OIL AND WHITE PETROLATUM: 150; 830 OINTMENT OPHTHALMIC at 20:34

## 2022-02-06 RX ADMIN — IPRATROPIUM BROMIDE AND ALBUTEROL SULFATE 1 AMPULE: .5; 2.5 SOLUTION RESPIRATORY (INHALATION) at 13:18

## 2022-02-06 RX ADMIN — ZINC SULFATE 220 MG (50 MG) CAPSULE 50 MG: CAPSULE at 08:39

## 2022-02-06 RX ADMIN — INSULIN LISPRO 1 UNITS: 100 INJECTION, SOLUTION INTRAVENOUS; SUBCUTANEOUS at 16:45

## 2022-02-06 RX ADMIN — LEVOFLOXACIN 750 MG: 5 INJECTION, SOLUTION INTRAVENOUS at 14:14

## 2022-02-06 RX ADMIN — IPRATROPIUM BROMIDE AND ALBUTEROL SULFATE 1 AMPULE: .5; 2.5 SOLUTION RESPIRATORY (INHALATION) at 17:00

## 2022-02-06 RX ADMIN — ENOXAPARIN SODIUM 30 MG: 100 INJECTION SUBCUTANEOUS at 20:33

## 2022-02-06 RX ADMIN — Medication 25 MCG/HR: at 23:51

## 2022-02-06 RX ADMIN — CHLORHEXIDINE GLUCONATE 0.12% ORAL RINSE 15 ML: 1.2 LIQUID ORAL at 08:38

## 2022-02-06 RX ADMIN — ENOXAPARIN SODIUM 30 MG: 100 INJECTION SUBCUTANEOUS at 08:38

## 2022-02-06 RX ADMIN — MINERAL OIL AND WHITE PETROLATUM: 150; 830 OINTMENT OPHTHALMIC at 04:46

## 2022-02-06 RX ADMIN — MINERAL OIL AND WHITE PETROLATUM: 150; 830 OINTMENT OPHTHALMIC at 14:14

## 2022-02-06 RX ADMIN — MINERAL OIL AND WHITE PETROLATUM: 150; 830 OINTMENT OPHTHALMIC at 17:19

## 2022-02-06 RX ADMIN — IPRATROPIUM BROMIDE AND ALBUTEROL SULFATE 1 AMPULE: .5; 2.5 SOLUTION RESPIRATORY (INHALATION) at 08:59

## 2022-02-06 RX ADMIN — POTASSIUM CHLORIDE 20 MEQ: 400 INJECTION, SOLUTION INTRAVENOUS at 18:20

## 2022-02-06 RX ADMIN — ACETAZOLAMIDE 250 MG: 500 INJECTION, POWDER, LYOPHILIZED, FOR SOLUTION INTRAVENOUS at 10:29

## 2022-02-06 RX ADMIN — MINERAL OIL AND WHITE PETROLATUM: 150; 830 OINTMENT OPHTHALMIC at 09:35

## 2022-02-06 RX ADMIN — POTASSIUM CHLORIDE 20 MEQ: 400 INJECTION, SOLUTION INTRAVENOUS at 20:32

## 2022-02-06 RX ADMIN — Medication 10 ML: at 08:39

## 2022-02-06 RX ADMIN — Medication 2000 UNITS: at 08:41

## 2022-02-06 RX ADMIN — FUROSEMIDE 40 MG: 10 INJECTION, SOLUTION INTRAMUSCULAR; INTRAVENOUS at 08:38

## 2022-02-06 RX ADMIN — Medication: at 08:40

## 2022-02-06 ASSESSMENT — PAIN SCALES - GENERAL
PAINLEVEL_OUTOF10: 0

## 2022-02-06 ASSESSMENT — PULMONARY FUNCTION TESTS
PIF_VALUE: 20
PIF_VALUE: 25
PIF_VALUE: 22
PIF_VALUE: 28
PIF_VALUE: 25
PIF_VALUE: 21
PIF_VALUE: 22
PIF_VALUE: 25
PIF_VALUE: 28
PIF_VALUE: 21
PIF_VALUE: 28
PIF_VALUE: 24
PIF_VALUE: 21
PIF_VALUE: 27
PIF_VALUE: 28
PIF_VALUE: 24
PIF_VALUE: 20
PIF_VALUE: 28
PIF_VALUE: 21
PIF_VALUE: 27
PIF_VALUE: 28
PIF_VALUE: 27
PIF_VALUE: 28
PIF_VALUE: 27
PIF_VALUE: 23
PIF_VALUE: 21
PIF_VALUE: 22
PIF_VALUE: 24
PIF_VALUE: 21
PIF_VALUE: 21

## 2022-02-06 NOTE — PROGRESS NOTES
HOSPITALIST PROGRESS NOTE  Date: 2/6/2022   Name: Law Felder   MRN: [id number] endocrine:  YOB: 1955        Hospital Course:   Royal Plummer is W 19 y.o. year old female who has a PMH of asthma.   She presented to the ER on 1/14/22 with complaints of SOB and diarrhea x 1 week.  She was not vaccinated against COVID-19.  Reportedly her daughter found her curled into the fetal position in respiratory distress at home and called 911.     Subjective/Interval Hx:   Patient remains intubated sedated in intensive care unit  Objective:   Physical Exam:   BP (!) 145/75   Pulse 112   Temp 97 °F (36.1 °C) (Esophageal)   Resp 27   Ht 5' (1.524 m)   Wt 119 lb 8 oz (54.2 kg)   SpO2 100%   BMI 23.34 kg/m²   General: no acute distress, well nourished and well hydrated  HEENT: NCAT  Heart: S1S2 RRR  Lungs: Clear to ascultation bilaterally, respiratory effort normal  Abdomen: soft, NT/ND, positive bowel sounds  Extremities: no pitting edema, nontender   Neuro: patient is awake, alert and orientated times 3, no gross deficits  Skin: no rashes or ecchymosis        Meds:   Meds:    furosemide  40 mg IntraVENous Daily    levofloxacin  750 mg IntraVENous Q24H    lansoprazole  30 mg Per NG tube QAM AC    midodrine  5 mg Per NG tube TID WC    insulin lispro  0-6 Units SubCUTAneous Q4H    enoxaparin  30 mg SubCUTAneous BID    miconazole nitrate   Topical BID    chlorhexidine  15 mL Mouth/Throat BID    artificial tears   Both Eyes Q4H    ipratropium-albuterol  1 ampule Inhalation Q4H WA    sodium chloride flush  5-40 mL IntraVENous 2 times per day    ascorbic acid  500 mg Oral Daily    zinc sulfate  50 mg Oral Daily    [Held by provider] vitamin D  2,000 Units Oral Daily      Infusions:    fentaNYL 5 mcg/ml in 0.9%  ml infusion 50 mcg/hr (02/06/22 0600)    sodium chloride      dextrose       PRN Meds: sennosides, 5 mL, Nightly PRN  polyethylene glycol, 17 g, Daily PRN  fentanNYL, 50 mcg, Q2H PRN  sodium chloride flush, 5-40 mL, PRN  sodium chloride, 25 mL, PRN  acetaminophen, 650 mg, Q6H PRN   Or  acetaminophen, 650 mg, Q6H PRN  glucose, 15 g, PRN  dextrose, 12.5 g, PRN  glucagon (rDNA), 1 mg, PRN  dextrose, 100 mL/hr, PRN        Data/Labs:     Recent Labs     02/04/22  0440 02/05/22  0445 02/06/22  0424   WBC 20.7* 23.4* 18.8*   HGB 8.6* 8.8* 8.1*   HCT 27.0* 27.3* 25.6*    389 443      Recent Labs     02/05/22  1354 02/05/22  2030 02/06/22  0424    139 137   K 3.6 3.9 3.9   CL 93* 96* 95*   CO2 30* 31* 32*   BUN 30* 34* 36*   CREATININE 0.5 0.6 0.5     No results for input(s): AST, ALT, ALB, BILIDIR, BILITOT, ALKPHOS in the last 72 hours. No results for input(s): INR in the last 72 hours. No results for input(s): CKTOTAL, CKMB, CKMBINDEX, TROPONINT in the last 72 hours. I/O last 3 completed shifts: In: 2115 [I.V.:624; NG/GT:1491]  Out: 2385 [Urine:2385]    Intake/Output Summary (Last 24 hours) at 2/6/2022 1035  Last data filed at 2/6/2022 0800  Gross per 24 hour   Intake 1475 ml   Output 1180 ml   Net 295 ml        Assessment/Plan:   1. Acute respiratory hypoxic failure due to Covid viral pneumonia-patient is unvaccinated with symptomatic x1 week prior to presentation, steroids, vitamins, pulmonary is following  02/02/2020-patient remains intubated sedated in the ICU we will continue to monitor with critical care team  02/0/2022-patient remains unchanged intubated sedated in ICU  02 05/2022-patient remains intubated sedated in intensive care unit CRP is high at 34.6  2. Non-insulin-dependent diabetes mellitus-sliding scale, monitor blood glucose, diabetic diet  3. GERD-Protonix, Reglan  4. COPD-DuoNeb, monitor pulmonary toiletry      DVT Prophylaxis: Eliquis  Diet: Diet NPO  ADULT TUBE FEEDING; Nasogastric; Standard with Fiber; Continuous; 10; Yes; 10; Q 4 hours; 40; 30; Q 6 hours  Code Status: DNR-CCA    Dispo:  When stable    Electronically signed by Jatinder Mercer MD on 2/6/2022 at 10:35 AM  Harry An

## 2022-02-06 NOTE — PLAN OF CARE
Talked to daughter Mary Lou Jaimes  who is next of kin. Her long term goals for the patient is to only continue treatment if there is a chance of meaningful recovery. We discussed how the patient is stable, but not waking up despite minimal sedation, and that while brainstem reflexes are intact, the chance of the patient having a meaningful recovery and ability to interact with the world are very low. I recommended that we wait for the EEG results and make sure we have thoroughly worked up the cause of the altered mental status. If these results do not show a reversible/treatable cause, I will recommend comfort measures, which daughter is in agreement with. I will be calling the daughter tomorrow afternoon again.  In the meantime, patient remains DNR-CCA    Electronically signed by Juan Lopez MD on 2/6/2022 at 1:07 PM

## 2022-02-06 NOTE — PROGRESS NOTES
[facility]   Department of Internal Medicine   Internal Medicine Residency  MICU Progress Note    Patient:  Mandeep Chamorro 78 y.o. female   MRN: [id number]       Date of Service: 2022    Allergy: Patient has no known allergies. Subjective     Patient was seen and examined this morning at bedside in no acute distress. Overnight, patient was on pressure support which she tolerated well. Patient did not wake up despite only on fentanyl for sedation, so EEG was ordered to rule out seizure activity She became air hungry at 9:30pm so morphine 2mg x once given which helped. This morning, patient is sedated and supine, intubated. Objective     TEMPERATURE:  Current - Temp: 99.9 °F (37.7 °C); Max - Temp  Av °F (37.8 °C)  Min: 99.7 °F (37.6 °C)  Max: 100.6 °F (38.1 °C)  RESPIRATIONS RANGE: Resp  Av.3  Min: 26  Max: 38  PULSE RANGE: Pulse  Av.5  Min: 107  Max: 129  BLOOD PRESSURE RANGE:  Systolic (29CKN), ORN:254 , Min:116 , IHV:917   ; Diastolic (57JVR), SUSAN:54, Min:58, Max:87    PULSE OXIMETRY RANGE: SpO2  Av.2 %  Min: 96 %  Max: 100 %    I & O - 24hr:    Intake/Output Summary (Last 24 hours) at 2022 0832  Last data filed at 2022 0600  Gross per 24 hour   Intake 1475 ml   Output 1805 ml   Net -330 ml     I/O last 3 completed shifts: In: 2115 [I.V.:624; NG/GT:1491]  Out: 2385 [Urine:2385] No intake/output data recorded. Weight change: -4 lb 9 oz (-2.07 kg)    Physical Exam:  General Appearance:    Supine, intubated, sedated. HEENT:    NC/AT, mucous membranes are moist   Neck:   Supple, no jugular venous distention. Resp:     CTAB, No wheezes, No rhonchi, no use of accessory muscles   Heart:    RRR, S1 and S2 normal, no murmur, rub or gallop. Abdomen:     Soft, non-tender, non-distended with normal bowel sounds   Extremities:   Atraumatic, no cyanosis or edema   Pulses:  Radial and pedal pulses are intact bilaterally   Neurologic: Unconscious.  Does not arouse to sternal rub. Medications     Continuous Infusions:   fentaNYL 5 mcg/ml in 0.9%  ml infusion 50 mcg/hr (02/06/22 0600)    sodium chloride      dextrose       Scheduled Meds:   furosemide  40 mg IntraVENous Daily    levofloxacin  750 mg IntraVENous Q24H    lansoprazole  30 mg Per NG tube QAM AC    midodrine  5 mg Per NG tube TID WC    insulin lispro  0-6 Units SubCUTAneous Q4H    enoxaparin  30 mg SubCUTAneous BID    miconazole nitrate   Topical BID    chlorhexidine  15 mL Mouth/Throat BID    artificial tears   Both Eyes Q4H    ipratropium-albuterol  1 ampule Inhalation Q4H WA    sodium chloride flush  5-40 mL IntraVENous 2 times per day    ascorbic acid  500 mg Oral Daily    zinc sulfate  50 mg Oral Daily    vitamin D  2,000 Units Oral Daily     PRN Meds: sennosides, polyethylene glycol, fentanNYL, sodium chloride flush, sodium chloride, acetaminophen **OR** acetaminophen, glucose, dextrose, glucagon (rDNA), dextrose  Nutrition:   Diet NPO  ADULT TUBE FEEDING; Nasogastric; Standard with Fiber; Continuous; 10; Yes; 10; Q 4 hours; 40; 30; Q 6 hours    Labs and Imaging Studies     CBC:   Recent Labs     02/04/22  0440 02/05/22  0445 02/06/22  0424   WBC 20.7* 23.4* 18.8*   HGB 8.6* 8.8* 8.1*   HCT 27.0* 27.3* 25.6*   MCV 94.7 93.8 94.1    389 443       BMP:    Recent Labs     02/05/22  1354 02/05/22  2030 02/06/22  0424    139 137   K 3.6 3.9 3.9   CL 93* 96* 95*   CO2 30* 31* 32*   BUN 30* 34* 36*   CREATININE 0.5 0.6 0.5   GLUCOSE 167* 157* 133*       LIVER PROFILE:   No results for input(s): AST, ALT, LIPASE, BILIDIR, BILITOT, ALKPHOS in the last 72 hours. Invalid input(s): AMYLASE,  ALB    PT/INR:   No results for input(s): PROTIME, INR in the last 72 hours. APTT:   No results for input(s): APTT in the last 72 hours.     Fasting Lipid Panel:    Lab Results   Component Value Date    CHOL 145 06/29/2020    TRIG 124 01/25/2022    HDL 73 06/29/2020       Cardiac Enzymes:    No results found for: CKTOTAL, CKMB, CKMBINDEX, TROPONINI    Notable Cultures:      Blood cultures   Blood Culture, Routine   Date Value Ref Range Status   02/03/2022 24 Hours no growth  Preliminary     Respiratory cultures No results found for: RESPCULTURE No results found for: LABGRAM  Urine   Urine Culture, Routine   Date Value Ref Range Status   01/29/2022 Growth not present  Final     Legionella No results found for: LABLEGI  C Diff PCR No results found for: CDIFPCR  Wound culture/abscess: No results for input(s): WNDABS in the last 72 hours. Tip culture:No results for input(s): CXCATHTIP in the last 72 hours. Oxygen:     Vent Information  $Ventilation: $Subsequent Day  Skin Assessment: Clean, dry, & intact  Suction Catheter Diameter:  (16)  Equipment ID: [id number]  Equipment Changed: (S) Airway securing device  Vent Type: [id number]  Vent Mode: PS  Vt Ordered: 0 mL  Rate Set: 0 bmp  Peak Flow: 0 L/min  Pressure Support: 12 cmH20  FiO2 : 50 %  SpO2: 98 %  SpO2/FiO2 ratio: 196  PaO2/FiO2 ratio: 196  Sensitivity: 3  PEEP/CPAP: 13  I Time/ I Time %: 0 s  Humidification Source: Heated wire  Humidification Temp: 37  Humidification Temp Measured: 37  Circuit Condensation: Drained  Mask Type: Full face mask  Mask Size: Medium  Additional Respiratory  Assessments  Pulse: 108  Resp: 26  SpO2: 98 %  Position: Semi-Ocampo's  Humidification Source: Heated wire  Humidification Temp: 37  Circuit Condensation: Drained  Oral Care: Mouth swabbed,Mouth moisturizer,Mouth suctioned  Subglottic Suction Done?: No  Airway Type: ET  Airway Size: 8  Cuff Pressure (cm H2O): 29 cm H2O       [REMOVED] Urethral Catheter Double-lumen 16 fr-Output (mL): 130 mL  Urethral Catheter Double-lumen 16 fr-Output (mL): 50 mL  [REMOVED] External Urinary Catheter-Output (mL): 200 mL    Imaging Studies:  XR CHEST PORTABLE   Final Result   1. Very minimal partial interval clearing of the right lung pneumonia when   compared with the study.    2. No interval change in the left lung pneumonia. 3. There is no pneumothorax. XR CHEST PORTABLE   Final Result   No interval change         XR ABDOMEN FOR NG/OG/NE TUBE PLACEMENT   Final Result   Satisfactory position of the enteric tube. US DUP LOWER EXTREMITIES BILATERAL VENOUS   Final Result   Within the visualized vessels there is no evidence for deep venous   thrombosis               XR CHEST PORTABLE   Final Result   No significant changes since February 2nd. Persistent bilateral infiltrates. Endotracheal tube in good position. XR CHEST PORTABLE   Final Result   No pneumothorax following line placement. Right greater than left parenchymal infiltrates similar to subtly improved. Continued follow-up recommended. XR ABDOMEN (KUB) (SINGLE AP VIEW)   Final Result   No significant change. Satisfactory position of the enteric tube within the   stomach. No bowel obstruction or acute abdominal disease identified. XR CHEST PORTABLE   Final Result   1. Extensive bilateral multifocal pneumonia. No significant change. 2. Vertical linear shadow over the lateral aspect of the right chest, most   likely skin fold. XR CHEST PORTABLE   Final Result   1. There is no interval change in the multifocal bilateral pneumonia. 2. There is no pneumothorax or pneumomediastinum. XR CHEST PORTABLE   Final Result   Stable to slightly improved aeration of the lungs bilaterally with persistent   patchy bilateral airspace opacities, in keeping with the patient's known   diagnosis of COVID pneumonia. US DUP LOWER EXTREMITIES BILATERAL VENOUS   Final Result   No evidence of DVT in either lower extremity. US DUP UPPER EXTREMITIES BILATERAL VENOUS   Final Result   Occlusive thrombus in the left distal cephalic vein. No evidence of DVT. Critical results were called by Dr. Christiane Curiel to Dr. Jennifer Lopez On 1/29/2022   at 21:09.       Please make a note of the limited nature of the study due to the above   reasons. RECOMMENDATIONS:         XR CHEST PORTABLE   Final Result   No significant interval change, when compared to the previous study performed   1 day earlier. XR CHEST PORTABLE   Final Result   Bilateral airspace disease without appreciable change. XR ABDOMEN (KUB) (SINGLE AP VIEW)   Final Result   No active process. NG tube in the gastric lumen as before. XR CHEST PORTABLE   Final Result   Progression of bilateral airspace disease remaining midlung predominant in   the periphery of airspace disease bronchopneumonia with increased from prior         XR CHEST PORTABLE   Final Result   Mild decrease in the diffuse left lung infiltrates, when compared to the   previous study performed 1 day earlier. Diffuse right lung infiltrates   without significant interval change. XR CHEST PORTABLE   Final Result   Left internal jugular line tip in the distal SVC. No pneumothorax. The remainder of the exam is essentially stable. .         XR CHEST PORTABLE   Final Result   Stable airspace disease. See above. XR CHEST PORTABLE   Final Result   Bilateral airspace disease which appears mildly progressive. XR CHEST PORTABLE   Final Result   1. Lines and tubes are unchanged in position. 2.  Diffuse bilateral opacities are not significantly changed. Differential   includes infection, pulmonary edema, atelectasis, ARDS, and/or layering   pleural effusions. XR CHEST PORTABLE   Final Result   1. No significant change in bilateral multifocal pneumonia. Support tubes   and catheters are stable. XR CHEST PORTABLE   Final Result   1. There is no interval change in extensive multifocal bilateral pneumonia. XR CHEST PORTABLE   Final Result   Slightly worsened bilateral pulmonary infiltrates with increasing opacity in   the bilateral bases when compared to previous. Stable lines and tubes.          XR CHEST PORTABLE   Final Result   1. Good position right IJ central venous line. Negative for pneumothorax. 2.  Bilateral widespread pulmonary infiltrates with interval mild worsening   on the left and compatible with bilateral pneumonia. XR CHEST PORTABLE   Final Result   Endotracheal tube tip is 3.3 cm above the alberta. Diffuse pulmonary infiltrates are unchanged. XR ABDOMEN FOR NG/OG/NE TUBE PLACEMENT   Final Result   Nasogastric tube terminates in the stomach. XR CHEST PORTABLE   Final Result   1. Multifocal bilateral pneumonia unchanged when compared with the prior   study. XR CHEST PORTABLE   Final Result   Bilateral airspace disease likely related to pneumonia.          XR CHEST PORTABLE    (Results Pending)   CTA PULMONARY W CONTRAST    (Results Pending)   XR CHEST PORTABLE    (Results Pending)   XR CHEST PORTABLE    (Results Pending)   XR CHEST PORTABLE    (Results Pending)        Resident's Assessment and Plan     Assessment and Plan:    Neurologic  # AMS  - Not awakening despite only minimal fentanyl drip and no versed, no other sedatives  Plan  - Follow EEG, rule out seizure    Pulmonary  # Acute hypoxic respiratory failure 2/2 ARDS 2/2 COVID-19 PNA  - CXR showed BL ground glass infiltrates  Most recent ABG:   Recent Labs     02/06/22  0455   PH 7.507*   PCO2 41.5   PO2 84.1   HCO3 32.2*   - Current vent settings: PS, PEEP 13, pressure support 12  - P/f 1.68  - Urine out 1.8L, -90.9 net  Plan  - Avoid propofol as BP very sensitive to it  - Wean vent as tolerated  - Goal is net negative fluid balance  - Follow EEG  - Decrease PEEP to 8    # Hx of asthma     Infectious disease  # Febrile with increasing leukocytosis   - NGT removed, OGT inserted 2/3/22  - DVT US LE to r/o DVT, negative for DVT  - Tmax 100.6 yesterday morning  - resp clx growing gram positive cocci in pairs and candida albicans, MRSA negative, blood clx negative  Plan  - Continue levofloxacin    Hematology/Oncology  # Acute normocytic anemia 2/2 anemia of chronic disease vs sepsis  - S/p 1u pRBC since admission   - Hb 8.1 today  - Iron studies negative  Plan  - CTM with CBC daily   - Transfuse if Hb <7      Last BM charted: 2/5/22  Antibiotics: levofloxacin (2/3/22)  Cultures: blood clx negative, resp clx growing gram positive cocci in pairs, candida albicans. MRSA screen negative  Lines: RIJ CVC (2/2/22)  Intubated: 1/19/22  Diet: TF        # Peptic ulcer prophylaxis: lansoprazole   # DVT Prophylaxis: lovenox 30mg BID  # Disposition: Cont current care     Peter Lopez MD, PGY-1    Attending Physician: Dr. Charlene Mariscal  Department of Pulmonary, Critical Care and Sleep Medicine  [facility]  Department of Internal Medicine      During multidisciplinary team rounds Law Felder is a 78 y.o. female was seen, examined and discussed. This is confirmation that I have personally seen and examined the patient and that the key elements of the encounter were performed by me (> 85 % time). The medications & laboratory data was discussed and adjusted where necessary. The radiographic images were reviewed or with radiologist or consultant if felt dis-concordant with the exam or history. The above findings were corroborated, plans confirmed and changes made if needed. Family is updated at the bedside as available. Key issues of the case were discussed among consultants. Critical Care time is documented if appropriate.       Gui Burton DO, FACP, FCCP, Annabelle Aragon,

## 2022-02-06 NOTE — PROGRESS NOTES
Department of Internal Medicine  Nephrology  Progress Note      Events Reviewed. Intubated, supine. SUBJECTIVE:  We are following Ms. Gloria Rodriguez for SAMUEL. Patient is intubated and supine. PHYSICAL EXAM:    Vitals:    VITALS:  BP (!) 145/75   Pulse 112   Temp 97 °F (36.1 °C) (Esophageal)   Resp 27   Ht 5' (1.524 m)   Wt 119 lb 8 oz (54.2 kg)   SpO2 100%   BMI 23.34 kg/m²   24HR INTAKE/OUTPUT:      Intake/Output Summary (Last 24 hours) at 2/6/2022 0943  Last data filed at 2/6/2022 0800  Gross per 24 hour   Intake 1475 ml   Output 1680 ml   Net -205 ml     General appearance: Patient is intubated, sedated  HEENT: Normal cephalic, atraumatic without obvious deformity. Pupils equal, round, and reactive to light. Endotracheal tube in place  Neck: Supple, with full range of motion. No jugular venous distention. Trachea midline. Respiratory: Diminished bilaterally, intubated. Cardiovascular: RRR, no murmur noted  Abdomen: Soft, nontender, nondistended, flat  Musculoskeletal: No clubbing or cyanosis. No edema of bilateral lower extremities. Brisk capillary refill. Skin: Normal skin color.  No rashes. Scattered ecchymosis.   Neurologic: Patient sedated        Scheduled Meds:   furosemide  40 mg IntraVENous Daily    levofloxacin  750 mg IntraVENous Q24H    lansoprazole  30 mg Per NG tube QAM AC    midodrine  5 mg Per NG tube TID WC    insulin lispro  0-6 Units SubCUTAneous Q4H    enoxaparin  30 mg SubCUTAneous BID    miconazole nitrate   Topical BID    chlorhexidine  15 mL Mouth/Throat BID    artificial tears   Both Eyes Q4H    ipratropium-albuterol  1 ampule Inhalation Q4H WA    sodium chloride flush  5-40 mL IntraVENous 2 times per day    ascorbic acid  500 mg Oral Daily    zinc sulfate  50 mg Oral Daily    vitamin D  2,000 Units Oral Daily     Continuous Infusions:   fentaNYL 5 mcg/ml in 0.9%  ml infusion 50 mcg/hr (02/06/22 0600)    sodium chloride      dextrose       PRN 19, BUN 38, creatinine 1.9, anion gap 19, procalcitonin 1.57, CRP 12.8, , mildly elevated LFTs, D-dimer 404, ferritin 5098. Chest x-ray showed bilateral airspace disease. Renal is consulted for SAMUEL. Problems Resolved:     · SAMUEL, likely pre-renal volume responsive SAMUEL secondary to poor oral intake and GI losses (diarrhea). Creatinine 1.9mg/dL on admission. Renal function has improved to 0.6 mg/dL with IVF administration. · HAGMA with low bicarbonate levels, secondary to uremia. To continue bicarbonate drip  · hypernatremia, with water deficit, dehydration due to poor intake. Sodium levels quite improved. Resolved. · Hypophosphatemia, 2/2 poor intake, to replace  · Hypocalcemia, vitamin D 25 level 39, calcium levels 8.4- improved  · Low grade temp--> pancultures sent. Resolved  · Hypokalemia 2/2 renal wasting with diuretics    IMPRESSION/RECOMMENDATIONS:        1. Alkalemia 7.507, pCO2 41.5, bicarb 32.2 (contraction alkalosis from diuretic use) will give 1 dose acetazolamide today and hold Lasix tomorrow  2. Hypercalcemia, hold vitamin D, obtain vitamin D 25 level in am  ----------------------------------------  3. COVID +, on hydrocortisone   4. Acute hypoxic respiratory failure, secondary to COVID pneumonia. Status post intubation  5. Hypotension, midodrine 5 mg TID. 6. Normocytic anemia, hemoglobin 8.1. stable. 7. Altered mental status, patient is not responding despite being off sedatives. For EEG  8. Nutrition, receiving tube feeds at 40 mL an hour with free water flush 30 cc every 3 hours      Plan:    · Hold Lasix tomorrow, patient is -7 L  · Continue midodrine 5 mg TID.    · Continue to monitor phos and magnesium levels  · Obtain ionized calcium daily  · Hold vitamin D supplements  · Obtain vitamin D 25 level in am  · Give acetazolamide 250 mgX1   · For EEG    Electronically signed by MAUREEN Rodriguez CNP on 2/6/2022 at 9:43 AM

## 2022-02-07 ENCOUNTER — APPOINTMENT (OUTPATIENT)
Dept: GENERAL RADIOLOGY | Age: 67
DRG: 207 | End: 2022-02-07
Payer: MEDICARE

## 2022-02-07 ENCOUNTER — APPOINTMENT (OUTPATIENT)
Dept: NEUROLOGY | Age: 67
DRG: 207 | End: 2022-02-07
Payer: MEDICARE

## 2022-02-07 LAB
(1,3)-BETA-D-GLUCAN (FUNGITELL) INTERPRETATION: NEGATIVE
(1,3)-BETA-D-GLUCAN (FUNGITELL): <31 PG/ML
AADO2: 158.4 MMHG
ANION GAP SERPL CALCULATED.3IONS-SCNC: 11 MMOL/L (ref 7–16)
ANION GAP SERPL CALCULATED.3IONS-SCNC: 12 MMOL/L (ref 7–16)
ANION GAP SERPL CALCULATED.3IONS-SCNC: 12 MMOL/L (ref 7–16)
B.E.: 8.3 MMOL/L (ref -3–3)
BUN BLDV-MCNC: 41 MG/DL (ref 6–23)
BUN BLDV-MCNC: 44 MG/DL (ref 6–23)
BUN BLDV-MCNC: 49 MG/DL (ref 6–23)
C-REACTIVE PROTEIN: 10.5 MG/DL (ref 0–0.4)
CALCIUM IONIZED: 1.36 MMOL/L (ref 1.15–1.33)
CALCIUM SERPL-MCNC: 9.6 MG/DL (ref 8.6–10.2)
CALCIUM SERPL-MCNC: 9.7 MG/DL (ref 8.6–10.2)
CALCIUM SERPL-MCNC: 9.9 MG/DL (ref 8.6–10.2)
CHLORIDE BLD-SCNC: 103 MMOL/L (ref 98–107)
CHLORIDE BLD-SCNC: 107 MMOL/L (ref 98–107)
CHLORIDE BLD-SCNC: 109 MMOL/L (ref 98–107)
CO2: 29 MMOL/L (ref 22–29)
CO2: 30 MMOL/L (ref 22–29)
CO2: 31 MMOL/L (ref 22–29)
COHB: 0.5 % (ref 0–1.5)
CREAT SERPL-MCNC: 0.6 MG/DL (ref 0.5–1)
CREAT SERPL-MCNC: 0.7 MG/DL (ref 0.5–1)
CREAT SERPL-MCNC: 0.7 MG/DL (ref 0.5–1)
CRITICAL: ABNORMAL
D DIMER: 436 NG/ML DDU
DATE ANALYZED: ABNORMAL
DATE OF COLLECTION: ABNORMAL
FIO2: 40 %
GFR AFRICAN AMERICAN: >60
GFR NON-AFRICAN AMERICAN: >60 ML/MIN/1.73
GLUCOSE BLD-MCNC: 141 MG/DL (ref 74–99)
GLUCOSE BLD-MCNC: 147 MG/DL (ref 74–99)
GLUCOSE BLD-MCNC: 169 MG/DL (ref 74–99)
HCO3: 31.4 MMOL/L (ref 22–26)
HCT VFR BLD CALC: 24.9 % (ref 34–48)
HEMOGLOBIN: 7.8 G/DL (ref 11.5–15.5)
HHB: 4.3 % (ref 0–5)
LAB: ABNORMAL
Lab: ABNORMAL
MAGNESIUM: 2.4 MG/DL (ref 1.6–2.6)
MCH RBC QN AUTO: 29.7 PG (ref 26–35)
MCHC RBC AUTO-ENTMCNC: 31.3 % (ref 32–34.5)
MCV RBC AUTO: 94.7 FL (ref 80–99.9)
METER GLUCOSE: 144 MG/DL (ref 74–99)
METER GLUCOSE: 147 MG/DL (ref 74–99)
METER GLUCOSE: 157 MG/DL (ref 74–99)
METER GLUCOSE: 164 MG/DL (ref 74–99)
METER GLUCOSE: 176 MG/DL (ref 74–99)
METHB: 0.4 % (ref 0–1.5)
O2 CONTENT: 12 ML/DL
O2 SATURATION: 95.7 % (ref 92–98.5)
O2HB: 94.8 % (ref 94–97)
OPERATOR ID: 1721
PATIENT TEMP: 37 C
PCO2: 37.2 MMHG (ref 35–45)
PDW BLD-RTO: 14.4 FL (ref 11.5–15)
PEEP/CPAP: 8 CMH2O
PFO2: 1.85 MMHG/%
PH BLOOD GAS: 7.54 (ref 7.35–7.45)
PHOSPHORUS: 2.5 MG/DL (ref 2.5–4.5)
PLATELET # BLD: 549 E9/L (ref 130–450)
PMV BLD AUTO: 11.6 FL (ref 7–12)
PO2: 74 MMHG (ref 75–100)
POTASSIUM SERPL-SCNC: 3.8 MMOL/L (ref 3.5–5)
POTASSIUM SERPL-SCNC: 4.2 MMOL/L (ref 3.5–5)
POTASSIUM SERPL-SCNC: 4.4 MMOL/L (ref 3.5–5)
PS: 10 CMH20
RBC # BLD: 2.63 E12/L (ref 3.5–5.5)
RI(T): 2.14
SODIUM BLD-SCNC: 145 MMOL/L (ref 132–146)
SODIUM BLD-SCNC: 148 MMOL/L (ref 132–146)
SODIUM BLD-SCNC: 151 MMOL/L (ref 132–146)
SOURCE, BLOOD GAS: ABNORMAL
THB: 8.9 G/DL (ref 11.5–16.5)
TIME ANALYZED: 449
VITAMIN D 25-HYDROXY: 40 NG/ML (ref 30–100)
WBC # BLD: 16 E9/L (ref 4.5–11.5)

## 2022-02-07 PROCEDURE — 6370000000 HC RX 637 (ALT 250 FOR IP)

## 2022-02-07 PROCEDURE — 99233 SBSQ HOSP IP/OBS HIGH 50: CPT | Performed by: INTERNAL MEDICINE

## 2022-02-07 PROCEDURE — 71045 X-RAY EXAM CHEST 1 VIEW: CPT

## 2022-02-07 PROCEDURE — 80048 BASIC METABOLIC PNL TOTAL CA: CPT

## 2022-02-07 PROCEDURE — 6360000002 HC RX W HCPCS: Performed by: INTERNAL MEDICINE

## 2022-02-07 PROCEDURE — 82962 GLUCOSE BLOOD TEST: CPT

## 2022-02-07 PROCEDURE — 86140 C-REACTIVE PROTEIN: CPT

## 2022-02-07 PROCEDURE — 6370000000 HC RX 637 (ALT 250 FOR IP): Performed by: FAMILY MEDICINE

## 2022-02-07 PROCEDURE — 85378 FIBRIN DEGRADE SEMIQUANT: CPT

## 2022-02-07 PROCEDURE — 6370000000 HC RX 637 (ALT 250 FOR IP): Performed by: INTERNAL MEDICINE

## 2022-02-07 PROCEDURE — 2580000003 HC RX 258: Performed by: FAMILY MEDICINE

## 2022-02-07 PROCEDURE — 6370000000 HC RX 637 (ALT 250 FOR IP): Performed by: NURSE PRACTITIONER

## 2022-02-07 PROCEDURE — 82330 ASSAY OF CALCIUM: CPT

## 2022-02-07 PROCEDURE — 36592 COLLECT BLOOD FROM PICC: CPT

## 2022-02-07 PROCEDURE — 94640 AIRWAY INHALATION TREATMENT: CPT

## 2022-02-07 PROCEDURE — 85027 COMPLETE CBC AUTOMATED: CPT

## 2022-02-07 PROCEDURE — 83735 ASSAY OF MAGNESIUM: CPT

## 2022-02-07 PROCEDURE — 82306 VITAMIN D 25 HYDROXY: CPT

## 2022-02-07 PROCEDURE — 2000000000 HC ICU R&B

## 2022-02-07 PROCEDURE — 95816 EEG AWAKE AND DROWSY: CPT

## 2022-02-07 PROCEDURE — 82805 BLOOD GASES W/O2 SATURATION: CPT

## 2022-02-07 PROCEDURE — 84100 ASSAY OF PHOSPHORUS: CPT

## 2022-02-07 PROCEDURE — 94003 VENT MGMT INPAT SUBQ DAY: CPT

## 2022-02-07 RX ORDER — GAUZE BANDAGE 2" X 2"
300 BANDAGE TOPICAL 3 TIMES DAILY
Status: COMPLETED | OUTPATIENT
Start: 2022-02-07 | End: 2022-02-08

## 2022-02-07 RX ORDER — GAUZE BANDAGE 2" X 2"
100 BANDAGE TOPICAL DAILY
Status: DISCONTINUED | OUTPATIENT
Start: 2022-02-09 | End: 2022-02-11 | Stop reason: HOSPADM

## 2022-02-07 RX ADMIN — Medication: at 07:27

## 2022-02-07 RX ADMIN — INSULIN LISPRO 1 UNITS: 100 INJECTION, SOLUTION INTRAVENOUS; SUBCUTANEOUS at 21:04

## 2022-02-07 RX ADMIN — INSULIN LISPRO 1 UNITS: 100 INJECTION, SOLUTION INTRAVENOUS; SUBCUTANEOUS at 04:34

## 2022-02-07 RX ADMIN — INSULIN LISPRO 1 UNITS: 100 INJECTION, SOLUTION INTRAVENOUS; SUBCUTANEOUS at 09:29

## 2022-02-07 RX ADMIN — Medication 10 ML: at 21:01

## 2022-02-07 RX ADMIN — LANSOPRAZOLE 30 MG: KIT at 07:25

## 2022-02-07 RX ADMIN — MINERAL OIL AND WHITE PETROLATUM: 150; 830 OINTMENT OPHTHALMIC at 09:23

## 2022-02-07 RX ADMIN — INSULIN LISPRO 1 UNITS: 100 INJECTION, SOLUTION INTRAVENOUS; SUBCUTANEOUS at 12:57

## 2022-02-07 RX ADMIN — MINERAL OIL AND WHITE PETROLATUM: 150; 830 OINTMENT OPHTHALMIC at 01:52

## 2022-02-07 RX ADMIN — Medication 300 MG: at 13:31

## 2022-02-07 RX ADMIN — IPRATROPIUM BROMIDE AND ALBUTEROL SULFATE 1 AMPULE: .5; 2.5 SOLUTION RESPIRATORY (INHALATION) at 12:17

## 2022-02-07 RX ADMIN — OXYCODONE HYDROCHLORIDE AND ACETAMINOPHEN 500 MG: 500 TABLET ORAL at 07:26

## 2022-02-07 RX ADMIN — Medication 300 MG: at 21:00

## 2022-02-07 RX ADMIN — CHLORHEXIDINE GLUCONATE 0.12% ORAL RINSE 15 ML: 1.2 LIQUID ORAL at 21:01

## 2022-02-07 RX ADMIN — ENOXAPARIN SODIUM 30 MG: 100 INJECTION SUBCUTANEOUS at 21:00

## 2022-02-07 RX ADMIN — IPRATROPIUM BROMIDE AND ALBUTEROL SULFATE 1 AMPULE: .5; 2.5 SOLUTION RESPIRATORY (INHALATION) at 17:09

## 2022-02-07 RX ADMIN — MINERAL OIL AND WHITE PETROLATUM: 150; 830 OINTMENT OPHTHALMIC at 13:31

## 2022-02-07 RX ADMIN — INSULIN LISPRO 1 UNITS: 100 INJECTION, SOLUTION INTRAVENOUS; SUBCUTANEOUS at 16:54

## 2022-02-07 RX ADMIN — CHLORHEXIDINE GLUCONATE 0.12% ORAL RINSE 15 ML: 1.2 LIQUID ORAL at 07:26

## 2022-02-07 RX ADMIN — MINERAL OIL AND WHITE PETROLATUM: 150; 830 OINTMENT OPHTHALMIC at 18:18

## 2022-02-07 RX ADMIN — IPRATROPIUM BROMIDE AND ALBUTEROL SULFATE 1 AMPULE: .5; 2.5 SOLUTION RESPIRATORY (INHALATION) at 20:32

## 2022-02-07 RX ADMIN — Medication: at 21:00

## 2022-02-07 RX ADMIN — ZINC SULFATE 220 MG (50 MG) CAPSULE 50 MG: CAPSULE at 07:26

## 2022-02-07 RX ADMIN — MINERAL OIL AND WHITE PETROLATUM: 150; 830 OINTMENT OPHTHALMIC at 21:00

## 2022-02-07 RX ADMIN — Medication 10 ML: at 07:26

## 2022-02-07 RX ADMIN — ACETAMINOPHEN 650 MG: 325 TABLET ORAL at 18:18

## 2022-02-07 RX ADMIN — MINERAL OIL AND WHITE PETROLATUM: 150; 830 OINTMENT OPHTHALMIC at 04:35

## 2022-02-07 RX ADMIN — LEVOFLOXACIN 750 MG: 5 INJECTION, SOLUTION INTRAVENOUS at 13:31

## 2022-02-07 RX ADMIN — IPRATROPIUM BROMIDE AND ALBUTEROL SULFATE 1 AMPULE: .5; 2.5 SOLUTION RESPIRATORY (INHALATION) at 08:22

## 2022-02-07 RX ADMIN — ACETAMINOPHEN 650 MG: 325 TABLET ORAL at 09:28

## 2022-02-07 RX ADMIN — ENOXAPARIN SODIUM 30 MG: 100 INJECTION SUBCUTANEOUS at 07:26

## 2022-02-07 ASSESSMENT — PULMONARY FUNCTION TESTS
PIF_VALUE: 21
PIF_VALUE: 20
PIF_VALUE: 21

## 2022-02-07 ASSESSMENT — PAIN SCALES - GENERAL
PAINLEVEL_OUTOF10: 0

## 2022-02-07 NOTE — CONSULTS
Clinical Ethics Consultation Note    Chart reviewed d/t 21 day admission and pt in ICU. Please call ethics for any further needs to assist in plan of care, support for patient, family or care team Ruth Mayen [phone]). Pt discharged home ambulatory to meet ride at main entrance. AVS reviewed with patient. Belongings sent with.

## 2022-02-07 NOTE — PROGRESS NOTES
[address] [facility]   Department of Internal Medicine   Internal Medicine Residency  MICU Progress Note    Patient:  Kb Ramos 78 y.o. female   MRN: [id number]       Date of Service: 2022    Allergy: Patient has no known allergies. Cc follow covid pneumonia   Subjective     Patient was seen and examined this morning at bedside in no acute distress. Overnight, no acute events. This morning, patient is sedated and supine, intubated. Her eyes are now open, but she is not tracking movement and is not following commands. All sedatives off this morning. Objective     TEMPERATURE:  Current - Temp: 100.9 °F (38.3 °C); Max - Temp  Av.2 °F (37.9 °C)  Min: 99.5 °F (37.5 °C)  Max: 101.1 °F (38.4 °C)  RESPIRATIONS RANGE: Resp  Av.8  Min: 22  Max: 37  PULSE RANGE: Pulse  Av.3  Min: 113  Max: 125  BLOOD PRESSURE RANGE:  Systolic (85KQZ), MKB:152 , Min:106 , HUP:807   ; Diastolic (56XMG), IQT:77, Min:54, Max:86    PULSE OXIMETRY RANGE: SpO2  Av.2 %  Min: 94 %  Max: 100 %    I & O - 24hr:    Intake/Output Summary (Last 24 hours) at 2022 1259  Last data filed at 2022 1141  Gross per 24 hour   Intake 2142.6 ml   Output 1400 ml   Net 742.6 ml     I/O last 3 completed shifts: In: 2381.6 [I.V.:409.6; NG/GT:1722; IV Piggyback:250]  Out: 4173 [Urine:2495] I/O this shift: In: 421 [I.V.:307; NG/GT:114]  Out: 335 [Urine:335]   Weight change: 2 lb 1 oz (0.936 kg)    Physical Exam:  General Appearance:    Supine, intubated, sedated. HEENT:    NC/AT, mucous membranes are moist   Neck:   Supple, no jugular venous distention. Resp:     CTAB, No wheezes, No rhonchi, no use of accessory muscles   Heart:    RRR, S1 and S2 normal, no murmur, rub or gallop. Abdomen:     Soft, non-tender, non-distended with normal bowel sounds   Extremities:   Atraumatic, no cyanosis or edema   Pulses:  Radial and pedal pulses are intact bilaterally   Neurologic: Unconscious. Grimaces to sternal rub.  Does not follow commands        Medications     Continuous Infusions:   fentaNYL 5 mcg/ml in 0.9%  ml infusion Stopped (02/07/22 0827)    sodium chloride      dextrose       Scheduled Meds:   thiamine mononitrate  300 mg Oral TID    Followed by   Davi Lane ON 2/9/2022] thiamine mononitrate  100 mg Oral Daily    [Held by provider] furosemide  40 mg IntraVENous Daily    levofloxacin  750 mg IntraVENous Q24H    lansoprazole  30 mg Per NG tube QAM AC    insulin lispro  0-6 Units SubCUTAneous Q4H    enoxaparin  30 mg SubCUTAneous BID    miconazole nitrate   Topical BID    chlorhexidine  15 mL Mouth/Throat BID    artificial tears   Both Eyes Q4H    ipratropium-albuterol  1 ampule Inhalation Q4H WA    sodium chloride flush  5-40 mL IntraVENous 2 times per day    ascorbic acid  500 mg Oral Daily    zinc sulfate  50 mg Oral Daily    [Held by provider] vitamin D  2,000 Units Oral Daily     PRN Meds: sennosides, polyethylene glycol, fentanNYL, sodium chloride flush, sodium chloride, acetaminophen **OR** acetaminophen, glucose, dextrose, glucagon (rDNA), dextrose  Nutrition:   Diet NPO  ADULT TUBE FEEDING; Nasogastric; Standard with Fiber; Continuous; 10; Yes; 10; Q 4 hours; 40; 30; Q 6 hours    Labs and Imaging Studies     CBC:   Recent Labs     02/05/22  0445 02/06/22  0424 02/07/22  0440   WBC 23.4* 18.8* 16.0*   HGB 8.8* 8.1* 7.8*   HCT 27.3* 25.6* 24.9*   MCV 93.8 94.1 94.7    443 549*       BMP:    Recent Labs     02/06/22  1435 02/06/22 2052 02/07/22  0450    143 151*   K 3.1* 5.7* 4.2   CL 99 103 109*   CO2 32* 31* 31*   BUN 41* 39* 44*   CREATININE 0.6 0.6 0.7   GLUCOSE 176* 137* 169*       LIVER PROFILE:   No results for input(s): AST, ALT, LIPASE, BILIDIR, BILITOT, ALKPHOS in the last 72 hours. Invalid input(s): AMYLASE,  ALB    PT/INR:   No results for input(s): PROTIME, INR in the last 72 hours. APTT:   No results for input(s): APTT in the last 72 hours.     Fasting Lipid Panel: Lab Results   Component Value Date    CHOL 145 06/29/2020    TRIG 124 01/25/2022    HDL 73 06/29/2020       Cardiac Enzymes:    No results found for: CKTOTAL, CKMB, CKMBINDEX, TROPONINI    Notable Cultures:      Blood cultures   Blood Culture, Routine   Date Value Ref Range Status   02/03/2022 24 Hours no growth  Preliminary     Respiratory cultures No results found for: RESPCULTURE No results found for: LABGRAM  Urine   Urine Culture, Routine   Date Value Ref Range Status   01/29/2022 Growth not present  Final     Legionella No results found for: LABLEGI  C Diff PCR No results found for: CDIFPCR  Wound culture/abscess: No results for input(s): WNDABS in the last 72 hours. Tip culture:No results for input(s): CXCATHTIP in the last 72 hours.       Oxygen:     Vent Information  $Ventilation: $Subsequent Day  Skin Assessment: Clean, dry, & intact  Suction Catheter Diameter:  (16)  Equipment ID: [id number]  Equipment Changed: Humidification (new h20 bag)  Vent Type: 980  Vent Mode: PS  Vt Ordered: 0 mL  Rate Set: 0 bmp  Peak Flow: 0 L/min  Pressure Support: 10 cmH20  FiO2 : 40 %  SpO2: 95 %  SpO2/FiO2 ratio: 237.5  PaO2/FiO2 ratio: 196  Sensitivity: 3  PEEP/CPAP: 8  I Time/ I Time %: 0 s  Humidification Source: Heated wire  Humidification Temp: 37  Humidification Temp Measured: 37.2  Circuit Condensation: Drained  Mask Type: Full face mask  Mask Size: Medium  Additional Respiratory  Assessments  Pulse: 117  Resp: (!) 31  SpO2: 95 %  Position: Semi-Ocampo's  Humidification Source: Heated wire  Humidification Temp: 37  Circuit Condensation: Drained  Oral Care: Mouth swabbed,Mouth moisturizer,Mouth suctioned  Subglottic Suction Done?: No  Airway Type: ET  Airway Size: 8  Cuff Pressure (cm H2O): 29 cm H2O       [REMOVED] Urethral Catheter Double-lumen 16 fr-Output (mL): 130 mL  Urethral Catheter Double-lumen 16 fr-Output (mL): 95 mL  [REMOVED] External Urinary Catheter-Output (mL): 200 mL    Imaging Studies:  XR CHEST PORTABLE   Final Result   Increase in density involvement of bilateral pulmonary opacifications right   greater than left of airspace disease/bronchopneumonia from prior         XR CHEST PORTABLE   Final Result   1. There is no interval change in the multifocal bilateral pneumonia   2. There is no pneumothorax or pneumomediastinum. XR CHEST PORTABLE   Final Result   1. Very minimal partial interval clearing of the right lung pneumonia when   compared with the study. 2. No interval change in the left lung pneumonia. 3. There is no pneumothorax. XR CHEST PORTABLE   Final Result   No interval change         XR ABDOMEN FOR NG/OG/NE TUBE PLACEMENT   Final Result   Satisfactory position of the enteric tube. US DUP LOWER EXTREMITIES BILATERAL VENOUS   Final Result   Within the visualized vessels there is no evidence for deep venous   thrombosis               XR CHEST PORTABLE   Final Result   No significant changes since February 2nd. Persistent bilateral infiltrates. Endotracheal tube in good position. XR CHEST PORTABLE   Final Result   No pneumothorax following line placement. Right greater than left parenchymal infiltrates similar to subtly improved. Continued follow-up recommended. XR ABDOMEN (KUB) (SINGLE AP VIEW)   Final Result   No significant change. Satisfactory position of the enteric tube within the   stomach. No bowel obstruction or acute abdominal disease identified. XR CHEST PORTABLE   Final Result   1. Extensive bilateral multifocal pneumonia. No significant change. 2. Vertical linear shadow over the lateral aspect of the right chest, most   likely skin fold. XR CHEST PORTABLE   Final Result   1. There is no interval change in the multifocal bilateral pneumonia. 2. There is no pneumothorax or pneumomediastinum.          XR CHEST PORTABLE   Final Result   Stable to slightly improved aeration of the lungs bilaterally with persistent patchy bilateral airspace opacities, in keeping with the patient's known   diagnosis of COVID pneumonia. US DUP LOWER EXTREMITIES BILATERAL VENOUS   Final Result   No evidence of DVT in either lower extremity. US DUP UPPER EXTREMITIES BILATERAL VENOUS   Final Result   Occlusive thrombus in the left distal cephalic vein. No evidence of DVT. Critical results were called by Dr. Alek Balbuena to Dr. Mariel Marks On 1/29/2022   at 21:09. Please make a note of the limited nature of the study due to the above   reasons. RECOMMENDATIONS:         XR CHEST PORTABLE   Final Result   No significant interval change, when compared to the previous study performed   1 day earlier. XR CHEST PORTABLE   Final Result   Bilateral airspace disease without appreciable change. XR ABDOMEN (KUB) (SINGLE AP VIEW)   Final Result   No active process. NG tube in the gastric lumen as before. XR CHEST PORTABLE   Final Result   Progression of bilateral airspace disease remaining midlung predominant in   the periphery of airspace disease bronchopneumonia with increased from prior         XR CHEST PORTABLE   Final Result   Mild decrease in the diffuse left lung infiltrates, when compared to the   previous study performed 1 day earlier. Diffuse right lung infiltrates   without significant interval change. XR CHEST PORTABLE   Final Result   Left internal jugular line tip in the distal SVC. No pneumothorax. The remainder of the exam is essentially stable. .         XR CHEST PORTABLE   Final Result   Stable airspace disease. See above. XR CHEST PORTABLE   Final Result   Bilateral airspace disease which appears mildly progressive. XR CHEST PORTABLE   Final Result   1. Lines and tubes are unchanged in position. 2.  Diffuse bilateral opacities are not significantly changed.   Differential   includes infection, pulmonary edema, atelectasis, ARDS, and/or layering pleural effusions. XR CHEST PORTABLE   Final Result   1. No significant change in bilateral multifocal pneumonia. Support tubes   and catheters are stable. XR CHEST PORTABLE   Final Result   1. There is no interval change in extensive multifocal bilateral pneumonia. XR CHEST PORTABLE   Final Result   Slightly worsened bilateral pulmonary infiltrates with increasing opacity in   the bilateral bases when compared to previous. Stable lines and tubes. XR CHEST PORTABLE   Final Result   1. Good position right IJ central venous line. Negative for pneumothorax. 2.  Bilateral widespread pulmonary infiltrates with interval mild worsening   on the left and compatible with bilateral pneumonia. XR CHEST PORTABLE   Final Result   Endotracheal tube tip is 3.3 cm above the [place]ber[place]. Diffuse pulmonary infiltrates are unchanged. XR ABDOMEN FOR NG/OG/NE TUBE PLACEMENT   Final Result   Nasogastric tube terminates in the stomach. XR CHEST PORTABLE   Final Result   1. Multifocal bilateral pneumonia unchanged when compared with the prior   study. XR CHEST PORTABLE   Final Result   Bilateral airspace disease likely related to pneumonia.          XR CHEST PORTABLE    (Results Pending)   CTA PULMONARY W CONTRAST    (Results Pending)   XR CHEST PORTABLE    (Results Pending)   XR CHEST PORTABLE    (Results Pending)        Resident's Assessment and Plan     Assessment and Plan:    Neurologic  # AMS  Plan  - For EEG today, rule out seizure activity  - If EEG negative, will get CTH to r/o stroke  - If all above negative, will talk again with family about CC  - Start thiamine 300mg x 3 doses    Pulmonary  # Acute hypoxic respiratory failure 2/2 ARDS 2/2 COVID-19 PNA  - CXR showed BL ground glass infiltrates  Most recent ABG:   Recent Labs     02/07/22  0449   PH 7.544*   PCO2 37.2   PO2 74.0*   HCO3 31.4*   - Current vent settings: PS, PEEP 8, pressure support 10  - P/f 1.85  - Urine out 1.97L, +81.7  Plan  - Avoid propofol as BP very sensitive to it  - Wean vent as tolerated  - Goal is net negative fluid balance    # Hx of asthma     Infectious disease  # Febrile with increasing leukocytosis   - NGT removed, OGT inserted 2/3/22  - DVT US LE to r/o DVT, negative for DVT  - Tmax 100 yesterday morning  - resp clx growing gram positive cocci in pairs and candida albicans, MRSA negative, blood clx negative  Plan  - Continue levofloxacin    Hematology/Oncology  # Acute normocytic anemia 2/2 anemia of chronic disease vs sepsis  - S/p 1u pRBC since admission   - Hb 7.8 today  - Iron studies negative  Plan  - CTM with CBC daily   - Transfuse if Hb <7      Last BM charted: 2/7/22  Antibiotics: levofloxacin (2/3/22), last dose today  Cultures: blood clx negative, resp clx growing gram positive cocci in pairs, candida albicans. MRSA screen negative  Lines: RIJ CVC (2/2/22)  Intubated: 1/19/22   Diet: TF        # Peptic ulcer prophylaxis: lansoprazole   # DVT Prophylaxis: lovenox 30mg BID  # Disposition: Cont current care     Sheldon Salas MD, PGY-1    Attending Physician: Dr. Mandy Aparicio personally saw, examined and provided care for the patient. Radiographs, labs and medication list were reviewed by me independently. I spoke with bedside nursing, therapists and consultants. Critical care services and times documented are independent of procedures and multidisciplinary rounds with Residents. Additionally comprehensive, multidisciplinary rounds were conducted with the MICU team. The case was discussed in detail and plans for care were established. Review of Residents documentation was conducted and revisions were made as appropriate. I agree with the above documented exam, problem list and plan of care.   ARDS  Pneumonia  Encephalopathy  Not following commands  EEG   Thiamine   If not better next 24 h .CT scan     PS 10  PEEP 8  Fio2 40  Jc Hebert MD,[facility]  Pulmonary&Critical Care Medicine   Director of [facility]  Medical Director of [address] [place] [address]    Mellisa Champion

## 2022-02-07 NOTE — PROGRESS NOTES
HOSPITALIST PROGRESS NOTE  Date: 2/7/2022   Name: Tommy Phillips   MRN: [id number] endocrine:  YOB: 1955        Hospital Course:   Lorren Libman is D 40 y.o. year old female who has a PMH of asthma.   She presented to the ER on 1/14/22 with complaints of SOB and diarrhea x 1 week.  She was not vaccinated against COVID-19.  Reportedly her daughter found her curled into the fetal position in respiratory distress at home and called 911.     Subjective/Interval Hx:   Patient remains intubated sedated in intensive care unit  Objective:   Physical Exam:   /68   Pulse 117   Temp 100.9 °F (38.3 °C) (Esophageal)   Resp (!) 31   Ht 5' (1.524 m)   Wt 121 lb 9 oz (55.1 kg)   SpO2 95%   BMI 23.74 kg/m²   General: no acute distress, well nourished and well hydrated  HEENT: NCAT  Heart: S1S2 RRR  Lungs: Clear to ascultation bilaterally, respiratory effort normal  Abdomen: soft, NT/ND, positive bowel sounds  Extremities: no pitting edema, nontender   Neuro: patient is awake, alert and orientated times 3, no gross deficits  Skin: no rashes or ecchymosis        Meds:   Meds:    thiamine mononitrate  300 mg Oral TID    Followed by   Brittany Joseph ON 2/9/2022] thiamine mononitrate  100 mg Oral Daily    [Held by provider] furosemide  40 mg IntraVENous Daily    levofloxacin  750 mg IntraVENous Q24H    lansoprazole  30 mg Per NG tube QAM AC    insulin lispro  0-6 Units SubCUTAneous Q4H    enoxaparin  30 mg SubCUTAneous BID    miconazole nitrate   Topical BID    chlorhexidine  15 mL Mouth/Throat BID    artificial tears   Both Eyes Q4H    ipratropium-albuterol  1 ampule Inhalation Q4H WA    sodium chloride flush  5-40 mL IntraVENous 2 times per day    ascorbic acid  500 mg Oral Daily    zinc sulfate  50 mg Oral Daily    [Held by provider] vitamin D  2,000 Units Oral Daily      Infusions:    fentaNYL 5 mcg/ml in 0.9%  ml infusion Stopped (02/07/22 0844)    sodium chloride      dextrose PRN Meds: sennosides, 5 mL, Nightly PRN  polyethylene glycol, 17 g, Daily PRN  fentanNYL, 50 mcg, Q2H PRN  sodium chloride flush, 5-40 mL, PRN  sodium chloride, 25 mL, PRN  acetaminophen, 650 mg, Q6H PRN   Or  acetaminophen, 650 mg, Q6H PRN  glucose, 15 g, PRN  dextrose, 12.5 g, PRN  glucagon (rDNA), 1 mg, PRN  dextrose, 100 mL/hr, PRN        Data/Labs:     Recent Labs     02/05/22  0445 02/06/22  0424 02/07/22  0440   WBC 23.4* 18.8* 16.0*   HGB 8.8* 8.1* 7.8*   HCT 27.3* 25.6* 24.9*    443 549*      Recent Labs     02/06/22  1435 02/06/22 2052 02/07/22  0450    143 151*   K 3.1* 5.7* 4.2   CL 99 103 109*   CO2 32* 31* 31*   BUN 41* 39* 44*   CREATININE 0.6 0.6 0.7     No results for input(s): AST, ALT, ALB, BILIDIR, BILITOT, ALKPHOS in the last 72 hours. No results for input(s): INR in the last 72 hours. No results for input(s): CKTOTAL, CKMB, CKMBINDEX, TROPONINT in the last 72 hours. I/O last 3 completed shifts: In: 2381.6 [I.V.:409.6; NG/GT:1722; IV Piggyback:250]  Out: 0712 [Urine:2495]    Intake/Output Summary (Last 24 hours) at 2/7/2022 1222  Last data filed at 2/7/2022 1141  Gross per 24 hour   Intake 2142.6 ml   Output 1400 ml   Net 742.6 ml        Assessment/Plan:   1. Acute respiratory hypoxic failure due to Covid viral pneumonia-patient is unvaccinated with symptomatic x1 week prior to presentation, steroids, vitamins, pulmonary is following  02/02/2020-patient remains intubated sedated in the ICU we will continue to monitor with critical care team  02/03/2022-patient remains unchanged intubated sedated in ICU  02 /05/2022-patient remains intubated sedated in intensive care unit CRP is high at 34.6  02/07/2022-patient remains intubated sedated in ICU his CRP is down to 10.5. 2. Non-insulin-dependent diabetes mellitus-sliding scale, monitor blood glucose, diabetic diet  3. GERD-Protonix, Reglan  4.  COPD-DuoNeb, monitor pulmonary toiletry      DVT Prophylaxis: Eliquis  Diet: Diet NPO  ADULT TUBE FEEDING; Nasogastric; Standard with Fiber; Continuous; 10; Yes; 10; Q 4 hours; 40; 30; Q 6 hours  Code Status: DNR-CCA    Dispo:  When stable    Electronically signed by Eliza Castleman, MD on 2/7/2022 at 12:22 PM  [facility]

## 2022-02-07 NOTE — PROCEDURES
[address] Report    MRN: [id number]   PATIENT NAME: Florencia Romero   DATE OF REPORT: 2022  DATE OF SERVICE: 2022    PHYSICIAN NAME: Jose Luis Kendrick DO  Referring Physician: Demetrice Whitlock MD       Patient's : 1955   Patient's Age: 78 y.o. Gender: female     PROCEDURE: Routine EEG with video      Clinical Interpretation: This abnormal study showed evidence of:    1. A mild nonspecific encephalopathy    No seizures or epileptiform discharges were noted during this study. ____________________________  Electronically signed by: Jose Luis Kendrick DO, 2022 10:20 AM      Patient Clinical Information   Reason for Study: Patient undergoing evaluation for altered mental status  Patient State: Stuporous  Primary neurological diagnosis: Altered mental status   Primary indication for monitoring: Diagnosis of nonconvulsive seizures    Pertinent Medications and Treatments    fentanyl    Sedatives administered: Yes  Intubated: Yes  Pharmacological paralytic: No    Reporting Period  Start of Study: 1003, 2022   End of Study:  1028, 2022       EEG Description  Digital video and scalp EEG monitoring was performed using the standard protocol for this laboratory. Scalp electrodes were applied in the international 10/20 system. Multiple digital montage arrangements were utilized for evaluation. EKG and video were recorded. Background:      Occipital rhythm (posterior dominant rhythm or PDR): Present intermittently  Frequency: 7 Hz  Voltage: Medium   Organization: fair   Reactivity to eye opening/closure: fair    Drowsiness: Present - mildly excessive generalized irregular delta present  Sleep: Absent    Comments:  In the waking state the background is composed primarily of generalized irregular theta activity    Technical and Activation Procedures:  Hyperventilation: Not done        Photic stimulation: Not done        Reactivity to stimulation: Yes    Abnormalities:    I. Seizures? No    II. Rhythmic or Periodic Patterns? No    III. Other Abnormalities?         No

## 2022-02-07 NOTE — PROGRESS NOTES
Comprehensive Nutrition Assessment    Type and Reason for Visit:  Reassess    Nutrition Recommendations/Plan: Continue NPO, Continue Current Tube Feeding     Add 1 protein modular to better meet est needs    Nutrition Assessment:  Pt remains at nutritional risk d/t ongoing intubation 2/2 +COVID-19 PNA. Noted SAMUEL resolved. EN support advanced from trickle rate. Pending POC. Will provide goal rec if needed. Malnutrition Assessment:  Malnutrition Status:   At risk for malnutrition   Context:  Acute Illness     Findings of the 6 clinical characteristics of malnutrition:  Energy Intake: 50% or less of estimated energy requirements for 5 or more days  Weight Loss:  Unable to assess (no wt hx on file)     Body Fat Loss:  Unable to assess (d/t covid iso)     Muscle Mass Loss:  Unable to assess (d/t covid iso)    Fluid Accumulation:  No significant fluid accumulation     Strength:  Not Performed    Estimated Daily Nutrient Needs:  Energy (kcal):  PS3B 1462; 2857-9821; Weight Used for Energy Requirements:  Admission     Protein (g):  70-90; Weight Used for Protein Requirements:  Admission (1.5-1.8 g/kg)        Fluid (ml/day):  per critical care    Nutrition Related Findings:  Pt remains intubated/sedated, off paralytic, supine, MAP WNL, -I/O's 6L, nonpitting edema, active BS, OGT w/ TF, hypernatremia      Wounds:  None       Current Nutrition Therapies:    Current Tube Feeding (TF) Orders:  · Feeding Route: Orogastric  · Formula: Standard with Fiber  · Schedule: Continuous (40 ml/hr, appears running per doc flow)  · Water Flushes: 30 ml q 3 hr    Anthropometric Measures:  · Height: 5' (152.4 cm)  · Current Body Weight: 109 lb (49.4 kg) (1/14 admit wt as CBW remains elevated)   · Admission Body Weight: 109 lb (49.4 kg) (1/14 first measured)    · Usual Body Weight: 131 lb (59.4 kg) (measured @ [facility] 10/2020, no recent wt hx on file)     · Ideal Body Weight: 100 lbs; % Ideal Body Weight 109 %   · BMI: 21.3 BMI Categories: Underweight (BMI less than 22) age over 72       Nutrition Diagnosis:   · Inadequate oral intake related to impaired respiratory function as evidenced by NPO or clear liquid status due to medical condition,intubation,nutrition support - enteral nutrition    Nutrition Interventions:    Nutrition Education/Counseling:  Education not appropriate   Coordination of Nutrition Care:  Continue to monitor while inpatient    Goals:  Pt to tolerate TF at goal rate       Nutrition Monitoring and Evaluation:   Food/Nutrient Intake Outcomes:  Diet Advancement/Tolerance,Enteral Nutrition Intake/Tolerance  Physical Signs/Symptoms Outcomes:  Biochemical Data,Nutrition Focused Physical Findings,Skin,Weight,Diarrhea,Nausea or Vomiting,Fluid Status or Edema,Hemodynamic Status     Discharge Planning:     Too soon to determine     Electronically signed by Ham Larson RD, LD on 2/7/22 at 3:13 PM EST    Contact: Ext [phone]

## 2022-02-07 NOTE — PROGRESS NOTES
Department of Internal Medicine  Nephrology  Progress Note      Events Reviewed. SUBJECTIVE:  We are following Ms. Gloria Rodriguez for SAMUEL. Patient is intubated. PHYSICAL EXAM:    Vitals:    VITALS:  /60   Pulse 113   Temp 101.1 °F (38.4 °C) (Esophageal)   Resp 22   Ht 5' (1.524 m)   Wt 121 lb 9 oz (55.1 kg)   SpO2 96%   BMI 23.74 kg/m²   24HR INTAKE/OUTPUT:      Intake/Output Summary (Last 24 hours) at 2/7/2022 1152  Last data filed at 2/7/2022 1141  Gross per 24 hour   Intake 2142.6 ml   Output 1680 ml   Net 462.6 ml     General appearance: Patient is intubated, sedated  HEENT:  Endotracheal tube in place  Neck: Supple, No jugular venous distention. Respiratory: Diminished bilaterally, intubated. Cardiovascular: RRR, no murmur noted  Abdomen: Soft, nontender, nondistended, flat extremities. Skin: Normal skin color.  No rashes. Scattered ecchymosis.   Neurologic: Patient sedated  Other: No edema         Scheduled Meds:   thiamine mononitrate  300 mg Oral TID    Followed by   Sheldon Granda ON 2/9/2022] thiamine mononitrate  100 mg Oral Daily    [Held by provider] furosemide  40 mg IntraVENous Daily    levofloxacin  750 mg IntraVENous Q24H    lansoprazole  30 mg Per NG tube QAM AC    insulin lispro  0-6 Units SubCUTAneous Q4H    enoxaparin  30 mg SubCUTAneous BID    miconazole nitrate   Topical BID    chlorhexidine  15 mL Mouth/Throat BID    artificial tears   Both Eyes Q4H    ipratropium-albuterol  1 ampule Inhalation Q4H WA    sodium chloride flush  5-40 mL IntraVENous 2 times per day    ascorbic acid  500 mg Oral Daily    zinc sulfate  50 mg Oral Daily    [Held by provider] vitamin D  2,000 Units Oral Daily     Continuous Infusions:   fentaNYL 5 mcg/ml in 0.9%  ml infusion Stopped (02/07/22 0827)    sodium chloride      dextrose       PRN Meds:.sennosides, polyethylene glycol, fentanNYL, sodium chloride flush, sodium chloride, acetaminophen **OR** acetaminophen, glucose, dextrose, glucagon (rDNA), dextrose    DATA:    CBC with Differential:    Lab Results   Component Value Date    WBC 16.0 02/07/2022    RBC 2.63 02/07/2022    HGB 7.8 02/07/2022    HCT 24.9 02/07/2022     02/07/2022    MCV 94.7 02/07/2022    MCH 29.7 02/07/2022    MCHC 31.3 02/07/2022    RDW 14.4 02/07/2022    METASPCT 0.9 01/18/2022    LYMPHOPCT 2.7 01/18/2022    MONOPCT 11.6 01/18/2022    MYELOPCT 0.9 01/14/2022    BASOPCT 0.1 01/18/2022    MONOSABS 1.38 01/18/2022    LYMPHSABS 0.35 01/18/2022    EOSABS 0.00 01/18/2022    BASOSABS 0.00 01/18/2022     CMP:    Lab Results   Component Value Date     02/07/2022    K 4.2 02/07/2022    K 3.8 01/18/2022     02/07/2022    CO2 31 02/07/2022    BUN 44 02/07/2022    CREATININE 0.7 02/07/2022    GFRAA >60 02/07/2022    LABGLOM >60 02/07/2022    GLUCOSE 169 02/07/2022    PROT 5.6 01/29/2022    LABALBU 2.4 01/29/2022    CALCIUM 9.9 02/07/2022    BILITOT 0.2 01/29/2022    ALKPHOS 97 01/29/2022    AST 16 01/29/2022    ALT 16 01/29/2022     Magnesium:    Lab Results   Component Value Date    MG 2.1 02/03/2022     Phosphorus:    Lab Results   Component Value Date    PHOS 2.8 02/03/2022        Radiology Review:    Chest X-ray February 6, 2022   1. There is no interval change in the multifocal bilateral pneumonia   2. There is no pneumothorax or pneumomediastinum.                 BRIEF SUMMARY OF INITIAL CONSULT:     Briefly, Ms. Margie Valle is a 78year old female with a past medical history of Asthma who presented to the ER on January 14 th, 2022 with complaints of SOB and diarrhea for one week. She is not vaccinated against COVID. Reportedly her daughter found her curled into a fetal position in respiratory distress at home and EMS was summoned. She was found to be COVID 19 positive. Labs were significant for bicarbonate 19, BUN 38, creatinine 1.9, anion gap 19, procalcitonin 1.57, CRP 12.8, , mildly elevated LFTs, D-dimer 404, ferritin 5098.   Chest x-ray showed bilateral airspace disease. Renal is consulted for SAMUEL. Problems Resolved:     · SAMUEL, likely pre-renal volume responsive SAMUEL secondary to poor oral intake and GI losses (diarrhea). Creatinine 1.9mg/dL on admission. Renal function has improved to 0.6 mg/dL with IVF administration. · HAGMA with low bicarbonate levels, secondary to uremia. To continue bicarbonate drip  · hypernatremia, with water deficit, dehydration due to poor intake. Sodium levels quite improved. Resolved. · Hypophosphatemia, 2/2 poor intake, to replace  · Hypocalcemia, vitamin D 25 level 39, calcium levels 8.4- improved  · Low grade temp--> pancultures sent. Resolved  · Hypokalemia 2/2 renal wasting with diuretics    IMPRESSION/RECOMMENDATIONS:        1. Hypernatremia, with water deficit, 2/2 to diuretics   2. Alkalemia 7.544, pCO2 37.2 (contraction alkalosis)    3. Hypercalcemia, hold vitamin D, r/o primary hyperparathyroidism   ----------------------------------------  4. COVID +, on hydrocortisone   5. Acute hypoxic respiratory failure, secondary to COVID pneumonia. Status post intubation  6. Normocytic anemia, hemoglobin 8.1. stable. 7. Altered mental status, patient is not responding despite being off sedatives. For EEG  8.  Nutrition, tube feeds at 40 mL an hour with free water flush 30 cc every 3 hours      Plan:    · Hold diuretics   · Increase free water to 50 cc/hour   · Monitor Na levels   · Monitor ionized calcium daily    Electronically signed by Paige Key MD on 2/7/2022 at 11:52 AM

## 2022-02-07 NOTE — PROGRESS NOTES
Pulmonary Subsequent Hospital F/U note    Patient is being followed for: acute hypoxic respiratory failure, severe covid pneumonia     Interval HPI:  Supine  Continued on PSV ( PEEP 8, PS 10, FiO2 40%)  Per nursing minimal responsiveness  She has been weaned off of fentanyl , also off versed  Off paralytic  To have EEG performed today     ROS:  Unable to obtain     Exam:  BP (!) 140/68   Pulse 124   Temp 101.1 °F (38.4 °C) (Esophageal)   Resp (!) 34   Ht 5' (1.524 m)   Wt 121 lb 9 oz (55.1 kg)   SpO2 96%   BMI 23.74 kg/m²    Due to the current efforts to prevent transmission of COVID-19 and also the need to preserve PPE for other caregivers, a face-to-face encounter with the patient was not performed. That being said, all relevant records and diagnostic tests were reviewed, including laboratory results and imaging. Please reference any relevant documentation elsewhere. Care will be coordinated with the primary service. Data:    Oximetry:  SpO2 Readings from Last 1 Encounters:   02/07/22 96%       Imaging personally reviewed by myself:     1. There is no interval change in the multifocal bilateral pneumonia   2. There is no pneumothorax or pneumomediastinum.      CXR (2/7/2022)  Increase in density involvement of bilateral pulmonary opacifications right   greater than left of airspace disease/bronchopneumonia from prior       Pertinent labs reviewed and noted:  Lab Results   Component Value Date    WBC 16.0 02/07/2022    HGB 7.8 02/07/2022    HCT 24.9 02/07/2022    MCV 94.7 02/07/2022    MCH 29.7 02/07/2022    MCHC 31.3 02/07/2022    RDW 14.4 02/07/2022     02/07/2022    MPV 11.6 02/07/2022     Lab Results   Component Value Date     02/07/2022    K 4.2 02/07/2022    K 3.8 01/18/2022     02/07/2022    CO2 31 02/07/2022    BUN 44 02/07/2022    CREATININE 0.7 02/07/2022    LABALBU 2.4 01/29/2022    CALCIUM 9.9 02/07/2022    GFRAA >60 02/07/2022    LABGLOM >60 02/07/2022     Lab Results Component Value Date    PROTIME 11.3 01/14/2022    INR 1.0 01/14/2022       Assessment:  1. Acute hypoxic respiratory failure  2. Severe covid-19 pneumonia  3. Possible superimposed bacterial pneumonia   4. Septic shock, resolved  5. Asthma without exacerbation  6. Anemia   7. SAMUEL, resolved  8. Fever  9. Metabolic encephalopathy     Plan:  1. Continues to tolerate Pressure Support  2. Off sedation, continue to monitor mentation  3. F/U EEG  4. Bronchodilators  5. Will complete levaquin , last dose today. 6. Monitor for new infectious symptoms ; WBC trending down. 7. Family considering goals of care options     Electronically signed by Annie Ricardo MD on 2/7/2022 at 9:58 AM    I personally saw, examined, and cared for the patient. Labs, medications, radiographs reviewed.  I agree with history exam and plans detailed in the resident note with the following additions:    Remains on PSV  Opening eyes  Does not follow commands yet  Awaiting EEG  Suspect she is slow to arouse d/t prolonged versed  Kenny Crouch MD

## 2022-02-07 NOTE — PROGRESS NOTES
Pulmonary Subsequent Hospital F/U note    Patient is being followed for: acute hypoxic respiratory failure, severe covid pneumonia     Interval HPI:  Supine  On PSV today  Per nursing minimal responsiveness  She is on 50mcg fentanyl  Off versed  Off paralytic    ROS:  Unable to obtain       Exam:  /67   Pulse 118   Temp 99.9 °F (37.7 °C) (Bladder)   Resp 28   Ht 5' (1.524 m)   Wt 119 lb 8 oz (54.2 kg)   SpO2 100%   BMI 23.34 kg/m²    Due to the current efforts to prevent transmission of COVID-19 and also the need to preserve PPE for other caregivers, a face-to-face encounter with the patient was not performed. That being said, all relevant records and diagnostic tests were reviewed, including laboratory results and imaging. Please reference any relevant documentation elsewhere. Care will be coordinated with the primary service. Data:    Oximetry:  SpO2 Readings from Last 1 Encounters:   02/06/22 100%       Imaging personally reviewed by myself:     1. There is no interval change in the multifocal bilateral pneumonia   2. There is no pneumothorax or pneumomediastinum. Pertinent labs reviewed and noted:  Lab Results   Component Value Date    WBC 18.8 02/06/2022    HGB 8.1 02/06/2022    HCT 25.6 02/06/2022    MCV 94.1 02/06/2022    MCH 29.8 02/06/2022    MCHC 31.6 02/06/2022    RDW 14.2 02/06/2022     02/06/2022    MPV 11.3 02/06/2022     Lab Results   Component Value Date     02/06/2022    K 3.1 02/06/2022    K 3.8 01/18/2022    CL 99 02/06/2022    CO2 32 02/06/2022    BUN 41 02/06/2022    CREATININE 0.6 02/06/2022    LABALBU 2.4 01/29/2022    CALCIUM 9.3 02/06/2022    GFRAA >60 02/06/2022    LABGLOM >60 02/06/2022     Lab Results   Component Value Date    PROTIME 11.3 01/14/2022    INR 1.0 01/14/2022       Assessment:  1. Acute hypoxic respiratory failure  2. Severe covid-19 pneumonia  3. Possible superimposed bacterial pneumonia   4. Septic shock, resolved  5.  Asthma without exacerbation  6. Anemia   7. SAMUEL, resolved  8. Fever  9. Metabolic encephalopathy     Plan:  1. Tolerating some PSV today  2. Hold sedation and monitor mentation  3. EEG ordered  4. Bronchodilators  5. Will complete levaquin  6. Monitor for new infectious symptoms  7.  Family considering goals of care options     Electronically signed by Arlyn Sutherland MD on 2/6/2022 at 8:05 PM

## 2022-02-08 ENCOUNTER — APPOINTMENT (OUTPATIENT)
Dept: GENERAL RADIOLOGY | Age: 67
DRG: 207 | End: 2022-02-08
Payer: MEDICARE

## 2022-02-08 LAB
AADO2: 149.3 MMHG
ALBUMIN SERPL-MCNC: 2.7 G/DL (ref 3.5–5.2)
AMORPHOUS: ABNORMAL
ANION GAP SERPL CALCULATED.3IONS-SCNC: 10 MMOL/L (ref 7–16)
ANION GAP SERPL CALCULATED.3IONS-SCNC: 12 MMOL/L (ref 7–16)
B.E.: 8.7 MMOL/L (ref -3–3)
BACTERIA: ABNORMAL /HPF
BILIRUBIN URINE: NEGATIVE
BLOOD CULTURE, ROUTINE: NORMAL
BLOOD, URINE: ABNORMAL
BUN BLDV-MCNC: 47 MG/DL (ref 6–23)
BUN BLDV-MCNC: 50 MG/DL (ref 6–23)
CALCIUM IONIZED: 1.35 MMOL/L (ref 1.15–1.33)
CALCIUM SERPL-MCNC: 8.7 MG/DL (ref 8.6–10.2)
CALCIUM SERPL-MCNC: 9 MG/DL (ref 8.6–10.2)
CHLORIDE BLD-SCNC: 107 MMOL/L (ref 98–107)
CHLORIDE BLD-SCNC: 109 MMOL/L (ref 98–107)
CHLORIDE URINE RANDOM: <20 MMOL/L
CLARITY: CLEAR
CO2: 30 MMOL/L (ref 22–29)
CO2: 31 MMOL/L (ref 22–29)
COHB: 0.7 % (ref 0–1.5)
COLOR: YELLOW
CREAT SERPL-MCNC: 0.4 MG/DL (ref 0.5–1)
CREAT SERPL-MCNC: 0.5 MG/DL (ref 0.5–1)
CRITICAL: ABNORMAL
CRYSTALS, UA: ABNORMAL /HPF
CULTURE, BLOOD 2: NORMAL
DATE ANALYZED: ABNORMAL
DATE OF COLLECTION: ABNORMAL
EPITHELIAL CELLS, UA: ABNORMAL /HPF
FERRITIN: 1577 NG/ML
FIO2: 40 %
GFR AFRICAN AMERICAN: >60
GFR AFRICAN AMERICAN: >60
GFR NON-AFRICAN AMERICAN: >60 ML/MIN/1.73
GFR NON-AFRICAN AMERICAN: >60 ML/MIN/1.73
GLUCOSE BLD-MCNC: 140 MG/DL (ref 74–99)
GLUCOSE BLD-MCNC: 148 MG/DL (ref 74–99)
GLUCOSE URINE: NEGATIVE MG/DL
HCO3: 32.2 MMOL/L (ref 22–26)
HCT VFR BLD CALC: 22.7 % (ref 34–48)
HEMOGLOBIN: 7.1 G/DL (ref 11.5–15.5)
HHB: 4.2 % (ref 0–5)
KETONES, URINE: NEGATIVE MG/DL
LAB: ABNORMAL
LEUKOCYTE ESTERASE, URINE: ABNORMAL
Lab: ABNORMAL
MAGNESIUM: 2.1 MG/DL (ref 1.6–2.6)
MAGNESIUM: 2.3 MG/DL (ref 1.6–2.6)
MCH RBC QN AUTO: 30 PG (ref 26–35)
MCHC RBC AUTO-ENTMCNC: 31.3 % (ref 32–34.5)
MCV RBC AUTO: 95.8 FL (ref 80–99.9)
METER GLUCOSE: 119 MG/DL (ref 74–99)
METER GLUCOSE: 127 MG/DL (ref 74–99)
METER GLUCOSE: 135 MG/DL (ref 74–99)
METER GLUCOSE: 139 MG/DL (ref 74–99)
METER GLUCOSE: 144 MG/DL (ref 74–99)
METER GLUCOSE: 145 MG/DL (ref 74–99)
METHB: 0.3 % (ref 0–1.5)
MODE: ABNORMAL
NITRITE, URINE: NEGATIVE
O2 CONTENT: 10.5 ML/DL
O2 SATURATION: 95.8 % (ref 92–98.5)
O2HB: 94.8 % (ref 94–97)
OPERATOR ID: 1921
OSMOLALITY URINE: 502 MOSM/KG (ref 300–900)
PARATHYROID HORMONE INTACT: 41 PG/ML (ref 15–65)
PATIENT TEMP: 37 C
PCO2: 40 MMHG (ref 35–45)
PDW BLD-RTO: 14.8 FL (ref 11.5–15)
PEEP/CPAP: 8 CMH2O
PFO2: 2 MMHG/%
PH BLOOD GAS: 7.52 (ref 7.35–7.45)
PH UA: 6.5 (ref 5–9)
PHOSPHORUS: 3.1 MG/DL (ref 2.5–4.5)
PHOSPHORUS: 3.4 MG/DL (ref 2.5–4.5)
PLATELET # BLD: 571 E9/L (ref 130–450)
PMV BLD AUTO: 11.7 FL (ref 7–12)
PO2: 79.9 MMHG (ref 75–100)
POTASSIUM SERPL-SCNC: 3.4 MMOL/L (ref 3.5–5)
POTASSIUM SERPL-SCNC: 3.8 MMOL/L (ref 3.5–5)
POTASSIUM, UR: 31.5 MMOL/L
PRO-BNP: 900 PG/ML (ref 0–125)
PROTEIN UA: 30 MG/DL
PS: 10 CMH20
RBC # BLD: 2.37 E12/L (ref 3.5–5.5)
RBC UA: >20 /HPF (ref 0–2)
RENAL EPITHELIAL, UA: ABNORMAL /HPF
RI(T): 1.87
SARS-COV-2, NAAT: NOT DETECTED
SODIUM BLD-SCNC: 148 MMOL/L (ref 132–146)
SODIUM BLD-SCNC: 151 MMOL/L (ref 132–146)
SODIUM URINE: <20 MMOL/L
SOURCE, BLOOD GAS: ABNORMAL
SPECIFIC GRAVITY UA: 1.02 (ref 1–1.03)
THB: 7.8 G/DL (ref 11.5–16.5)
TIME ANALYZED: 556
UROBILINOGEN, URINE: 0.2 E.U./DL
WBC # BLD: 17.5 E9/L (ref 4.5–11.5)
WBC UA: ABNORMAL /HPF (ref 0–5)
YEAST: PRESENT /HPF

## 2022-02-08 PROCEDURE — 6370000000 HC RX 637 (ALT 250 FOR IP): Performed by: INTERNAL MEDICINE

## 2022-02-08 PROCEDURE — 87206 SMEAR FLUORESCENT/ACID STAI: CPT

## 2022-02-08 PROCEDURE — 6360000002 HC RX W HCPCS: Performed by: INTERNAL MEDICINE

## 2022-02-08 PROCEDURE — 87070 CULTURE OTHR SPECIMN AEROBIC: CPT

## 2022-02-08 PROCEDURE — 37799 UNLISTED PX VASCULAR SURGERY: CPT

## 2022-02-08 PROCEDURE — 6370000000 HC RX 637 (ALT 250 FOR IP): Performed by: NURSE PRACTITIONER

## 2022-02-08 PROCEDURE — 36415 COLL VENOUS BLD VENIPUNCTURE: CPT

## 2022-02-08 PROCEDURE — 84100 ASSAY OF PHOSPHORUS: CPT

## 2022-02-08 PROCEDURE — 83935 ASSAY OF URINE OSMOLALITY: CPT

## 2022-02-08 PROCEDURE — 82728 ASSAY OF FERRITIN: CPT

## 2022-02-08 PROCEDURE — 87040 BLOOD CULTURE FOR BACTERIA: CPT

## 2022-02-08 PROCEDURE — 84133 ASSAY OF URINE POTASSIUM: CPT

## 2022-02-08 PROCEDURE — 6370000000 HC RX 637 (ALT 250 FOR IP): Performed by: FAMILY MEDICINE

## 2022-02-08 PROCEDURE — 99233 SBSQ HOSP IP/OBS HIGH 50: CPT | Performed by: INTERNAL MEDICINE

## 2022-02-08 PROCEDURE — 71045 X-RAY EXAM CHEST 1 VIEW: CPT

## 2022-02-08 PROCEDURE — 83880 ASSAY OF NATRIURETIC PEPTIDE: CPT

## 2022-02-08 PROCEDURE — 81001 URINALYSIS AUTO W/SCOPE: CPT

## 2022-02-08 PROCEDURE — 82040 ASSAY OF SERUM ALBUMIN: CPT

## 2022-02-08 PROCEDURE — 82805 BLOOD GASES W/O2 SATURATION: CPT

## 2022-02-08 PROCEDURE — 82962 GLUCOSE BLOOD TEST: CPT

## 2022-02-08 PROCEDURE — 83735 ASSAY OF MAGNESIUM: CPT

## 2022-02-08 PROCEDURE — 94003 VENT MGMT INPAT SUBQ DAY: CPT

## 2022-02-08 PROCEDURE — 83970 ASSAY OF PARATHORMONE: CPT

## 2022-02-08 PROCEDURE — 80048 BASIC METABOLIC PNL TOTAL CA: CPT

## 2022-02-08 PROCEDURE — 2580000003 HC RX 258: Performed by: FAMILY MEDICINE

## 2022-02-08 PROCEDURE — 87449 NOS EACH ORGANISM AG IA: CPT

## 2022-02-08 PROCEDURE — 84300 ASSAY OF URINE SODIUM: CPT

## 2022-02-08 PROCEDURE — 2000000000 HC ICU R&B

## 2022-02-08 PROCEDURE — 82330 ASSAY OF CALCIUM: CPT

## 2022-02-08 PROCEDURE — 6370000000 HC RX 637 (ALT 250 FOR IP)

## 2022-02-08 PROCEDURE — 94640 AIRWAY INHALATION TREATMENT: CPT

## 2022-02-08 PROCEDURE — 82436 ASSAY OF URINE CHLORIDE: CPT

## 2022-02-08 PROCEDURE — 87635 SARS-COV-2 COVID-19 AMP PRB: CPT

## 2022-02-08 PROCEDURE — 85027 COMPLETE CBC AUTOMATED: CPT

## 2022-02-08 RX ORDER — POTASSIUM CHLORIDE 7.45 MG/ML
10 INJECTION INTRAVENOUS
Status: COMPLETED | OUTPATIENT
Start: 2022-02-08 | End: 2022-02-09

## 2022-02-08 RX ORDER — CALCITONIN SALMON 200 [USP'U]/ML
200 INJECTION, SOLUTION INTRAMUSCULAR; SUBCUTANEOUS DAILY
Status: COMPLETED | OUTPATIENT
Start: 2022-02-08 | End: 2022-02-08

## 2022-02-08 RX ORDER — CALCITONIN SALMON 200 [USP'U]/ML
200 INJECTION, SOLUTION INTRAMUSCULAR; SUBCUTANEOUS DAILY
Status: DISCONTINUED | OUTPATIENT
Start: 2022-02-08 | End: 2022-02-08

## 2022-02-08 RX ADMIN — Medication 10 ML: at 22:16

## 2022-02-08 RX ADMIN — ZINC SULFATE 220 MG (50 MG) CAPSULE 50 MG: CAPSULE at 08:02

## 2022-02-08 RX ADMIN — Medication: at 08:03

## 2022-02-08 RX ADMIN — POTASSIUM CHLORIDE 10 MEQ: 10 INJECTION, SOLUTION INTRAVENOUS at 23:39

## 2022-02-08 RX ADMIN — ENOXAPARIN SODIUM 30 MG: 100 INJECTION SUBCUTANEOUS at 22:17

## 2022-02-08 RX ADMIN — POTASSIUM CHLORIDE 10 MEQ: 10 INJECTION, SOLUTION INTRAVENOUS at 22:33

## 2022-02-08 RX ADMIN — MINERAL OIL AND WHITE PETROLATUM: 150; 830 OINTMENT OPHTHALMIC at 07:26

## 2022-02-08 RX ADMIN — ENOXAPARIN SODIUM 30 MG: 100 INJECTION SUBCUTANEOUS at 08:02

## 2022-02-08 RX ADMIN — IPRATROPIUM BROMIDE AND ALBUTEROL SULFATE 1 AMPULE: .5; 2.5 SOLUTION RESPIRATORY (INHALATION) at 10:24

## 2022-02-08 RX ADMIN — CALCITONIN SALMON 200 UNITS: 200 INJECTION, SOLUTION INTRAMUSCULAR; SUBCUTANEOUS at 12:38

## 2022-02-08 RX ADMIN — ACETAMINOPHEN 650 MG: 325 TABLET ORAL at 01:38

## 2022-02-08 RX ADMIN — Medication: at 22:17

## 2022-02-08 RX ADMIN — LANSOPRAZOLE 30 MG: KIT at 07:24

## 2022-02-08 RX ADMIN — IPRATROPIUM BROMIDE AND ALBUTEROL SULFATE 1 AMPULE: .5; 2.5 SOLUTION RESPIRATORY (INHALATION) at 21:25

## 2022-02-08 RX ADMIN — IPRATROPIUM BROMIDE AND ALBUTEROL SULFATE 1 AMPULE: .5; 2.5 SOLUTION RESPIRATORY (INHALATION) at 13:08

## 2022-02-08 RX ADMIN — ACETAMINOPHEN 650 MG: 325 TABLET ORAL at 17:37

## 2022-02-08 RX ADMIN — OXYCODONE HYDROCHLORIDE AND ACETAMINOPHEN 500 MG: 500 TABLET ORAL at 08:02

## 2022-02-08 RX ADMIN — MINERAL OIL AND WHITE PETROLATUM: 150; 830 OINTMENT OPHTHALMIC at 22:17

## 2022-02-08 RX ADMIN — MINERAL OIL AND WHITE PETROLATUM: 150; 830 OINTMENT OPHTHALMIC at 01:45

## 2022-02-08 RX ADMIN — CHLORHEXIDINE GLUCONATE 0.12% ORAL RINSE 15 ML: 1.2 LIQUID ORAL at 22:17

## 2022-02-08 RX ADMIN — IPRATROPIUM BROMIDE AND ALBUTEROL SULFATE 1 AMPULE: .5; 2.5 SOLUTION RESPIRATORY (INHALATION) at 17:23

## 2022-02-08 RX ADMIN — INSULIN LISPRO 1 UNITS: 100 INJECTION, SOLUTION INTRAVENOUS; SUBCUTANEOUS at 01:38

## 2022-02-08 RX ADMIN — MINERAL OIL AND WHITE PETROLATUM: 150; 830 OINTMENT OPHTHALMIC at 09:22

## 2022-02-08 RX ADMIN — CHLORHEXIDINE GLUCONATE 0.12% ORAL RINSE 15 ML: 1.2 LIQUID ORAL at 08:02

## 2022-02-08 RX ADMIN — Medication 10 ML: at 08:02

## 2022-02-08 RX ADMIN — Medication 300 MG: at 08:02

## 2022-02-08 ASSESSMENT — PAIN SCALES - GENERAL
PAINLEVEL_OUTOF10: 0

## 2022-02-08 ASSESSMENT — PULMONARY FUNCTION TESTS
PIF_VALUE: 19
PIF_VALUE: 19
PIF_VALUE: 20
PIF_VALUE: 19
PIF_VALUE: 21
PIF_VALUE: 19
PIF_VALUE: 19
PIF_VALUE: 21
PIF_VALUE: 19
PIF_VALUE: 19
PIF_VALUE: 20
PIF_VALUE: 21
PIF_VALUE: 19
PIF_VALUE: 20
PIF_VALUE: 19
PIF_VALUE: 20
PIF_VALUE: 21
PIF_VALUE: 19
PIF_VALUE: 19
PIF_VALUE: 18
PIF_VALUE: 19
PIF_VALUE: 18

## 2022-02-08 NOTE — PROGRESS NOTES
Pt on PS 10/.40/+6 for weaning parameters.     VT= 388 ml  RR= 25 bpm  NIF= -22 cmH20  VC= 280 ml  Ve= 9.6 l/m    RSBI= 64    Patient has positive cuff leak

## 2022-02-08 NOTE — PROGRESS NOTES
exacerbation  6. Anemia   7. SAMUEL, resolved  8. Fever  9. Metabolic encephalopathy, improving   10. Critical care myopathy    Plan:  1. Tolerating PSV today  2. Off sedation and mentation improving   3. Bronchodilators  4. Completed levaquin  5.  I still feel she will likely require a tracheostomy given debility/weakness      Electronically signed by Darren Henderson MD on 2/8/2022 at 12:01 PM

## 2022-02-08 NOTE — PROGRESS NOTES
Department of Internal Medicine  Nephrology  Progress Note      Events Reviewed. SUBJECTIVE:  We are following Ms. Gloria Rodriguez for SAMUEL. Patient is intubated. PHYSICAL EXAM:    Vitals:    VITALS:  /67   Pulse 111   Temp 100 °F (37.8 °C) (Esophageal)   Resp 30   Ht 5' (1.524 m)   Wt 114 lb 4.8 oz (51.8 kg)   SpO2 98%   BMI 22.32 kg/m²   24HR INTAKE/OUTPUT:      Intake/Output Summary (Last 24 hours) at 2/8/2022 1054  Last data filed at 2/8/2022 1016  Gross per 24 hour   Intake 1515.9 ml   Output 1450 ml   Net 65.9 ml     General appearance: Patient is intubated, sedated  HEENT:  Endotracheal tube in place  Neck: Supple, No jugular venous distention. Respiratory: Diminished bilaterally, intubated. Cardiovascular: RRR, no murmur noted  Abdomen: Soft, nontender, nondistended, flat extremities. Skin: Normal skin color.  No rashes. Scattered ecchymosis.   Neurologic: Patient sedated  Other: No edema         Scheduled Meds:   [START ON 2/9/2022] thiamine mononitrate  100 mg Oral Daily    [Held by provider] furosemide  40 mg IntraVENous Daily    lansoprazole  30 mg Per NG tube QAM AC    insulin lispro  0-6 Units SubCUTAneous Q4H    enoxaparin  30 mg SubCUTAneous BID    miconazole nitrate   Topical BID    chlorhexidine  15 mL Mouth/Throat BID    artificial tears   Both Eyes Q4H    ipratropium-albuterol  1 ampule Inhalation Q4H WA    sodium chloride flush  5-40 mL IntraVENous 2 times per day    ascorbic acid  500 mg Oral Daily    zinc sulfate  50 mg Oral Daily    [Held by provider] vitamin D  2,000 Units Oral Daily     Continuous Infusions:   sodium chloride      dextrose       PRN Meds:.sennosides, polyethylene glycol, fentanNYL, sodium chloride flush, sodium chloride, acetaminophen **OR** acetaminophen, glucose, dextrose, glucagon (rDNA), dextrose    DATA:    CBC with Differential:    Lab Results   Component Value Date    WBC 17.5 02/08/2022    RBC 2.37 02/08/2022    HGB 7.1 02/08/2022 HCT 22.7 02/08/2022     02/08/2022    MCV 95.8 02/08/2022    MCH 30.0 02/08/2022    MCHC 31.3 02/08/2022    RDW 14.8 02/08/2022    METASPCT 0.9 01/18/2022    LYMPHOPCT 2.7 01/18/2022    MONOPCT 11.6 01/18/2022    MYELOPCT 0.9 01/14/2022    BASOPCT 0.1 01/18/2022    MONOSABS 1.38 01/18/2022    LYMPHSABS 0.35 01/18/2022    EOSABS 0.00 01/18/2022    BASOSABS 0.00 01/18/2022     CMP:    Lab Results   Component Value Date     02/08/2022    K 3.8 02/08/2022    K 3.8 01/18/2022     02/08/2022    CO2 30 02/08/2022    BUN 50 02/08/2022    CREATININE 0.5 02/08/2022    GFRAA >60 02/08/2022    LABGLOM >60 02/08/2022    GLUCOSE 140 02/08/2022    PROT 5.6 01/29/2022    LABALBU 2.4 01/29/2022    CALCIUM 9.0 02/08/2022    BILITOT 0.2 01/29/2022    ALKPHOS 97 01/29/2022    AST 16 01/29/2022    ALT 16 01/29/2022     Magnesium:    Lab Results   Component Value Date    MG 2.3 02/08/2022     Phosphorus:    Lab Results   Component Value Date    PHOS 3.4 02/08/2022        Radiology Review:    Chest X-ray February 6, 2022   1. There is no interval change in the multifocal bilateral pneumonia   2. There is no pneumothorax or pneumomediastinum.                 BRIEF SUMMARY OF INITIAL CONSULT:     Briefly, Ms. Kb Ramos is a 78year old female with a past medical history of Asthma who presented to the ER on January 14 th, 2022 with complaints of SOB and diarrhea for one week. She is not vaccinated against COVID. Reportedly her daughter found her curled into a fetal position in respiratory distress at home and EMS was summoned. She was found to be COVID 19 positive. Labs were significant for bicarbonate 19, BUN 38, creatinine 1.9, anion gap 19, procalcitonin 1.57, CRP 12.8, , mildly elevated LFTs, D-dimer 404, ferritin 5098. Chest x-ray showed bilateral airspace disease. Renal is consulted for SAMUEL.      Problems Resolved:     · SAMUEL, likely pre-renal volume responsive SAMUEL secondary to poor oral intake and GI losses (diarrhea). Creatinine 1.9mg/dL on admission. Renal function has improved to 0.6 mg/dL with IVF administration. · HAGMA with low bicarbonate levels, secondary to uremia. To continue bicarbonate drip  · hypernatremia, with water deficit, dehydration due to poor intake. Sodium levels quite improved. Resolved. · Hypophosphatemia, 2/2 poor intake, to replace  · Hypocalcemia, vitamin D 25 level 39, calcium levels 8.4- improved  · Low grade temp--> pancultures sent. Resolved  · Hypokalemia 2/2 renal wasting with diuretics    IMPRESSION/RECOMMENDATIONS:      1. Hypernatremia, with water deficit, 2/2 to diuretics; estimated water deficit about 1.8 L. Urine osmolality 502, Lizabeth<20, indicating probably intravascular volume depletion versus heart failure? [place] will increase free water administration for now and obtain a proBNP. 2. Alkalemia 7.524, pCO2 40, total CO2 30, (contraction alkalosis) with respiratory alkalosis  3. Hypercalcemia, with normal PTH level: 41, probably primary hyperparathyroidism?  ----------------------------------------  4. COVID +, on hydrocortisone   5. Acute hypoxic respiratory failure, secondary to COVID pneumonia. Status post intubation  6. Normocytic anemia, multifactorial, dropping H&H  7. Altered mental status, patient is not responding despite being off sedatives. EEG results pending  8.  Nutrition, tube feeds at 40 mL an hour with free water flush 30 cc every 3 hours      Plan:    · Continue to hold diuretics   · Increase free water to 100 cc/hour   · Calcitonin IV x 1 one   · Continue to monitor Na levels   · Obtain a proBNP level  · Continue to monitor ionized calcium      Electronically signed by Ethan Billy MD on 2/8/2022 at 10:54 AM

## 2022-02-08 NOTE — PROGRESS NOTES
Hospitalist Progress Note      SYNOPSIS:     Ms. Izzy Holden is a 78y.o. year old female who has a PMH of asthma.     She presented to the ER on 22 with complaints of SOB and diarrhea x 1 week. She was not vaccinated against COVID-19. Reportedly her daughter found her curled into the fetal position in respiratory distress at home and called 911. Vitals on arrival were significant for temperature 103.2, heart rate 110, blood pressure 122/70 and oxygen saturation 72% on room air. She was placed on 15 L via nonrebreather mask and her oxygen saturation improved to 95%. Labs were significant for bicarbonate 19, BUN 38, creatinine 1.9, anion gap 19, procalcitonin 1.57, CRP 12.8, , mildly elevated LFTs, D-dimer 404, ferritin 5098 and a positive COVID PCR. Chest x-ray showed bilateral airspace disease. She was given 1 L normal saline bolus as well Lovenox and Decadron prior to being admitted. RRT was called on  for respiratory distress. She was transferred to ICU for intubation. She has been intermittently proned. Code status changed to CCA on . SUBJECTIVE:    Patient seen and examined. Awake, tracking with eyes and nodding head slightly. She appears terrified. Records reviewed.        Temp (24hrs), Av.6 °F (38.1 °C), Min:98.8 °F (37.1 °C), Max:101.7 °F (38.7 °C)    DIET: Diet NPO  ADULT TUBE FEEDING; Nasogastric; Standard with Fiber; Continuous; 10; Yes; 10; Q 4 hours; 40; 100; Q 1 hour; Protein; 1 Proteinex daily  CODE: [id number]    Intake/Output Summary (Last 24 hours) at 2022 1257  Last data filed at 2022 1200  Gross per 24 hour   Intake 1515.9 ml   Output 1505 ml   Net 10.9 ml       Review of Systems  RAGHAV- intubated and sedated      OBJECTIVE:    BP (!) 140/69   Pulse 110   Temp 100 °F (37.8 °C) (Esophageal)   Resp 24   Ht 5' (1.524 m)   Wt 114 lb 4.8 oz (51.8 kg)   SpO2 98%   BMI 22.32 kg/m²     General appearance: Intubated and awake, not following commands. Wide eyed. HEENT: Normal cephalic, facial edema noted. ETT secured. Neck: Supple, with full range of motion. No jugular venous distention. Trachea midline. Respiratory: ETT to vent, PS 10, PEEP 6, FiO2 40%; tachypneic  Cardiovascular: tachycardic, no murmur noted  Abdomen: Soft, nondistended, flat. NGT with TF infusing  Musculoskeletal: No clubbing, cyanosis, BUE edema noted  Skin: Normal skin color.  Scattered bruises  Neurologic:  Intubated, not following commands, tracking with eyes  Psychiatric:  Intubated    Medications:  REVIEWED DAILY    Infusion Medications    sodium chloride      dextrose       Scheduled Medications    [START ON 2/9/2022] thiamine mononitrate  100 mg Oral Daily    [Held by provider] furosemide  40 mg IntraVENous Daily    lansoprazole  30 mg Per NG tube QAM AC    insulin lispro  0-6 Units SubCUTAneous Q4H    enoxaparin  30 mg SubCUTAneous BID    miconazole nitrate   Topical BID    chlorhexidine  15 mL Mouth/Throat BID    artificial tears   Both Eyes Q4H    ipratropium-albuterol  1 ampule Inhalation Q4H WA    sodium chloride flush  5-40 mL IntraVENous 2 times per day    ascorbic acid  500 mg Oral Daily    zinc sulfate  50 mg Oral Daily    [Held by provider] vitamin D  2,000 Units Oral Daily     PRN Meds: sennosides, polyethylene glycol, fentanNYL, sodium chloride flush, sodium chloride, acetaminophen **OR** acetaminophen, glucose, dextrose, glucagon (rDNA), dextrose    Labs:     Recent Labs     02/06/22  0424 02/07/22  0440 02/08/22  0506   WBC 18.8* 16.0* 17.5*   HGB 8.1* 7.8* 7.1*   HCT 25.6* 24.9* 22.7*    549* 571*       Recent Labs     02/07/22  0450 02/07/22  1145 02/07/22  1804 02/08/22  0204 02/08/22  0506   * 148*  --  151*  --    K 4.2 3.8  --  3.8  --    * 107  --  109*  --    CO2 31* 29  --  30*  --    BUN 44* 49*  --  50*  --    CREATININE 0.7 0.7  --  0.5  --    CALCIUM 9.9 9.6  --  9.0  --    PHOS  --   --  2.5  --  3.4       No results for input(s): PROT, ALB, ALKPHOS, ALT, AST, BILITOT, AMYLASE, LIPASE in the last 72 hours. No results for input(s): INR in the last 72 hours. No results for input(s): Lorenso Favre in the last 72 hours. Chronic labs:    Lab Results   Component Value Date    CHOL 145 06/29/2020    TRIG 124 01/25/2022    HDL 73 06/29/2020    LDLCALC 57 06/29/2020    TSH 3.600 06/29/2020    INR 1.0 01/14/2022    LABA1C 6.0 (H) 01/19/2022       Radiology: REVIEWED DAILY      ASSESSMENT and PLAN:    COVID-19 pneumonia-unvaccinated, symptom onset approximately 1 week prior to presentation. To continue with vitamin C and zinc.  Remains on lovenox bid. · S/p decadron 10 mg PO bid x 8 days, baricitinib discontinued on 1/19. · First negative COVID 2/8    VAP-respiratory culture growing GPC in pairs and candida albicans; levaquin completed 2/7. Acute hypoxic respiratory failure-2/2 above. S/p intubation 1/19. · Currently requiring PS 10, PEEP 6, FiO2 40%    Hypernatremia- with FW deficit and overall fluid balance -6.5L s/p aggressive diuresis; presently on FW at 100 mL Q1 hour    Hypercalcemia- ionized calcium 1.35, given calcitonin. Vitamin D level 40. Alkalemia- pH 4.794 with metabolic (contraction/post hypercapnic) alkalosis     Hyperglycemia-likely steroid-induced, remains on SSI    Normocytic anemia- likely 2/2 phlebotomy; s/p 1U PRBC on 1/26. Continue to monitor H&H.        DISPOSITION: Continued inpatient care    +++++++++++++++++++++++++++++++++++++++++++++++++  [address] [facility]  +++++++++++++++++++++++++++++++++++++++++++++++++  NOTE: This report was transcribed using voice recognition software. Every effort was made to ensure accuracy; however, inadvertent computerized transcription errors may be present.

## 2022-02-08 NOTE — PROGRESS NOTES
[address] [facility]   Department of Internal Medicine   Internal Medicine Residency  MICU Progress Note    Patient:  Lyndon Jenkins 78 y.o. female   MRN: [id number]       Date of Service: 2022    Allergy: Patient has no known allergies. Cc follow covid pneumonia   Subjective     Patient was seen and examined this morning at bedside in no acute distress. Overnight, no acute events. This morning, patient is sedated and supine, intubated. Her eyes are open and she's tracking movement but is not following commands. Objective     TEMPERATURE:  Current - Temp: 100.4 °F (38 °C); Max - Temp  Av °F (38.3 °C)  Min: 100.4 °F (38 °C)  Max: 101.7 °F (38.7 °C)  RESPIRATIONS RANGE: Resp  Av.8  Min: 22  Max: 38  PULSE RANGE: Pulse  Av.8  Min: 110  Max: 124  BLOOD PRESSURE RANGE:  Systolic (03WGX), JBZ:953 , Min:100 , XVQ:941   ; Diastolic (56IUU), YDK:02, Min:50, Max:74    PULSE OXIMETRY RANGE: SpO2  Av.4 %  Min: 91 %  Max: 98 %    I & O - 24hr:    Intake/Output Summary (Last 24 hours) at 2022 0818  Last data filed at 2022 0800  Gross per 24 hour   Intake 1518.6 ml   Output 1375 ml   Net 143.6 ml     I/O last 3 completed shifts: In: 2609.6 [I.V.:561.6; VF/YP:3650; IV XDAHQWKVR:911]  Out:  [Urine:] I/O this shift:  In: -   Out: 120 [Urine:120]   Weight change: -7 lb 4.2 oz (-3.294 kg)    Physical Exam:  General Appearance:    Supine, intubated, sedated. HEENT:    NC/AT, mucous membranes are moist   Neck:   Supple, no jugular venous distention. Resp:     CTAB, No wheezes, No rhonchi, no use of accessory muscles   Heart:    RRR, S1 and S2 normal, no murmur, rub or gallop. Abdomen:     Soft, non-tender, non-distended with normal bowel sounds   Extremities:   Atraumatic, no cyanosis or edema   Pulses:  Radial and pedal pulses are intact bilaterally   Neurologic: Unconscious. Grimaces to sternal rub.  Does not follow commands        Medications     Continuous Infusions:   fentaNYL 5 mcg/ml in 0.9%  ml infusion Stopped (02/07/22 0827)    sodium chloride      dextrose       Scheduled Meds:   [START ON 2/9/2022] thiamine mononitrate  100 mg Oral Daily    [Held by provider] furosemide  40 mg IntraVENous Daily    lansoprazole  30 mg Per NG tube QAM AC    insulin lispro  0-6 Units SubCUTAneous Q4H    enoxaparin  30 mg SubCUTAneous BID    miconazole nitrate   Topical BID    chlorhexidine  15 mL Mouth/Throat BID    artificial tears   Both Eyes Q4H    ipratropium-albuterol  1 ampule Inhalation Q4H WA    sodium chloride flush  5-40 mL IntraVENous 2 times per day    ascorbic acid  500 mg Oral Daily    zinc sulfate  50 mg Oral Daily    [Held by provider] vitamin D  2,000 Units Oral Daily     PRN Meds: sennosides, polyethylene glycol, fentanNYL, sodium chloride flush, sodium chloride, acetaminophen **OR** acetaminophen, glucose, dextrose, glucagon (rDNA), dextrose  Nutrition:   Diet NPO  ADULT TUBE FEEDING; Nasogastric; Standard with Fiber; Continuous; 10; Yes; 10; Q 4 hours; 40; 100; Q 1 hour; Protein; 1 Proteinex daily    Labs and Imaging Studies     CBC:   Recent Labs     02/06/22  0424 02/07/22  0440 02/08/22  0506   WBC 18.8* 16.0* 17.5*   HGB 8.1* 7.8* 7.1*   HCT 25.6* 24.9* 22.7*   MCV 94.1 94.7 95.8    549* 571*       BMP:    Recent Labs     02/07/22  0450 02/07/22  1145 02/08/22  0204   * 148* 151*   K 4.2 3.8 3.8   * 107 109*   CO2 31* 29 30*   BUN 44* 49* 50*   CREATININE 0.7 0.7 0.5   GLUCOSE 169* 147* 140*       LIVER PROFILE:   No results for input(s): AST, ALT, LIPASE, BILIDIR, BILITOT, ALKPHOS in the last 72 hours. Invalid input(s): AMYLASE,  ALB    PT/INR:   No results for input(s): PROTIME, INR in the last 72 hours. APTT:   No results for input(s): APTT in the last 72 hours.     Fasting Lipid Panel:    Lab Results   Component Value Date    CHOL 145 06/29/2020    TRIG 124 01/25/2022    HDL 73 06/29/2020       Cardiac Enzymes:    No results found for: CKTOTAL, CKMB, CKMBINDEX, TROPONINI    Notable Cultures:      Blood cultures   Blood Culture, Routine   Date Value Ref Range Status   02/03/2022 24 Hours no growth  Preliminary     Respiratory cultures No results found for: RESPCULTURE No results found for: LABGRAM  Urine   Urine Culture, Routine   Date Value Ref Range Status   01/29/2022 Growth not present  Final     Legionella No results found for: LABLEGI  C Diff PCR No results found for: CDIFPCR  Wound culture/abscess: No results for input(s): WNDABS in the last 72 hours. Tip culture:No results for input(s): CXCATHTIP in the last 72 hours.       Oxygen:     Vent Information  $Ventilation: $Subsequent Day  Skin Assessment: Clean, dry, & intact  Suction Catheter Diameter:  (16)  Equipment ID: [id number]  Equipment Changed: Humidification  Vent Type: 980  Vent Mode: PS  Vt Ordered: 0 mL  Rate Set: 0 bmp  Peak Flow: 0 L/min  Pressure Support: 10 cmH20  FiO2 : 40 %  SpO2: 94 %  SpO2/FiO2 ratio: 235  PaO2/FiO2 ratio: 196  Sensitivity: 3  PEEP/CPAP: 6  I Time/ I Time %: 0 s  Humidification Source: Heated wire  Humidification Temp: 37  Humidification Temp Measured: 37  Circuit Condensation: Drained  Mask Type: Full face mask  Mask Size: Medium  Additional Respiratory  Assessments  Pulse: 114  Resp: 25  SpO2: 94 %  Position: Semi-Ocampo's  Humidification Source: Heated wire  Humidification Temp: 37  Circuit Condensation: Drained  Oral Care: Mouth suctioned  Subglottic Suction Done?: No  Airway Type: ET  Airway Size: 8  Cuff Pressure (cm H2O): 29 cm H2O       [REMOVED] Urethral Catheter Double-lumen 16 fr-Output (mL): 130 mL  Urethral Catheter Double-lumen 16 fr-Output (mL): 120 mL  [REMOVED] External Urinary Catheter-Output (mL): 200 mL    Imaging Studies:  XR CHEST PORTABLE   Final Result   Increase in density involvement of bilateral pulmonary opacifications right   greater than left of airspace disease/bronchopneumonia from prior         XR CHEST PORTABLE   Final Result   1. There is no interval change in the multifocal bilateral pneumonia   2. There is no pneumothorax or pneumomediastinum. XR CHEST PORTABLE   Final Result   1. Very minimal partial interval clearing of the right lung pneumonia when   compared with the study. 2. No interval change in the left lung pneumonia. 3. There is no pneumothorax. XR CHEST PORTABLE   Final Result   No interval change         XR ABDOMEN FOR NG/OG/NE TUBE PLACEMENT   Final Result   Satisfactory position of the enteric tube. US DUP LOWER EXTREMITIES BILATERAL VENOUS   Final Result   Within the visualized vessels there is no evidence for deep venous   thrombosis               XR CHEST PORTABLE   Final Result   No significant changes since February 2nd. Persistent bilateral infiltrates. Endotracheal tube in good position. XR CHEST PORTABLE   Final Result   No pneumothorax following line placement. Right greater than left parenchymal infiltrates similar to subtly improved. Continued follow-up recommended. XR ABDOMEN (KUB) (SINGLE AP VIEW)   Final Result   No significant change. Satisfactory position of the enteric tube within the   stomach. No bowel obstruction or acute abdominal disease identified. XR CHEST PORTABLE   Final Result   1. Extensive bilateral multifocal pneumonia. No significant change. 2. Vertical linear shadow over the lateral aspect of the right chest, most   likely skin fold. XR CHEST PORTABLE   Final Result   1. There is no interval change in the multifocal bilateral pneumonia. 2. There is no pneumothorax or pneumomediastinum. XR CHEST PORTABLE   Final Result   Stable to slightly improved aeration of the lungs bilaterally with persistent   patchy bilateral airspace opacities, in keeping with the patient's known   diagnosis of COVID pneumonia.          US DUP LOWER EXTREMITIES BILATERAL VENOUS   Final Result   No evidence of DVT in either lower extremity. US DUP UPPER EXTREMITIES BILATERAL VENOUS   Final Result   Occlusive thrombus in the left distal cephalic vein. No evidence of DVT. Critical results were called by Dr. Celio Palencia to Dr. Yaritza Chicas On 1/29/2022   at 21:09. Please make a note of the limited nature of the study due to the above   reasons. RECOMMENDATIONS:         XR CHEST PORTABLE   Final Result   No significant interval change, when compared to the previous study performed   1 day earlier. XR CHEST PORTABLE   Final Result   Bilateral airspace disease without appreciable change. XR ABDOMEN (KUB) (SINGLE AP VIEW)   Final Result   No active process. NG tube in the gastric lumen as before. XR CHEST PORTABLE   Final Result   Progression of bilateral airspace disease remaining midlung predominant in   the periphery of airspace disease bronchopneumonia with increased from prior         XR CHEST PORTABLE   Final Result   Mild decrease in the diffuse left lung infiltrates, when compared to the   previous study performed 1 day earlier. Diffuse right lung infiltrates   without significant interval change. XR CHEST PORTABLE   Final Result   Left internal jugular line tip in the distal SVC. No pneumothorax. The remainder of the exam is essentially stable. .         XR CHEST PORTABLE   Final Result   Stable airspace disease. See above. XR CHEST PORTABLE   Final Result   Bilateral airspace disease which appears mildly progressive. XR CHEST PORTABLE   Final Result   1. Lines and tubes are unchanged in position. 2.  Diffuse bilateral opacities are not significantly changed. Differential   includes infection, pulmonary edema, atelectasis, ARDS, and/or layering   pleural effusions. XR CHEST PORTABLE   Final Result   1. No significant change in bilateral multifocal pneumonia. Support tubes   and catheters are stable.          XR CHEST PORTABLE Final Result   1. There is no interval change in extensive multifocal bilateral pneumonia. XR CHEST PORTABLE   Final Result   Slightly worsened bilateral pulmonary infiltrates with increasing opacity in   the bilateral bases when compared to previous. Stable lines and tubes. XR CHEST PORTABLE   Final Result   1. Good position right IJ central venous line. Negative for pneumothorax. 2.  Bilateral widespread pulmonary infiltrates with interval mild worsening   on the left and compatible with bilateral pneumonia. XR CHEST PORTABLE   Final Result   Endotracheal tube tip is 3.3 cm above the alberta. Diffuse pulmonary infiltrates are unchanged. XR ABDOMEN FOR NG/OG/NE TUBE PLACEMENT   Final Result   Nasogastric tube terminates in the stomach. XR CHEST PORTABLE   Final Result   1. Multifocal bilateral pneumonia unchanged when compared with the prior   study. XR CHEST PORTABLE   Final Result   Bilateral airspace disease likely related to pneumonia.          XR CHEST PORTABLE    (Results Pending)   CTA PULMONARY W CONTRAST    (Results Pending)   XR CHEST PORTABLE    (Results Pending)   XR CHEST PORTABLE    (Results Pending)   XR CHEST PORTABLE    (Results Pending)        Resident's Assessment and Plan     Assessment and Plan:    Neurologic  # AMS  - EEG showed non-specific changes, no evidence seizures  Plan  - Continue thiamine 100mg qd    Pulmonary  # Acute hypoxic respiratory failure 2/2 ARDS 2/2 COVID-19 PNA  - CXR showed BL ground glass infiltrates  Most recent ABG:   Recent Labs     02/08/22  0556   PH 7.524*   PCO2 40.0   PO2 79.9   HCO3 32.2*   - Current vent settings: PS, PEEP 8, pressure support 10  - P/f 1.85  - Urine out 1.97L, +81.7  Plan  - Avoid propofol as BP very sensitive to it  - Wean vent as tolerated  - Goal is net negative fluid balance     # Hx of asthma     Infectious disease  # Febrile with increasing leukocytosis   - NGT removed, OGT inserted 2/3/22  - DVT US LE to r/o DVT, negative for DVT  - Tmax 100 yesterday morning  - resp clx growing gram positive cocci in pairs and candida albicans, MRSA negative, blood clx negative  Plan  - Resent pan clx    Hematology/Oncology  # Acute normocytic anemia 2/2 anemia of chronic disease vs sepsis  - S/p 1u pRBC since admission   - Hb 7.8 today  - Iron studies negative  Plan  - CTM with CBC daily   - Transfuse if Hb <7      Last BM charted: 2/7/22  Antibiotics: none  Cultures: blood clx negative, resp clx growing gram positive cocci in pairs, candida albicans. MRSA screen negative  Lines: RIJ CVC (2/2/22)  Intubated: 1/19/22   Diet: TF        # Peptic ulcer prophylaxis: lansoprazole   # DVT Prophylaxis: lovenox 30mg BID  # Disposition: Cont current care     Skye Diallo MD, PGY-1    Attending Physician: Dr. Yesenia Moreno personally saw, examined and provided care for the patient. Radiographs, labs and medication list were reviewed by me independently. I spoke with bedside nursing, therapists and consultants. Critical care services and times documented are independent of procedures and multidisciplinary rounds with Residents. Additionally comprehensive, multidisciplinary rounds were conducted with the MICU team. The case was discussed in detail and plans for care were established. Review of Residents documentation was conducted and revisions were made as appropriate. I agree with the above documented exam, problem list and plan of care.     Trach and Peg as doubt she will be able protect airway  Also RR in the [address]  Pulmonary&Critical Care Medicine   Director of [facility] Director of [address] [place] [address]    Guille Lara

## 2022-02-09 ENCOUNTER — APPOINTMENT (OUTPATIENT)
Dept: GENERAL RADIOLOGY | Age: 67
DRG: 207 | End: 2022-02-09
Payer: MEDICARE

## 2022-02-09 LAB
AADO2: 145.9 MMHG
ALBUMIN SERPL-MCNC: 2.4 G/DL (ref 3.5–5.2)
ALP BLD-CCNC: 97 U/L (ref 35–104)
ALT SERPL-CCNC: 375 U/L (ref 0–32)
ANION GAP SERPL CALCULATED.3IONS-SCNC: 7 MMOL/L (ref 7–16)
AST SERPL-CCNC: 290 U/L (ref 0–31)
B.E.: 6.8 MMOL/L (ref -3–3)
BILIRUB SERPL-MCNC: 0.2 MG/DL (ref 0–1.2)
BUN BLDV-MCNC: 36 MG/DL (ref 6–23)
C-REACTIVE PROTEIN: 4.3 MG/DL (ref 0–0.4)
CALCIUM IONIZED: 1.29 MMOL/L (ref 1.15–1.33)
CALCIUM SERPL-MCNC: 8.7 MG/DL (ref 8.6–10.2)
CHLORIDE BLD-SCNC: 102 MMOL/L (ref 98–107)
CO2: 30 MMOL/L (ref 22–29)
COHB: 1.6 % (ref 0–1.5)
CREAT SERPL-MCNC: 0.4 MG/DL (ref 0.5–1)
CRITICAL: ABNORMAL
D DIMER: 607 NG/ML DDU
DATE ANALYZED: ABNORMAL
DATE OF COLLECTION: ABNORMAL
FIO2: 40 %
GFR AFRICAN AMERICAN: >60
GFR NON-AFRICAN AMERICAN: >60 ML/MIN/1.73
GLUCOSE BLD-MCNC: 138 MG/DL (ref 74–99)
HCO3: 30.5 MMOL/L (ref 22–26)
HCT VFR BLD CALC: 23.4 % (ref 34–48)
HEMOGLOBIN: 7.2 G/DL (ref 11.5–15.5)
HHB: 3.8 % (ref 0–5)
LAB: ABNORMAL
Lab: ABNORMAL
MAGNESIUM: 2.1 MG/DL (ref 1.6–2.6)
MCH RBC QN AUTO: 29.9 PG (ref 26–35)
MCHC RBC AUTO-ENTMCNC: 30.8 % (ref 32–34.5)
MCV RBC AUTO: 97.1 FL (ref 80–99.9)
METER GLUCOSE: 108 MG/DL (ref 74–99)
METER GLUCOSE: 109 MG/DL (ref 74–99)
METER GLUCOSE: 117 MG/DL (ref 74–99)
METER GLUCOSE: 120 MG/DL (ref 74–99)
METER GLUCOSE: 147 MG/DL (ref 74–99)
METHB: 0.3 % (ref 0–1.5)
MODE: ABNORMAL
O2 CONTENT: 10.5 ML/DL
O2 SATURATION: 96.1 % (ref 92–98.5)
O2HB: 94.3 % (ref 94–97)
OPERATOR ID: 2593
PATIENT TEMP: 37 C
PCO2: 39.6 MMHG (ref 35–45)
PDW BLD-RTO: 14.8 FL (ref 11.5–15)
PEEP/CPAP: 6 CMH2O
PFO2: 2.1 MMHG/%
PH BLOOD GAS: 7.5 (ref 7.35–7.45)
PHOSPHORUS: 3.2 MG/DL (ref 2.5–4.5)
PLATELET # BLD: 639 E9/L (ref 130–450)
PMV BLD AUTO: 11.7 FL (ref 7–12)
PO2: 83.8 MMHG (ref 75–100)
POTASSIUM SERPL-SCNC: 4.4 MMOL/L (ref 3.5–5)
PS: 10 CMH20
RBC # BLD: 2.41 E12/L (ref 3.5–5.5)
RI(T): 1.74
SARS-COV-2, NAAT: DETECTED
SODIUM BLD-SCNC: 139 MMOL/L (ref 132–146)
SOURCE, BLOOD GAS: ABNORMAL
THB: 7.8 G/DL (ref 11.5–16.5)
TIME ANALYZED: 503
TOTAL PROTEIN: 5.8 G/DL (ref 6.4–8.3)
WBC # BLD: 23.1 E9/L (ref 4.5–11.5)

## 2022-02-09 PROCEDURE — 6370000000 HC RX 637 (ALT 250 FOR IP): Performed by: INTERNAL MEDICINE

## 2022-02-09 PROCEDURE — 6360000002 HC RX W HCPCS: Performed by: INTERNAL MEDICINE

## 2022-02-09 PROCEDURE — 99232 SBSQ HOSP IP/OBS MODERATE 35: CPT | Performed by: SURGERY

## 2022-02-09 PROCEDURE — 94640 AIRWAY INHALATION TREATMENT: CPT

## 2022-02-09 PROCEDURE — 82805 BLOOD GASES W/O2 SATURATION: CPT

## 2022-02-09 PROCEDURE — 80053 COMPREHEN METABOLIC PANEL: CPT

## 2022-02-09 PROCEDURE — 6370000000 HC RX 637 (ALT 250 FOR IP)

## 2022-02-09 PROCEDURE — 82962 GLUCOSE BLOOD TEST: CPT

## 2022-02-09 PROCEDURE — 2000000000 HC ICU R&B

## 2022-02-09 PROCEDURE — 71045 X-RAY EXAM CHEST 1 VIEW: CPT

## 2022-02-09 PROCEDURE — 86140 C-REACTIVE PROTEIN: CPT

## 2022-02-09 PROCEDURE — 85378 FIBRIN DEGRADE SEMIQUANT: CPT

## 2022-02-09 PROCEDURE — 87635 SARS-COV-2 COVID-19 AMP PRB: CPT

## 2022-02-09 PROCEDURE — 94003 VENT MGMT INPAT SUBQ DAY: CPT

## 2022-02-09 PROCEDURE — 2580000003 HC RX 258: Performed by: FAMILY MEDICINE

## 2022-02-09 PROCEDURE — 99233 SBSQ HOSP IP/OBS HIGH 50: CPT | Performed by: INTERNAL MEDICINE

## 2022-02-09 PROCEDURE — 6370000000 HC RX 637 (ALT 250 FOR IP): Performed by: NURSE PRACTITIONER

## 2022-02-09 PROCEDURE — 82330 ASSAY OF CALCIUM: CPT

## 2022-02-09 PROCEDURE — 6370000000 HC RX 637 (ALT 250 FOR IP): Performed by: FAMILY MEDICINE

## 2022-02-09 PROCEDURE — 84100 ASSAY OF PHOSPHORUS: CPT

## 2022-02-09 PROCEDURE — 83735 ASSAY OF MAGNESIUM: CPT

## 2022-02-09 PROCEDURE — 85027 COMPLETE CBC AUTOMATED: CPT

## 2022-02-09 RX ORDER — VECURONIUM BROMIDE 1 MG/ML
10 INJECTION, POWDER, LYOPHILIZED, FOR SOLUTION INTRAVENOUS SEE ADMIN INSTRUCTIONS
Status: COMPLETED | OUTPATIENT
Start: 2022-02-09 | End: 2022-02-10

## 2022-02-09 RX ORDER — FENTANYL CITRATE 50 UG/ML
200 INJECTION, SOLUTION INTRAMUSCULAR; INTRAVENOUS SEE ADMIN INSTRUCTIONS
Status: COMPLETED | OUTPATIENT
Start: 2022-02-09 | End: 2022-02-10

## 2022-02-09 RX ORDER — MIDAZOLAM HYDROCHLORIDE 2 MG/2ML
4 INJECTION, SOLUTION INTRAMUSCULAR; INTRAVENOUS SEE ADMIN INSTRUCTIONS
Status: COMPLETED | OUTPATIENT
Start: 2022-02-09 | End: 2022-02-10

## 2022-02-09 RX ADMIN — POTASSIUM CHLORIDE 10 MEQ: 10 INJECTION, SOLUTION INTRAVENOUS at 02:00

## 2022-02-09 RX ADMIN — Medication: at 21:07

## 2022-02-09 RX ADMIN — Medication 100 MG: at 08:25

## 2022-02-09 RX ADMIN — Medication 10 ML: at 08:25

## 2022-02-09 RX ADMIN — ENOXAPARIN SODIUM 30 MG: 100 INJECTION SUBCUTANEOUS at 21:08

## 2022-02-09 RX ADMIN — MINERAL OIL AND WHITE PETROLATUM: 150; 830 OINTMENT OPHTHALMIC at 21:08

## 2022-02-09 RX ADMIN — CHLORHEXIDINE GLUCONATE 0.12% ORAL RINSE 15 ML: 1.2 LIQUID ORAL at 21:08

## 2022-02-09 RX ADMIN — ZINC SULFATE 220 MG (50 MG) CAPSULE 50 MG: CAPSULE at 08:23

## 2022-02-09 RX ADMIN — OXYCODONE HYDROCHLORIDE AND ACETAMINOPHEN 500 MG: 500 TABLET ORAL at 08:23

## 2022-02-09 RX ADMIN — IPRATROPIUM BROMIDE AND ALBUTEROL SULFATE 1 AMPULE: .5; 2.5 SOLUTION RESPIRATORY (INHALATION) at 20:29

## 2022-02-09 RX ADMIN — IPRATROPIUM BROMIDE AND ALBUTEROL SULFATE 1 AMPULE: .5; 2.5 SOLUTION RESPIRATORY (INHALATION) at 17:53

## 2022-02-09 RX ADMIN — MINERAL OIL AND WHITE PETROLATUM: 150; 830 OINTMENT OPHTHALMIC at 06:51

## 2022-02-09 RX ADMIN — LANSOPRAZOLE 30 MG: KIT at 08:24

## 2022-02-09 RX ADMIN — Medication 10 ML: at 21:07

## 2022-02-09 RX ADMIN — Medication: at 10:00

## 2022-02-09 RX ADMIN — IPRATROPIUM BROMIDE AND ALBUTEROL SULFATE 1 AMPULE: .5; 2.5 SOLUTION RESPIRATORY (INHALATION) at 13:07

## 2022-02-09 RX ADMIN — IPRATROPIUM BROMIDE AND ALBUTEROL SULFATE 1 AMPULE: .5; 2.5 SOLUTION RESPIRATORY (INHALATION) at 09:09

## 2022-02-09 RX ADMIN — MINERAL OIL AND WHITE PETROLATUM: 150; 830 OINTMENT OPHTHALMIC at 02:01

## 2022-02-09 RX ADMIN — MINERAL OIL AND WHITE PETROLATUM: 150; 830 OINTMENT OPHTHALMIC at 10:35

## 2022-02-09 RX ADMIN — POTASSIUM CHLORIDE 10 MEQ: 10 INJECTION, SOLUTION INTRAVENOUS at 00:41

## 2022-02-09 RX ADMIN — CHLORHEXIDINE GLUCONATE 0.12% ORAL RINSE 15 ML: 1.2 LIQUID ORAL at 08:24

## 2022-02-09 RX ADMIN — ENOXAPARIN SODIUM 30 MG: 100 INJECTION SUBCUTANEOUS at 08:23

## 2022-02-09 ASSESSMENT — PULMONARY FUNCTION TESTS
PIF_VALUE: 18
PIF_VALUE: 19
PIF_VALUE: 19
PIF_VALUE: 18
PIF_VALUE: 19
PIF_VALUE: 18
PIF_VALUE: 19
PIF_VALUE: 18
PIF_VALUE: 19
PIF_VALUE: 19
PIF_VALUE: 18
PIF_VALUE: 19
PIF_VALUE: 18
PIF_VALUE: 19
PIF_VALUE: 18
PIF_VALUE: 19
PIF_VALUE: 18
PIF_VALUE: 19

## 2022-02-09 NOTE — PLAN OF CARE
Problem: Airway Clearance - Ineffective  Goal: Achieve or maintain patent airway  Outcome: Ongoing     Problem: Gas Exchange - Impaired  Goal: Absence of hypoxia  Outcome: Ongoing  Goal: Promote optimal lung function  Outcome: Ongoing     Problem: Breathing Pattern - Ineffective  Goal: Ability to achieve and maintain a regular respiratory rate  Outcome: Ongoing     Problem: Falls - Risk of:  Goal: Will remain free from falls  Description: Will remain free from falls  Outcome: Ongoing  Goal: Absence of physical injury  Description: Absence of physical injury  Outcome: Ongoing     Problem: Skin Integrity:  Goal: Will show no infection signs and symptoms  Description: Will show no infection signs and symptoms  Outcome: Ongoing

## 2022-02-09 NOTE — PROGRESS NOTES
[facility]  ATTENDING PHYSICIAN PROGRESS NOTE     I have examined the patient and  reviewed the record. I have reviewed all relevant labs and imaging data. The following summarizes my clinical findings and independent assessment. CC: Acute respiratory failure secondary to Covid pneumonia    S. Patient is on low vent settings. She is on pressure support    ROS: not  able to be performed due to patient factors  O.  Vitals:    02/09/22 0911   BP:    Pulse: 97   Resp: 24   Temp:    SpO2: 97%     Awake and tracks  Tolerating pressure support  Lungs coarse  Abdomen soft nontender nondistended    ASSESSMENT:  Active Problems:    Pneumonia due to COVID-19 virus  Resolved Problems:    * No resolved hospital problems. *       PLAN:  Acute respiratory failure secondary to COVID-19 pneumonia-currently on pressure support  If patient is unable to be extubated, we will proceed with tracheostomy and PEG tube placement either Thursday or Friday. Discussed with bedside nurse  DVT Proph: CLARIBEL/ leslee Jha MD, FACS  2/9/2022  9:43 AM    NOTE: This report was transcribed using voice recognition software. Every effort was made to ensure accuracy; however, inadvertent computerized transcription errors may be present.

## 2022-02-09 NOTE — PROGRESS NOTES
Fam Florentino M.D.,[facility]  Thomas Alberts D.O., F.A.C.O.I., Katie Valencia M.D. Beverly Beltran M.D. Chema Key D.O. Daily Pulmonary Progress Note    Patient:  Yvette Russell 78 y.o. female MRN: [id number]     Date of Service: 2/9/2022      Synopsis     We are following patient for acute hypoxic respiratory failure, severe Covid pneumonia    \"CC\" shortness of breath    Code status: DNR CCA      Subjective      Patient was seen and examined through the window. She is currently on pressure support 10 with FiO2 40%. Her eyes are open but she is not purposefully tracking. Plans are for trach and PEG tomorrow or Friday.     Review of Systems:  Unable to assess    24-hour events:  None    Objective   Vitals: /66   Pulse 97   Temp 99.9 °F (37.7 °C) (Esophageal)   Resp 24   Ht 5' (1.524 m)   Wt 116 lb 9.6 oz (52.9 kg)   SpO2 97%   BMI 22.77 kg/m²     I/O:    Intake/Output Summary (Last 24 hours) at 2/9/2022 1126  Last data filed at 2/9/2022 0700  Gross per 24 hour   Intake 3748.9 ml   Output 1790 ml   Net 1958.9 ml       Vent Information  $Ventilation: $Subsequent Day  Skin Assessment: Clean, dry, & intact  Suction Catheter Diameter:  (16)  Equipment ID: [id number]  Equipment Changed: (S) Humidification  Vent Type: 980  Vent Mode: PS  Vt Ordered: 0 mL  Rate Set: 0 bmp  Peak Flow: 0 L/min  Pressure Support: 10 cmH20  FiO2 : 40 %  SpO2: 97 %  SpO2/FiO2 ratio: 242.5  PaO2/FiO2 ratio: 196  Sensitivity: 3  PEEP/CPAP: 6  I Time/ I Time %: 0 s  Humidification Source: Heated wire  Humidification Temp: 37  Humidification Temp Measured: 37  Circuit Condensation: Drained  Mask Type: Full face mask  Mask Size: Medium       IPAP: 14 cmH20  CPAP/EPAP: 8 cmH2O     CURRENT MEDS :  Scheduled Meds:   thiamine mononitrate  100 mg Oral Daily    [Held by provider] furosemide  40 mg IntraVENous Daily    lansoprazole  30 mg Per NG tube QAM AC    insulin lispro  0-6 Units SubCUTAneous Q4H    enoxaparin 30 mg SubCUTAneous BID    miconazole nitrate   Topical BID    chlorhexidine  15 mL Mouth/Throat BID    artificial tears   Both Eyes Q4H    ipratropium-albuterol  1 ampule Inhalation Q4H WA    sodium chloride flush  5-40 mL IntraVENous 2 times per day    [Held by provider] vitamin D  2,000 Units Oral Daily       Physical Exam:  Due to the current efforts to prevent transmission of COVID-19 and also the need to preserve PPE for other caregivers, a face-to-face encounter with the patient was not performed. That being said, all relevant records and diagnostic tests were reviewed, including laboratory results and imaging. Please reference any relevant documentation elsewhere. Care will be coordinated with the primary service. Pertinent/ New Labs and Imaging Studies     Imaging Personally Reviewed:  2/9/22  1. Overall there is no interval change in the extent of the multifocal   bilateral pneumonia   2. Status post removal of the right internal jugular central venous catheter. ECHO  None on file    Labs:  Lab Results   Component Value Date    WBC 23.1 02/09/2022    HGB 7.2 02/09/2022    HCT 23.4 02/09/2022    MCV 97.1 02/09/2022    MCH 29.9 02/09/2022    MCHC 30.8 02/09/2022    RDW 14.8 02/09/2022     02/09/2022    MPV 11.7 02/09/2022     Lab Results   Component Value Date     02/09/2022    K 4.4 02/09/2022    K 3.8 01/18/2022     02/09/2022    CO2 30 02/09/2022    BUN 36 02/09/2022    CREATININE 0.4 02/09/2022    LABALBU 2.4 02/09/2022    CALCIUM 8.7 02/09/2022    GFRAA >60 02/09/2022    LABGLOM >60 02/09/2022     Lab Results   Component Value Date    PROTIME 11.3 01/14/2022    INR 1.0 01/14/2022     Recent Labs     02/08/22  1222   PROBNP 900*     No results for input(s): PROCAL in the last 72 hours. This SmartLink has not been configured with any valid records.        Micro:  Recent Labs     02/08/22  1345   CULTRESP Oral Pharyngeal Sommer reduced     No results for input(s): LABGRAM in the last 72 hours. No results for input(s): LEGUR in the last 72 hours. No results for input(s): STREPNEUMAGU in the last 72 hours. No results for input(s): LP1UAG in the last 72 hours. Assessment:    1. Acute hypoxic respiratory failure  2. Severe covid-19 pneumonia  3. Possible superimposed bacterial pneumonia   4. Septic shock, resolved  5. Asthma without exacerbation  6. Anemia   7. SAMUEL, resolved  8. Fever  9. 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 Tolerating PSV 10  2. Off sedation and mentation improving   3. Bronchodilators  4. Levaquin completed  5. Plans for trach/peg later this week    This plan of care was reviewed in collaboration with Dr. Triston Brown  Electronically signed by MAUREEN Whyte CNP on 2/9/2022 at 11:26 AM    I personally saw, examined, and cared for the patient. Labs, medications, radiographs reviewed.  I agree with history exam and plans detailed in NP note with the following additions:    Awake, intermittently following commands  She is remarkably weak  I do not feel she is a good candidate for extubation at this time and would recommend trache/PEG/placement for therapy    Philly Hopkins MD

## 2022-02-09 NOTE — CONSULTS
GENERAL SURGERY  CONSULT NOTE  2/8/2022    Physician Consulted: Dr. Estelita Abdi  Reason for Consult: Astrid Robles and PEG  Referring Physician: Dr. Javier WOODRUFF  Sade Matt is a 78 y.o. female who presents for evaluation of Trach and PEG. Patient original presented on 1/14 due to shortness of breath and was found to be COVID +. She was intubated and brought to the medical ICU. She has since been on PS for the last few days, but has been unable to wean completely off the vent. Given her prolong intubation and failure to be complete weaned, we were consulted for Trach and PEG. Has been on PS since the 5th and weaning parameters are:     VT= 388 ml  RR= 25 bpm  NIF= -22 cmH20  VC= 280 ml  Ve= 9.6 l/m   RSBI= 64  With a positive cuff leak. Patient also follows commands and is able to lift head off of pillow       Past Medical History:   Diagnosis Date    Asthma        History reviewed. No pertinent surgical history. Medications Prior to Admission:    Prior to Admission medications    Medication Sig Start Date End Date Taking?  Authorizing Provider   albuterol sulfate HFA (VENTOLIN HFA) 108 (90 Base) MCG/ACT inhaler Inhale 2 puffs into the lungs every 6 hours as needed for Wheezing   Yes Historical Provider, MD   cefdinir (OMNICEF) 300 MG capsule Take 300 mg by mouth 2 times daily For 7 days   Yes Historical Provider, MD   alendronate (FOSAMAX) 70 MG tablet Take 70 mg by mouth every 7 days   Yes Historical Provider, MD   Lansoprazole (PREVACID PO) Take by mouth as needed    Yes Historical Provider, MD   Alpha-D-Galactosidase (BEANO PO) Take by mouth   Yes Historical Provider, MD   vitamin B-12 (CYANOCOBALAMIN) 1000 MCG tablet Take 500 mcg by mouth daily   Yes Historical Provider, MD   vitamin C (ASCORBIC ACID) 500 MG tablet Take 500 mg by mouth daily   Yes Historical Provider, MD   Cholecalciferol (VITAMIN D) 2000 units CAPS capsule Take by mouth   Yes Historical Provider, MD       No Known Allergies    History reviewed. No pertinent family history. Social History     Tobacco Use    Smoking status: Never Smoker    Smokeless tobacco: Never Used   Substance Use Topics    Alcohol use: Yes    Drug use: Never         Review of Systems   Pt is intuabted, chart was checked    PHYSICAL EXAM:    Vitals:    02/08/22 1800   BP: (!) 117/59   Pulse: 116   Resp: 27   Temp:    SpO2: 95%       General Appearance:  Intubated, GCS 11T   Skin:  Skin color, texture, turgor normal. No rashes or lesions, is frail in appearence   Head/face:  NCAT  Eyes:  PERRL  Lungs:  Intubated on PS   Heart:  Heart regular rate and rhythm  Abdomen:  Soft, non-tender, normal bowel sounds. No bruits, organomegaly or masses. Extremities: pulses present in all extremities      LABS:    CBC  Recent Labs     02/08/22  0506   WBC 17.5*   HGB 7.1*   HCT 22.7*   *     BMP  Recent Labs     02/08/22  1718   *   K 3.4*      CO2 31*   BUN 47*   CREATININE 0.4*   CALCIUM 8.7     Liver Function  Recent Labs     02/08/22  1222   LABALBU 2.7*     No results for input(s): LACTATE in the last 72 hours. No results for input(s): INR, PTT in the last 72 hours. Invalid input(s): PT    RADIOLOGY    XR CHEST PORTABLE    Result Date: 1/14/2022  EXAMINATION: ONE XRAY VIEW OF THE CHEST 1/14/2022 4:37 pm COMPARISON: None. HISTORY: ORDERING SYSTEM PROVIDED HISTORY: hypoxia TECHNOLOGIST PROVIDED HISTORY: Reason for exam:->hypoxia What reading provider will be dictating this exam?->CRC FINDINGS: There is extensive bilateral airspace disease bilaterally. The findings are most compatible with pneumonia. Heart size is borderline. Pulmonary vascularity is not clearly congested. Bilateral airspace disease likely related to pneumonia.          ASSESSMENT:  78 y.o. female with acute hypoxic respiratory failure 2/2 COVID PNA    PLAN:  -Pt s/e  -Would like to see an attempt at extubation given her weaning parameters and tolerating PS for multiple days, Feel that it is worth at least an attempt  -Pt has also had minimal secretions   -Read and understand pulmonaries concerns and would be happy to move forward with Trach and PEG if they feel that her failure rate would be to high   -NPO @ MN for possible extubation vs trach and PEG  -hold anticoagulation    -Discussed with Attending     Electronically signed by Kenneth Segura DO on 2/8/22 at 7:31 PM EST

## 2022-02-09 NOTE — PROGRESS NOTES
Hospitalist Progress Note      SYNOPSIS:     Ms. Lilly Olivares is a 78y.o. year old female who has a PMH of asthma.     She presented to the ER on 22 with complaints of SOB and diarrhea x 1 week. She was not vaccinated against COVID-19. Reportedly her daughter found her curled into the fetal position in respiratory distress at home and called 911. Vitals on arrival were significant for temperature 103.2, heart rate 110, blood pressure 122/70 and oxygen saturation 72% on room air. She was placed on 15 L via nonrebreather mask and her oxygen saturation improved to 95%. Labs were significant for bicarbonate 19, BUN 38, creatinine 1.9, anion gap 19, procalcitonin 1.57, CRP 12.8, , mildly elevated LFTs, D-dimer 404, ferritin 5098 and a positive COVID PCR. Chest x-ray showed bilateral airspace disease. She was given 1 L normal saline bolus as well Lovenox and Decadron prior to being admitted. RRT was called on  for respiratory distress. She was transferred to ICU for intubation. She has been intermittently proned. Code status changed to CCA on . SUBJECTIVE:    Patient seen and examined. General surgery consulted yesterday for trach and PEG. Recommending attempt at extubation prior to moving forward with T&P, TF on hold. Appears more relaxed today. Records reviewed.        Temp (24hrs), Av.3 °F (37.9 °C), Min:98.8 °F (37.1 °C), Max:101.5 °F (38.6 °C)    DIET: Diet NPO  ADULT TUBE FEEDING; Nasogastric; Standard with Fiber; Continuous; 10; Yes; 10; Q 4 hours; 40; 100; Q 1 hour; Protein; 1 Proteinex daily  CODE: DNR-CCA    Intake/Output Summary (Last 24 hours) at 2022 0757  Last data filed at 2022 0700  Gross per 24 hour   Intake 3748.9 ml   Output 2060 ml   Net 1688.9 ml       Review of Systems  RAGHAV- intubated and sedated      OBJECTIVE:    /66   Pulse 98   Temp 99.9 °F (37.7 °C) (Esophageal)   Resp 27   Ht 5' (1.524 m)   Wt 116 lb 9.6 oz (52.9 kg)   SpO2 97%   BMI 22.77 kg/m²      Due to the current efforts to prevent transmission of COVID-19 and also the need to preserve PPE for other caregivers, a face-to-face encounter with the patient was not performed. That being said, all relevant records and diagnostic tests were reviewed, including laboratory results and imaging. Please reference any relevant documentation elsewhere. Care will be coordinated with the ICU service. Medications:  REVIEWED DAILY    Infusion Medications    sodium chloride      dextrose       Scheduled Medications    thiamine mononitrate  100 mg Oral Daily    [Held by provider] furosemide  40 mg IntraVENous Daily    lansoprazole  30 mg Per NG tube QAM AC    insulin lispro  0-6 Units SubCUTAneous Q4H    enoxaparin  30 mg SubCUTAneous BID    miconazole nitrate   Topical BID    chlorhexidine  15 mL Mouth/Throat BID    artificial tears   Both Eyes Q4H    ipratropium-albuterol  1 ampule Inhalation Q4H WA    sodium chloride flush  5-40 mL IntraVENous 2 times per day    ascorbic acid  500 mg Oral Daily    zinc sulfate  50 mg Oral Daily    [Held by provider] vitamin D  2,000 Units Oral Daily     PRN Meds: sennosides, polyethylene glycol, fentanNYL, sodium chloride flush, sodium chloride, acetaminophen **OR** acetaminophen, glucose, dextrose, glucagon (rDNA), dextrose    Labs:     Recent Labs     02/07/22  0440 02/08/22  0506 02/09/22 0417   WBC 16.0* 17.5* 23.1*   HGB 7.8* 7.1* 7.2*   HCT 24.9* 22.7* 23.4*   * 571* 639*       Recent Labs     02/07/22  1804 02/08/22  0204 02/08/22  0506 02/08/22  1718 02/09/22  0417   NA  --  151*  --  148* 139   K  --  3.8  --  3.4* 4.4   CL  --  109*  --  107 102   CO2  --  30*  --  31* 30*   BUN  --  50*  --  47* 36*   CREATININE  --  0.5  --  0.4* 0.4*   CALCIUM  --  9.0  --  8.7 8.7   PHOS   < >  --  3.4 3.1 3.2    < > = values in this interval not displayed.        Recent Labs     02/09/22 0417   PROT 5.8*   ALKPHOS 97   *   AST 290*   BILITOT 0.2       No results for input(s): INR in the last 72 hours. No results for input(s): [place] in the last 72 hours. Chronic labs:    Lab Results   Component Value Date    CHOL 145 06/29/2020    TRIG 124 01/25/2022    HDL 73 06/29/2020    LDLCALC 57 06/29/2020    TSH 3.600 06/29/2020    INR 1.0 01/14/2022    LABA1C 6.0 (H) 01/19/2022       Radiology: REVIEWED DAILY      ASSESSMENT and PLAN:    COVID-19 pneumonia-unvaccinated, symptom onset approximately 1 week prior to presentation. To continue with vitamin C and zinc.  Remains on lovenox bid. · S/p decadron 10 mg PO bid x 8 days, baricitinib discontinued on 1/19. · First negative COVID 2/8    VAP-respiratory culture growing GPC in pairs and candida albicans; levaquin completed 2/7. · Worsening leukocytosis with low grade temps, repeat cultures sent 2/8. Acute hypoxic respiratory failure-2/2 above. S/p intubation 1/19. · Currently requiring PS 10, PEEP 6, FiO2 40%. · General surgery consulted for trach and PEG; recommending attempting extubation prior to placement of both as she has been tolerating PS- if unable to be extubated they plan to proceed with T&P on Thursday or Friday. Hypernatremia- resolved with presently on FW administration    Hypercalcemia- ionized calcium 1.35, given calcitonin 2/8. Vitamin D level 40. Alkalemia- pH 8.532 with metabolic (contraction/post hypercapnic) alkalosis     Hyperglycemia-likely steroid-induced, remains on SSI    Normocytic anemia- likely 2/2 phlebotomy; s/p 1U PRBC on 1/26. Continue to monitor H&H.        DISPOSITION: Continued inpatient care    +++++++++++++++++++++++++++++++++++++++++++++++++  [address], [place]  +++++++++++++++++++++++++++++++++++++++++++++++++  NOTE: This report was transcribed using voice recognition software.  Every effort was made to ensure accuracy; however, inadvertent computerized transcription errors may be present.

## 2022-02-09 NOTE — PROGRESS NOTES
[address] [facility]   Department of Internal Medicine   Internal Medicine Residency  MICU Progress Note    Patient:  Luis Eduardo Doran 78 y.o. female   MRN: [id number]       Date of Service: 2022    Allergy: Patient has no known allergies. Cc follow covid pneumonia   Subjective     Patient was seen and examined this morning at bedside in no acute distress. Overnight, no acute events. This morning, patient is laying supine and following me with her eyes. She nods and shakes her head appropriately when asked questions. Objective     TEMPERATURE:  Current - Temp: 99.9 °F (37.7 °C); Max - Temp  Av.5 °F (38.1 °C)  Min: 99.9 °F (37.7 °C)  Max: 101.5 °F (38.6 °C)  RESPIRATIONS RANGE: Resp  Av.2  Min: 22  Max: 35  PULSE RANGE: Pulse  Av.4  Min: 98  Max: 122  BLOOD PRESSURE RANGE:  Systolic (36DDD), HBX:854 , Min:116 , SWT:448   ; Diastolic (40QXL), DHD:17, Min:58, Max:78    PULSE OXIMETRY RANGE: SpO2  Av.6 %  Min: 92 %  Max: 98 %    I & O - 24hr:    Intake/Output Summary (Last 24 hours) at 2022 0806  Last data filed at 2022 0700  Gross per 24 hour   Intake 3748.9 ml   Output 1940 ml   Net 1808.9 ml     I/O last 3 completed shifts: In: 4544.8 [I.V.:403.7; NG/GT:3734; IV Piggyback:407.1]  Out: 4812 [Urine:2715] No intake/output data recorded. Weight change: 2 lb 4.8 oz (1.043 kg)    Physical Exam:  General Appearance:    Supine, intubated. HEENT:    NC/AT, mucous membranes are moist   Neck:   Supple, no jugular venous distention. Resp:     CTAB, No wheezes, No rhonchi, no use of accessory muscles   Heart:    RRR, S1 and S2 normal, no murmur, rub or gallop. Abdomen:     Soft, non-tender, non-distended with normal bowel sounds   Extremities:   Atraumatic, no cyanosis or edema   Pulses:  Radial and pedal pulses are intact bilaterally   Neurologic: Nods and shakes head appropriately to questions. Follows commands but arms and legs are flaccid.         Medications Continuous Infusions:   sodium chloride      dextrose       Scheduled Meds:   thiamine mononitrate  100 mg Oral Daily    [Held by provider] furosemide  40 mg IntraVENous Daily    lansoprazole  30 mg Per NG tube QAM AC    insulin lispro  0-6 Units SubCUTAneous Q4H    enoxaparin  30 mg SubCUTAneous BID    miconazole nitrate   Topical BID    chlorhexidine  15 mL Mouth/Throat BID    artificial tears   Both Eyes Q4H    ipratropium-albuterol  1 ampule Inhalation Q4H WA    sodium chloride flush  5-40 mL IntraVENous 2 times per day    ascorbic acid  500 mg Oral Daily    zinc sulfate  50 mg Oral Daily    [Held by provider] vitamin D  2,000 Units Oral Daily     PRN Meds: sennosides, polyethylene glycol, fentanNYL, sodium chloride flush, sodium chloride, acetaminophen **OR** acetaminophen, glucose, dextrose, glucagon (rDNA), dextrose  Nutrition:   Diet NPO  ADULT TUBE FEEDING; Nasogastric; Standard with Fiber; Continuous; 10; Yes; 10; Q 4 hours; 40; 100; Q 1 hour; Protein; 1 Proteinex daily    Labs and Imaging Studies     CBC:   Recent Labs     02/07/22  0440 02/08/22  0506 02/09/22  0417   WBC 16.0* 17.5* 23.1*   HGB 7.8* 7.1* 7.2*   HCT 24.9* 22.7* 23.4*   MCV 94.7 95.8 97.1   * 571* 639*       BMP:    Recent Labs     02/08/22  0204 02/08/22  1718 02/09/22  0417   * 148* 139   K 3.8 3.4* 4.4   * 107 102   CO2 30* 31* 30*   BUN 50* 47* 36*   CREATININE 0.5 0.4* 0.4*   GLUCOSE 140* 148* 138*       LIVER PROFILE:   Recent Labs     02/09/22  0417   *   *   BILITOT 0.2   ALKPHOS 97       PT/INR:   No results for input(s): PROTIME, INR in the last 72 hours. APTT:   No results for input(s): APTT in the last 72 hours.     Fasting Lipid Panel:    Lab Results   Component Value Date    CHOL 145 06/29/2020    TRIG 124 01/25/2022    HDL 73 06/29/2020       Cardiac Enzymes:    No results found for: CKTOTAL, CKMB, CKMBINDEX, TROPONINI    Notable Cultures:      Blood cultures   Blood Culture, Routine   Date Value Ref Range Status   02/03/2022 5 Days no growth  Final     Respiratory cultures No results found for: RESPCULTURE No results found for: LABGRAM  Urine   Urine Culture, Routine   Date Value Ref Range Status   01/29/2022 Growth not present  Final     Legionella No results found for: LABLEGI  C Diff PCR No results found for: CDIFPCR  Wound culture/abscess: No results for input(s): WNDABS in the last 72 hours. Tip culture:No results for input(s): CXCATHTIP in the last 72 hours. Oxygen:     Vent Information  $Ventilation: $Subsequent Day  Skin Assessment: Clean, dry, & intact  Suction Catheter Diameter:  (16)  Equipment ID: [id number]  Equipment Changed: (S) Humidification  Vent Type: [id number]  Vent Mode: PS  Vt Ordered: 0 mL  Rate Set: 0 bmp  Peak Flow: 0 L/min  Pressure Support: 10 cmH20  FiO2 : 40 %  SpO2: 97 %  SpO2/FiO2 ratio: 242.5  PaO2/FiO2 ratio: 196  Sensitivity: 3  PEEP/CPAP: 6  I Time/ I Time %: 0 s  Humidification Source: Heated wire  Humidification Temp: 37  Humidification Temp Measured: 37  Circuit Condensation: Drained  Mask Type: Full face mask  Mask Size: Medium  Additional Respiratory  Assessments  Pulse: 98  Resp: 27  SpO2: 97 %  Position: Semi-Ocampo's  Humidification Source: Heated wire  Humidification Temp: 37  Circuit Condensation: Drained  Oral Care: Mouth suctioned  Subglottic Suction Done?: No  Airway Type: ET  Airway Size: 8  Cuff Pressure (cm H2O): 29 cm H2O       [REMOVED] Urethral Catheter Double-lumen 16 fr-Output (mL): 130 mL  Urethral Catheter Double-lumen 16 fr-Output (mL): 60 mL  [REMOVED] External Urinary Catheter-Output (mL): 200 mL    Imaging Studies:  XR CHEST PORTABLE   Final Result   No interval change in the diffuse multifocal bilateral pneumonia.          XR CHEST PORTABLE   Final Result   Increase in density involvement of bilateral pulmonary opacifications right   greater than left of airspace disease/bronchopneumonia from prior         XR CHEST PORTABLE   Final Result   1. There is no interval change in the multifocal bilateral pneumonia   2. There is no pneumothorax or pneumomediastinum. XR CHEST PORTABLE   Final Result   1. Very minimal partial interval clearing of the right lung pneumonia when   compared with the study. 2. No interval change in the left lung pneumonia. 3. There is no pneumothorax. XR CHEST PORTABLE   Final Result   No interval change         XR ABDOMEN FOR NG/OG/NE TUBE PLACEMENT   Final Result   Satisfactory position of the enteric tube. US DUP LOWER EXTREMITIES BILATERAL VENOUS   Final Result   Within the visualized vessels there is no evidence for deep venous   thrombosis               XR CHEST PORTABLE   Final Result   No significant changes since February 2nd. Persistent bilateral infiltrates. Endotracheal tube in good position. XR CHEST PORTABLE   Final Result   No pneumothorax following line placement. Right greater than left parenchymal infiltrates similar to subtly improved. Continued follow-up recommended. XR ABDOMEN (KUB) (SINGLE AP VIEW)   Final Result   No significant change. Satisfactory position of the enteric tube within the   stomach. No bowel obstruction or acute abdominal disease identified. XR CHEST PORTABLE   Final Result   1. Extensive bilateral multifocal pneumonia. No significant change. 2. Vertical linear shadow over the lateral aspect of the right chest, most   likely skin fold. XR CHEST PORTABLE   Final Result   1. There is no interval change in the multifocal bilateral pneumonia. 2. There is no pneumothorax or pneumomediastinum. XR CHEST PORTABLE   Final Result   Stable to slightly improved aeration of the lungs bilaterally with persistent   patchy bilateral airspace opacities, in keeping with the patient's known   diagnosis of COVID pneumonia.          US DUP LOWER EXTREMITIES BILATERAL VENOUS   Final Result   No evidence of DVT in either lower extremity. US DUP UPPER EXTREMITIES BILATERAL VENOUS   Final Result   Occlusive thrombus in the left distal cephalic vein. No evidence of DVT. Critical results were called by Dr. Alfred Khalil to Dr. Nasrin Borja On 1/29/2022   at 21:09. Please make a note of the limited nature of the study due to the above   reasons. RECOMMENDATIONS:         XR CHEST PORTABLE   Final Result   No significant interval change, when compared to the previous study performed   1 day earlier. XR CHEST PORTABLE   Final Result   Bilateral airspace disease without appreciable change. XR ABDOMEN (KUB) (SINGLE AP VIEW)   Final Result   No active process. NG tube in the gastric lumen as before. XR CHEST PORTABLE   Final Result   Progression of bilateral airspace disease remaining midlung predominant in   the periphery of airspace disease bronchopneumonia with increased from prior         XR CHEST PORTABLE   Final Result   Mild decrease in the diffuse left lung infiltrates, when compared to the   previous study performed 1 day earlier. Diffuse right lung infiltrates   without significant interval change. XR CHEST PORTABLE   Final Result   Left internal jugular line tip in the distal SVC. No pneumothorax. The remainder of the exam is essentially stable. .         XR CHEST PORTABLE   Final Result   Stable airspace disease. See above. XR CHEST PORTABLE   Final Result   Bilateral airspace disease which appears mildly progressive. XR CHEST PORTABLE   Final Result   1. Lines and tubes are unchanged in position. 2.  Diffuse bilateral opacities are not significantly changed. Differential   includes infection, pulmonary edema, atelectasis, ARDS, and/or layering   pleural effusions. XR CHEST PORTABLE   Final Result   1. No significant change in bilateral multifocal pneumonia. Support tubes   and catheters are stable.          XR CHEST PORTABLE Final Result   1. There is no interval change in extensive multifocal bilateral pneumonia. XR CHEST PORTABLE   Final Result   Slightly worsened bilateral pulmonary infiltrates with increasing opacity in   the bilateral bases when compared to previous. Stable lines and tubes. XR CHEST PORTABLE   Final Result   1. Good position right IJ central venous line. Negative for pneumothorax. 2.  Bilateral widespread pulmonary infiltrates with interval mild worsening   on the left and compatible with bilateral pneumonia. XR CHEST PORTABLE   Final Result   Endotracheal tube tip is 3.3 cm above the [place]. Diffuse pulmonary infiltrates are unchanged. XR ABDOMEN FOR NG/OG/NE TUBE PLACEMENT   Final Result   Nasogastric tube terminates in the stomach. XR CHEST PORTABLE   Final Result   1. Multifocal bilateral pneumonia unchanged when compared with the prior   study. XR CHEST PORTABLE   Final Result   Bilateral airspace disease likely related to pneumonia.          XR CHEST PORTABLE    (Results Pending)   CTA PULMONARY W CONTRAST    (Results Pending)   XR CHEST PORTABLE    (Results Pending)   XR CHEST PORTABLE    (Results Pending)   XR CHEST PORTABLE    (Results Pending)        Resident's Assessment and Plan     Assessment and Plan:    Neurologic  # AMS  - EEG showed non-specific changes, no evidence seizures  Plan  - Continue thiamine 100mg qd    Pulmonary  # Acute hypoxic respiratory failure 2/2 ARDS 2/2 COVID-19 PNA  - CXR showed BL ground glass infiltrates  Most recent ABG:   Recent Labs     02/09/22  0503   PH 7.504*   PCO2 39.6   PO2 83.8   HCO3 30.5*   - Current vent settings: PS, PEEP 6, pressure support 10  - P/f 2.10  - Urine out 2L, +1.8L  Plan  - Avoid propofol as BP very sensitive to it  - Wean vent as tolerated  - Goal is net negative fluid balance  - T piece failed, will call  to schedule trach     # Hx of asthma     Infectious disease  # Febrile with increasing leukocytosis   - NGT removed, OGT inserted 2/3/22  - DVT US LE to r/o DVT, negative for DVT  - Tmax 101.5 yesterday evening  - resp clx growing gram positive cocci in pairs and candida albicans, MRSA negative, blood clx negative  Plan  - Resent pan clx    Hematology/Oncology  # Acute normocytic anemia 2/2 anemia of chronic disease vs sepsis  - S/p 1u pRBC since admission   - Hb 7.2 today  - Iron studies negative  Plan  - CTM with CBC daily   - Transfuse if Hb <7      Last BM charted: 2/9/22  Antibiotics: none  Cultures: resent pan clx pending,  MRSA screen negative  Lines: PIV  Intubated: 1/19/22   Diet: TF        # Peptic ulcer prophylaxis: lansoprazole   # DVT Prophylaxis: lovenox 30mg BID  # Disposition: Cont current care     Faith Jordan MD, PGY-1    Attending Physician: Dr. Eitan Carroll   I personally saw, examined and provided care for the patient. Radiographs, labs and medication list were reviewed by me independently. I spoke with bedside nursing, therapists and consultants. Critical care services and times documented are independent of procedures and multidisciplinary rounds with Residents. Additionally comprehensive, multidisciplinary rounds were conducted with the MICU team. The case was discussed in detail and plans for care were established. Review of Residents documentation was conducted and revisions were made as appropriate. I agree with the above documented exam, problem list and plan of care.   Fail SBT  Doubt will remain able to protect her way ,high risk reintubation   Suggest kizzy Chand 36  Pulmonary&Critical Care Medicine   Director of [address] [place] [address] Director of [address] [place] [address]    Svetlana Wild

## 2022-02-09 NOTE — PROGRESS NOTES
Department of Internal Medicine  Nephrology  Progress Note      Events Reviewed. SUBJECTIVE:  We are following Ms. Gloria Rodriguez for SAMUEL. Patient is intubated. PHYSICAL EXAM:    Vitals:    VITALS:  /66   Pulse 97   Temp 99.9 °F (37.7 °C) (Esophageal)   Resp 24   Ht 5' (1.524 m)   Wt 116 lb 9.6 oz (52.9 kg)   SpO2 97%   BMI 22.77 kg/m²   24HR INTAKE/OUTPUT:      Intake/Output Summary (Last 24 hours) at 2/9/2022 0934  Last data filed at 2/9/2022 0700  Gross per 24 hour   Intake 3748.9 ml   Output 1865 ml   Net 1883.9 ml     General appearance: Patient is intubated, sedated  HEENT:  Endotracheal tube in place  Neck: Supple, No jugular venous distention. Respiratory: Diminished bilaterally, intubated. Cardiovascular: RRR, no murmur noted  Abdomen: Soft, nontender, nondistended, flat extremities. Skin: Normal skin color.  No rashes. Scattered ecchymosis.   Neurologic: Patient sedated  Other: No edema         Scheduled Meds:   thiamine mononitrate  100 mg Oral Daily    [Held by provider] furosemide  40 mg IntraVENous Daily    lansoprazole  30 mg Per NG tube QAM AC    insulin lispro  0-6 Units SubCUTAneous Q4H    enoxaparin  30 mg SubCUTAneous BID    miconazole nitrate   Topical BID    chlorhexidine  15 mL Mouth/Throat BID    artificial tears   Both Eyes Q4H    ipratropium-albuterol  1 ampule Inhalation Q4H WA    sodium chloride flush  5-40 mL IntraVENous 2 times per day    ascorbic acid  500 mg Oral Daily    zinc sulfate  50 mg Oral Daily    [Held by provider] vitamin D  2,000 Units Oral Daily     Continuous Infusions:   sodium chloride      dextrose       PRN Meds:.sennosides, polyethylene glycol, fentanNYL, sodium chloride flush, sodium chloride, acetaminophen **OR** acetaminophen, glucose, dextrose, glucagon (rDNA), dextrose    DATA:    CBC with Differential:    Lab Results   Component Value Date    WBC 23.1 02/09/2022    RBC 2.41 02/09/2022    HGB 7.2 02/09/2022    HCT 23.4 02/09/2022     02/09/2022    MCV 97.1 02/09/2022    MCH 29.9 02/09/2022    MCHC 30.8 02/09/2022    RDW 14.8 02/09/2022    METASPCT 0.9 01/18/2022    LYMPHOPCT 2.7 01/18/2022    MONOPCT 11.6 01/18/2022    MYELOPCT 0.9 01/14/2022    BASOPCT 0.1 01/18/2022    MONOSABS 1.38 01/18/2022    LYMPHSABS 0.35 01/18/2022    EOSABS 0.00 01/18/2022    BASOSABS 0.00 01/18/2022     CMP:    Lab Results   Component Value Date     02/09/2022    K 4.4 02/09/2022    K 3.8 01/18/2022     02/09/2022    CO2 30 02/09/2022    BUN 36 02/09/2022    CREATININE 0.4 02/09/2022    GFRAA >60 02/09/2022    LABGLOM >60 02/09/2022    GLUCOSE 138 02/09/2022    PROT 5.8 02/09/2022    LABALBU 2.4 02/09/2022    CALCIUM 8.7 02/09/2022    BILITOT 0.2 02/09/2022    ALKPHOS 97 02/09/2022     02/09/2022     02/09/2022     Magnesium:    Lab Results   Component Value Date    MG 2.1 02/09/2022     Phosphorus:    Lab Results   Component Value Date    PHOS 3.2 02/09/2022        Radiology Review:    Chest X-ray February 6, 2022   1. There is no interval change in the multifocal bilateral pneumonia   2. There is no pneumothorax or pneumomediastinum.                 BRIEF SUMMARY OF INITIAL CONSULT:     Briefly, Ms. Flash Buchanan is a 78year old female with a past medical history of Asthma who presented to the ER on January 14 th, 2022 with complaints of SOB and diarrhea for one week. She is not vaccinated against COVID. Reportedly her daughter found her curled into a fetal position in respiratory distress at home and EMS was summoned. She was found to be COVID 19 positive. Labs were significant for bicarbonate 19, BUN 38, creatinine 1.9, anion gap 19, procalcitonin 1.57, CRP 12.8, , mildly elevated LFTs, D-dimer 404, ferritin 5098. Chest x-ray showed bilateral airspace disease. Renal is consulted for SAMUEL.      Problems Resolved:     · SAMUEL, likely pre-renal volume responsive SAMUEL secondary to poor oral intake and GI losses (diarrhea). Creatinine 1.9mg/dL on admission. Renal function has improved to 0.6 mg/dL with IVF administration. · HAGMA with low bicarbonate levels, secondary to uremia. To continue bicarbonate drip  · hypernatremia, with water deficit, dehydration due to poor intake. Sodium levels quite improved. Resolved. · Hypophosphatemia, 2/2 poor intake, to replace  · Hypocalcemia, vitamin D 25 level 39, calcium levels 8.4- improved  · Low grade temp--> pancultures sent. Resolved  · Hypokalemia 2/2 renal wasting with diuretics    IMPRESSION/RECOMMENDATIONS:      1. Hypernatremia, with water deficit, 2/2 to diuretics; estimated water deficit about 1.8 L. Urine osmolality 502, Lizabeth<20, indicating probably intravascular volume depletion versus heart failure? Betzy Wang Resolved. Sodium levels improved    2. Alkalemia 7.504, pCO2 29.6, total CO2 30, (contraction alkalosis) with respiratory alkalosis  3. Hypercalcemia, with normal PTH level: 41, probably primary hyperparathyroidism? [facility] Ionized calcium improved today after calcitonin administration.  ----------------------------------------  4. COVID +, on hydrocortisone   5. Acute hypoxic respiratory failure, secondary to COVID pneumonia. Status post intubation  6. Normocytic anemia, multifactorial  7.  Nutrition, tube feeds at 40 mL an hour with free water flush 30 cc every 3 hours      Plan:    · Continue to hold diuretics   · Decrease free water to 50 cc/hour  · Continue to monitor Na levels   · Continue to monitor ionized calcium      Electronically signed by Wm Stevens MD on 2/9/2022 at 9:34 AM

## 2022-02-09 NOTE — PROCEDURES
Placed patient on 40% T-piece at 1310. Placed patient  back on ventilator Spo2 dropped to 83% and respiratory rate 30's.

## 2022-02-10 ENCOUNTER — APPOINTMENT (OUTPATIENT)
Dept: GENERAL RADIOLOGY | Age: 67
DRG: 207 | End: 2022-02-10
Payer: MEDICARE

## 2022-02-10 LAB
(1,3)-BETA-D-GLUCAN (FUNGITELL) INTERPRETATION: NEGATIVE
(1,3)-BETA-D-GLUCAN (FUNGITELL): <31 PG/ML
AADO2: 164 MMHG
ABO/RH: NORMAL
ALBUMIN SERPL-MCNC: 2.3 G/DL (ref 3.5–5.2)
ALP BLD-CCNC: 87 U/L (ref 35–104)
ALT SERPL-CCNC: 265 U/L (ref 0–32)
ANION GAP SERPL CALCULATED.3IONS-SCNC: 11 MMOL/L (ref 7–16)
ANTIBODY SCREEN: NORMAL
AST SERPL-CCNC: 140 U/L (ref 0–31)
B.E.: 6.9 MMOL/L (ref -3–3)
BILIRUB SERPL-MCNC: 0.3 MG/DL (ref 0–1.2)
BLOOD BANK DISPENSE STATUS: NORMAL
BLOOD BANK PRODUCT CODE: NORMAL
BPU ID: NORMAL
BUN BLDV-MCNC: 22 MG/DL (ref 6–23)
CALCIUM IONIZED: 1.29 MMOL/L (ref 1.15–1.33)
CALCIUM SERPL-MCNC: 8.3 MG/DL (ref 8.6–10.2)
CHLORIDE BLD-SCNC: 100 MMOL/L (ref 98–107)
CO2: 28 MMOL/L (ref 22–29)
COHB: 1.5 % (ref 0–1.5)
CREAT SERPL-MCNC: 0.3 MG/DL (ref 0.5–1)
CRITICAL: ABNORMAL
CULTURE, RESPIRATORY: ABNORMAL
CULTURE, RESPIRATORY: ABNORMAL
DATE ANALYZED: ABNORMAL
DATE OF COLLECTION: ABNORMAL
DESCRIPTION BLOOD BANK: NORMAL
FERRITIN: 1418 NG/ML
FIO2: 40 %
GFR AFRICAN AMERICAN: >60
GFR NON-AFRICAN AMERICAN: >60 ML/MIN/1.73
GLUCOSE BLD-MCNC: 120 MG/DL (ref 74–99)
HCO3: 30.2 MMOL/L (ref 22–26)
HCT VFR BLD CALC: 21.9 % (ref 34–48)
HCT VFR BLD CALC: 22 % (ref 34–48)
HCT VFR BLD CALC: 26 % (ref 34–48)
HCT VFR BLD CALC: 26.8 % (ref 34–48)
HEMOGLOBIN: 6.9 G/DL (ref 11.5–15.5)
HEMOGLOBIN: 6.9 G/DL (ref 11.5–15.5)
HEMOGLOBIN: 8.1 G/DL (ref 11.5–15.5)
HEMOGLOBIN: 8.8 G/DL (ref 11.5–15.5)
HHB: 6.7 % (ref 0–5)
LAB: ABNORMAL
Lab: ABNORMAL
MAGNESIUM: 1.8 MG/DL (ref 1.6–2.6)
MCH RBC QN AUTO: 30 PG (ref 26–35)
MCHC RBC AUTO-ENTMCNC: 31.5 % (ref 32–34.5)
MCV RBC AUTO: 95.2 FL (ref 80–99.9)
METER GLUCOSE: 102 MG/DL (ref 74–99)
METER GLUCOSE: 104 MG/DL (ref 74–99)
METER GLUCOSE: 124 MG/DL (ref 74–99)
METER GLUCOSE: 125 MG/DL (ref 74–99)
METER GLUCOSE: 97 MG/DL (ref 74–99)
METER GLUCOSE: 98 MG/DL (ref 74–99)
METHB: 0.3 % (ref 0–1.5)
MODE: ABNORMAL
O2 CONTENT: 9.9 ML/DL
O2 SATURATION: 93.2 % (ref 92–98.5)
O2HB: 91.5 % (ref 94–97)
OPERATOR ID: 7296
ORGANISM: ABNORMAL
PATIENT TEMP: 37 C
PCO2: 37.3 MMHG (ref 35–45)
PDW BLD-RTO: 14.9 FL (ref 11.5–15)
PEEP/CPAP: 5 CMH2O
PFO2: 1.71 MMHG/%
PH BLOOD GAS: 7.53 (ref 7.35–7.45)
PHOSPHORUS: 3.4 MG/DL (ref 2.5–4.5)
PLATELET # BLD: 676 E9/L (ref 130–450)
PMV BLD AUTO: 11.3 FL (ref 7–12)
PO2: 68.3 MMHG (ref 75–100)
POTASSIUM SERPL-SCNC: 3.79 MMOL/L (ref 3.5–5)
POTASSIUM SERPL-SCNC: 3.8 MMOL/L (ref 3.5–5)
PS: 10 CMH20
RBC # BLD: 2.3 E12/L (ref 3.5–5.5)
RI(T): 2.4
SMEAR, RESPIRATORY: ABNORMAL
SODIUM BLD-SCNC: 139 MMOL/L (ref 132–146)
SOURCE, BLOOD GAS: ABNORMAL
THB: 7.6 G/DL (ref 11.5–16.5)
TIME ANALYZED: 436
TOTAL PROTEIN: 5.5 G/DL (ref 6.4–8.3)
WBC # BLD: 22.5 E9/L (ref 4.5–11.5)

## 2022-02-10 PROCEDURE — 6370000000 HC RX 637 (ALT 250 FOR IP)

## 2022-02-10 PROCEDURE — 86901 BLOOD TYPING SEROLOGIC RH(D): CPT

## 2022-02-10 PROCEDURE — 99233 SBSQ HOSP IP/OBS HIGH 50: CPT | Performed by: INTERNAL MEDICINE

## 2022-02-10 PROCEDURE — P9016 RBC LEUKOCYTES REDUCED: HCPCS

## 2022-02-10 PROCEDURE — 84132 ASSAY OF SERUM POTASSIUM: CPT

## 2022-02-10 PROCEDURE — 2000000000 HC ICU R&B

## 2022-02-10 PROCEDURE — 6370000000 HC RX 637 (ALT 250 FOR IP): Performed by: NURSE PRACTITIONER

## 2022-02-10 PROCEDURE — 94003 VENT MGMT INPAT SUBQ DAY: CPT

## 2022-02-10 PROCEDURE — 99153 MOD SED SAME PHYS/QHP EA: CPT | Performed by: SURGERY

## 2022-02-10 PROCEDURE — 99152 MOD SED SAME PHYS/QHP 5/>YRS: CPT | Performed by: SURGERY

## 2022-02-10 PROCEDURE — 86923 COMPATIBILITY TEST ELECTRIC: CPT

## 2022-02-10 PROCEDURE — 6360000002 HC RX W HCPCS: Performed by: INTERNAL MEDICINE

## 2022-02-10 PROCEDURE — 3E0G76Z INTRODUCTION OF NUTRITIONAL SUBSTANCE INTO UPPER GI, VIA NATURAL OR ARTIFICIAL OPENING: ICD-10-PCS | Performed by: SURGERY

## 2022-02-10 PROCEDURE — 2580000003 HC RX 258

## 2022-02-10 PROCEDURE — 83735 ASSAY OF MAGNESIUM: CPT

## 2022-02-10 PROCEDURE — 85027 COMPLETE CBC AUTOMATED: CPT

## 2022-02-10 PROCEDURE — 80053 COMPREHEN METABOLIC PANEL: CPT

## 2022-02-10 PROCEDURE — 36415 COLL VENOUS BLD VENIPUNCTURE: CPT

## 2022-02-10 PROCEDURE — 71045 X-RAY EXAM CHEST 1 VIEW: CPT

## 2022-02-10 PROCEDURE — 2500000003 HC RX 250 WO HCPCS: Performed by: SURGERY

## 2022-02-10 PROCEDURE — 85018 HEMOGLOBIN: CPT

## 2022-02-10 PROCEDURE — 2709999900 HC NON-CHARGEABLE SUPPLY: Performed by: SURGERY

## 2022-02-10 PROCEDURE — 6360000002 HC RX W HCPCS: Performed by: STUDENT IN AN ORGANIZED HEALTH CARE EDUCATION/TRAINING PROGRAM

## 2022-02-10 PROCEDURE — 99232 SBSQ HOSP IP/OBS MODERATE 35: CPT | Performed by: SURGERY

## 2022-02-10 PROCEDURE — 94640 AIRWAY INHALATION TREATMENT: CPT

## 2022-02-10 PROCEDURE — 31600 PLANNED TRACHEOSTOMY: CPT | Performed by: SURGERY

## 2022-02-10 PROCEDURE — 3609013300 HC EGD TUBE PLACEMENT: Performed by: SURGERY

## 2022-02-10 PROCEDURE — 36430 TRANSFUSION BLD/BLD COMPNT: CPT

## 2022-02-10 PROCEDURE — 86850 RBC ANTIBODY SCREEN: CPT

## 2022-02-10 PROCEDURE — 85014 HEMATOCRIT: CPT

## 2022-02-10 PROCEDURE — 2580000003 HC RX 258: Performed by: FAMILY MEDICINE

## 2022-02-10 PROCEDURE — 2500000003 HC RX 250 WO HCPCS: Performed by: STUDENT IN AN ORGANIZED HEALTH CARE EDUCATION/TRAINING PROGRAM

## 2022-02-10 PROCEDURE — 86900 BLOOD TYPING SEROLOGIC ABO: CPT

## 2022-02-10 PROCEDURE — 82805 BLOOD GASES W/O2 SATURATION: CPT

## 2022-02-10 PROCEDURE — 0DH63UZ INSERTION OF FEEDING DEVICE INTO STOMACH, PERCUTANEOUS APPROACH: ICD-10-PCS | Performed by: SURGERY

## 2022-02-10 PROCEDURE — 82728 ASSAY OF FERRITIN: CPT

## 2022-02-10 PROCEDURE — 43246 EGD PLACE GASTROSTOMY TUBE: CPT | Performed by: SURGERY

## 2022-02-10 PROCEDURE — 82330 ASSAY OF CALCIUM: CPT

## 2022-02-10 PROCEDURE — 82962 GLUCOSE BLOOD TEST: CPT

## 2022-02-10 PROCEDURE — 84100 ASSAY OF PHOSPHORUS: CPT

## 2022-02-10 RX ORDER — MAGNESIUM SULFATE IN WATER 40 MG/ML
2000 INJECTION, SOLUTION INTRAVENOUS ONCE
Status: COMPLETED | OUTPATIENT
Start: 2022-02-10 | End: 2022-02-10

## 2022-02-10 RX ORDER — MIDAZOLAM HYDROCHLORIDE 2 MG/2ML
2 INJECTION, SOLUTION INTRAMUSCULAR; INTRAVENOUS ONCE
Status: COMPLETED | OUTPATIENT
Start: 2022-02-10 | End: 2022-02-10

## 2022-02-10 RX ORDER — SODIUM CHLORIDE 9 MG/ML
INJECTION, SOLUTION INTRAVENOUS PRN
Status: DISCONTINUED | OUTPATIENT
Start: 2022-02-10 | End: 2022-02-11

## 2022-02-10 RX ORDER — MINERAL OIL AND WHITE PETROLATUM 150; 830 MG/G; MG/G
OINTMENT OPHTHALMIC PRN
Status: DISCONTINUED | OUTPATIENT
Start: 2022-02-10 | End: 2022-02-11 | Stop reason: HOSPADM

## 2022-02-10 RX ORDER — POTASSIUM CHLORIDE 7.45 MG/ML
10 INJECTION INTRAVENOUS
Status: COMPLETED | OUTPATIENT
Start: 2022-02-10 | End: 2022-02-10

## 2022-02-10 RX ORDER — POTASSIUM CHLORIDE 29.8 MG/ML
20 INJECTION INTRAVENOUS ONCE
Status: DISCONTINUED | OUTPATIENT
Start: 2022-02-10 | End: 2022-02-10

## 2022-02-10 RX ORDER — LIDOCAINE HYDROCHLORIDE 10 MG/ML
INJECTION, SOLUTION INFILTRATION; PERINEURAL PRN
Status: DISCONTINUED | OUTPATIENT
Start: 2022-02-10 | End: 2022-02-10 | Stop reason: ALTCHOICE

## 2022-02-10 RX ADMIN — ACETAMINOPHEN 650 MG: 650 SUPPOSITORY RECTAL at 01:57

## 2022-02-10 RX ADMIN — POTASSIUM CHLORIDE 10 MEQ: 10 INJECTION, SOLUTION INTRAVENOUS at 06:12

## 2022-02-10 RX ADMIN — MINERAL OIL AND WHITE PETROLATUM: 150; 830 OINTMENT OPHTHALMIC at 05:18

## 2022-02-10 RX ADMIN — WATER 10 ML: 1 INJECTION INTRAMUSCULAR; INTRAVENOUS; SUBCUTANEOUS at 16:25

## 2022-02-10 RX ADMIN — FENTANYL CITRATE 50 MCG: 0.05 INJECTION, SOLUTION INTRAMUSCULAR; INTRAVENOUS at 23:48

## 2022-02-10 RX ADMIN — MINERAL OIL AND WHITE PETROLATUM: 150; 830 OINTMENT OPHTHALMIC at 01:57

## 2022-02-10 RX ADMIN — CHLORHEXIDINE GLUCONATE 0.12% ORAL RINSE 15 ML: 1.2 LIQUID ORAL at 19:48

## 2022-02-10 RX ADMIN — FENTANYL CITRATE 50 MCG: 0.05 INJECTION, SOLUTION INTRAMUSCULAR; INTRAVENOUS at 09:34

## 2022-02-10 RX ADMIN — IPRATROPIUM BROMIDE AND ALBUTEROL SULFATE 1 AMPULE: .5; 2.5 SOLUTION RESPIRATORY (INHALATION) at 21:29

## 2022-02-10 RX ADMIN — IPRATROPIUM BROMIDE AND ALBUTEROL SULFATE 1 AMPULE: .5; 2.5 SOLUTION RESPIRATORY (INHALATION) at 12:46

## 2022-02-10 RX ADMIN — Medication 2000 MG: at 17:30

## 2022-02-10 RX ADMIN — LANSOPRAZOLE 30 MG: KIT at 05:24

## 2022-02-10 RX ADMIN — CHLORHEXIDINE GLUCONATE 0.12% ORAL RINSE 15 ML: 1.2 LIQUID ORAL at 09:22

## 2022-02-10 RX ADMIN — FENTANYL CITRATE 50 MCG: 0.05 INJECTION, SOLUTION INTRAMUSCULAR; INTRAVENOUS at 21:55

## 2022-02-10 RX ADMIN — FENTANYL CITRATE 200 MCG: 50 INJECTION, SOLUTION INTRAMUSCULAR; INTRAVENOUS at 16:25

## 2022-02-10 RX ADMIN — VECURONIUM BROMIDE 10 MG: 1 INJECTION, POWDER, LYOPHILIZED, FOR SOLUTION INTRAVENOUS at 16:25

## 2022-02-10 RX ADMIN — Medication 10 ML: at 09:25

## 2022-02-10 RX ADMIN — IPRATROPIUM BROMIDE AND ALBUTEROL SULFATE 1 AMPULE: .5; 2.5 SOLUTION RESPIRATORY (INHALATION) at 09:49

## 2022-02-10 RX ADMIN — MINERAL OIL AND WHITE PETROLATUM: 150; 830 OINTMENT OPHTHALMIC at 09:24

## 2022-02-10 RX ADMIN — IPRATROPIUM BROMIDE AND ALBUTEROL SULFATE 1 AMPULE: .5; 2.5 SOLUTION RESPIRATORY (INHALATION) at 16:25

## 2022-02-10 RX ADMIN — MAGNESIUM SULFATE HEPTAHYDRATE 2000 MG: 40 INJECTION, SOLUTION INTRAVENOUS at 06:12

## 2022-02-10 RX ADMIN — ACETAMINOPHEN 650 MG: 325 TABLET ORAL at 23:51

## 2022-02-10 RX ADMIN — Medication: at 19:49

## 2022-02-10 RX ADMIN — POTASSIUM CHLORIDE 10 MEQ: 10 INJECTION, SOLUTION INTRAVENOUS at 07:30

## 2022-02-10 RX ADMIN — MIDAZOLAM 2 MG: 1 INJECTION INTRAMUSCULAR; INTRAVENOUS at 10:22

## 2022-02-10 RX ADMIN — Medication 10 ML: at 19:49

## 2022-02-10 RX ADMIN — MIDAZOLAM 4 MG: 1 INJECTION INTRAMUSCULAR; INTRAVENOUS at 16:23

## 2022-02-10 RX ADMIN — Medication: at 09:24

## 2022-02-10 ASSESSMENT — PULMONARY FUNCTION TESTS
PIF_VALUE: 28
PIF_VALUE: 29
PIF_VALUE: 41
PIF_VALUE: 30
PIF_VALUE: 18
PIF_VALUE: 37
PIF_VALUE: 18
PIF_VALUE: 18
PIF_VALUE: 27
PIF_VALUE: 34
PIF_VALUE: 27
PIF_VALUE: 39
PIF_VALUE: 31
PIF_VALUE: 29
PIF_VALUE: 18
PIF_VALUE: 34
PIF_VALUE: 18
PIF_VALUE: 39
PIF_VALUE: 27
PIF_VALUE: 18
PIF_VALUE: 18
PIF_VALUE: 25
PIF_VALUE: 17
PIF_VALUE: 26
PIF_VALUE: 18
PIF_VALUE: 32
PIF_VALUE: 18
PIF_VALUE: 28
PIF_VALUE: 18
PIF_VALUE: 48
PIF_VALUE: 15
PIF_VALUE: 18
PIF_VALUE: 42

## 2022-02-10 ASSESSMENT — PAIN SCALES - GENERAL
PAINLEVEL_OUTOF10: 0

## 2022-02-10 NOTE — CARE COORDINATION
2/10: Update CM Note: Pt came into the ER for SOB & diarrhea/Covid +1/14. Pt was transferred to MICU on 1/19 for respiratory distress & intubated. Pt is for a trach & peg today, SPO2 94% & Fio2 40%. Pt is on Iv Lasix. Cm spoke with casey Manzano at [phone]. She would like her [facility] when she is medically [facility]. Per previous CM Note: Her significant other, Gladys Romeo, was contacted. He states patient resides with him. She is active, drives and works part time. She does not use any dme equipment. She has not had a history of keri or homecare. She follows with Linh Amezquita CNP and her pharmacy is [facility] in [place]. SW/HUBERT will continue to follow for dc planning. Electronically signed by Teetee Ballesteros RN on 2/10/2022 at 3:17 PM    The Plan for Transition of Care is related to the following treatment goals: LTAC    The Patient and/or patient representative  was provided with a choice of provider and agrees   with the discharge plan. [x] Yes [] No    Freedom of choice list was provided with basic dialogue that supports the patient's individualized plan of care/goals, treatment preferences and shares the quality data associated with the providers.  [x] Yes [] No

## 2022-02-10 NOTE — FLOWSHEET NOTE
Updated pt's daughter Erica Austin on plan for bedside trach and peg at 1430 today. Also updated her on patient's need for blood products at this time. Daughter verbalized understanding.  Electronically signed by Rudolfo Sicard, RN on 2/10/2022 at 9:52 AM

## 2022-02-10 NOTE — PROGRESS NOTES
Hospitalist Progress Note      SYNOPSIS:     Ms. Alena Cox is a 78y.o. year old female who has a PMH of asthma.     She presented to the ER on 22 with complaints of SOB and diarrhea x 1 week. She was not vaccinated against COVID-19. Reportedly her daughter found her curled into the fetal position in respiratory distress at home and called 911. Vitals on arrival were significant for temperature 103.2, heart rate 110, blood pressure 122/70 and oxygen saturation 72% on room air. She was placed on 15 L via nonrebreather mask and her oxygen saturation improved to 95%. Labs were significant for bicarbonate 19, BUN 38, creatinine 1.9, anion gap 19, procalcitonin 1.57, CRP 12.8, , mildly elevated LFTs, D-dimer 404, ferritin 5098 and a positive COVID PCR. Chest x-ray showed bilateral airspace disease. She was given 1 L normal saline bolus as well Lovenox and Decadron prior to being admitted. RRT was called on  for respiratory distress. She was transferred to ICU for intubation. She has been intermittently proned. Code status changed to CCA on . SUBJECTIVE:    Patient seen and examined. Plan for trach and PEG today. Records reviewed. Temp (24hrs), Av.1 °F (37.8 °C), Min:98.4 °F (36.9 °C), Max:101.5 °F (38.6 °C)    DIET: Diet NPO  CODE: Full Code    Intake/Output Summary (Last 24 hours) at 2/10/2022 1300  Last data filed at 2/10/2022 1000  Gross per 24 hour   Intake 2986 ml   Output 1450 ml   Net 1536 ml       Review of Systems  RAGHAV- intubated and sedated      OBJECTIVE:    /60   Pulse 98   Temp 100 °F (37.8 °C) (Esophageal)   Resp 25   Ht 5' (1.524 m)   Wt 115 lb 3.2 oz (52.3 kg)   SpO2 97%   BMI 22.50 kg/m²      Due to the current efforts to prevent transmission of COVID-19 and also the need to preserve PPE for other caregivers, a face-to-face encounter with the patient was not performed.  That being said, all relevant records and diagnostic tests were reviewed, including laboratory results and imaging. Please reference any relevant documentation elsewhere. Care will be coordinated with the ICU service. Medications:  REVIEWED DAILY    Infusion Medications    sodium chloride      sodium chloride      dextrose       Scheduled Medications    fentanNYL  200 mcg IntraVENous See Admin Instructions    vecuronium  10 mg IntraVENous See Admin Instructions    midazolam  4 mg IntraVENous See Admin Instructions    ceFAZolin  2,000 mg IntraVENous See Admin Instructions    thiamine mononitrate  100 mg Oral Daily    [Held by provider] furosemide  40 mg IntraVENous Daily    lansoprazole  30 mg Per NG tube QAM AC    insulin lispro  0-6 Units SubCUTAneous Q4H    [Held by provider] enoxaparin  30 mg SubCUTAneous BID    miconazole nitrate   Topical BID    chlorhexidine  15 mL Mouth/Throat BID    artificial tears   Both Eyes Q4H    ipratropium-albuterol  1 ampule Inhalation Q4H WA    sodium chloride flush  5-40 mL IntraVENous 2 times per day    [Held by provider] vitamin D  2,000 Units Oral Daily     PRN Meds: sodium chloride, sennosides, polyethylene glycol, fentanNYL, sodium chloride flush, sodium chloride, acetaminophen **OR** acetaminophen, glucose, dextrose, glucagon (rDNA), dextrose    Labs:     Recent Labs     02/08/22  0506 02/08/22  0506 02/09/22 0417 02/10/22  0410 02/10/22  0508   WBC 17.5*  --  23.1* 22.5*  --    HGB 7.1*   < > 7.2* 6.9* 6.9*   HCT 22.7*   < > 23.4* 21.9* 22.0*   *  --  639* 676*  --     < > = values in this interval not displayed. Recent Labs     02/08/22  1718 02/08/22  1718 02/09/22 0417 02/10/22  0410 02/10/22  0436   *  --  139 139  --    K 3.4*   < > 4.4 3.8 3.79     --  102 100  --    CO2 31*  --  30* 28  --    BUN 47*  --  36* 22  --    CREATININE 0.4*  --  0.4* 0.3*  --    CALCIUM 8.7  --  8.7 8.3*  --    PHOS 3.1  --  3.2 3.4  --     < > = values in this interval not displayed.        Recent Labs     02/09/22  0417 02/10/22  0410   PROT 5.8* 5.5*   ALKPHOS 97 87   * 265*   * 140*   BILITOT 0.2 0.3       No results for input(s): INR in the last 72 hours. No results for input(s): Kwasi Seed in the last 72 hours. Chronic labs:    Lab Results   Component Value Date    CHOL 145 06/29/2020    TRIG 124 01/25/2022    HDL 73 06/29/2020    LDLCALC 57 06/29/2020    TSH 3.600 06/29/2020    INR 1.0 01/14/2022    LABA1C 6.0 (H) 01/19/2022       Radiology: REVIEWED DAILY      ASSESSMENT and PLAN:    COVID-19 pneumonia-unvaccinated, symptom onset approximately 1 week prior to presentation. · S/p decadron 10 mg PO bid x 8 days, baricitinib discontinued on 1/19. · First negative COVID 2/8. VAP-respiratory culture growing GPC in pairs and candida albicans; levaquin completed 2/7. · Worsening leukocytosis with low grade temps, repeat cultures sent 2/8. · Not on ATBs currently    Acute hypoxic respiratory failure-2/2 above. S/p intubation 1/19. · Currently requiring PS 10, PEEP 5, FiO2 40%. · Plan for trach and PEG today 2/10    Alkalemia- pH 1.346 with metabolic (contraction/post hypercapnic) alkalosis     Hyperglycemia-likely steroid-induced, remains on SSI    Normocytic anemia- likely 2/2 phlebotomy; s/p 1U PRBC on 1/26. · Hgb 6.9 today- for 1U PRBC        DISPOSITION: Continued inpatient care    +++++++++++++++++++++++++++++++++++++++++++++++++  [address], [place]  +++++++++++++++++++++++++++++++++++++++++++++++++  NOTE: This report was transcribed using voice recognition software. Every effort was made to ensure accuracy; however, inadvertent computerized transcription errors may be present.

## 2022-02-10 NOTE — PROGRESS NOTES
Instructions    thiamine mononitrate  100 mg Oral Daily    [Held by provider] furosemide  40 mg IntraVENous Daily    lansoprazole  30 mg Per NG tube QAM AC    insulin lispro  0-6 Units SubCUTAneous Q4H    [Held by provider] enoxaparin  30 mg SubCUTAneous BID    miconazole nitrate   Topical BID    chlorhexidine  15 mL Mouth/Throat BID    artificial tears   Both Eyes Q4H    ipratropium-albuterol  1 ampule Inhalation Q4H WA    sodium chloride flush  5-40 mL IntraVENous 2 times per day    [Held by provider] vitamin D  2,000 Units Oral Daily       Physical Exam:  Due to the current efforts to prevent transmission of COVID-19 and also the need to preserve PPE for other caregivers, a face-to-face encounter with the patient was not performed. That being said, all relevant records and diagnostic tests were reviewed, including laboratory results and imaging. Please reference any relevant documentation elsewhere. Care will be coordinated with the primary service. Pertinent/ New Labs and Imaging Studies     Imaging Personally Reviewed:  2/9/22  1. Overall there is no interval change in the extent of the multifocal   bilateral pneumonia   2. Status post removal of the right internal jugular central venous catheter.      ECHO  None on file    Labs:  Lab Results   Component Value Date    WBC 22.5 02/10/2022    HGB 6.9 02/10/2022    HCT 22.0 02/10/2022    MCV 95.2 02/10/2022    MCH 30.0 02/10/2022    MCHC 31.5 02/10/2022    RDW 14.9 02/10/2022     02/10/2022    MPV 11.3 02/10/2022     Lab Results   Component Value Date     02/10/2022    K 3.79 02/10/2022    K 3.8 01/18/2022     02/10/2022    CO2 28 02/10/2022    BUN 22 02/10/2022    CREATININE 0.3 02/10/2022    LABALBU 2.3 02/10/2022    CALCIUM 8.3 02/10/2022    GFRAA >60 02/10/2022    LABGLOM >60 02/10/2022     Lab Results   Component Value Date    PROTIME 11.3 01/14/2022    INR 1.0 01/14/2022     Recent Labs     02/08/22  1222   PROBNP 900* No results for input(s): PROCAL in the last 72 hours. This SmartLink has not been configured with any valid records. Micro:  Recent Labs     02/08/22  1345   CULTRESP Oral Pharyngeal Sommer reduced*  Moderate growth     No results for input(s): LABGRAM in the last 72 hours. No results for input(s): LEGUR in the last 72 hours. No results for input(s): STREPNEUMAGU in the last 72 hours. No results for input(s): LP1UAG in the last 72 hours. Assessment:    1. Acute hypoxic respiratory failure  2. Severe covid-19 pneumonia  3. Possible superimposed bacterial pneumonia   4. Septic shock, resolved  5. Asthma without exacerbation  6. Anemia   7. SAMUEL, resolved  8. Fever  9. 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 Tolerating PSV 10  2. Off sedation and mentation improving   3. Bronchodilators  4. Levaquin completed  5. Plans for trach/peg today  6. PRBC transfusion today, Hgb 6.9    This plan of care was reviewed in collaboration with Dr. Lenwood Merlin  Electronically signed by MAUREEN Perrin CNP on 2/10/2022 at 1:59 PM      I personally  provided care for the patient. Radiographs, labs and medication list were reviewed by me independently. I spoke with bedside nursing, therapists and consultants. The case was discussed in detail and plans for care were established. Review of CNP documentation was conducted and revisions were made as appropriate. I agree with the above documented exam, problem list and plan of care.   Cori Grimaldo MD

## 2022-02-10 NOTE — PLAN OF CARE
Talked to patient's daughter outside the room for code status discussion given the improvement in patient's mental status. Daughter understands patient is still very sick. Resuscitation/Code Status Note on Jeri Valdez (YOB: 1955)    At 7:20pm on February 9, 2022, resuscitation/code status decision was based on a thorough discussion with the patient and patient's adult child - Sharmin Dong. The code status was made Full Code No additional code details.     Electronically signed by Skye Diallo MD on 2/9/22 at 7:20 PM EST

## 2022-02-10 NOTE — PROGRESS NOTES
Department of Internal Medicine  Nephrology  Progress Note      Events Reviewed. SUBJECTIVE:  We are following Ms. Gloria Rodriguez for SAMUEL. Patient is intubated. PHYSICAL EXAM:    Vitals:    VITALS:  /66   Pulse 101   Temp 100 °F (37.8 °C) (Esophageal)   Resp 26   Ht 5' (1.524 m)   Wt 115 lb 3.2 oz (52.3 kg)   SpO2 96%   BMI 22.50 kg/m²   24HR INTAKE/OUTPUT:      Intake/Output Summary (Last 24 hours) at 2/10/2022 1603  Last data filed at 2/10/2022 1300  Gross per 24 hour   Intake 3326 ml   Output 1250 ml   Net 2076 ml     General appearance: Patient is intubated, sedated  HEENT:  Endotracheal tube in place  Neck: Supple, No jugular venous distention. Respiratory: Diminished bilaterally, intubated. Cardiovascular: RRR, no murmur noted  Abdomen: Soft, nontender, nondistended, flat extremities. Skin: Normal skin color.  No rashes. Scattered ecchymosis.   Neurologic: Patient sedated  Other: No edema         Scheduled Meds:   sterile water        fentanNYL  200 mcg IntraVENous See Admin Instructions    vecuronium  10 mg IntraVENous See Admin Instructions    midazolam  4 mg IntraVENous See Admin Instructions    ceFAZolin  2,000 mg IntraVENous See Admin Instructions    thiamine mononitrate  100 mg Oral Daily    [Held by provider] furosemide  40 mg IntraVENous Daily    lansoprazole  30 mg Per NG tube QAM AC    insulin lispro  0-6 Units SubCUTAneous Q4H    [Held by provider] enoxaparin  30 mg SubCUTAneous BID    miconazole nitrate   Topical BID    chlorhexidine  15 mL Mouth/Throat BID    artificial tears   Both Eyes Q4H    ipratropium-albuterol  1 ampule Inhalation Q4H WA    sodium chloride flush  5-40 mL IntraVENous 2 times per day    [Held by provider] vitamin D  2,000 Units Oral Daily     Continuous Infusions:   sodium chloride      sodium chloride      dextrose       PRN Meds:.sodium chloride, sennosides, polyethylene glycol, fentanNYL, sodium chloride flush, sodium chloride, acetaminophen **OR** acetaminophen, glucose, dextrose, glucagon (rDNA), dextrose    DATA:    CBC with Differential:    Lab Results   Component Value Date    WBC 22.5 02/10/2022    RBC 2.30 02/10/2022    HGB 6.9 02/10/2022    HCT 22.0 02/10/2022     02/10/2022    MCV 95.2 02/10/2022    MCH 30.0 02/10/2022    MCHC 31.5 02/10/2022    RDW 14.9 02/10/2022    METASPCT 0.9 01/18/2022    LYMPHOPCT 2.7 01/18/2022    MONOPCT 11.6 01/18/2022    MYELOPCT 0.9 01/14/2022    BASOPCT 0.1 01/18/2022    MONOSABS 1.38 01/18/2022    LYMPHSABS 0.35 01/18/2022    EOSABS 0.00 01/18/2022    BASOSABS 0.00 01/18/2022     CMP:    Lab Results   Component Value Date     02/10/2022    K 3.79 02/10/2022    K 3.8 01/18/2022     02/10/2022    CO2 28 02/10/2022    BUN 22 02/10/2022    CREATININE 0.3 02/10/2022    GFRAA >60 02/10/2022    LABGLOM >60 02/10/2022    GLUCOSE 120 02/10/2022    PROT 5.5 02/10/2022    LABALBU 2.3 02/10/2022    CALCIUM 8.3 02/10/2022    BILITOT 0.3 02/10/2022    ALKPHOS 87 02/10/2022     02/10/2022     02/10/2022     Magnesium:    Lab Results   Component Value Date    MG 1.8 02/10/2022     Phosphorus:    Lab Results   Component Value Date    PHOS 3.4 02/10/2022        Radiology Review:    Chest X-ray February 6, 2022   1. There is no interval change in the multifocal bilateral pneumonia   2. There is no pneumothorax or pneumomediastinum.                 BRIEF SUMMARY OF INITIAL CONSULT:     Briefly, Ms. Arcadio Forrest is a 78year old female with a past medical history of Asthma who presented to the ER on January 14 th, 2022 with complaints of SOB and diarrhea for one week. She is not vaccinated against COVID. Reportedly her daughter found her curled into a fetal position in respiratory distress at home and EMS was summoned. She was found to be COVID 19 positive.  Labs were significant for bicarbonate 19, BUN 38, creatinine 1.9, anion gap 19, procalcitonin 1.57, CRP 12.8, , mildly elevated LFTs, D-dimer 404, ferritin 5098. Chest x-ray showed bilateral airspace disease. Renal is consulted for SAMUEL. Problems Resolved:     · SAMUEL, likely pre-renal volume responsive SAMUEL secondary to poor oral intake and GI losses (diarrhea). Creatinine 1.9mg/dL on admission. Renal function has improved to 0.6 mg/dL with IVF administration. · HAGMA with low bicarbonate levels, secondary to uremia. To continue bicarbonate drip  · hypernatremia, with water deficit, dehydration due to poor intake. Sodium levels quite improved. Resolved. · Hypophosphatemia, 2/2 poor intake, to replace  · Hypocalcemia, vitamin D 25 level 39, calcium levels 8.4- improved  · Low grade temp--> pancultures sent. Resolved  · Hypokalemia 2/2 renal wasting with diuretics  · Hypernatremia, with water deficit, 2/2 to diuretics; estimated water deficit about 1.8 L. Urine osmolality 502, Lizabeth<20, indicating probably intravascular volume depletion versus heart failure? Adriana Dickinson Resolved. Sodium levels improved    IMPRESSION/RECOMMENDATIONS:        1. Alkalemia 7.504, pCO2 29.6, total CO2 30, (contraction alkalosis) with respiratory alkalosis  2. Hypercalcemia, with normal PTH level: 41, probably primary hyperparathyroidism? Adriana Dickinson Ionized calcium improved after calcitonin administration. To continue to monitor.  ----------------------------------------  3. COVID +, on hydrocortisone   4. Acute hypoxic respiratory failure, secondary to COVID pneumonia. Status post intubation  5. Normocytic anemia, multifactorial  6.  Nutrition, tube feeds at 40 mL an hour with free water flush 50 cc/hour      Plan:    · Continue to hold diuretics   · Continue free water to 50 cc/hour  · Continue to monitor Na levels   · Continue to monitor ionized calcium      Electronically signed by Efrem Herron MD on 2/10/2022 at 4:03 PM

## 2022-02-10 NOTE — PROGRESS NOTES
[facility]  ATTENDING PHYSICIAN PROGRESS NOTE     I have examined the patient and  reviewed the record. I have reviewed all relevant labs and imaging data. The following summarizes my clinical findings and independent assessment. CC: Acute respiratory failure secondary to Covid pneumonia    S. Patient remains deconditioned. TOlerating PS. ROS: not  able to be performed due to patient factors  O.  Vitals:    02/10/22 1100   BP: 112/61   Pulse: 101   Resp: 25   Temp:    SpO2: 95%     Awake and tracks  Does not follow commands  Tolerating pressure support  Lungs coarse  Abdomen soft nontender nondistended    ASSESSMENT:  Active Problems:    Pneumonia due to COVID-19 virus  Resolved Problems:    * No resolved hospital problems. *       PLAN:  Acute respiratory failure secondary to COVID-19 pneumonia-currently on pressure support   Pt is too weak to be safely extubated per Pulmonary. Pt has been intubated for over 3 weeks  Will proceed with bedside tracheostomy/ PEG tube placement today   Discussed with bedside nurse  DVT Proph: EANS/ leslee Lara MD, FACS  2/10/2022  12:30 PM    NOTE: This report was transcribed using voice recognition software. Every effort was made to ensure accuracy; however, inadvertent computerized transcription errors may be present.

## 2022-02-10 NOTE — OP NOTE
Operative Note      Patient: Prashant Mcleod  YOB: 1955  MRN: [id number]    Date of Procedure: 2/10/2022    Pre-Op Diagnosis: Resp Failure               Bedside  Malnutrition  Covid +    after 230 pm    Post-Op Diagnosis: Same       Procedure(s):  EGD ESOPHAGOGASTRODUODENOSCOPY PEG TUBE INSERTION  TRACHEOTOMY PERCUTANEOUS BRONCHOSCOPY    Surgeon(s):  Ellie Barger MD    Assistant:   Resident: Griffin Aguilera MD    Anesthesia: IV Sedation    Estimated Blood Loss (mL): Minimal    Findings: Other: laryngeal edema with difficulty to pass EGD     Specimens:   * No specimens in log *    Implants:  * No implants in log *      Drains:   Gastrostomy/Enterostomy/Jejunostomy Percutaneous Endoscopic Gastrostomy (PEG) LUQ 1 20 fr (Active)   Drainage Appearance None 02/10/22 1701   Catheter Position (cm marking) 2.5 cm 02/10/22 1701   Drain Status Clamped 02/10/22 1701   Surrounding Skin Dry; Intact 02/10/22 1701   Dressing Status Dry; Intact 02/10/22 1701   Dressing Type Dry dressing 02/10/22 1701       Urethral Catheter Double-lumen 16 fr (Active)   Catheter Indications Need for fluid volume management of the critically ill patient in a critical care setting 02/10/22 1200   Securement Device Date Changed 01/29/22 01/30/22 0800   Site Assessment No urethral drainage 02/10/22 1200   Urine Color Yellow 02/10/22 1200   Urine Appearance Sediment 02/10/22 1200   Output (mL) 100 mL 02/10/22 1000       [REMOVED] NG/OG/NJ/NE Tube Nasogastric 16 fr Right nostril (Removed)   Surrounding Skin Dry; Intact 01/31/22 2036   Securement device Yes 01/31/22 2036   Status Other (Comment) 02/03/22 2000   Placement Verified by Gastric Contents;by External Catheter Length 02/02/22 2000   NG/OG/NJ/NE External Measurement (cm) 56 cm 02/02/22 2000   Drainage Appearance Bile; Tan 02/01/22 2120   Tube Feeding Standard with Fiber 02/01/22 2120   Tube Feeding Status Continuous 02/02/22 2000   Rate/Schedule 10 mL/hr 02/02/22 2000   Tube Feeding Intake (mL) 120 ml 02/03/22 0526   Free Water Flush (mL) 90 mL 02/03/22 0526   Free Water Rate 30mL every 3 hrs 02/01/22 2120   Residual Volume (ml) 0 ml 02/03/22 0400   Output (mL) 5 ml 01/24/22 2000       [REMOVED] NG/OG/NJ/NE Tube Orogastric Center mouth (Removed)   Surrounding Skin Dry; Intact 02/08/22 0800   Securement device Yes 02/09/22 2000   Status Other (Comment) 02/08/22 0800   Placement Verified by External Catheter Length 02/08/22 0800   NG/OG/NJ/NE External Measurement (cm) 55 cm 02/09/22 2000   Drainage Appearance Bile 02/03/22 2000   Tube Feeding Standard with Fiber 02/09/22 2000   Tube Feeding Status Continuous 02/09/22 2000   Rate/Schedule 40 mL/hr 02/09/22 2000   Tube Feeding Supplement (Product) Protein Modular 02/08/22 1200   Tube Feeding Supplement Amount (mL) 75 02/08/22 1200   Tube Feeding Intake (mL) 876 ml 02/09/22 2200   Free Water Flush (mL) 2100 mL 02/09/22 2200   Free Water Rate [id number] 02/09/22 2000   Residual Volume (ml) 0 ml 02/08/22 0541   Output (mL) 0 ml 02/07/22 0600       [REMOVED] Urethral Catheter Double-lumen 16 fr (Removed)   Catheter Indications Need for fluid volume management of the critically ill patient in a critical care setting 01/29/22 0400   Site Assessment No urethral drainage 01/29/22 0400   Urine Color Yellow 01/29/22 0400   Urine Appearance Hazy 01/29/22 0400   Output (mL) 130 mL 01/29/22 0545       [REMOVED] External Urinary Catheter (Removed)   Catheter changed  Yes 01/16/22 2250   Output (mL) 200 mL 01/17/22 0659       Findings: PEG 2.5 cm at skin, Gastritis with old blood no ulcers. Normal duodenum     Detailed Description of Procedure: The patient was in MICU. Sequential compression devices were placed on the patient's lower extremities and functioning. Anesthesia was obtained without complication as per the anesthesia record.  Immediately prior to the procedure a time-out was called and the surgical checklist was reviewed and agreed upon by all present. The patient's neck was then extended by placing a towel roll underneath the upper back. The anterior neck was then prepped with chlorhexidine and draped. The bronchoscope was passed down the endotracheal tube to localize the skin incision. The endotracheal tube was then slowly withdrawn to just below the vocal cords. The skin and subcutaneous tissue were then infiltrated with 1% lidocaine. The skin was then incised with a scalpel. The subcutaneous tissue was then bluntly dissected down to the pretracheal plane. A Seldinger technique was then used to place a guide wire into the trachea using bronchoscopic visualization. A tracheotomy was then created with dilators. A #8 Shiley cuffed, percutaneous tracheostomy tube was then inserted into the trachea. The inner cannula was inserted, and the cuff was inflated. The ventilator circuit was connected to the tracheostomy. Capnography and bronchoscopy were then used to confirm proper position of the tube. The endotracheal tube was then completely removed. A therapeutic bronchoscopy was then performed to clear the tracheobronchial tree of blood and mucus. The tracheostomy was then secured to the neck with interrupted 2-0 Prolene sutures and tracheostomy ties. The naso-/orogastric tube was removed. A bite block was inserted between the incisors. A lubricated adult gastroscope was then passed through the oral cavity into the oropharynx. The upper esophagus was then visualized and gently intubated. The scope was advanced down the esophagus into the stomach. There was gastritis in the stomach with old blood. The stomach was then maximally insufflated. The upper abdomen was then fully exposed. An insertion site was then chosen in the epigastrium using transillumination and direct point palpation. The skin was then prepped with chlorhexidine and draped. 1% lidocaine was then used to infiltrate the skin and subcutaneous tissues.  A scalpel was then used to make a small transverse incision. An angiocatheter was inserted through this into the stomach under direct gastroscopic visualization. A snare was then passed through the gastroscope. A guide wire was then passed through the angiocatheter, secured with the snare, and brought out through the mouth. A 20-Serbian gastrostomy tube was then secured to the guide wire and pulled in antegrade fashion into the stomach and up through the abdominal wall. The gastroscope was then reinserted and passed down into the stomach, confirming position of the bolster against the gastric wall. The gastrostomy tube was then secured with a silicone bolster. The tube was then cut to desired length, fitted with the tube clamp and connector, and placed to gravity drainage. Needle, sponge, and instrument counts were reported as correct x2. Dr. Pranay Chavira was present and scrubbed throughout the case. The patient tolerated the procedure well without complications. She was transferred to the recovery area in good condition.     Electronically signed by Renata Bonner MD on 2/10/22 at 5:05 PM EST       Electronically signed by Arben Dewitt MD on 2/11/2022 at 8:04 PM

## 2022-02-11 ENCOUNTER — APPOINTMENT (OUTPATIENT)
Dept: GENERAL RADIOLOGY | Age: 67
DRG: 207 | End: 2022-02-11
Payer: MEDICARE

## 2022-02-11 VITALS
BODY MASS INDEX: 22.97 KG/M2 | DIASTOLIC BLOOD PRESSURE: 67 MMHG | WEIGHT: 117 LBS | SYSTOLIC BLOOD PRESSURE: 131 MMHG | OXYGEN SATURATION: 95 % | HEART RATE: 124 BPM | RESPIRATION RATE: 30 BRPM | TEMPERATURE: 99.4 F | HEIGHT: 60 IN

## 2022-02-11 LAB
AADO2: 288.7 MMHG
ANION GAP SERPL CALCULATED.3IONS-SCNC: 11 MMOL/L (ref 7–16)
B.E.: 3.2 MMOL/L (ref -3–3)
BUN BLDV-MCNC: 26 MG/DL (ref 6–23)
C-REACTIVE PROTEIN: 19.9 MG/DL (ref 0–0.4)
CALCIUM IONIZED: 1.24 MMOL/L (ref 1.15–1.33)
CALCIUM SERPL-MCNC: 8.2 MG/DL (ref 8.6–10.2)
CHLORIDE BLD-SCNC: 107 MMOL/L (ref 98–107)
CO2: 26 MMOL/L (ref 22–29)
COHB: 0.7 % (ref 0–1.5)
CREAT SERPL-MCNC: 0.4 MG/DL (ref 0.5–1)
CRITICAL: ABNORMAL
D DIMER: 1721 NG/ML DDU
DATE ANALYZED: ABNORMAL
DATE OF COLLECTION: ABNORMAL
FIO2: 60 %
GFR AFRICAN AMERICAN: >60
GFR NON-AFRICAN AMERICAN: >60 ML/MIN/1.73
GLUCOSE BLD-MCNC: 115 MG/DL (ref 74–99)
HCO3: 27.2 MMOL/L (ref 22–26)
HCT VFR BLD CALC: 27.2 % (ref 34–48)
HEMOGLOBIN: 8.6 G/DL (ref 11.5–15.5)
HHB: 4.5 % (ref 0–5)
LAB: ABNORMAL
Lab: ABNORMAL
MAGNESIUM: 2.2 MG/DL (ref 1.6–2.6)
MCH RBC QN AUTO: 29.8 PG (ref 26–35)
MCHC RBC AUTO-ENTMCNC: 31.6 % (ref 32–34.5)
MCV RBC AUTO: 94.1 FL (ref 80–99.9)
METER GLUCOSE: 103 MG/DL (ref 74–99)
METER GLUCOSE: 105 MG/DL (ref 74–99)
METER GLUCOSE: 129 MG/DL (ref 74–99)
METHB: 0.3 % (ref 0–1.5)
MODE: AC
O2 CONTENT: 13 ML/DL
O2 SATURATION: 95.5 % (ref 92–98.5)
O2HB: 94.5 % (ref 94–97)
OPERATOR ID: 1721
PATIENT TEMP: 37 C
PCO2: 39.3 MMHG (ref 35–45)
PDW BLD-RTO: 15.1 FL (ref 11.5–15)
PEEP/CPAP: 5 CMH2O
PFO2: 1.35 MMHG/%
PH BLOOD GAS: 7.46 (ref 7.35–7.45)
PHOSPHORUS: 3.2 MG/DL (ref 2.5–4.5)
PLATELET # BLD: 690 E9/L (ref 130–450)
PMV BLD AUTO: 11.2 FL (ref 7–12)
PO2: 80.9 MMHG (ref 75–100)
POTASSIUM SERPL-SCNC: 3.6 MMOL/L (ref 3.5–5)
RBC # BLD: 2.89 E12/L (ref 3.5–5.5)
RI(T): 3.57
RR MECHANICAL: 14 B/MIN
SODIUM BLD-SCNC: 144 MMOL/L (ref 132–146)
SOURCE, BLOOD GAS: ABNORMAL
THB: 9.7 G/DL (ref 11.5–16.5)
TIME ANALYZED: 456
VT MECHANICAL: 350 ML
WBC # BLD: 23.5 E9/L (ref 4.5–11.5)

## 2022-02-11 PROCEDURE — 6370000000 HC RX 637 (ALT 250 FOR IP)

## 2022-02-11 PROCEDURE — 94003 VENT MGMT INPAT SUBQ DAY: CPT

## 2022-02-11 PROCEDURE — 80048 BASIC METABOLIC PNL TOTAL CA: CPT

## 2022-02-11 PROCEDURE — 6360000002 HC RX W HCPCS: Performed by: INTERNAL MEDICINE

## 2022-02-11 PROCEDURE — 6370000000 HC RX 637 (ALT 250 FOR IP): Performed by: INTERNAL MEDICINE

## 2022-02-11 PROCEDURE — 85027 COMPLETE CBC AUTOMATED: CPT

## 2022-02-11 PROCEDURE — 71045 X-RAY EXAM CHEST 1 VIEW: CPT

## 2022-02-11 PROCEDURE — 6370000000 HC RX 637 (ALT 250 FOR IP): Performed by: NURSE PRACTITIONER

## 2022-02-11 PROCEDURE — 99233 SBSQ HOSP IP/OBS HIGH 50: CPT | Performed by: INTERNAL MEDICINE

## 2022-02-11 PROCEDURE — 82805 BLOOD GASES W/O2 SATURATION: CPT

## 2022-02-11 PROCEDURE — 82330 ASSAY OF CALCIUM: CPT

## 2022-02-11 PROCEDURE — 85378 FIBRIN DEGRADE SEMIQUANT: CPT

## 2022-02-11 PROCEDURE — 94640 AIRWAY INHALATION TREATMENT: CPT

## 2022-02-11 PROCEDURE — 86140 C-REACTIVE PROTEIN: CPT

## 2022-02-11 PROCEDURE — 84100 ASSAY OF PHOSPHORUS: CPT

## 2022-02-11 PROCEDURE — 83735 ASSAY OF MAGNESIUM: CPT

## 2022-02-11 PROCEDURE — 82962 GLUCOSE BLOOD TEST: CPT

## 2022-02-11 PROCEDURE — 2580000003 HC RX 258: Performed by: FAMILY MEDICINE

## 2022-02-11 RX ORDER — FENTANYL CITRATE 50 UG/ML
50 INJECTION, SOLUTION INTRAMUSCULAR; INTRAVENOUS
Status: DISCONTINUED | OUTPATIENT
Start: 2022-02-11 | End: 2022-02-11 | Stop reason: HOSPADM

## 2022-02-11 RX ORDER — LANSOPRAZOLE
30 KIT
Qty: 300 ML | Refills: 0
Start: 2022-02-12

## 2022-02-11 RX ORDER — IPRATROPIUM BROMIDE AND ALBUTEROL SULFATE 2.5; .5 MG/3ML; MG/3ML
3 SOLUTION RESPIRATORY (INHALATION)
Qty: 360 ML | Refills: 0
Start: 2022-02-11

## 2022-02-11 RX ADMIN — CHLORHEXIDINE GLUCONATE 0.12% ORAL RINSE 15 ML: 1.2 LIQUID ORAL at 08:36

## 2022-02-11 RX ADMIN — FENTANYL CITRATE 50 MCG: 0.05 INJECTION, SOLUTION INTRAMUSCULAR; INTRAVENOUS at 03:12

## 2022-02-11 RX ADMIN — IPRATROPIUM BROMIDE AND ALBUTEROL SULFATE 1 AMPULE: .5; 2.5 SOLUTION RESPIRATORY (INHALATION) at 08:00

## 2022-02-11 RX ADMIN — IPRATROPIUM BROMIDE AND ALBUTEROL SULFATE 1 AMPULE: .5; 2.5 SOLUTION RESPIRATORY (INHALATION) at 12:03

## 2022-02-11 RX ADMIN — Medication: at 08:38

## 2022-02-11 RX ADMIN — LANSOPRAZOLE 30 MG: KIT at 05:18

## 2022-02-11 RX ADMIN — Medication 10 ML: at 08:36

## 2022-02-11 RX ADMIN — FENTANYL CITRATE 50 MCG: 50 INJECTION, SOLUTION INTRAMUSCULAR; INTRAVENOUS at 14:31

## 2022-02-11 RX ADMIN — FENTANYL CITRATE 50 MCG: 0.05 INJECTION, SOLUTION INTRAMUSCULAR; INTRAVENOUS at 05:14

## 2022-02-11 RX ADMIN — Medication 100 MG: at 08:36

## 2022-02-11 RX ADMIN — POTASSIUM BICARBONATE 40 MEQ: 782 TABLET, EFFERVESCENT ORAL at 06:35

## 2022-02-11 ASSESSMENT — PULMONARY FUNCTION TESTS
PIF_VALUE: 27
PIF_VALUE: 31
PIF_VALUE: 31
PIF_VALUE: 33
PIF_VALUE: 33
PIF_VALUE: 35
PIF_VALUE: 31
PIF_VALUE: 33
PIF_VALUE: 33
PIF_VALUE: 31

## 2022-02-11 ASSESSMENT — PAIN DESCRIPTION - ORIENTATION: ORIENTATION: UPPER

## 2022-02-11 ASSESSMENT — PAIN SCALES - GENERAL
PAINLEVEL_OUTOF10: 0

## 2022-02-11 ASSESSMENT — PAIN DESCRIPTION - PAIN TYPE: TYPE: ACUTE PAIN

## 2022-02-11 ASSESSMENT — PAIN DESCRIPTION - LOCATION: LOCATION: ABDOMEN

## 2022-02-11 NOTE — PROGRESS NOTES
Per Charge nurse, paperwork has been completed. Call report to [address] ()  Provided report. Advised that patient would be transferred at 1400.       Layton Cranker Z./RN      [phone]  Patient transported to [facility]  In stable condition via cart by transport team and Respiratory Therapist.      Layton Cranker / [address] [place] [address]

## 2022-02-11 NOTE — PROGRESS NOTES
Temi Rawls M.D.,[facility]  Hyacinth Nash D.O., F.A.C.O.I., Sebastian Langford M.D. Nery Harris M.D. Yunior Corona D.O. Daily Pulmonary Progress Note    Patient:  Alma Narayanan 78 y.o. female MRN: [id number]     Date of Service: 2/11/2022      Synopsis     We are following patient for acute hypoxic respiratory failure, severe Covid pneumonia    \"CC\" shortness of breath    Code status: DNR CCA      Subjective      Patient was seen and examined through the window. She is back on AC/VC 14, 50%, +5. Trach and PEG yesterday. Her eyes are open and she is tracking per nursing. Plan is to transfer to [facility] this afternoon.      Review of Systems:  Unable to assess    24-hour events:  None    Objective   Vitals: /71   Pulse 120   Temp 99.3 °F (37.4 °C) (Temporal)   Resp 25   Ht 5' (1.524 m)   Wt 117 lb (53.1 kg)   SpO2 94%   BMI 22.85 kg/m²     I/O:    Intake/Output Summary (Last 24 hours) at 2/11/2022 1058  Last data filed at 2/11/2022 1000  Gross per 24 hour   Intake 882 ml   Output 745 ml   Net 137 ml       Vent Information  $Ventilation: $Subsequent Day  Skin Assessment: Clean, dry, & intact  Suction Catheter Diameter:  (16)  Equipment ID: [id number]  Equipment Changed: Humidification  Vent Type: 980  Vent Mode: AC/VC  Vt Ordered: 350 mL  Rate Set: 14 bmp  Peak Flow: 55 L/min  Pressure Support: 0 cmH20  FiO2 : 60 %  SpO2: 94 %  SpO2/FiO2 ratio: 158.33  PaO2/FiO2 ratio: 196  Sensitivity: 3  PEEP/CPAP: 5  I Time/ I Time %: 0 s  Humidification Source: Heated wire  Humidification Temp: 37  Humidification Temp Measured: 37  Circuit Condensation: Drained  Mask Type: Full face mask  Mask Size: Medium       IPAP: 14 cmH20  CPAP/EPAP: 8 cmH2O     CURRENT MEDS :  Scheduled Meds:   thiamine mononitrate  100 mg Oral Daily    [Held by provider] furosemide  40 mg IntraVENous Daily    lansoprazole  30 mg Per NG tube QAM AC    insulin lispro  0-6 Units SubCUTAneous Q4H    enoxaparin  30 mg SubCUTAneous BID    miconazole nitrate   Topical BID    chlorhexidine  15 mL Mouth/Throat BID    ipratropium-albuterol  1 ampule Inhalation Q4H WA    sodium chloride flush  5-40 mL IntraVENous 2 times per day    [Held by provider] vitamin D  2,000 Units Oral Daily       Physical Exam:  Due to the current efforts to prevent transmission of COVID-19 and also the need to preserve PPE for other caregivers, a face-to-face encounter with the patient was not performed. That being said, all relevant records and diagnostic tests were reviewed, including laboratory results and imaging. Please reference any relevant documentation elsewhere. Care will be coordinated with the primary service. Pertinent/ New Labs and Imaging Studies     Imaging Personally Reviewed:  2/9/22  1. Overall there is no interval change in the extent of the multifocal   bilateral pneumonia   2. Status post removal of the right internal jugular central venous catheter. ECHO  None on file    Labs:  Lab Results   Component Value Date    WBC 23.5 02/11/2022    HGB 8.6 02/11/2022    HCT 27.2 02/11/2022    MCV 94.1 02/11/2022    MCH 29.8 02/11/2022    MCHC 31.6 02/11/2022    RDW 15.1 02/11/2022     02/11/2022    MPV 11.2 02/11/2022     Lab Results   Component Value Date     02/11/2022    K 3.6 02/11/2022    K 3.8 01/18/2022     02/11/2022    CO2 26 02/11/2022    BUN 26 02/11/2022    CREATININE 0.4 02/11/2022    LABALBU 2.3 02/10/2022    CALCIUM 8.2 02/11/2022    GFRAA >60 02/11/2022    LABGLOM >60 02/11/2022     Lab Results   Component Value Date    PROTIME 11.3 01/14/2022    INR 1.0 01/14/2022     Recent Labs     02/08/22  1222   PROBNP 900*     No results for input(s): PROCAL in the last 72 hours. This SmartLink has not been configured with any valid records.        Micro:  Recent Labs     02/08/22  1345   CULTRESP Oral Pharyngeal Sommer reduced*  Moderate growth     No results for input(s): LABGRAM in the last 72 hours. No results for input(s): LEGUR in the last 72 hours. No results for input(s): STREPNEUMAGU in the last 72 hours. No results for input(s): LP1UAG in the last 72 hours. Assessment:    1. Acute hypoxic respiratory failure  2. Severe covid-19 pneumonia  3. Possible superimposed bacterial pneumonia   4. Septic shock, resolved  5. Asthma without exacerbation  6. Anemia   7. SAMUEL, resolved  8. Fever  9. 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 Back up to AC/VC  2. Off sedation and mentation improving   3. Bronchodilators  4. Levaquin completed  5. Trach/PEG placed 2/10  6. PRBC transfusion 2/10, Hgb 6.9  7. Nephrology following, diuretics on hold  8. Plan to transfer to [facility]3] today    This plan of care was reviewed in collaboration with Dr. Hunter Bales  Electronically signed by MAUREEN Toure CNP on 2/11/2022 at 10:58 AM    I personally saw, examined, and cared for the patient. Labs, medications, radiographs reviewed. I agree with history exam and plans detailed in NP note. Patient is stable to be transitioned to select.   Lisa Cheung MD

## 2022-02-11 NOTE — PROGRESS NOTES
Skin:     General: Skin is warm. Capillary Refill: Capillary refill takes less than 2 seconds. Neurological:      Mental Status: She is alert. Medications     Continuous Infusions:   sodium chloride      sodium chloride      dextrose       Scheduled Meds:   thiamine mononitrate  100 mg Oral Daily    [Held by provider] furosemide  40 mg IntraVENous Daily    lansoprazole  30 mg Per NG tube QAM AC    insulin lispro  0-6 Units SubCUTAneous Q4H    [Held by provider] enoxaparin  30 mg SubCUTAneous BID    miconazole nitrate   Topical BID    chlorhexidine  15 mL Mouth/Throat BID    ipratropium-albuterol  1 ampule Inhalation Q4H WA    sodium chloride flush  5-40 mL IntraVENous 2 times per day    [Held by provider] vitamin D  2,000 Units Oral Daily     PRN Meds: sodium chloride, artificial tears, sennosides, polyethylene glycol, fentanNYL, sodium chloride flush, sodium chloride, acetaminophen **OR** acetaminophen, glucose, dextrose, glucagon (rDNA), dextrose  Nutrition:   NG/OG tube TF type: Pulmocare/Nephro/Glucerna/Jevity        At rate: ml/h    Labs and Imaging Studies     CBC:   Recent Labs     02/09/22  0417 02/09/22  0417 02/10/22  0410 02/10/22  0508 02/10/22  1711 02/10/22  1933 02/11/22 0425   WBC 23.1*  --  22.5*  --   --   --  23.5*   HGB 7.2*   < > 6.9*   < > 8.1* 8.8* 8.6*   HCT 23.4*   < > 21.9*   < > 26.0* 26.8* 27.2*   MCV 97.1  --  95.2  --   --   --  94.1   *  --  676*  --   --   --  690*    < > = values in this interval not displayed. BMP:    Recent Labs     02/09/22  0417 02/09/22  0417 02/10/22  0410 02/10/22  0436 02/11/22  0425     --  139  --  144   K 4.4   < > 3.8 3.79 3.6     --  100  --  107   CO2 30*  --  28  --  26   BUN 36*  --  22  --  26*   CREATININE 0.4*  --  0.3*  --  0.4*   GLUCOSE 138*  --  120*  --  115*    < > = values in this interval not displayed.        LIVER PROFILE:   Recent Labs     02/09/22 0417 02/10/22  0410   * 140*   * 265*   BILITOT 0.2 0.3   ALKPHOS 97 87       PT/INR:   No results for input(s): PROTIME, INR in the last 72 hours. APTT:   No results for input(s): APTT in the last 72 hours. Fasting Lipid Panel:    Lab Results   Component Value Date    CHOL 145 06/29/2020    TRIG 124 01/25/2022    HDL 73 06/29/2020       Cardiac Enzymes:    No results found for: CKTOTAL, CKMB, CKMBINDEX, TROPONINI    Notable Cultures:      Blood cultures   Blood Culture, Routine   Date Value Ref Range Status   02/08/2022 24 Hours no growth  Preliminary     Respiratory cultures No results found for: RESPCULTURE No results found for: LABGRAM  Urine   Urine Culture, Routine   Date Value Ref Range Status   01/29/2022 Growth not present  Final     Legionella No results found for: LABLEGI  C Diff PCR No results found for: CDIFPCR  Wound culture/abscess: No results for input(s): WNDABS in the last 72 hours. Tip culture:No results for input(s): CXCATHTIP in the last 72 hours.       Oxygen:     Vent Information  $Ventilation: $Subsequent Day  Skin Assessment: Clean, dry, & intact  Suction Catheter Diameter:  (16)  Equipment ID: [id number]  Equipment Changed: (S) Humidification  Vent Type: 980  Vent Mode: AC/VC  Vt Ordered: 350 mL  Rate Set: 14 bmp  Peak Flow: 55 L/min  Pressure Support: 0 cmH20  FiO2 : 60 %  SpO2: 98 %  SpO2/FiO2 ratio: 163.33  PaO2/FiO2 ratio: 196  Sensitivity: 3  PEEP/CPAP: 5  I Time/ I Time %: 0 s  Humidification Source: Heated wire  Humidification Temp: 37  Humidification Temp Measured: 36.8  Circuit Condensation: Drained  Mask Type: Full face mask  Mask Size: Medium  Additional Respiratory  Assessments  Pulse: 104  Resp: 21  SpO2: 98 %  End Tidal CO2: 67 (%)  Position: Semi-Ocampo's  Humidification Source: Heated wire  Humidification Temp: 37  Circuit Condensation: Drained  Oral Care: Mouth suctioned,Mouth swabbed  Subglottic Suction Done?: No  Airway Type: Trachial  Airway Size: 8  Cuff Pressure (cm H2O): 29 cm H2O (KUB) (SINGLE AP VIEW)   Final Result   No significant change. Satisfactory position of the enteric tube within the   stomach. No bowel obstruction or acute abdominal disease identified. XR CHEST PORTABLE   Final Result   1. Extensive bilateral multifocal pneumonia. No significant change. 2. Vertical linear shadow over the lateral aspect of the right chest, most   likely skin fold. XR CHEST PORTABLE   Final Result   1. There is no interval change in the multifocal bilateral pneumonia. 2. There is no pneumothorax or pneumomediastinum. XR CHEST PORTABLE   Final Result   Stable to slightly improved aeration of the lungs bilaterally with persistent   patchy bilateral airspace opacities, in keeping with the patient's known   diagnosis of COVID pneumonia. US DUP LOWER EXTREMITIES BILATERAL VENOUS   Final Result   No evidence of DVT in either lower extremity. US DUP UPPER EXTREMITIES BILATERAL VENOUS   Final Result   Occlusive thrombus in the left distal cephalic vein. No evidence of DVT. Critical results were called by Dr. Suhail Mabry to Dr. Magali Valle On 1/29/2022   at 21:09. Please make a note of the limited nature of the study due to the above   reasons. RECOMMENDATIONS:         XR CHEST PORTABLE   Final Result   No significant interval change, when compared to the previous study performed   1 day earlier. XR CHEST PORTABLE   Final Result   Bilateral airspace disease without appreciable change. XR ABDOMEN (KUB) (SINGLE AP VIEW)   Final Result   No active process. NG tube in the gastric lumen as before.          XR CHEST PORTABLE   Final Result   Progression of bilateral airspace disease remaining midlung predominant in   the periphery of airspace disease bronchopneumonia with increased from prior         XR CHEST PORTABLE   Final Result   Mild decrease in the diffuse left lung infiltrates, when compared to the   previous study performed 1 day earlier. Diffuse right lung infiltrates   without significant interval change. XR CHEST PORTABLE   Final Result   Left internal jugular line tip in the distal SVC. No pneumothorax. The remainder of the exam is essentially stable. .         XR CHEST PORTABLE   Final Result   Stable airspace disease. See above. XR CHEST PORTABLE   Final Result   Bilateral airspace disease which appears mildly progressive. XR CHEST PORTABLE   Final Result   1. Lines and tubes are unchanged in position. 2.  Diffuse bilateral opacities are not significantly changed. Differential   includes infection, pulmonary edema, atelectasis, ARDS, and/or layering   pleural effusions. XR CHEST PORTABLE   Final Result   1. No significant change in bilateral multifocal pneumonia. Support tubes   and catheters are stable. XR CHEST PORTABLE   Final Result   1. There is no interval change in extensive multifocal bilateral pneumonia. XR CHEST PORTABLE   Final Result   Slightly worsened bilateral pulmonary infiltrates with increasing opacity in   the bilateral bases when compared to previous. Stable lines and tubes. XR CHEST PORTABLE   Final Result   1. Good position right IJ central venous line. Negative for pneumothorax. 2.  Bilateral widespread pulmonary infiltrates with interval mild worsening   on the left and compatible with bilateral pneumonia. XR CHEST PORTABLE   Final Result   Endotracheal tube tip is 3.3 cm above the alberta. Diffuse pulmonary infiltrates are unchanged. XR ABDOMEN FOR NG/OG/NE TUBE PLACEMENT   Final Result   Nasogastric tube terminates in the stomach. XR CHEST PORTABLE   Final Result   1. Multifocal bilateral pneumonia unchanged when compared with the prior   study. XR CHEST PORTABLE   Final Result   Bilateral airspace disease likely related to pneumonia.          XR CHEST PORTABLE    (Results Pending)   CTA PULMONARY W CONTRAST    (Results Pending)   XR CHEST PORTABLE    (Results Pending)   XR CHEST PORTABLE    (Results Pending)   XR CHEST PORTABLE    (Results Pending)        Resident's Assessment and Plan     Assessment and Plan:    1. Altered mental status resolved  -Patient has awake and alert  -Able to move head to track voice and nods appropriately  -Unable to follow commands due to critical care myopathy  -Continue thiamine  -Continue to monitor off sedation    2. Acute hypoxic respiratory failure secondary to ARDS 2/2 COVID-19 pneumonia  -Patient remains on pressure support  -Chest x-ray remains stable as compared to previous  -Patient has for trach and PEG yesterday tolerated well   -Wean vent settings as able  -Continue to monitor    3. Leukocytosis  -WBC of 23.5 today  -Cultures grew candidemia from respiratory  -We will hold off on treatment at this time most likely contamination  -We will continue on cefazolin  -Continue to monitor    4. Acute normocytic anemia 2/2 GI bleed  -Hemoglobin stable today  -1 unit of blood transfused  -When placing PEG tube is noted that patient had spots of dried blood in stomach  -Continue Protonix  -We will hold anticoagulation at this time check with surgery morning.  -Continue to monitor      # Peptic ulcer prophylaxis: Protonix  # DVT Prophylaxis: On hold due to drop in hemoglobin  # Disposition: Transfer to [facility]    Lizette Orosco MD, PGY-1    Attending Physician: Dr. Sari Ruiz  I personally saw, examined and provided care for the patient. Radiographs, labs and medication list were reviewed by me independently. I spoke with bedside nursing, therapists and consultants. Critical care services and times documented are independent of procedures and multidisciplinary rounds with Residents. Additionally comprehensive, multidisciplinary rounds were conducted with the MICU team. The case was discussed in detail and plans for care were established.  Review of Residents documentation was conducted and revisions were made as appropriate. I agree with the above documented exam, problem list and plan of care.     LTAC   Pulmonary will follow  Jc Hebert MD,[facility]  Pulmonary&Critical Care Medicine   Director of [address] [place] [address] Director of [address] [place] [address]    [place]

## 2022-02-11 NOTE — PROGRESS NOTES
Department of Internal Medicine  Nephrology  Progress Note      Events Reviewed. Had PEG tube placement and tracheostomy. SUBJECTIVE:  We are following Ms. Gloria Rodriguez for SAMUEL. Patient is intubated. PHYSICAL EXAM:    Vitals:    VITALS:  /64   Pulse 115   Temp 99 °F (37.2 °C) (Temporal)   Resp 26   Ht 5' (1.524 m)   Wt 117 lb (53.1 kg)   SpO2 95%   BMI 22.85 kg/m²   24HR INTAKE/OUTPUT:      Intake/Output Summary (Last 24 hours) at 2/11/2022 1015  Last data filed at 2/11/2022 0518  Gross per 24 hour   Intake 882 ml   Output 470 ml   Net 412 ml     General appearance: Patient is intubated, sedated  HEENT: PERRLA  Neck: Supple, No jugular venous distention, tracheostomy in place  Respiratory: Diminished bilaterally, intubated. Cardiovascular: RRR, no murmur noted  Abdomen: Soft, nontender, nondistended, flat extremities. Skin: Normal skin color.  No rashes. Scattered ecchymosis.   Neurologic: Patient sedated  Other: No edema     Scheduled Meds:   thiamine mononitrate  100 mg Oral Daily    [Held by provider] furosemide  40 mg IntraVENous Daily    lansoprazole  30 mg Per NG tube QAM AC    insulin lispro  0-6 Units SubCUTAneous Q4H    enoxaparin  30 mg SubCUTAneous BID    miconazole nitrate   Topical BID    chlorhexidine  15 mL Mouth/Throat BID    ipratropium-albuterol  1 ampule Inhalation Q4H WA    sodium chloride flush  5-40 mL IntraVENous 2 times per day    [Held by provider] vitamin D  2,000 Units Oral Daily     Continuous Infusions:   sodium chloride      sodium chloride      dextrose       PRN Meds:.sodium chloride, artificial tears, sennosides, polyethylene glycol, fentanNYL, sodium chloride flush, sodium chloride, acetaminophen **OR** acetaminophen, glucose, dextrose, glucagon (rDNA), dextrose    DATA:    CBC with Differential:    Lab Results   Component Value Date    WBC 23.5 02/11/2022    RBC 2.89 02/11/2022    HGB 8.6 02/11/2022    HCT 27.2 02/11/2022     02/11/2022 MCV 94.1 02/11/2022    MCH 29.8 02/11/2022    MCHC 31.6 02/11/2022    RDW 15.1 02/11/2022    METASPCT 0.9 01/18/2022    LYMPHOPCT 2.7 01/18/2022    MONOPCT 11.6 01/18/2022    MYELOPCT 0.9 01/14/2022    BASOPCT 0.1 01/18/2022    MONOSABS 1.38 01/18/2022    LYMPHSABS 0.35 01/18/2022    EOSABS 0.00 01/18/2022    BASOSABS 0.00 01/18/2022     CMP:    Lab Results   Component Value Date     02/11/2022    K 3.6 02/11/2022    K 3.8 01/18/2022     02/11/2022    CO2 26 02/11/2022    BUN 26 02/11/2022    CREATININE 0.4 02/11/2022    GFRAA >60 02/11/2022    LABGLOM >60 02/11/2022    GLUCOSE 115 02/11/2022    PROT 5.5 02/10/2022    LABALBU 2.3 02/10/2022    CALCIUM 8.2 02/11/2022    BILITOT 0.3 02/10/2022    ALKPHOS 87 02/10/2022     02/10/2022     02/10/2022     Magnesium:    Lab Results   Component Value Date    MG 2.2 02/11/2022     Phosphorus:    Lab Results   Component Value Date    PHOS 3.2 02/11/2022        Radiology Review:    Chest X-ray February 6, 2022   1. There is no interval change in the multifocal bilateral pneumonia   2. There is no pneumothorax or pneumomediastinum.                 BRIEF SUMMARY OF INITIAL CONSULT:     Briefly, Ms. Toyin Vargas is a 78year old female with a past medical history of Asthma who presented to the ER on January 14 th, 2022 with complaints of SOB and diarrhea for one week. She is not vaccinated against COVID. Reportedly her daughter found her curled into a fetal position in respiratory distress at home and EMS was summoned. She was found to be COVID 19 positive. Labs were significant for bicarbonate 19, BUN 38, creatinine 1.9, anion gap 19, procalcitonin 1.57, CRP 12.8, , mildly elevated LFTs, D-dimer 404, ferritin 5098. Chest x-ray showed bilateral airspace disease. Renal is consulted for SAMUEL. Problems Resolved:     · SAMUEL, likely pre-renal volume responsive SAMUEL secondary to poor oral intake and GI losses (diarrhea).  Creatinine 1.9mg/dL on admission. Renal function has improved to 0.6 mg/dL with IVF administration. · HAGMA with low bicarbonate levels, secondary to uremia. To continue bicarbonate drip  · hypernatremia, with water deficit, dehydration due to poor intake. Sodium levels quite improved. Resolved. · Hypophosphatemia, 2/2 poor intake, to replace  · Hypocalcemia, vitamin D 25 level 39, calcium levels 8.4- improved  · Low grade temp--> pancultures sent. Resolved  · Hypokalemia 2/2 renal wasting with diuretics  · Hypernatremia, with water deficit, 2/2 to diuretics; estimated water deficit about 1.8 L. Urine osmolality 502, Lizabeth<20, indicating probably intravascular volume depletion versus heart failure? [place]  Resolved. Sodium levels improved    IMPRESSION/RECOMMENDATIONS:        1. Alkalemia 7.458, pCO2 39.3, total CO2 26, (contraction alkalosis) with respiratory alkalosis  2. Hypercalcemia, with normal PTH level: 41, probably primary hyperparathyroidism? [place] Ava Ionized calcium improved after calcitonin administration. To continue to monitor.  ----------------------------------------  3. COVID +, on hydrocortisone   4. Acute hypoxic respiratory failure, secondary to COVID pneumonia. Status tracheostomy. 5. Normocytic anemia, multifactorial  6.  Nutrition, tube feeds at 20 mL an hour with free water flush 30 cc/ 4 hour      Plan:    · Increase free water to 50 cc/hour  · Continue to hold diuretics   · Continue to monitor Na levels     Electronically signed by Jose Guadalupe Lopez MD on 2/11/2022 at 10:15 AM

## 2022-02-11 NOTE — DISCHARGE SUMMARY
VANCOMYCIN  IP CONSULT TO GENERAL SURGERY  IP CONSULT TO DIETITIAN    Discharge Diagnoses:    COVID-19 pneumonia-unvaccinated, symptom onset approximately 1 week prior to presentation. · S/p decadron 10 mg PO bid x 8 days, baricitinib discontinued on 1/19.      Acute hypoxic respiratory failure-2/2 above. S/p intubation 1/19. · Currently requiring AC 14, , PEEP 5, FiO2 60%. · S/p trach and PEG 2/10     Leukocytosis- pan cultures were repeated 2/8. Sputum growing candida however felt to be contamination. Currently off antibiotics.     Alkalemia- pH 1.487 with metabolic (contraction/post hypercapnic) alkalosis      Normocytic anemia- likely 2/2 phlebotomy; s/p 1U PRBC on 1/26. · S/p 1U PRBC 2/10- H&H appropriately responded. Discharge Instructions / Follow up:    Medications as prescribed. DVT prophy/VAP prevention per [facility] protocol. Activity: activity as tolerated    Significant labs:  CBC:   Recent Labs     02/09/22  0417 02/09/22  0417 02/10/22  0410 02/10/22  0508 02/10/22  1711 02/10/22  1933 02/11/22  0425   WBC 23.1*  --  22.5*  --   --   --  23.5*   RBC 2.41*  --  2.30*  --   --   --  2.89*   HGB 7.2*   < > 6.9*   < > 8.1* 8.8* 8.6*   HCT 23.4*   < > 21.9*   < > 26.0* 26.8* 27.2*   MCV 97.1  --  95.2  --   --   --  94.1   RDW 14.8  --  14.9  --   --   --  15.1*   *  --  676*  --   --   --  690*    < > = values in this interval not displayed. BMP:   Recent Labs     02/09/22  0417 02/09/22  0417 02/10/22  0410 02/10/22  0436 02/11/22  0425     --  139  --  144   K 4.4   < > 3.8 3.79 3.6     --  100  --  107   CO2 30*  --  28  --  26   BUN 36*  --  22  --  26*   CREATININE 0.4*  --  0.3*  --  0.4*   MG 2.1  --  1.8  --  2.2   PHOS 3.2  --  3.4  --  3.2    < > = values in this interval not displayed.      LFT:  Recent Labs     02/09/22  0417 02/10/22  0410   PROT 5.8* 5.5*   ALKPHOS 97 87   * 265*   * 140*   BILITOT 0.2 0.3     PT/INR: No results for input(s): INR, APTT in the last 72 hours. BNP: No results for input(s): BNP in the last 72 hours. Hgb A1C:   Lab Results   Component Value Date    LABA1C 6.0 (H) 01/19/2022     Folate and B12: No results found for: ADDSWQCX58, No results found for: FOLATE  Thyroid Studies:   Lab Results   Component Value Date    TSH 3.600 06/29/2020       Urinalysis:    Lab Results   Component Value Date    NITRU Negative 02/08/2022    WBCUA 10-20 02/08/2022    BACTERIA MODERATE 02/08/2022    RBCUA >20 02/08/2022    BLOODU LARGE 02/08/2022    SPECGRAV 1.020 02/08/2022    GLUCOSEU Negative 02/08/2022       Imaging:  XR ABDOMEN (KUB) (SINGLE AP VIEW)    Result Date: 2/1/2022  EXAMINATION: ONE SUPINE XRAY VIEW(S) OF THE ABDOMEN 2/1/2022 8:26 am COMPARISON: 01/27/2022 HISTORY: ORDERING SYSTEM PROVIDED HISTORY: r/o bowel obstruction TECHNOLOGIST PROVIDED HISTORY: Reason for exam:->r/o bowel obstruction What reading provider will be dictating this exam?->CRC FINDINGS: No significant change from prior. The distal enteric tube remains in satisfactory position with the tube tip at the stomach body region. The Mead catheter or rectal temperature probe is in place. The stomach, small bowel, and colon are nondilated. No bowel obstruction and no significant constipation. Surgical clips in the region of the gallbladder fossa. No significant change. Satisfactory position of the enteric tube within the stomach. No bowel obstruction or acute abdominal disease identified. XR ABDOMEN (KUB) (SINGLE AP VIEW)    Result Date: 1/27/2022  EXAMINATION: ONE SUPINE XRAY VIEW(S) OF THE ABDOMEN 1/27/2022 9:07 am COMPARISON: 19 January 2022 HISTORY: ORDERING SYSTEM PROVIDED HISTORY: r/o obstruction or ileus TECHNOLOGIST PROVIDED HISTORY: Reason for exam:->r/o obstruction or ileus What reading provider will be dictating this exam?->CRC FINDINGS: NG tube tip is in the gastric lumen. There is no free air, bowel wall pneumatosis or dilated bowel. multifocal bilateral pneumonia. There is no pneumothorax or pneumomediastinum. No gross pleural effusion is noted. 1. Overall there is no interval change in the extent of the multifocal bilateral pneumonia 2. Status post removal of the right internal jugular central venous catheter. RECOMMENDATION: 1.    XR CHEST PORTABLE    Result Date: 2/8/2022  EXAMINATION: ONE XRAY VIEW OF THE CHEST 2/8/2022 8:11 am COMPARISON: 2/7/2022 HISTORY: ORDERING SYSTEM PROVIDED HISTORY: COVID ICU TECHNOLOGIST PROVIDED HISTORY: Reason for exam:->COVID ICU What reading provider will be dictating this exam?->CRC FINDINGS: There is stable position of support lines and tubes. Extensive multifocal bilateral pneumonia is noted, unchanged when compared with the prior study. The cardiac silhouette is within normal limits. No interval change in the diffuse multifocal bilateral pneumonia. XR CHEST PORTABLE    Result Date: 2/7/2022  EXAMINATION: ONE XRAY VIEW OF THE CHEST 2/7/2022 8:11 am COMPARISON: Chest x-ray 02/06/2022 HISTORY: ORDERING SYSTEM PROVIDED HISTORY: COVID ICU TECHNOLOGIST PROVIDED HISTORY: Reason for exam:->COVID ICU What reading provider will be dictating this exam?->CRC FINDINGS: Support hardware unchanged in overall satisfactory position from prior. Cardiac size unchanged. Bilateral pulmonary opacifications right greater than left have increased in the interim of a somewhat midlung predominance of worsening airspace disease or bronchopneumonia from prior.   No pneumothorax or pleural effusion     Increase in density involvement of bilateral pulmonary opacifications right greater than left of airspace disease/bronchopneumonia from prior     XR CHEST PORTABLE    Result Date: 2/6/2022  EXAMINATION: ONE XRAY VIEW OF THE CHEST 2/6/2022 7:55 am COMPARISON: 02/05/2022 HISTORY: ORDERING SYSTEM PROVIDED HISTORY: COVID ICU TECHNOLOGIST PROVIDED HISTORY: Reason for exam:->COVID ICU What reading provider will be dictating this exam?->CRC FINDINGS: The support lines and tubes are unchanged in position. Multifocal bilateral pneumonia is noted unchanged compared to prior study. There is no pneumothorax or pneumomediastinum. The heart is not enlarged. 1. There is no interval change in the multifocal bilateral pneumonia 2. There is no pneumothorax or pneumomediastinum. XR CHEST PORTABLE    Result Date: 2/5/2022  EXAMINATION: ONE XRAY VIEW OF THE CHEST 2/5/2022 8:30 am COMPARISON: 02/04/2022 HISTORY: ORDERING SYSTEM PROVIDED HISTORY: COVID ICU TECHNOLOGIST PROVIDED HISTORY: Reason for exam:->COVID ICU What reading provider will be dictating this exam?->CRC FINDINGS: The cardiac silhouette is within normal limits. The support lines and tubes are unchanged in position. Multifocal bilateral airspace disease is noted most consistent with pneumonia. There appears to be minimal partial interval clearing of the right lung pneumonia when compared with the prior study. The left lung pneumonia is unchanged. There is no pneumothorax or pneumomediastinum. 1. Very minimal partial interval clearing of the right lung pneumonia when compared with the study. 2. No interval change in the left lung pneumonia. 3. There is no pneumothorax. XR CHEST PORTABLE    Result Date: 2/4/2022  EXAMINATION: ONE XRAY VIEW OF THE CHEST 2/4/2022 8:29 am COMPARISON: 02/03/2022 HISTORY: ORDERING SYSTEM [facility] PROVIDED HISTORY: Reason for exam:->COVID ICU What reading provider will be dictating this exam?->CRC FINDINGS: Significant bilateral parenchymal and interstitial fibrosis and possible superimposed infiltrates are unchanged. This appears worse on the right. Lines/tubes are appropriate. There are no effusions. No interval change     XR CHEST PORTABLE    Result Date: 2/3/2022  EXAMINATION: ONE XRAY VIEW OF THE CHEST 2/3/2022 8:07 am COMPARISON: Comparison January 30-February 2nd.  HISTORY: ORDERING SYSTEM PROVIDED HISTORY: COVID ICU TECHNOLOGIST PROVIDED HISTORY: Reason for exam:->COVID ICU What reading provider will be dictating this exam?->CRC FINDINGS: Endotracheal tube in good position at the level of the arch of the aorta. The NG tube in the stomach. The right internal jugular central venous catheter tip in the SVC right atrial junction. Heart has upper borderline size. Extensive bilateral infiltrates are seen throughout both lungs more prominent on the right-side extending from the superior to inferior aspect. There is no pleural effusions. There is no subcutaneous emphysema, pneumothorax or pneumomediastinum. No significant changes since February 2nd. Persistent bilateral infiltrates. Endotracheal tube in good position. XR CHEST PORTABLE    Result Date: 2/2/2022  EXAMINATION: ONE XRAY VIEW OF THE CHEST 2/2/2022 5:31 am COMPARISON: 02/01/2022 HISTORY: ORDERING SYSTEM PROVIDED HISTORY: for central line position assessment TECHNOLOGIST PROVIDED HISTORY: Reason for exam:->for central line position assessment What reading provider will be dictating this exam?->CRC FINDINGS: Patient is slightly rotated. EKG leads overlie the chest.  Interval placement of right internal jugular venous catheter terminating in the distal superior vena cava. Other support devices remain in place. Heart size is normal.  No pneumothorax. Right greater than left parenchymal infiltrates persist and are similar to subtly improved. No pneumothorax following line placement. Right greater than left parenchymal infiltrates similar to subtly improved. Continued follow-up recommended.      XR CHEST PORTABLE    Result Date: 2/1/2022  EXAMINATION: ONE XRAY VIEW OF THE CHEST 2/1/2022 8:21 am COMPARISON: 01/31/2022 HISTORY: ORDERING SYSTEM PROVIDED HISTORY: COVID ICU TECHNOLOGIST PROVIDED HISTORY: Reason for exam:->COVID ICU What reading provider will be dictating this exam?->CRC FINDINGS: ETT, NG tube and left internal jugular catheter again noted, not significantly changed in position. Extensive bilateral airspace opacity again seen, consistent with pneumonia. There is a vertical linear shadow over the lateral aspect of the right hemithorax, most likely a skin fold. The heart and mediastinum are not significantly changed. 1. Extensive bilateral multifocal pneumonia. No significant change. 2. Vertical linear shadow over the lateral aspect of the right chest, most likely skin fold. XR CHEST PORTABLE    Result Date: 1/31/2022  EXAMINATION: ONE XRAY VIEW OF THE CHEST 1/31/2022 8:13 am COMPARISON: 01/30/2022 HISTORY: ORDERING SYSTEM PROVIDED HISTORY: COVID ICU TECHNOLOGIST PROVIDED HISTORY: Reason for exam:->COVID ICU What reading provider will be dictating this exam?->CRC FINDINGS: The endotracheal tube, NG tube and left internal jugular central venous catheter are unchanged in position. Multifocal bilateral pulmonary infiltrates are noted unchanged when compared to the prior study. There is no pneumothorax or pneumomediastinum. 1. There is no interval change in the multifocal bilateral pneumonia. 2. There is no pneumothorax or pneumomediastinum. XR CHEST PORTABLE    Result Date: 1/30/2022  EXAMINATION: ONE XRAY VIEW OF THE CHEST 1/30/2022 8:05 am COMPARISON: Multiple priors, most recent from yesterday. HISTORY: ORDERING SYSTEM PROVIDED HISTORY: COVID ICU TECHNOLOGIST PROVIDED HISTORY: Reason for exam:->COVID ICU What reading provider will be dictating this exam?->CRC FINDINGS: Heart size is unable to be accurately assessed on this single portable view of the chest, but appears to be stable. Supportive lines and tubes are not significantly changed in position. There may be slightly improved aeration of the lungs compared with yesterday's examination (when taking into account differences in technique). Moderate to severe bilateral patchy airspace opacities persist.  No evidence of a sizable pleural effusion. No pneumothorax.   No acute osseous abnormality. Stable to slightly improved aeration of the lungs bilaterally with persistent patchy bilateral airspace opacities, in keeping with the patient's known diagnosis of COVID pneumonia. XR CHEST PORTABLE    Result Date: 1/29/2022  EXAMINATION: ONE XRAY VIEW OF THE CHEST 1/29/2022 8:00 am COMPARISON: The previous study performed 01/28/2022. HISTORY: ORDERING SYSTEM PROVIDED HISTORY: COVID ICU TECHNOLOGIST PROVIDED HISTORY: Reason for exam:->COVID ICU What reading provider will be dictating this exam?->CRC FINDINGS: There has been no significant interval change in the bilateral airspace disease. No active pleural disease is appreciated. The cardiac silhouette and mediastinal structures are without significant interval change. An endotracheal tube is again noted, with the tip 3.5 cm above the alberta. An NG tube is again seen, with the tip in the stomach. A left-sided IJ line is again present, with tip in the SVC. No significant interval change, when compared to the previous study performed 1 day earlier. XR CHEST PORTABLE    Result Date: 1/28/2022  EXAMINATION: ONE XRAY VIEW OF THE CHEST 1/28/2022 7:05 am COMPARISON: 27 January 2022 HISTORY: ORDERING SYSTEM PROVIDED HISTORY: COVID ICU TECHNOLOGIST PROVIDED HISTORY: Reason for exam:->COVID ICU What reading provider will be dictating this exam?->CRC FINDINGS: Unchanged support lines. Bilateral airspace disease is not appreciably changed. Stable cardiomediastinal silhouette. Bilateral airspace disease without appreciable change. XR CHEST PORTABLE    Result Date: 1/27/2022  EXAMINATION: ONE XRAY VIEW OF THE CHEST 1/27/2022 7:53 am COMPARISON: Chest x-ray 01/26/2022 HISTORY: ORDERING SYSTEM PROVIDED HISTORY: COVID ICU TECHNOLOGIST PROVIDED HISTORY: Reason for exam:->COVID ICU What reading provider will be dictating this exam?->CRC FINDINGS: Cardiac size enlarged.   Bilateral pulmonary opacifications right greater than left PORTABLE    Result Date: 1/22/2022  EXAMINATION: ONE XRAY VIEW OF THE CHEST 1/22/2022 8:28 am COMPARISON: 01/21/2022 HISTORY: ORDERING SYSTEM PROVIDED HISTORY: COVID ICU TECHNOLOGIST PROVIDED HISTORY: Reason for exam:->COVID ICU What reading provider will be dictating this exam?->CRC FINDINGS: Heart is normal in size. Moderate bilateral interstitial and alveolar opacities with central air bronchograms have not significantly improved. No pneumothorax detected. The gastric catheter passes into the stomach. Endotracheal tube tip is 4 cm above the alberta. Right IJ central venous catheter in good position. Osseous structures are stable. 1.  No significant change in bilateral multifocal pneumonia. Support tubes and catheters are stable. XR CHEST PORTABLE    Result Date: 1/21/2022  EXAMINATION: ONE XRAY VIEW OF THE CHEST 1/21/2022 8:33 am COMPARISON: None. HISTORY: ORDERING SYSTEM PROVIDED HISTORY: COVID ICU TECHNOLOGIST PROVIDED HISTORY: Reason for exam:->COVID ICU What reading provider will be dictating this exam?->CRC FINDINGS: There is stable position of support lines and tubes. There is no pneumothorax or pneumomediastinum. Extensive multifocal bilateral pneumonia is noted unchanged when compared to the prior study. 1. There is no interval change in extensive multifocal bilateral pneumonia. XR CHEST PORTABLE    Result Date: 1/20/2022  EXAMINATION: ONE XRAY VIEW OF THE CHEST 1/20/2022 8:06 am COMPARISON: January 19, 2022 HISTORY: ORDERING SYSTEM PROVIDED HISTORY: [facility] ICU TECHNOLOGIST PROVIDED HISTORY: Reason for exam:->COVID ICU What reading provider will be dictating this exam?->CRC FINDINGS: There is no evidence of pneumothorax or effusion. There is continued bilateral diffuse pulmonary infiltrates with mildly increased opacity in the bilateral bases when compared to previous. ET tube is in good position with tip above the alberta. NG tube courses below the diaphragm.   Stable appearance of right IJ central venous catheter projecting over the SVC. Visualized bony thorax shows no acute abnormality. Slightly worsened bilateral pulmonary infiltrates with increasing opacity in the bilateral bases when compared to previous. Stable lines and tubes. XR CHEST PORTABLE    Result Date: 1/19/2022  EXAMINATION: ONE XRAY VIEW OF THE CHEST PORTABLE UPRIGHT 1/19/2022 12:38 pm COMPARISON: [id number] hours HISTORY: ORDERING SYSTEM PROVIDED HISTORY: central line placement TECHNOLOGIST PROVIDED HISTORY: Reason for exam:->central line placement What reading provider will be dictating this exam?->CRC FINDINGS: Medical devices: Right IJ central venous line tip at the cavoatrial junction. No pneumothorax. The endotracheal and enteric tubes remain in satisfactory position. Redemonstration of bilateral widespread pulmonary infiltrates. Right lung infiltrate shows no significant change. Left perihilar and left upper lobe infiltrate shows mild progression and is more confluent. Normal heart size. No significant pleural effusion. Atherosclerotic thoracic aorta. 1.  Good position right IJ central venous line. Negative for pneumothorax. 2.  Bilateral widespread pulmonary infiltrates with interval mild worsening on the left and compatible with bilateral pneumonia. XR CHEST PORTABLE    Result Date: 1/19/2022  EXAMINATION: ONE XRAY VIEW OF THE CHEST 1/19/2022 3:01 am COMPARISON: 01/16/2022 HISTORY: ORDERING SYSTEM PROVIDED HISTORY: ETT placement TECHNOLOGIST PROVIDED HISTORY: Reason for exam:->ETT placement What reading provider will be dictating this exam?->CRC FINDINGS: Endotracheal tube tip is 3.3 cm above alberta. Nasogastric tube extends into the stomach. There are diffuse infiltrates which are more notable in the lower lungs. When accounting for technical differences, these infiltrates have not changed significantly. There is no pleural effusion or pneumothorax seen. There is no cardiomegaly.      Endotracheal tube tip is 3.3 cm above the alberta. Diffuse pulmonary infiltrates are unchanged. XR CHEST PORTABLE    Result Date: 1/16/2022  EXAMINATION: ONE XRAY VIEW OF THE CHEST 1/16/2022 10:59 am COMPARISON: 01/14/2022 HISTORY: ORDERING SYSTEM PROVIDED HISTORY: hypoxia TECHNOLOGIST PROVIDED HISTORY: Reason for exam:->hypoxia What reading provider will be dictating this exam?->CRC FINDINGS: The cardiac silhouette is within normals. Extensive multifocal bilateral pneumonia is noted unchanged when compared to prior study. There is no pneumothorax or pneumomediastinum. There is no pleural effusion. 1. Multifocal bilateral pneumonia unchanged when compared with the prior study. XR CHEST PORTABLE    Result Date: 1/14/2022  EXAMINATION: ONE XRAY VIEW OF THE CHEST 1/14/2022 4:37 pm COMPARISON: None. HISTORY: ORDERING SYSTEM PROVIDED HISTORY: hypoxia TECHNOLOGIST PROVIDED HISTORY: Reason for exam:->hypoxia What reading provider will be dictating this exam?->CRC FINDINGS: There is extensive bilateral airspace disease bilaterally. The findings are most compatible with pneumonia. Heart size is borderline. Pulmonary vascularity is not clearly congested. Bilateral airspace disease likely related to pneumonia. US DUP UPPER EXTREMITIES BILATERAL VENOUS    Result Date: 1/29/2022  EXAMINATION: DUPLEX ULTRASOUND OF THE BILATERAL UPPER EXTREMITIES FOR DVT, 1/29/2022 6:40 pm TECHNIQUE: Duplex ultrasound using B-mode/gray scaled imaging and Doppler spectral analysis and color flow was obtained of the deep venous structures of the bilateral upper extremities. COMPARISON: None. HISTORY: ORDERING SYSTEM PROVIDED HISTORY: dvt TECHNOLOGIST PROVIDED HISTORY: Reason for exam:->dvt What reading provider will be dictating this exam?->CRC FINDINGS: There is occlusive thrombus in the left distal cephalic vein. There is normal flow and compressibility of the remaining visualized venous structures.   There is no evidence of echogenic thrombus. The veins demonstrate good compressibility with normal color flow study and spectral analysis. Please note the right distal ulna vein, left distal radial and ulna vein are not visualized due to the external dressing in place. The left internal jugular vein not visualized due to the presence of central venous catheter and external dressing. Occlusive thrombus in the left distal cephalic vein. No evidence of DVT. Critical results were called by Dr. Celio Palencia to Dr. Yaritza Chicas On 1/29/2022 at 21:09. Please make a note of the limited nature of the study due to the above reasons. RECOMMENDATIONS:     XR ABDOMEN FOR NG/OG/NE TUBE PLACEMENT    Result Date: 2/3/2022  EXAMINATION: ONE SUPINE XRAY VIEW(S) OF THE ABDOMEN 2/3/2022 8:38 pm COMPARISON: Chest series from the same day at 07:33 HISTORY: ORDERING SYSTEM PROVIDED HISTORY: ogt placement TECHNOLOGIST PROVIDED HISTORY: Reason for exam:->ogt placement Portable? ->Yes What reading provider will be dictating this exam?->CRC FINDINGS: Ill-defined opacities in the imaged lungs. Atherosclerotic disease. Postprocedural changes in the right upper quadrant. Enteric tube with distal tip and side port extending subdiaphragmatic. Distal tip projects over the left upper quadrant. Satisfactory position of the enteric tube. XR ABDOMEN FOR NG/OG/NE TUBE PLACEMENT    Result Date: 1/19/2022  EXAMINATION: ONE SUPINE XRAY VIEW(S) OF THE ABDOMEN 1/19/2022 3:01 am COMPARISON: None. HISTORY: ORDERING SYSTEM PROVIDED HISTORY: Confirmation of course of NG/OG/NE tube and location of tip of tube TECHNOLOGIST PROVIDED HISTORY: Reason for exam:->Confirmation of course of NG/OG/NE tube and location of tip of tube Portable? ->Yes What reading provider will be dictating this exam?->CRC FINDINGS: The nasogastric tube terminates in the stomach. There are infiltrates in visualized lungs. Visualized gas pattern is nonspecific demonstrating a paucity of bowel gas.      Nasogastric tube terminates in the stomach. US DUP LOWER EXTREMITIES BILATERAL VENOUS    Result Date: 2/3/2022  Patient MRN:  [id number] : 1955 Age: 78 years Gender: Female Order Date:  2/3/2022 EXAM: US DUP LOWER EXTREMITIES BILATERAL VENOUS NUMBER OF IMAGES:  52 INDICATION:  r/o DVT r/o DVT What reading provider will be dictating this exam?->[facility] COMPARISON: None Within the visualized vessels, there is no evidence for deep venous thrombosis There is good compressibility, there is good augmentation, there is good color flow. Within the visualized vessels there is no evidence for deep venous thrombosis     US DUP LOWER EXTREMITIES BILATERAL VENOUS    Result Date: 2022  EXAMINATION: DUPLEX VENOUS ULTRASOUND OF THE BILATERAL LOWER EXTREMITIES2022 6:39 pm TECHNIQUE: Duplex ultrasound using B-mode/gray scaled imaging, Doppler spectral analysis and color flow Doppler was obtained of the deep venous structures of the lower bilateral extremities. COMPARISON: None. HISTORY: ORDERING SYSTEM PROVIDED HISTORY: possible DVT TECHNOLOGIST PROVIDED HISTORY: Reason for exam:->possible DVT What reading provider will be dictating this exam?->CRC FINDINGS: The visualized veins of the bilateral lower extremities are patent and free of echogenic thrombus. The veins demonstrate good compressibility with normal color flow study and spectral analysis. No evidence of DVT in either lower extremity.        Discharge Medications:      Medication List      START taking these medications    ipratropium-albuterol 0.5-2.5 (3) MG/3ML Soln nebulizer solution  Commonly known as: DUONEB  Inhale 3 mLs into the lungs every 4 hours (while awake)     lansoprazole 3 MG/ML Susp  10 mLs by Per NG tube route every morning (before breakfast)  Start taking on: 2022  Replaces: PREVACID PO     miconazole nitrate 2 % Oint  Apply topically 2 times daily        STOP taking these medications    alendronate 70 MG tablet  Commonly known as: FOSAMAX     BEANO PO     cefdinir 300 MG capsule  Commonly known as: OMNICEF     PREVACID PO  Replaced by: lansoprazole 3 MG/ML Susp     Ventolin  (90 Base) MCG/ACT inhaler  Generic drug: albuterol sulfate HFA     vitamin B-12 1000 MCG tablet  Commonly known as: CYANOCOBALAMIN     vitamin C 500 MG tablet  Commonly known as: ASCORBIC ACID     vitamin D 50 MCG (2000 UT) Caps capsule           Where to Get Your Medications      Information about where to get these medications is not yet available    Ask your nurse or doctor about these medications  · ipratropium-albuterol 0.5-2.5 (3) MG/3ML Soln nebulizer solution  · lansoprazole 3 MG/ML Susp  · miconazole nitrate 2 % Oint         Time Spent on discharge is more than 35 minutes in the examination, evaluation, counseling and review of medications and discharge plan.    +++++++++++++++++++++++++++++++++++++++++++++++++  MAUREEN Reid - CNP   [employer] - Hospitalist  [address], [place]  +++++++++++++++++++++++++++++++++++++++++++++++++  NOTE: This report was transcribed using voice recognition software. Every effort was made to ensure accuracy; however, inadvertent computerized transcription errors may be present.

## 2022-02-11 NOTE — PLAN OF CARE
Problem: Isolation Precautions - Risk of Spread of Infection  Goal: Prevent transmission of infection  Outcome: Met This Shift     Problem: Falls - Risk of:  Goal: Will remain free from falls  Description: Will remain free from falls  Outcome: Met This Shift     Problem: Falls - Risk of:  Goal: Absence of physical injury  Description: Absence of physical injury  Outcome: Met This Shift     Problem: Skin Integrity:  Goal: Will show no infection signs and symptoms  Description: Will show no infection signs and symptoms  Outcome: Met This Shift     Problem: Skin Integrity:  Goal: Absence of new skin breakdown  Description: Absence of new skin breakdown  Outcome: Met This Shift     Problem: Airway Clearance - Ineffective  Goal: Achieve or maintain patent airway  Outcome: Ongoing     Problem: Gas Exchange - Impaired  Goal: Absence of hypoxia  Outcome: Ongoing     Problem: Gas Exchange - Impaired  Goal: Promote optimal lung function  Outcome: Ongoing     Problem: Breathing Pattern - Ineffective  Goal: Ability to achieve and maintain a regular respiratory rate  Outcome: Ongoing     Problem: Body Temperature -  Risk of, Imbalanced  Goal: Ability to maintain a body temperature within defined limits  Outcome: Ongoing     Problem: Body Temperature -  Risk of, Imbalanced  Goal: Will regain or maintain usual level of consciousness  Outcome: Ongoing     Problem:  Body Temperature -  Risk of, Imbalanced  Goal: Complications related to the disease process, condition or treatment will be avoided or minimized  Outcome: Ongoing     Problem: Nutrition Deficits  Goal: Optimize nutritional status  Outcome: Ongoing     Problem: Risk for Fluid Volume Deficit  Goal: Maintain normal heart rhythm  Outcome: Ongoing     Problem: Risk for Fluid Volume Deficit  Goal: Maintain absence of muscle cramping  Outcome: Ongoing     Problem: Risk for Fluid Volume Deficit  Goal: Maintain normal serum potassium, sodium, calcium, phosphorus, and pH  Outcome: Ongoing     Problem: Loneliness or Risk for Loneliness  Goal: Demonstrate positive use of time alone when socialization is not possible  Outcome: Ongoing     Problem: Fatigue  Goal: Verbalize increase energy and improved vitality  Outcome: Ongoing     Problem: Patient Education: Go to Patient Education Activity  Goal: Patient/Family Education  Outcome: Ongoing

## 2022-02-11 NOTE — DISCHARGE INSTR - COC
Continuity of Care Form    Patient Name: Lance Carlson   :  1955  MRN:  [id number]    Admit date:  2022  Discharge date:  2022    Code Status Order: Full Code   Advance Directives:      Admitting Physician:  Samantha Styles DO  PCP: No primary care provider on file. Discharging Nurse: [facility] Unit/Room#: 7547/0602-F  Discharging Unit Phone Number: [phone]    Emergency Contact:   Extended Emergency Contact Information  Primary Emergency Contact: Maren Quispe  Mobile Phone: [phone]  Relation: Domestic Partner   needed? No  Secondary Emergency Contact: [facility] Phone: [phone]  Mobile Phone: [phone]  Relation: Child  Preferred language: English   needed? No    Past Surgical History:  Past Surgical History:   Procedure Laterality Date    TRACHEAL SURGERY N/A 2/10/2022    TRACHEOTOMY PERCUTANEOUS BRONCHOSCOPY performed by Quinn Garcia MD at [address] [facility] N/A 2/10/2022    EGD ESOPHAGOGASTRODUODENOSCOPY PEG TUBE INSERTION performed by Quinn Garcia MD at [facility] ENDOSCOPY       Immunization History: There is no immunization history on file for this patient.     Active Problems:  Patient Active Problem List   Diagnosis Code    Pneumonia due to COVID-19 virus U07.1, J12.82       Isolation/Infection:   Isolation            Droplet Plus          Patient Infection Status       Infection Onset Added Last Indicated Last Indicated By Review Planned Expiration Resolved Resolved By    COVID-19 22 COVID-19, Rapid 22      Resolved    COVID-19 (Rule Out) 22 COVID-19, Rapid (Ordered)   22 Rule-Out Test Resulted            Nurse Assessment:  Last Vital Signs: /64   Pulse 115   Temp 99 °F (37.2 °C) (Temporal)   Resp 26   Ht 5' (1.524 m)   Wt 117 lb (53.1 kg)   SpO2 95%   BMI 22.85 kg/m²     Last documented pain score (0-10 scale): Pain Level: 0  Last Weight:   Wt Readings from Last 1 Encounters:   02/11/22 117 lb (53.1 kg)     Mental Status:  disoriented and alert    IV Access:  Peripheral IVs X 2    Nursing Mobility/ADLs:  Walking   Dependent  Transfer  Dependent  Bathing  Dependent  Dressing  Dependent  Toileting  Dependent  Feeding  Dependent  Med Admin  Dependent  Med Delivery   crushed    Wound Care Documentation and Therapy:        Elimination:  Continence: Bowel: No  Bladder: No  Urinary Catheter: Insertion Date: 1/29/2022    Colostomy/Ileostomy/Ileal Conduit: No       Date of Last BM: ***    Intake/Output Summary (Last 24 hours) at 2/11/2022 1009  Last data filed at 2/11/2022 0518  Gross per 24 hour   Intake 882 ml   Output 470 ml   Net 412 ml     I/O last 3 completed shifts: In: 3868 [I.V.:411; Blood:340; NG/GT:3117]  Out: 3013 [Urine:1570]    Safety Concerns:      At Risk for Falls and Aspiration Risk    Impairments/Disabilities:      None    Nutrition Therapy:  Current Nutrition Therapy:   Tube Feedings    Routes of Feeding: Gastrostomy Tube  Liquids: ***  Daily Fluid Restriction: no  Last Modified Barium Swallow with Video (Video Swallowing Test): not done    Treatments at the Time of Hospital Discharge:   Respiratory Treatments: ***  Oxygen Therapy:   ***  Ventilator:    - Ventilator Settings:    Vt Ordered: 350 mL  Rate Set: 14 bmp  FiO2 : 60 %    PEEP/CPAP: 5  Pressure Support: 0 cmH20    Rehab Therapies: Physical Therapy, Occupational Therapy, and Speech/Language Therapy  Weight Bearing Status/Restrictions: No weight bearing restirctions  Other Medical Equipment (for information only, NOT a DME order):  hospital bed  Other Treatments: ***    Patient's personal belongings (please select all that are sent with patient):  None    RN SIGNATURE:  Electronically signed by Sandra Rosario RN on 2/11/22 at 10:11 AM EST    CASE MANAGEMENT/SOCIAL WORK SECTION    Inpatient Status Date: ***    Readmission Risk Assessment Score:  Readmission Risk              Risk of Unplanned Readmission:  25           Discharging to Facility/ Agency   Name:   Address:  Phone:  Fax:    Dialysis Facility (if applicable)   Name:  Address:  Dialysis Schedule:  Phone:  Fax:    / signature: {Edwigegnature:[id number]}    PHYSICIAN SECTION    Prognosis: Fair    Condition at Discharge: Stable    Rehab Potential (if transferring to Rehab): Guarded    Recommended Labs or Other Treatments After Discharge: ***    Physician Certification: I certify the above information and transfer of Lyndon Jenkins  is necessary for the continuing treatment of the diagnosis listed and that she requires LTAC for greater 30 days.      Update Admission H&P: {CHP DME Changes in ZFKKK:[id number]}    PHYSICIAN SIGNATURE:  Electronically signed by Jae Francisco MD on 2/11/2022 at 10:10 AM

## 2022-02-11 NOTE — CARE COORDINATION
2/11: Update CM Note: [facility] can accept her today room 42[facility] A at 2pm today. Elise/Charge aware. Ciara Lopez daughter updated.  Electronically signed by Ezequiel Trevino RN on 2/11/2022 at 10:35 AM

## 2022-02-14 LAB
BLOOD CULTURE, ROUTINE: NORMAL
CULTURE, BLOOD 2: NORMAL

## 2022-02-14 PROCEDURE — 83880 ASSAY OF NATRIURETIC PEPTIDE: CPT

## 2022-02-14 PROCEDURE — 84145 PROCALCITONIN (PCT): CPT

## 2022-02-14 PROCEDURE — 86140 C-REACTIVE PROTEIN: CPT

## 2022-02-14 PROCEDURE — 85378 FIBRIN DEGRADE SEMIQUANT: CPT

## 2022-02-14 PROCEDURE — 80076 HEPATIC FUNCTION PANEL: CPT

## 2022-02-14 PROCEDURE — 85610 PROTHROMBIN TIME: CPT

## 2022-02-15 PROCEDURE — 87635 SARS-COV-2 COVID-19 AMP PRB: CPT

## 2022-02-17 PROCEDURE — 87088 URINE BACTERIA CULTURE: CPT

## 2022-02-19 PROCEDURE — 87635 SARS-COV-2 COVID-19 AMP PRB: CPT

## 2022-03-14 LAB
FUNGUS (MYCOLOGY) CULTURE: ABNORMAL
FUNGUS STAIN: ABNORMAL
ORGANISM: ABNORMAL

## (undated) DEVICE — BRONCHOSCOPY PACK: Brand: MEDLINE INDUSTRIES, INC.

## (undated) DEVICE — ADAPTER TBNG DIA15MM SWVL FBROPT BRONCHSCP TERM 2 AXIS PEEP

## (undated) DEVICE — Device

## (undated) DEVICE — DEFENDO AIR WATER SUCTION AND BIOPSY VALVE KIT FOR  OLYMPUS: Brand: DEFENDO AIR/WATER/SUCTION AND BIOPSY VALVE

## (undated) DEVICE — GAUZE,SPONGE,POST-OP,4X3,STRL,LF: Brand: MEDLINE

## (undated) DEVICE — BITEBLOCK 54FR W/ DENT RIM BLOX